# Patient Record
Sex: FEMALE | Race: WHITE | Employment: FULL TIME | URBAN - METROPOLITAN AREA
[De-identification: names, ages, dates, MRNs, and addresses within clinical notes are randomized per-mention and may not be internally consistent; named-entity substitution may affect disease eponyms.]

---

## 2017-03-29 ENCOUNTER — HOSPITAL ENCOUNTER (EMERGENCY)
Age: 27
Discharge: HOME | End: 2017-03-29
Attending: EMERGENCY MEDICINE
Payer: MEDICARE

## 2017-03-29 VITALS
BODY MASS INDEX: 28.25 KG/M2 | SYSTOLIC BLOOD PRESSURE: 139 MMHG | RESPIRATION RATE: 16 BRPM | OXYGEN SATURATION: 98 % | HEIGHT: 67 IN | HEART RATE: 92 BPM | DIASTOLIC BLOOD PRESSURE: 87 MMHG | WEIGHT: 180 LBS | TEMPERATURE: 97.9 F

## 2017-03-29 DIAGNOSIS — F41.1 ANXIETY STATE: Primary | ICD-10-CM

## 2017-03-29 PROCEDURE — 99282 EMERGENCY DEPT VISIT SF MDM: CPT

## 2017-03-29 RX ORDER — LORAZEPAM 1 MG/1
1 TABLET ORAL ONCE
Status: DISCONTINUED | OUTPATIENT
Start: 2017-03-29 | End: 2017-03-29

## 2017-03-29 RX ORDER — LORAZEPAM 1 MG/1
0.5 TABLET ORAL EVERY 8 HOURS PRN
Qty: 5 TABLET | Refills: 0 | Status: SHIPPED | OUTPATIENT
Start: 2017-03-29 | End: 2017-04-28

## 2017-03-29 ASSESSMENT — PAIN SCALES - GENERAL: PAINLEVEL_OUTOF10: 2

## 2017-05-30 ENCOUNTER — HOSPITAL ENCOUNTER (EMERGENCY)
Facility: CLINIC | Age: 27
Discharge: HOME | End: 2017-05-30
Attending: EMERGENCY MEDICINE
Payer: MEDICARE

## 2017-05-30 VITALS
TEMPERATURE: 98.5 F | HEART RATE: 94 BPM | WEIGHT: 180 LBS | OXYGEN SATURATION: 98 % | SYSTOLIC BLOOD PRESSURE: 129 MMHG | BODY MASS INDEX: 28.25 KG/M2 | RESPIRATION RATE: 20 BRPM | HEIGHT: 67 IN | DIASTOLIC BLOOD PRESSURE: 73 MMHG

## 2017-05-30 DIAGNOSIS — N76.0 BACTERIAL VAGINOSIS: Primary | ICD-10-CM

## 2017-05-30 DIAGNOSIS — B96.89 BACTERIAL VAGINOSIS: Primary | ICD-10-CM

## 2017-05-30 DIAGNOSIS — N39.0 URINARY TRACT INFECTION, SITE UNSPECIFIED: ICD-10-CM

## 2017-05-30 LAB
-: ABNORMAL
ABSOLUTE EOS #: 0.1 K/UL (ref 0–0.4)
ABSOLUTE LYMPH #: 2.4 K/UL (ref 1–4.8)
ABSOLUTE MONO #: 0.4 K/UL (ref 0.1–1.2)
AMORPHOUS: ABNORMAL
BACTERIA: ABNORMAL
BASOPHILS # BLD: 0 %
BASOPHILS ABSOLUTE: 0 K/UL (ref 0–0.2)
BILIRUBIN URINE: NEGATIVE
CASTS UA: ABNORMAL /LPF (ref 0–2)
COLOR: YELLOW
COMMENT UA: ABNORMAL
CRYSTALS, UA: ABNORMAL /HPF
DIFFERENTIAL TYPE: ABNORMAL
DIRECT EXAM: ABNORMAL
EOSINOPHILS RELATIVE PERCENT: 1 %
EPITHELIAL CELLS UA: ABNORMAL /HPF (ref 0–5)
GLUCOSE URINE: NEGATIVE
HCG QUALITATIVE: NEGATIVE
HCT VFR BLD CALC: 48 % (ref 36–46)
HEMOGLOBIN: 15.9 G/DL (ref 12–16)
KETONES, URINE: NEGATIVE
LEUKOCYTE ESTERASE, URINE: ABNORMAL
LIPASE: 18 U/L (ref 13–60)
LYMPHOCYTES # BLD: 27 %
Lab: ABNORMAL
MCH RBC QN AUTO: 30.8 PG (ref 26–34)
MCHC RBC AUTO-ENTMCNC: 33.2 G/DL (ref 31–37)
MCV RBC AUTO: 93 FL (ref 80–100)
MONOCYTES # BLD: 5 %
MUCUS: ABNORMAL
NITRITE, URINE: POSITIVE
OTHER OBSERVATIONS UA: ABNORMAL
PDW BLD-RTO: 15.3 % (ref 12.5–15.4)
PH UA: 7 (ref 5–8)
PLATELET # BLD: 166 K/UL (ref 140–450)
PLATELET ESTIMATE: ABNORMAL
PMV BLD AUTO: 10.4 FL (ref 6–12)
PROTEIN UA: NEGATIVE
RBC # BLD: 5.16 M/UL (ref 4–5.2)
RBC # BLD: ABNORMAL 10*6/UL
RBC UA: ABNORMAL /HPF (ref 0–2)
RENAL EPITHELIAL, UA: ABNORMAL /HPF
SEG NEUTROPHILS: 67 %
SEGMENTED NEUTROPHILS ABSOLUTE COUNT: 6 K/UL (ref 1.8–7.7)
SPECIFIC GRAVITY UA: 1.01 (ref 1–1.03)
SPECIMEN DESCRIPTION: ABNORMAL
STATUS: ABNORMAL
TRICHOMONAS: ABNORMAL
TURBIDITY: ABNORMAL
URINE HGB: NEGATIVE
UROBILINOGEN, URINE: NORMAL
WBC # BLD: 8.9 K/UL (ref 3.5–11)
WBC # BLD: ABNORMAL 10*3/UL
WBC UA: ABNORMAL /HPF (ref 0–5)
YEAST: ABNORMAL

## 2017-05-30 PROCEDURE — 87480 CANDIDA DNA DIR PROBE: CPT

## 2017-05-30 PROCEDURE — 83690 ASSAY OF LIPASE: CPT

## 2017-05-30 PROCEDURE — 81001 URINALYSIS AUTO W/SCOPE: CPT

## 2017-05-30 PROCEDURE — 87088 URINE BACTERIA CULTURE: CPT

## 2017-05-30 PROCEDURE — 87491 CHLMYD TRACH DNA AMP PROBE: CPT

## 2017-05-30 PROCEDURE — 36415 COLL VENOUS BLD VENIPUNCTURE: CPT

## 2017-05-30 PROCEDURE — 87510 GARDNER VAG DNA DIR PROBE: CPT

## 2017-05-30 PROCEDURE — 87086 URINE CULTURE/COLONY COUNT: CPT

## 2017-05-30 PROCEDURE — 87660 TRICHOMONAS VAGIN DIR PROBE: CPT

## 2017-05-30 PROCEDURE — 85025 COMPLETE CBC W/AUTO DIFF WBC: CPT

## 2017-05-30 PROCEDURE — 99284 EMERGENCY DEPT VISIT MOD MDM: CPT

## 2017-05-30 PROCEDURE — 87591 N.GONORRHOEAE DNA AMP PROB: CPT

## 2017-05-30 PROCEDURE — 87186 SC STD MICRODIL/AGAR DIL: CPT

## 2017-05-30 PROCEDURE — 84703 CHORIONIC GONADOTROPIN ASSAY: CPT

## 2017-05-30 PROCEDURE — 6370000000 HC RX 637 (ALT 250 FOR IP): Performed by: EMERGENCY MEDICINE

## 2017-05-30 RX ORDER — IBUPROFEN 800 MG/1
800 TABLET ORAL EVERY 8 HOURS PRN
Qty: 30 TABLET | Refills: 0 | Status: SHIPPED | OUTPATIENT
Start: 2017-05-30 | End: 2017-06-19

## 2017-05-30 RX ORDER — ACETAMINOPHEN 500 MG
1000 TABLET ORAL ONCE
Status: COMPLETED | OUTPATIENT
Start: 2017-05-30 | End: 2017-05-30

## 2017-05-30 RX ORDER — METRONIDAZOLE 500 MG/1
500 TABLET ORAL 2 TIMES DAILY
Qty: 14 TABLET | Refills: 0 | Status: SHIPPED | OUTPATIENT
Start: 2017-05-30 | End: 2017-06-06

## 2017-05-30 RX ORDER — NITROFURANTOIN 25; 75 MG/1; MG/1
100 CAPSULE ORAL 2 TIMES DAILY
Qty: 10 CAPSULE | Refills: 0 | Status: SHIPPED | OUTPATIENT
Start: 2017-05-30 | End: 2017-06-04

## 2017-05-30 RX ADMIN — ACETAMINOPHEN 1000 MG: 500 TABLET ORAL at 14:00

## 2017-05-30 ASSESSMENT — ENCOUNTER SYMPTOMS
ABDOMINAL PAIN: 0
BACK PAIN: 0
NAUSEA: 0
CONSTIPATION: 0
EYE PAIN: 0
BLOOD IN STOOL: 0
DIARRHEA: 0
COUGH: 0
VOMITING: 0
SHORTNESS OF BREATH: 0

## 2017-05-30 ASSESSMENT — PAIN DESCRIPTION - ONSET: ONSET: SUDDEN

## 2017-05-30 ASSESSMENT — PAIN DESCRIPTION - FREQUENCY: FREQUENCY: INTERMITTENT

## 2017-05-30 ASSESSMENT — PAIN DESCRIPTION - ORIENTATION: ORIENTATION: LEFT;RIGHT;LOWER

## 2017-05-30 ASSESSMENT — PAIN DESCRIPTION - LOCATION
LOCATION: ABDOMEN
LOCATION: ABDOMEN

## 2017-05-30 ASSESSMENT — PAIN SCALES - GENERAL
PAINLEVEL_OUTOF10: 10
PAINLEVEL_OUTOF10: 8

## 2017-05-30 ASSESSMENT — PAIN DESCRIPTION - PAIN TYPE
TYPE: ACUTE PAIN
TYPE: ACUTE PAIN

## 2017-05-30 ASSESSMENT — PAIN DESCRIPTION - PROGRESSION: CLINICAL_PROGRESSION: GRADUALLY WORSENING

## 2017-05-31 LAB
C TRACH DNA GENITAL QL NAA+PROBE: NEGATIVE
N. GONORRHOEAE DNA: NEGATIVE

## 2017-06-01 LAB
CULTURE: ABNORMAL
CULTURE: ABNORMAL
Lab: ABNORMAL
ORGANISM: ABNORMAL
SPECIMEN DESCRIPTION: ABNORMAL
SPECIMEN DESCRIPTION: ABNORMAL
STATUS: ABNORMAL

## 2017-06-19 ENCOUNTER — APPOINTMENT (OUTPATIENT)
Dept: GENERAL RADIOLOGY | Age: 27
End: 2017-06-19
Payer: MEDICARE

## 2017-06-19 ENCOUNTER — HOSPITAL ENCOUNTER (EMERGENCY)
Age: 27
Discharge: HOME | End: 2017-06-19
Attending: EMERGENCY MEDICINE
Payer: MEDICARE

## 2017-06-19 VITALS
HEIGHT: 67 IN | TEMPERATURE: 98.6 F | HEART RATE: 76 BPM | WEIGHT: 185.5 LBS | OXYGEN SATURATION: 95 % | RESPIRATION RATE: 16 BRPM | BODY MASS INDEX: 29.11 KG/M2 | DIASTOLIC BLOOD PRESSURE: 68 MMHG | SYSTOLIC BLOOD PRESSURE: 122 MMHG

## 2017-06-19 DIAGNOSIS — S93.602A FOOT SPRAIN, LEFT, INITIAL ENCOUNTER: Primary | ICD-10-CM

## 2017-06-19 PROCEDURE — 73630 X-RAY EXAM OF FOOT: CPT

## 2017-06-19 PROCEDURE — 99283 EMERGENCY DEPT VISIT LOW MDM: CPT

## 2017-06-19 RX ORDER — NAPROXEN 500 MG/1
500 TABLET ORAL 2 TIMES DAILY PRN
Qty: 20 TABLET | Refills: 0 | Status: SHIPPED | OUTPATIENT
Start: 2017-06-19 | End: 2021-04-13 | Stop reason: ALTCHOICE

## 2017-06-19 RX ORDER — IBUPROFEN 800 MG/1
800 TABLET ORAL ONCE
Status: DISCONTINUED | OUTPATIENT
Start: 2017-06-19 | End: 2017-06-19 | Stop reason: HOSPADM

## 2017-06-19 ASSESSMENT — PAIN DESCRIPTION - LOCATION: LOCATION: FOOT

## 2017-06-19 ASSESSMENT — PAIN DESCRIPTION - DESCRIPTORS: DESCRIPTORS: ACHING

## 2017-06-19 ASSESSMENT — PAIN DESCRIPTION - ORIENTATION: ORIENTATION: LEFT

## 2017-06-19 ASSESSMENT — PAIN DESCRIPTION - FREQUENCY: FREQUENCY: CONTINUOUS

## 2017-06-19 ASSESSMENT — PAIN SCALES - GENERAL: PAINLEVEL_OUTOF10: 8

## 2018-12-29 ENCOUNTER — HOSPITAL ENCOUNTER (EMERGENCY)
Age: 28
Discharge: HOME | End: 2018-12-29
Attending: EMERGENCY MEDICINE
Payer: MEDICARE

## 2018-12-29 VITALS
OXYGEN SATURATION: 100 % | BODY MASS INDEX: 28.25 KG/M2 | DIASTOLIC BLOOD PRESSURE: 68 MMHG | WEIGHT: 180 LBS | TEMPERATURE: 98.4 F | HEART RATE: 83 BPM | HEIGHT: 67 IN | SYSTOLIC BLOOD PRESSURE: 135 MMHG | RESPIRATION RATE: 18 BRPM

## 2018-12-29 DIAGNOSIS — K08.89 PAIN, DENTAL: Primary | ICD-10-CM

## 2018-12-29 PROCEDURE — 99283 EMERGENCY DEPT VISIT LOW MDM: CPT

## 2018-12-29 RX ORDER — PENICILLIN V POTASSIUM 500 MG/1
500 TABLET ORAL 4 TIMES DAILY
Qty: 40 TABLET | Refills: 0 | Status: SHIPPED | OUTPATIENT
Start: 2018-12-29 | End: 2021-04-13 | Stop reason: ALTCHOICE

## 2018-12-29 ASSESSMENT — PAIN DESCRIPTION - ORIENTATION: ORIENTATION: LEFT;LOWER;UPPER

## 2018-12-29 ASSESSMENT — ENCOUNTER SYMPTOMS
RHINORRHEA: 0
VOMITING: 0
DIARRHEA: 0
SHORTNESS OF BREATH: 0
SINUS PRESSURE: 0
COLOR CHANGE: 0
CONSTIPATION: 0
COUGH: 0
NAUSEA: 0
ABDOMINAL PAIN: 0
WHEEZING: 0
SORE THROAT: 0

## 2018-12-29 ASSESSMENT — PAIN DESCRIPTION - LOCATION: LOCATION: JAW

## 2018-12-29 ASSESSMENT — PAIN DESCRIPTION - PAIN TYPE: TYPE: ACUTE PAIN

## 2018-12-29 ASSESSMENT — PAIN DESCRIPTION - DESCRIPTORS: DESCRIPTORS: ACHING;THROBBING;STABBING;SHARP

## 2018-12-29 ASSESSMENT — PAIN SCALES - GENERAL: PAINLEVEL_OUTOF10: 10

## 2019-05-18 ENCOUNTER — APPOINTMENT (OUTPATIENT)
Dept: CT IMAGING | Age: 29
End: 2019-05-18
Payer: MEDICARE

## 2019-05-18 ENCOUNTER — HOSPITAL ENCOUNTER (EMERGENCY)
Age: 29
Discharge: HOME | End: 2019-05-18
Payer: MEDICARE

## 2019-05-18 VITALS
OXYGEN SATURATION: 100 % | DIASTOLIC BLOOD PRESSURE: 69 MMHG | SYSTOLIC BLOOD PRESSURE: 130 MMHG | BODY MASS INDEX: 26.73 KG/M2 | HEART RATE: 67 BPM | RESPIRATION RATE: 16 BRPM | HEIGHT: 67 IN | WEIGHT: 170.3 LBS | TEMPERATURE: 98.5 F

## 2019-05-18 DIAGNOSIS — S09.90XA MINOR HEAD INJURY, INITIAL ENCOUNTER: ICD-10-CM

## 2019-05-18 DIAGNOSIS — S09.90XA CLOSED HEAD INJURY, INITIAL ENCOUNTER: ICD-10-CM

## 2019-05-18 DIAGNOSIS — S09.90XA INJURY OF HEAD, INITIAL ENCOUNTER: Primary | ICD-10-CM

## 2019-05-18 PROCEDURE — 70450 CT HEAD/BRAIN W/O DYE: CPT

## 2019-05-18 PROCEDURE — 99285 EMERGENCY DEPT VISIT HI MDM: CPT

## 2019-05-18 RX ORDER — METRONIDAZOLE 500 MG/1
500 TABLET ORAL 2 TIMES DAILY
Qty: 14 TABLET | Refills: 0 | Status: SHIPPED | OUTPATIENT
Start: 2019-05-18 | End: 2019-05-25

## 2019-05-18 RX ORDER — DEXTROAMPHETAMINE SACCHARATE, AMPHETAMINE ASPARTATE, DEXTROAMPHETAMINE SULFATE AND AMPHETAMINE SULFATE 5; 5; 5; 5 MG/1; MG/1; MG/1; MG/1
20 TABLET ORAL 2 TIMES DAILY
COMMUNITY
End: 2021-04-13 | Stop reason: ALTCHOICE

## 2019-05-18 ASSESSMENT — PAIN DESCRIPTION - DESCRIPTORS: DESCRIPTORS: PRESSURE

## 2019-05-18 ASSESSMENT — PAIN DESCRIPTION - FREQUENCY: FREQUENCY: CONTINUOUS

## 2019-05-18 ASSESSMENT — PAIN DESCRIPTION - LOCATION: LOCATION: HEAD

## 2019-05-18 ASSESSMENT — PAIN SCALES - GENERAL: PAINLEVEL_OUTOF10: 8

## 2019-05-19 NOTE — ED NOTES
Pt discharge discussed. CT results discussed. Pt advised that if sx persist to f/u with PCP for more in-depth testing than what can be done in an ED setting. Pt had no further questions or concerns.       Chelsie Delgado RN  05/18/19 1814

## 2019-05-20 ASSESSMENT — ENCOUNTER SYMPTOMS
WHEEZING: 0
SHORTNESS OF BREATH: 0
VOMITING: 0
ABDOMINAL PAIN: 0
RHINORRHEA: 0
SORE THROAT: 0
CONSTIPATION: 0
COLOR CHANGE: 0
COUGH: 0
SINUS PRESSURE: 0
DIARRHEA: 0
NAUSEA: 0
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
## 2020-06-18 ENCOUNTER — TELEPHONE (OUTPATIENT)
Dept: OBGYN CLINIC | Age: 30
End: 2020-06-18

## 2021-02-04 ENCOUNTER — HOSPITAL ENCOUNTER (OUTPATIENT)
Age: 31
Setting detail: SPECIMEN
Discharge: HOME | End: 2021-02-04
Payer: MEDICARE

## 2021-02-04 ENCOUNTER — OFFICE VISIT (OUTPATIENT)
Dept: OBGYN CLINIC | Age: 31
End: 2021-02-04
Payer: MEDICARE

## 2021-02-04 VITALS
SYSTOLIC BLOOD PRESSURE: 118 MMHG | WEIGHT: 142 LBS | HEIGHT: 67 IN | DIASTOLIC BLOOD PRESSURE: 72 MMHG | BODY MASS INDEX: 22.29 KG/M2

## 2021-02-04 DIAGNOSIS — Z20.2 STD EXPOSURE: ICD-10-CM

## 2021-02-04 DIAGNOSIS — N88.9 CERVICAL LESION: ICD-10-CM

## 2021-02-04 DIAGNOSIS — Z01.419 ENCOUNTER FOR GYNECOLOGICAL EXAMINATION WITHOUT ABNORMAL FINDING: Primary | ICD-10-CM

## 2021-02-04 DIAGNOSIS — R35.0 FREQUENT URINATION: Primary | ICD-10-CM

## 2021-02-04 PROCEDURE — G8420 CALC BMI NORM PARAMETERS: HCPCS | Performed by: OBSTETRICS & GYNECOLOGY

## 2021-02-04 PROCEDURE — G8427 DOCREV CUR MEDS BY ELIG CLIN: HCPCS | Performed by: OBSTETRICS & GYNECOLOGY

## 2021-02-04 PROCEDURE — G8484 FLU IMMUNIZE NO ADMIN: HCPCS | Performed by: OBSTETRICS & GYNECOLOGY

## 2021-02-04 PROCEDURE — 99203 OFFICE O/P NEW LOW 30 MIN: CPT | Performed by: OBSTETRICS & GYNECOLOGY

## 2021-02-04 PROCEDURE — 4004F PT TOBACCO SCREEN RCVD TLK: CPT | Performed by: OBSTETRICS & GYNECOLOGY

## 2021-02-04 ASSESSMENT — PATIENT HEALTH QUESTIONNAIRE - PHQ9
1. LITTLE INTEREST OR PLEASURE IN DOING THINGS: 0
2. FEELING DOWN, DEPRESSED OR HOPELESS: 0
SUM OF ALL RESPONSES TO PHQ QUESTIONS 1-9: 0

## 2021-02-04 NOTE — PATIENT INSTRUCTIONS
We will contact you with the results of today's vaginal cultures and if any treatment is necessary we will send that to your pharmacy. We will also contact you with the results of today's Pap smear and after that is resulted please schedule an appointment in the office to have another colposcopy of your cervix. If you still want Depo-Provera we will begin within 5 days of your next normal cycle.   Striving to serve committed to caring the people station the juice .3 striving to serve committed to caring the people station the juice .3

## 2021-02-04 NOTE — PROGRESS NOTES
DATE OF VISIT:  21        History and Physical    Cheryle Giang    :  1990  CHIEF COMPLAINT:    Chief Complaint   Patient presents with   Flaco Novak Doctor     New patient Here for annual last pap 18 LSIL has freq urination  wants cultures done                       HPI :   Cheryle Giang is a 31 y.o. femaleGRAVIDA 1 PARA 1001    Cheryle Giang comes to the office today as a new visit self-referral.  She is here to establish care and have her annual exam.  She was last seen by a GYN 2 to 3 years ago for colposcopy secondary to LGSIL. Currently she is not sexually active, has been on Depo-Provera in the past and desires to restart. She states that she is not sure if her discharge is abnormal today? She is also been experiencing urinary frequency without dysuria or hematuria. She has regular monthly cycles without BTB. She is having regular bowel movements without constipation or diarrhea. She is otherwise without any significant complaints today. Omi Crespo is currently on Subutex for previous heroin abuse and states that she has not used in 4 years. _____________________________________________________________________  Past Medical History:   Diagnosis Date    ADD (attention deficit disorder)     Substance abuse ([facility] [address]     heroin / on 18 pt states last used 3 yrs ago                                                                   Past Surgical History:   Procedure Laterality Date    OVARY REMOVAL      left    TONSILLECTOMY       History reviewed. No pertinent family history.   Social History     Tobacco Use   Smoking Status Current Every Day Smoker    Packs/day: 0.50    Types: Cigarettes   Smokeless Tobacco Never Used     Social History     Substance and Sexual Activity   Alcohol Use Yes    Comment: socially     Current Outpatient Medications   Medication Sig Dispense Refill    Buprenorphine HCl (SUBUTEX SL) Place under the tongue 2 / 8mg daily      amphetamine-dextroamphetamine (ADDERALL, 20MG,) 20 MG tablet Take 20 mg by mouth 2 times daily.  Prenatal Multivit-Min-Fe-FA (PRENATAL VITAMINS PO) Take by mouth      penicillin v potassium (VEETID) 500 MG tablet Take 1 tablet by mouth 4 times daily (Patient not taking: Reported on 2021) 40 tablet 0    naproxen (NAPROSYN) 500 MG tablet Take 1 tablet by mouth 2 times daily as needed for Pain (Patient not taking: Reported on 2021) 20 tablet 0     No current facility-administered medications for this visit. Allergies: Allergies   Allergen Reactions    Ceftin [Cefuroxime Axetil]        Gynecologic History:  Patient's last menstrual period was 2021. Sexually Active: No  STD History: No  Abnormal Pap History yes  Birth Control: No    OB History    Para Term  AB Living   1 0 0 0 0 1   SAB TAB Ectopic Molar Multiple Live Births   0 0 0 0 0 0     ______________________________________________________________________  REVIEW OF SYSTEMS:        Constitutional:  Unexpected weight change no  Neurological:  Frequent headaches  no  Ophthalmic:  Recent visual changes no  ENT:   Difficulty swallowing  no  Breast:              Masses   no     Respiratory:  Shortness of breath  no    Cardiovascular: Chest pain   no     Gastrointestinal: Chronic diarrhea/constipation no   Urogenital:  Urinary incontinence  no                                         Heavy/irregular periods           no                                      Vaginal discharge                   ?   Hematological: Bruises easy   no     Endocrine:  Nipple Discharge  no     Hot/Cold Intolerance  no   Psychological:  Mood and affect were wnl yes                                                                                                                                           Physical Exam:    Vitals:    21 0925   BP: 118/72   Site: Right Upper Arm   Position: Sitting   Cuff Size: Medium Adult   Weight: 142 lb (64.4 kg) Height: 5' 7\" (1.702 m)       General Appearance:  She does not appear to be in any distress. This  is a well developed, well nourished, well groomed female. Neurological:  The patient is alert and oriented to time, place, person, and situation without any noted sensory motor deficits. Skin:  A brief inspection of the skin revealed no rashes or lesions. Neck:  The neck was supple. There is no tracheal deviation, thyromegaly or supraclavicular adenopathy appreciated. Respiratory: There was unlabored respiratory effort. Lungs clear to ascultation without wheezes, rales or rhonchi in all fields bilaterally. Cardiovascular:  Normal sinus rhythm with a regular rate and without murmur, rubs or gallops. Abdomen: The abdomen was soft and non-tender with no guarding, rebound, CVAT or rigidity. No hernias were appreciated. Bowel sounds were normally active. Pelvic exam:  No vulvar or vaginal lesions are noted. There is a large white plaque at 12:00 on the cervix present. Normal vaginal discharge present, no significant cystocele, rectocele or enterocele noted. Uterus nongravid and without CMT and adnexa nontender and without abnormal masses bilaterally. Extremities:  FROM and nontender without clubbing cyanosis or edema. ASSESSMENT:         ICD-10-CM    1. Encounter for gynecological examination without abnormal finding  Z01.419 PAP SMEAR   2. STD exposure  Z20.2 C.trachomatis N.gonorrhoeae DNA     VAGINITIS DNA PROBE   3. Cervical lesion  N88.9                    PLAN:  Discussed presence of the cervical lesion with her today and colposcopy was recommended. She is agreeable and will schedule a separate appointment. No absolute contraindications to restart Depo-Provera and she will return within 5 days of her next normal menstrual cycle. Maykel Bernal M.D., Mph  [facility]  [place]

## 2021-02-05 DIAGNOSIS — Z20.2 STD EXPOSURE: ICD-10-CM

## 2021-02-05 LAB
-: ABNORMAL
AMORPHOUS: ABNORMAL
BACTERIA: ABNORMAL
BILIRUBIN URINE: NEGATIVE
CASTS UA: ABNORMAL /LPF (ref 0–8)
COLOR: ABNORMAL
COMMENT UA: ABNORMAL
CRYSTALS, UA: ABNORMAL /HPF
CRYSTALS, UA: ABNORMAL /HPF
DIRECT EXAM: NORMAL
EPITHELIAL CELLS UA: ABNORMAL /HPF (ref 0–5)
GLUCOSE URINE: NEGATIVE
KETONES, URINE: NEGATIVE
LEUKOCYTE ESTERASE, URINE: NEGATIVE
Lab: NORMAL
MUCUS: ABNORMAL
NITRITE, URINE: NEGATIVE
OTHER OBSERVATIONS UA: ABNORMAL
PH UA: 5.5 (ref 5–8)
PROTEIN UA: NEGATIVE
RBC UA: ABNORMAL /HPF (ref 0–4)
RENAL EPITHELIAL, UA: ABNORMAL /HPF
SPECIFIC GRAVITY UA: 1.02 (ref 1–1.03)
SPECIMEN DESCRIPTION: NORMAL
TRICHOMONAS: ABNORMAL
TURBIDITY: ABNORMAL
URINE HGB: NEGATIVE
UROBILINOGEN, URINE: NORMAL
WBC UA: ABNORMAL /HPF (ref 0–5)
YEAST: ABNORMAL

## 2021-02-06 LAB
CULTURE: NORMAL
Lab: NORMAL
SPECIMEN DESCRIPTION: NORMAL

## 2021-02-09 LAB
C TRACH DNA GENITAL QL NAA+PROBE: NEGATIVE
N. GONORRHOEAE DNA: NEGATIVE
SPECIMEN DESCRIPTION: NORMAL

## 2021-02-10 LAB
HPV SAMPLE: ABNORMAL
HPV, GENOTYPE 16: DETECTED
HPV, GENOTYPE 18: NOT DETECTED
HPV, HIGH RISK OTHER: NOT DETECTED
HPV, INTERPRETATION: ABNORMAL
SPECIMEN DESCRIPTION: ABNORMAL

## 2021-02-17 LAB — CYTOLOGY REPORT: NORMAL

## 2021-02-25 ENCOUNTER — HOSPITAL ENCOUNTER (OUTPATIENT)
Age: 31
Setting detail: SPECIMEN
Discharge: HOME | End: 2021-02-25
Payer: MEDICARE

## 2021-02-25 ENCOUNTER — PROCEDURE VISIT (OUTPATIENT)
Dept: OBGYN CLINIC | Age: 31
End: 2021-02-25
Payer: MEDICARE

## 2021-02-25 VITALS
SYSTOLIC BLOOD PRESSURE: 118 MMHG | TEMPERATURE: 97 F | WEIGHT: 142 LBS | HEIGHT: 67 IN | BODY MASS INDEX: 22.29 KG/M2 | DIASTOLIC BLOOD PRESSURE: 72 MMHG

## 2021-02-25 DIAGNOSIS — A63.0: ICD-10-CM

## 2021-02-25 DIAGNOSIS — N84.1 CERVICAL POLYP: ICD-10-CM

## 2021-02-25 DIAGNOSIS — R87.613 PAP SMEAR ABNORMALITY OF CERVIX WITH HGSIL: Primary | ICD-10-CM

## 2021-02-25 DIAGNOSIS — Z30.42 ENCOUNTER FOR MANAGEMENT AND INJECTION OF DEPO-PROVERA: ICD-10-CM

## 2021-02-25 PROCEDURE — 57455 BIOPSY OF CERVIX W/SCOPE: CPT | Performed by: OBSTETRICS & GYNECOLOGY

## 2021-02-25 RX ORDER — MEDROXYPROGESTERONE ACETATE 150 MG/ML
150 INJECTION, SUSPENSION INTRAMUSCULAR ONCE
Status: COMPLETED | OUTPATIENT
Start: 2021-02-25 | End: 2021-02-25

## 2021-02-25 RX ADMIN — MEDROXYPROGESTERONE ACETATE 150 MG: 150 INJECTION, SUSPENSION INTRAMUSCULAR at 15:40

## 2021-02-25 NOTE — PROGRESS NOTES
After obtaining consent, and per orders of Dr. Gerard Holly, injection of Depo Provera 150mg  given in Left upper quad. gluteus by Raj Felix. Patient instructed to remain in clinic for 20 minutes afterwards, and to report any adverse reaction to me immediately.

## 2021-02-25 NOTE — PATIENT INSTRUCTIONS
Please read the information given to you on abnormal Pap smears and LEEP surgery. You may also choose to reference Web MD or the [facility] of obstetrics and gynecology for more information. We will contact you with the results of today's biopsy and tentatively plan on performing a LEEP. Please call the office if you have any questions or concerns. Patient Education        Patient Education        Abnormal Pap Test: Care Instructions  Your Care Instructions     A Pap test (also called a Pap smear) is used to look for early changes that may become cancer of the cervix. Your Pap test was abnormal. That may mean that some cells in your cervix have changed. The cell changes are most often caused by human papillomavirus (HPV) infection. An abnormal Pap test does not mean that the abnormal cells will lead to cancer. Changes in cervical cells may go away on their own or may progress slowly. Your doctor may want to repeat the Pap test or have you take other tests to see if you have cell changes. It is very important that you have regular Pap tests after you've had an abnormal Pap test.  Follow-up care is a key part of your treatment and safety. Be sure to make and go to all appointments, and call your doctor if you are having problems. It's also a good idea to know your test results and keep a list of the medicines you take. How can you care for yourself at home? · Do not smoke. Smoking may increase your risk for cervical cell changes. If you need help quitting, talk to your doctor about stop-smoking programs and medicines. These can increase your chances of quitting for good. · Be sure to get follow-up Pap tests or other follow-up tests as recommended by your doctor. When should you call for help? Watch closely for changes in your health, and be sure to contact your doctor if you have any problems. Where can you learn more? Go to [email or web address] and sign in to your MyChart account. Enter P925 in the [facility] box to learn more about \"Abnormal Pap Test: Care Instructions. \"     If you do not have an account, please click on the \"Sign Up Now\" link. Current as of: April 29, 2020               Content Version: 12.6  © 9484-6362 [employer]. Care instructions adapted under license by [employer]. If you have questions about a medical condition or this instruction, always ask your healthcare professional. [address] any warranty or liability for your use of this information.        12

## 2021-02-25 NOTE — PROGRESS NOTES
Physical Exam  Genitourinary:            Genitourinary Comments: Constanza Enrique returns to the office today for colposcopic follow-up on an abnormal Pap smear. On her initial annual exam she was seen to have several large cervical condyloma and a cervical polyp. Pap smear results revealed HGSIL with HR-HPV+. Cervical material, (ThinPrep vial, Imaging-assisted review):   Specimen Adequacy:        Satisfactory for evaluation.        - Endocervical/transformation zone component present. Descriptive Diagnosis:        High-grade squamous intraepithelial lesion (HSIL).  Comments:        High Risk HPV testing was ordered. Cytotechnologist: Chel Dia M.D.   **Electronically Signed Out**         des/2/17/2021   Constanza Enrique has a history of abnormal Paps and was scheduled to have a colposcopy previously but did not follow-up. Her results were discussed and she agrees to proceed with colposcopy today. She also desires to get Depo-Provera. She has not been sexually active. Procedure/Addendum   HPV Procedure Report     Date Ordered:     2/8/2021     Status: Signed   Out        Date Complete:     2/8/2021     By: BETTIE Pascual(ASCP)        Date Reported:     2/18/2021       INTERPRETATION   Roche HPV DNA High Risk                                                               HPV Sample               Thin Prep                    (Ref Range)   HPV Type 16               Detected                    (Not Detected)   HPV Type 18               Not Detected                    (Not   Detected)   Other High Risk HPV     Not Detected                    (Not Detected)    Physical exam:  -------------------------              02/25/21                    1430        -------------------------   BP:         118/72        Temp:   97 °F (36.1 °C)  -------------------------    No vulvar lesions or inflammation noted. Speculum exam reveals small amount of post menstrual discharge.   Colposcopy was satisfactory. Large condylomatous plaque seen at 1:00 4 and 5:00. Benign-appearing cervical polyp also noted. Assessment/plan:  HGSIL, HR-HPV+  Cervical condyloma. Cervical polyp. ECC performed with Cytobrush and biopsy done at 1:00. Discussed abnormal Pap smears and LEEP. She was given information on both and will be contacted with today's results and recommendations.

## 2021-03-01 LAB — SURGICAL PATHOLOGY REPORT: NORMAL

## 2021-03-09 ENCOUNTER — OFFICE VISIT (OUTPATIENT)
Dept: OBGYN CLINIC | Age: 31
End: 2021-03-09
Payer: MEDICARE

## 2021-03-09 VITALS
TEMPERATURE: 97 F | WEIGHT: 138 LBS | HEIGHT: 67 IN | BODY MASS INDEX: 21.66 KG/M2 | SYSTOLIC BLOOD PRESSURE: 118 MMHG | DIASTOLIC BLOOD PRESSURE: 78 MMHG

## 2021-03-09 DIAGNOSIS — A63.0: ICD-10-CM

## 2021-03-09 DIAGNOSIS — F19.90 SUBSTANCE USE DISORDER: ICD-10-CM

## 2021-03-09 DIAGNOSIS — R87.613 HSIL (HIGH GRADE SQUAMOUS INTRAEPITHELIAL LESION) ON PAP SMEAR OF CERVIX: Primary | ICD-10-CM

## 2021-03-09 PROCEDURE — G8427 DOCREV CUR MEDS BY ELIG CLIN: HCPCS | Performed by: OBSTETRICS & GYNECOLOGY

## 2021-03-09 PROCEDURE — 4004F PT TOBACCO SCREEN RCVD TLK: CPT | Performed by: OBSTETRICS & GYNECOLOGY

## 2021-03-09 PROCEDURE — G8420 CALC BMI NORM PARAMETERS: HCPCS | Performed by: OBSTETRICS & GYNECOLOGY

## 2021-03-09 PROCEDURE — G8484 FLU IMMUNIZE NO ADMIN: HCPCS | Performed by: OBSTETRICS & GYNECOLOGY

## 2021-03-09 PROCEDURE — 99214 OFFICE O/P EST MOD 30 MIN: CPT | Performed by: OBSTETRICS & GYNECOLOGY

## 2021-03-09 NOTE — PATIENT INSTRUCTIONS
Please carefully follow all the preoperative instructions given to you. Please call the office number if you have any questions or concerns before or after surgery. Plan on returning to the office 10 to 14 days after surgery.

## 2021-03-09 NOTE — PROGRESS NOTES
DATE OF VISIT:  3/9/21        History and Physical    Cheryle Giang    :  1990  CHIEF COMPLAINT:    Chief Complaint   Patient presents with    Pre-op Exam     Here for Preop [place] HSIL 21                    HPI :   Cheryle Giang is a 31 y.o. femaleGRAVIDA 1 PARA 1001    Cheryle Giang returns today to discuss GYN surgery. She is here to discuss scheduling a LEEP secondary to    Diagnosis Orders   1. HSIL (high grade squamous intraepithelial lesion) on Pap smear of cervix     2. Cervical condyloma     3. Substance use disorder     4. Positive ECC for HGSIL     Patient education was previously done regarding her diagnosis and management. She does desire to proceed with the intended procedure. She is otherwise without any significant complaints.  _____________________________________________________________________  Past Medical History:   Diagnosis Date    ADD (attention deficit disorder)     Substance abuse ([facility] [address]     heroin / on 18 pt states last used 3 yrs ago                                                                   Past Surgical History:   Procedure Laterality Date    OVARY REMOVAL      left    TONSILLECTOMY       No family history on file. Social History     Tobacco Use   Smoking Status Current Every Day Smoker    Packs/day: 0.50    Types: Cigarettes   Smokeless Tobacco Never Used     Social History     Substance and Sexual Activity   Alcohol Use Yes    Comment: socially     Current Outpatient Medications   Medication Sig Dispense Refill    amphetamine-dextroamphetamine (ADDERALL, 20MG,) 20 MG tablet Take 20 mg by mouth 2 times daily.       Prenatal Multivit-Min-Fe-FA (PRENATAL VITAMINS PO) Take by mouth      Buprenorphine HCl (SUBUTEX SL) Place under the tongue 2 / 8mg daily      penicillin v potassium (VEETID) 500 MG tablet Take 1 tablet by mouth 4 times daily (Patient not taking: Reported on 2021) 40 tablet 0    naproxen (NAPROSYN) 500 MG tablet Take 1 tablet by mouth 2 times daily as needed for Pain (Patient not taking: Reported on 2021) 20 tablet 0     No current facility-administered medications for this visit. Allergies: Allergies   Allergen Reactions    Ceftin [Cefuroxime Axetil]        Gynecologic History:  Patient's last menstrual period was 2021. Sexually Active: Yes  STD History: No  Abnormal Pap History yes  Birth Control: No    OB History    Para Term  AB Living   1 0 0 0 0 1   SAB TAB Ectopic Molar Multiple Live Births   0 0 0 0 0 0     ______________________________________________________________________  REVIEW OF SYSTEMS:        Constitutional:  Unexpected weight change no  Neurological:  Frequent headaches  no  Ophthalmic:  Recent visual changes no  ENT:   Difficulty swallowing  no  Breast:              Masses   no     Respiratory:  Shortness of breath  no    Cardiovascular: Chest pain   no     Gastrointestinal: Chronic diarrhea/constipation no   Urogenital:  Urinary incontinence  no                                         Heavy/irregular periods           no                                      Vaginal discharge                   no  Hematological: Bruises easy   no     Endocrine:  Nipple Discharge  no     Hot/Cold Intolerance  no   Psychological:  Mood and affect were wnl yes                                                                                                                                           Physical Exam:    Vitals:    21 1407   BP: 118/78   Site: Right Upper Arm   Position: Sitting   Cuff Size: Medium Adult   Temp: 97 °F (36.1 °C)   Weight: 138 lb (62.6 kg)   Height: 5' 7\" (1.702 m)       General Appearance:  She does not appear to be in any distress. This  is a well developed, well nourished, well groomed female. Neurological:  The patient is alert and oriented to time, place, person, and situation without any noted sensory motor deficits.     Skin:  A brief inspection of the skin revealed no rashes or lesions. Neck:  The neck was supple. There is no tracheal deviation, thyromegaly or supraclavicular adenopathy appreciated. Respiratory: There was unlabored respiratory effort. Lungs clear to ascultation without wheezes, rales or rhonchi in all fields bilaterally. Cardiovascular:  Normal sinus rhythm with a regular rate and without murmur, rubs or gallops. Abdomen: The abdomen was soft and non-tender with no guarding, rebound, CVAT or rigidity. No hernias were appreciated. Bowel sounds were normally active. Pelvic exam:  For EUA. Extremities:  FROM and nontender without clubbing cyanosis or edema. ASSESSMENT:         ICD-10-CM    1. HSIL (high grade squamous intraepithelial lesion) on Pap smear of cervix  R87.613    2. Cervical condyloma  A63.0    3. Substance use disorder  F19.90                    PLAN:    Proper informed consent was done, alternatives were discussed   and she understands the surgical risks to the proposed procedure including but not limited to: infection, hemorrhage, blood clot formation, damage to the gastrointestinal/genital urinary/neurological/vascular systems, death and failure in the proposed procedure's intent. She also understands the risks from transfusion including but not limited to: fever, allergic reaction, hepatitis B/C. and HIV. All her questions have been answered to her satisfaction. The consent has been signed for a LEEP . Preop labs will include a CBC, type & screen, HCG and BRIGITTE. We will not require antibiotic prophylaxis and will use SCDs for VTE prophylaxis. She will plan on returning to the office in 1-2 weeks postop. Corbin Locke MD, MPH, [facility]    Patient was seen with total face to face time of 20 minutes.

## 2021-03-25 ENCOUNTER — OFFICE VISIT (OUTPATIENT)
Dept: FAMILY MEDICINE CLINIC | Age: 31
End: 2021-03-25
Payer: MEDICARE

## 2021-03-25 VITALS
OXYGEN SATURATION: 97 % | HEART RATE: 66 BPM | DIASTOLIC BLOOD PRESSURE: 70 MMHG | TEMPERATURE: 98.6 F | WEIGHT: 141.8 LBS | BODY MASS INDEX: 22.21 KG/M2 | SYSTOLIC BLOOD PRESSURE: 117 MMHG

## 2021-03-25 DIAGNOSIS — Z13.220 ENCOUNTER FOR LIPID SCREENING FOR CARDIOVASCULAR DISEASE: ICD-10-CM

## 2021-03-25 DIAGNOSIS — R41.840 INATTENTION: ICD-10-CM

## 2021-03-25 DIAGNOSIS — Z76.89 ENCOUNTER TO ESTABLISH CARE WITH NEW DOCTOR: Primary | ICD-10-CM

## 2021-03-25 DIAGNOSIS — Z11.59 NEED FOR HEPATITIS C SCREENING TEST: ICD-10-CM

## 2021-03-25 DIAGNOSIS — Z13.1 DIABETES MELLITUS SCREENING: ICD-10-CM

## 2021-03-25 DIAGNOSIS — Z13.6 ENCOUNTER FOR LIPID SCREENING FOR CARDIOVASCULAR DISEASE: ICD-10-CM

## 2021-03-25 PROCEDURE — 4004F PT TOBACCO SCREEN RCVD TLK: CPT | Performed by: FAMILY MEDICINE

## 2021-03-25 PROCEDURE — G8484 FLU IMMUNIZE NO ADMIN: HCPCS | Performed by: FAMILY MEDICINE

## 2021-03-25 PROCEDURE — G8427 DOCREV CUR MEDS BY ELIG CLIN: HCPCS | Performed by: FAMILY MEDICINE

## 2021-03-25 PROCEDURE — G8420 CALC BMI NORM PARAMETERS: HCPCS | Performed by: FAMILY MEDICINE

## 2021-03-25 PROCEDURE — 99204 OFFICE O/P NEW MOD 45 MIN: CPT | Performed by: FAMILY MEDICINE

## 2021-03-25 RX ORDER — HYDROCORTISONE ACETATE 25 MG/1
25 SUPPOSITORY RECTAL EVERY 12 HOURS
Qty: 10 SUPPOSITORY | Refills: 1 | Status: SHIPPED | OUTPATIENT
Start: 2021-03-25 | End: 2021-04-13 | Stop reason: ALTCHOICE

## 2021-03-25 SDOH — ECONOMIC STABILITY: FOOD INSECURITY: WITHIN THE PAST 12 MONTHS, THE FOOD YOU BOUGHT JUST DIDN'T LAST AND YOU DIDN'T HAVE MONEY TO GET MORE.: NEVER TRUE

## 2021-03-25 SDOH — ECONOMIC STABILITY: INCOME INSECURITY: HOW HARD IS IT FOR YOU TO PAY FOR THE VERY BASICS LIKE FOOD, HOUSING, MEDICAL CARE, AND HEATING?: NOT HARD AT ALL

## 2021-03-25 SDOH — ECONOMIC STABILITY: TRANSPORTATION INSECURITY
IN THE PAST 12 MONTHS, HAS THE LACK OF TRANSPORTATION KEPT YOU FROM MEDICAL APPOINTMENTS OR FROM GETTING MEDICATIONS?: NOT ASKED

## 2021-03-25 NOTE — PROGRESS NOTES
3/25/2021    Noelle Kim (:  1990) is a 31 y.o. female, coming today to establish care. She has a 3 yo son - ADHD messes with her head, since she was a kids. When she had the med her insurance cut off and she did not have the med. She was in a bad relationship but she got out and she now is falling apart. Her family helped her get out of this relationship. She is a very hard worker - sever,  --at [employer] And she was hoping that \"we can help her gaining weight\". The patient is on buprenorphine. She states that she was a heroin abuser for a long time. Now she is in program and she has been clean for years. No violence at the current living place. No concerns about sexual transmitted diseases. Tobacco use: 0.33 ppd/for a long time. Alcohol use:none  Other drug use: none  Depressed: not depressed. Mom 62 yo - healthy. Dad 43th yo -  killed himself. Siblings: 27 yo brother  of a MI. Patient Active Problem List   Diagnosis    Encounter to establish care with new doctor       Review of Systems   Pertinent items are noted in HPI. Prior to Visit Medications    Medication Sig Taking? Authorizing Provider   hydrocortisone (ANUSOL-HC) 25 MG suppository Place 1 suppository rectally every 12 hours Yes Ann Cheatham MD   Buprenorphine HCl (SUBUTEX SL) Place under the tongue 2 / 8mg daily Yes Historical Provider, MD   amphetamine-dextroamphetamine (ADDERALL, 20MG,) 20 MG tablet Take 20 mg by mouth 2 times daily.   Historical Provider, MD   Prenatal Multivit-Min-Fe-FA (PRENATAL VITAMINS PO) Take by mouth  Historical Provider, MD   penicillin v potassium (VEETID) 500 MG tablet Take 1 tablet by mouth 4 times daily  Patient not taking: Reported on 2021  SCOTT Espinoza CNP   naproxen (NAPROSYN) 500 MG tablet Take 1 tablet by mouth 2 times daily as needed for Pain  Patient not taking: Reported on 2021  SCOTT Malik CNP        Allergies Allergen Reactions    Ceftin [Cefuroxime Axetil]        Past Medical History:   Diagnosis Date    ADD (attention deficit disorder)     Substance abuse ([facility] [address]     heroin / on 12/29/18 pt states last used 3 yrs ago       Past Surgical History:   Procedure Laterality Date    OVARY REMOVAL      left    TONSILLECTOMY         Social History     Socioeconomic History    Marital status: Single     Spouse name: Not on file    Number of children: Not on file    Years of education: Not on file    Highest education level: Not on file   Occupational History    Not on file   Social Needs    Financial resource strain: Not hard at all   Breadcrumbtracking insecurity     Worry: Never true     Inability: Never true   [employer] needs     Medical: Not on file     Non-medical: Not on file   Tobacco Use    Smoking status: Current Every Day Smoker     Packs/day: 0.50     Types: Cigarettes    Smokeless tobacco: Never Used   Substance and Sexual Activity    Alcohol use: Yes     Comment: socially    Drug use: Not Currently     Types: IV     Comment: heroin, last used 2015    Sexual activity: Yes     Partners: Male   Lifestyle    Physical activity     Days per week: Not on file     Minutes per session: Not on file    Stress: Not on file   Relationships    Social connections     Talks on phone: Not on file     Gets together: Not on file     Attends Hoahaoism service: Not on file     Active member of club or organization: Not on file     Attends meetings of clubs or organizations: Not on file     Relationship status: Not on file    Intimate partner violence     Fear of current or ex partner: Not on file     Emotionally abused: Not on file     Physically abused: Not on file     Forced sexual activity: Not on file   Other Topics Concern    Not on file   Social History Narrative    Not on file        No family history on file.     ADVANCE DIRECTIVE: N, <no information>    Vitals:    03/25/21 1401   BP: 117/70   Pulse: 66   Temp: 98.6 °F (37 °C)   SpO2: 97%   Weight: 141 lb 12.8 oz (64.3 kg)     Estimated body mass index is 22.21 kg/m² as calculated from the following:    Height as of 3/9/21: 5' 7\" (1.702 m). Weight as of this encounter: 141 lb 12.8 oz (64.3 kg). Physical Exam   HENT:   /70   Pulse 66   Temp 98.6 °F (37 °C)   Wt 141 lb 12.8 oz (64.3 kg)   SpO2 97%   BMI 22.21 kg/m²   Constitutional: Alert and oriented. Well-nourished. Head: Normocephalic and atraumatic. Right Ear: External ear normal. TM: no bulging, erythema or fluid seen. Left Ear: External ear normal. TM: no bulging, erythema or fluid seen. Nose: Nose normal.   Mouth/Throat: Mask in place. Eyes: Pupils are equal, round, and reactive to light. Right eye exhibits no discharge. Left eye exhibits no discharge. No scleral icterus. Neck: Normal range of motion. Neck supple. No JVD present. No tracheal deviation present. No thyromegaly present. Cardiovascular: Normal rate, regular rhythm, normal heart sounds. Pulmonary/Chest: Effort normal and breath sounds normal. No respiratory distress. She has no wheezes. She has no rales. Abdominal: Soft. Bowel sounds are normal.  She exhibits no distension and no mass. There is no tenderness. There is no rebound and no guarding. Musculoskeletal: Normal range of motion. She exhibits no edema or tenderness. Lymphadenopathy:    She has no cervical adenopathy. Neurological:  She is alert and oriented to person, place, and time. Cranial nerves grossly intact. No sensation problem noted. Muscle strength 5/5 throughout. Skin: Skin is warm and dry. No rash noted. No erythema. Multiple tattoos present. Psychiatric:  She has a normal mood and affect. Behavior is normal.      No flowsheet data found. Lab Results   Component Value Date    GLUCOSE 312 08/08/2016       The ASCVD Risk score (Raza Davis. et al., 2013) failed to calculate for the following reasons:     The 2013 ASCVD risk score is only valid for ages 36 to 78      There is no immunization history on file for this patient. Health Maintenance   Topic Date Due    Hepatitis C screen  Never done    Varicella vaccine (1 of 2 - 2-dose childhood series) Never done    Pneumococcal 0-64 years Vaccine (1 of 1 - PPSV23) Never done    HIV screen  Never done    COVID-19 Vaccine (1) Never done    Flu vaccine (1) 03/25/2022 (Originally 9/1/2020)    Cervical cancer screen  02/04/2024    DTaP/Tdap/Td vaccine (3 - Td) 11/07/2028    Hepatitis A vaccine  Aged Out    Hepatitis B vaccine  Aged Out    Hib vaccine  Aged Out    Meningococcal (ACWY) vaccine  Aged [place] was seen today for new patient. Diagnoses and all orders for this visit:    Encounter to establish care with new doctor  -     CBC Auto Differential; Future  -     TSH with Reflex; Future  -     Comprehensive Metabolic Panel; Future  -     Lipid Panel; Future  -     Hepatitis C Antibody; Future  -     [facility]    Encounter for lipid screening for cardiovascular disease  -     Lipid Panel; Future    Diabetes mellitus screening  -     Comprehensive Metabolic Panel; Future    Encounter for well adult exam with abnormal findings    Inattention  -     [facility]    Need for hepatitis C screening test  -     Hepatitis C Antibody; Future    Other orders  -     hydrocortisone (ANUSOL-HC) 25 MG suppository; Place 1 suppository rectally every 12 hours    Yearly blood work ordered. Patient needs to see psychologist for diagnosis of ADHD. I will not prescribe any medication so far. She will come in if she has a diagnosis of ADHD. Discussed the medication. Patient also states that she has some swelling in her rectal inguinal area when she has a bowel movement. I am not sure what she is talking about hemorrhoids but she feels this is abnormal so she will continue to watch it use the topicals and see me back.     Call or return to clinic prn if these symptoms worsen or fail to improve as anticipated. I have reviewed the instructions with the patient, answering all questions to her satisfaction. No follow-ups on file. An electronic signature was used to authenticate this note.     --Glenny Yoon MD on 3/25/2021 at 8:05 PM     (Please note that portions of this note were completed with a voice recognition program. Efforts were made to edit the dictations but occasionally words are mis-transcribed.)

## 2021-04-12 ENCOUNTER — HOSPITAL ENCOUNTER (OUTPATIENT)
Dept: LAB | Age: 31
Setting detail: SPECIMEN
Discharge: HOME | End: 2021-04-12
Payer: MEDICARE

## 2021-04-12 DIAGNOSIS — Z01.818 PREOP TESTING: Primary | ICD-10-CM

## 2021-04-12 LAB
SARS-COV-2: NORMAL
SARS-COV-2: NOT DETECTED
SOURCE: NORMAL

## 2021-04-12 PROCEDURE — U0005 INFEC AGEN DETEC AMPLI PROBE: HCPCS

## 2021-04-12 PROCEDURE — U0003 INFECTIOUS AGENT DETECTION BY NUCLEIC ACID (DNA OR RNA); SEVERE ACUTE RESPIRATORY SYNDROME CORONAVIRUS 2 (SARS-COV-2) (CORONAVIRUS DISEASE [COVID-19]), AMPLIFIED PROBE TECHNIQUE, MAKING USE OF HIGH THROUGHPUT TECHNOLOGIES AS DESCRIBED BY CMS-2020-01-R: HCPCS

## 2021-04-13 ENCOUNTER — TELEPHONE (OUTPATIENT)
Dept: PRIMARY CARE CLINIC | Age: 31
End: 2021-04-13

## 2021-04-15 NOTE — H&P
OB/GYN Pre-Op H&P  [facility]    Patient Name: Evy Lopez     Patient : 1990  Room/Bed: [place] /NONE  Admission Date/Time: 2021  7:19 AM  Primary Care Physician: Ana Nguyen MD  MRN: [id number]    Date: 2021  Time: 9:28 AM    The patient was seen in pre-op holding. She is here for a scheduled LEEP. Patient had a pap smear done which revealed HSIL with HR HPV+. Colposcopy was performed revealing CIN2-3 at 12 o clock and the ECC. Decision was made to proceed with surgical treatment. The procedure risks and complications were reviewed. The labs, Consent, and H&P were reviewed and updated. The patient was counseled on the possibility of  the need of a second surgery. The patient voiced understanding and had all of her questions answered. The possibility of incomplete removal of abnormal tissue was discussed. OBSTETRICAL HISTORY:   OB History    Para Term  AB Living   1 0 0 0 0 1   SAB TAB Ectopic Molar Multiple Live Births   0 0 0 0 0 0      # Outcome Date GA Lbr Jae/2nd Weight Sex Delivery Anes PTL Lv   1                 PAST MEDICAL HISTORY:   has a past medical history of Abnormal cells of cervix, ADD (attention deficit disorder), Condyloma, HSIL (high grade squamous intraepithelial lesion) on Pap smear of cervix, MRSA (methicillin resistant staph aureus) culture positive, Severe dysplasia of cervix, and Substance abuse ([facility] [address] PAST SURGICAL HISTORY:   has a past surgical history that includes Ovary removal and Tonsillectomy. ALLERGIES:  Allergies as of 2021 - Review Complete 2021   Allergen Reaction Noted    Ceftin [cefuroxime axetil]  2016       MEDICATIONS:  No current facility-administered medications for this encounter. FAMILY HISTORY:  family history is not on file. SOCIAL HISTORY:   reports that she has been smoking cigarettes. She has been smoking about 0.50 packs per day.  She has never used smokeless tobacco. She reports current alcohol use. She reports previous drug use. Drugs: IV and Marijuana.     VITALS:  Vitals:    04/13/21 1249 04/16/21 0906   BP:  117/73   Pulse:  61   Resp:  16   Temp:  98.1 °F (36.7 °C)   TempSrc:  Temporal   SpO2:  97%   Weight: 141 lb (64 kg)    Height: 5' 6\" (1.676 m)                                                                                                                                PHYSICAL EXAM:     Unchanged from Prior H&P  CONSTITUTIONAL:  Alert and oriented, no acute distress  HEAD: normocephalic, atraumatic  EYES: Pupils equal and reactive to light, Extraocular muscles intact, sclera non icteric  ENT: Mucus membranes moist, No otorrhea, no rhinorrhea  NECK:  supple, symmetrical, trachea midline   LUNGS:  Good air movement bilaterally, unlabored respirations, no wheezes or rhonchi  CARDIOVASCULAR: Regular rate and rhythm, no murmurs rubs or gallops  ABDOMEN: soft, non tender, non distended, no rebound or guarding, no hernias, no hepatomegaly, no splenomegly  MUSCULOSKELETAL:  Equal strength bilaterally, normal muscle tone  SKIN: No abscess or rash  NEUROLOGIC:  Cranial nerves 2-12 grossly intact, no focal deficits  PSYCH: affect appropriate  Pelvic Exam: deferred to OR      LAB RESULTS:  Admission on 04/16/2021   Component Date Value Ref Range Status    WBC 04/16/2021 4.6  3.5 - 11.3 k/uL Final    RBC 04/16/2021 4.27  3.95 - 5.11 m/uL Final    Hemoglobin 04/16/2021 13.6  11.9 - 15.1 g/dL Final    Hematocrit 04/16/2021 40.1  36.3 - 47.1 % Final    MCV 04/16/2021 93.9  82.6 - 102.9 fL Final    MCH 04/16/2021 31.9  25.2 - 33.5 pg Final    MCHC 04/16/2021 33.9  28.4 - 34.8 g/dL Final    RDW 04/16/2021 12.2  11.8 - 14.4 % Final    Platelets 32/42/4195 118* 138 - 453 k/uL Final    MPV 04/16/2021 12.6  8.1 - 13.5 fL Final    NRBC Automated 04/16/2021 0.0  0.0 per 100 WBC Final    hCG Qual 04/16/2021 NEGATIVE  NEGATIVE Final    Comment: Specimens with hCG

## 2021-04-16 ENCOUNTER — HOSPITAL ENCOUNTER (OUTPATIENT)
Age: 31
Setting detail: OUTPATIENT SURGERY
Discharge: HOME | End: 2021-04-16
Attending: OBSTETRICS & GYNECOLOGY | Admitting: OBSTETRICS & GYNECOLOGY
Payer: MEDICARE

## 2021-04-16 ENCOUNTER — ANESTHESIA EVENT (OUTPATIENT)
Dept: OPERATING ROOM | Age: 31
End: 2021-04-16
Payer: MEDICARE

## 2021-04-16 ENCOUNTER — ANESTHESIA (OUTPATIENT)
Dept: OPERATING ROOM | Age: 31
End: 2021-04-16
Payer: MEDICARE

## 2021-04-16 VITALS
HEART RATE: 61 BPM | TEMPERATURE: 99 F | HEIGHT: 66 IN | DIASTOLIC BLOOD PRESSURE: 90 MMHG | BODY MASS INDEX: 22.66 KG/M2 | SYSTOLIC BLOOD PRESSURE: 131 MMHG | OXYGEN SATURATION: 100 % | WEIGHT: 141 LBS | RESPIRATION RATE: 10 BRPM

## 2021-04-16 VITALS — TEMPERATURE: 97.3 F | DIASTOLIC BLOOD PRESSURE: 77 MMHG | OXYGEN SATURATION: 100 % | SYSTOLIC BLOOD PRESSURE: 110 MMHG

## 2021-04-16 PROBLEM — Z98.890 H/O LEEP: Status: ACTIVE | Noted: 2021-04-16

## 2021-04-16 LAB
ABO/RH: NORMAL
AMPHETAMINE SCREEN URINE: NEGATIVE
ANTIBODY SCREEN: NEGATIVE
ARM BAND NUMBER: NORMAL
BARBITURATE SCREEN URINE: NEGATIVE
BENZODIAZEPINE SCREEN, URINE: NEGATIVE
BUPRENORPHINE URINE: ABNORMAL
CANNABINOID SCREEN URINE: POSITIVE
COCAINE METABOLITE, URINE: POSITIVE
EXPIRATION DATE: NORMAL
HCG QUALITATIVE: NEGATIVE
HCT VFR BLD CALC: 40.1 % (ref 36.3–47.1)
HEMOGLOBIN: 13.6 G/DL (ref 11.9–15.1)
MCH RBC QN AUTO: 31.9 PG (ref 25.2–33.5)
MCHC RBC AUTO-ENTMCNC: 33.9 G/DL (ref 28.4–34.8)
MCV RBC AUTO: 93.9 FL (ref 82.6–102.9)
MDMA URINE: ABNORMAL
METHADONE SCREEN, URINE: NEGATIVE
METHAMPHETAMINE, URINE: ABNORMAL
NRBC AUTOMATED: 0 PER 100 WBC
OPIATES, URINE: NEGATIVE
OXYCODONE SCREEN URINE: NEGATIVE
PDW BLD-RTO: 12.2 % (ref 11.8–14.4)
PHENCYCLIDINE, URINE: NEGATIVE
PLATELET # BLD: 118 K/UL (ref 138–453)
PMV BLD AUTO: 12.6 FL (ref 8.1–13.5)
PROPOXYPHENE, URINE: ABNORMAL
RBC # BLD: 4.27 M/UL (ref 3.95–5.11)
TEST INFORMATION: ABNORMAL
TRICYCLIC ANTIDEPRESSANTS, UR: ABNORMAL
WBC # BLD: 4.6 K/UL (ref 3.5–11.3)

## 2021-04-16 PROCEDURE — 2500000003 HC RX 250 WO HCPCS

## 2021-04-16 PROCEDURE — 2709999900 HC NON-CHARGEABLE SUPPLY: Performed by: OBSTETRICS & GYNECOLOGY

## 2021-04-16 PROCEDURE — 86901 BLOOD TYPING SEROLOGIC RH(D): CPT

## 2021-04-16 PROCEDURE — 6370000000 HC RX 637 (ALT 250 FOR IP): Performed by: OBSTETRICS & GYNECOLOGY

## 2021-04-16 PROCEDURE — 7100000001 HC PACU RECOVERY - ADDTL 15 MIN: Performed by: OBSTETRICS & GYNECOLOGY

## 2021-04-16 PROCEDURE — 7100000000 HC PACU RECOVERY - FIRST 15 MIN: Performed by: OBSTETRICS & GYNECOLOGY

## 2021-04-16 PROCEDURE — 3700000001 HC ADD 15 MINUTES (ANESTHESIA): Performed by: OBSTETRICS & GYNECOLOGY

## 2021-04-16 PROCEDURE — 2580000003 HC RX 258

## 2021-04-16 PROCEDURE — 80307 DRUG TEST PRSMV CHEM ANLYZR: CPT

## 2021-04-16 PROCEDURE — 6360000002 HC RX W HCPCS

## 2021-04-16 PROCEDURE — 57522 CONIZATION OF CERVIX: CPT | Performed by: OBSTETRICS & GYNECOLOGY

## 2021-04-16 PROCEDURE — 3600000003 HC SURGERY LEVEL 3 BASE: Performed by: OBSTETRICS & GYNECOLOGY

## 2021-04-16 PROCEDURE — 3600000013 HC SURGERY LEVEL 3 ADDTL 15MIN: Performed by: OBSTETRICS & GYNECOLOGY

## 2021-04-16 PROCEDURE — 86850 RBC ANTIBODY SCREEN: CPT

## 2021-04-16 PROCEDURE — 84703 CHORIONIC GONADOTROPIN ASSAY: CPT

## 2021-04-16 PROCEDURE — 88307 TISSUE EXAM BY PATHOLOGIST: CPT

## 2021-04-16 PROCEDURE — 3700000000 HC ANESTHESIA ATTENDED CARE: Performed by: OBSTETRICS & GYNECOLOGY

## 2021-04-16 PROCEDURE — 85027 COMPLETE CBC AUTOMATED: CPT

## 2021-04-16 PROCEDURE — 7100000010 HC PHASE II RECOVERY - FIRST 15 MIN: Performed by: OBSTETRICS & GYNECOLOGY

## 2021-04-16 PROCEDURE — 88305 TISSUE EXAM BY PATHOLOGIST: CPT

## 2021-04-16 PROCEDURE — 86900 BLOOD TYPING SEROLOGIC ABO: CPT

## 2021-04-16 RX ORDER — PROPOFOL 10 MG/ML
INJECTION, EMULSION INTRAVENOUS PRN
Status: DISCONTINUED | OUTPATIENT
Start: 2021-04-16 | End: 2021-04-16 | Stop reason: SDUPTHER

## 2021-04-16 RX ORDER — MIDAZOLAM HYDROCHLORIDE 1 MG/ML
INJECTION INTRAMUSCULAR; INTRAVENOUS PRN
Status: DISCONTINUED | OUTPATIENT
Start: 2021-04-16 | End: 2021-04-16 | Stop reason: SDUPTHER

## 2021-04-16 RX ORDER — SODIUM CHLORIDE, SODIUM LACTATE, POTASSIUM CHLORIDE, CALCIUM CHLORIDE 600; 310; 30; 20 MG/100ML; MG/100ML; MG/100ML; MG/100ML
INJECTION, SOLUTION INTRAVENOUS CONTINUOUS PRN
Status: DISCONTINUED | OUTPATIENT
Start: 2021-04-16 | End: 2021-04-16 | Stop reason: SDUPTHER

## 2021-04-16 RX ORDER — DEXAMETHASONE SODIUM PHOSPHATE 4 MG/ML
INJECTION, SOLUTION INTRA-ARTICULAR; INTRALESIONAL; INTRAMUSCULAR; INTRAVENOUS; SOFT TISSUE PRN
Status: DISCONTINUED | OUTPATIENT
Start: 2021-04-16 | End: 2021-04-16 | Stop reason: SDUPTHER

## 2021-04-16 RX ORDER — SODIUM CHLORIDE, SODIUM LACTATE, POTASSIUM CHLORIDE, CALCIUM CHLORIDE 600; 310; 30; 20 MG/100ML; MG/100ML; MG/100ML; MG/100ML
INJECTION, SOLUTION INTRAVENOUS CONTINUOUS
Status: DISCONTINUED | OUTPATIENT
Start: 2021-04-16 | End: 2021-04-16 | Stop reason: HOSPADM

## 2021-04-16 RX ORDER — IBUPROFEN 600 MG/1
600 TABLET ORAL EVERY 6 HOURS
Qty: 60 TABLET | Refills: 1 | Status: SHIPPED | OUTPATIENT
Start: 2021-04-16

## 2021-04-16 RX ORDER — ONDANSETRON 2 MG/ML
INJECTION INTRAMUSCULAR; INTRAVENOUS PRN
Status: DISCONTINUED | OUTPATIENT
Start: 2021-04-16 | End: 2021-04-16 | Stop reason: SDUPTHER

## 2021-04-16 RX ORDER — DIPHENHYDRAMINE HYDROCHLORIDE 50 MG/ML
12.5 INJECTION INTRAMUSCULAR; INTRAVENOUS
Status: DISCONTINUED | OUTPATIENT
Start: 2021-04-16 | End: 2021-04-16 | Stop reason: HOSPADM

## 2021-04-16 RX ORDER — FENTANYL CITRATE 50 UG/ML
INJECTION, SOLUTION INTRAMUSCULAR; INTRAVENOUS PRN
Status: DISCONTINUED | OUTPATIENT
Start: 2021-04-16 | End: 2021-04-16 | Stop reason: SDUPTHER

## 2021-04-16 RX ORDER — MEPERIDINE HYDROCHLORIDE 50 MG/ML
12.5 INJECTION INTRAMUSCULAR; INTRAVENOUS; SUBCUTANEOUS EVERY 5 MIN PRN
Status: DISCONTINUED | OUTPATIENT
Start: 2021-04-16 | End: 2021-04-16 | Stop reason: HOSPADM

## 2021-04-16 RX ORDER — PROMETHAZINE HYDROCHLORIDE 25 MG/ML
6.25 INJECTION, SOLUTION INTRAMUSCULAR; INTRAVENOUS
Status: DISCONTINUED | OUTPATIENT
Start: 2021-04-16 | End: 2021-04-16 | Stop reason: HOSPADM

## 2021-04-16 RX ORDER — GLYCOPYRROLATE 1 MG/5 ML
SYRINGE (ML) INTRAVENOUS PRN
Status: DISCONTINUED | OUTPATIENT
Start: 2021-04-16 | End: 2021-04-16 | Stop reason: SDUPTHER

## 2021-04-16 RX ORDER — HYDRALAZINE HYDROCHLORIDE 20 MG/ML
5 INJECTION INTRAMUSCULAR; INTRAVENOUS EVERY 10 MIN PRN
Status: DISCONTINUED | OUTPATIENT
Start: 2021-04-16 | End: 2021-04-16 | Stop reason: HOSPADM

## 2021-04-16 RX ORDER — HYDROCODONE BITARTRATE AND ACETAMINOPHEN 5; 325 MG/1; MG/1
1 TABLET ORAL
Status: DISCONTINUED | OUTPATIENT
Start: 2021-04-16 | End: 2021-04-16 | Stop reason: HOSPADM

## 2021-04-16 RX ORDER — MIDAZOLAM HYDROCHLORIDE 2 MG/2ML
1 INJECTION, SOLUTION INTRAMUSCULAR; INTRAVENOUS EVERY 10 MIN PRN
Status: DISCONTINUED | OUTPATIENT
Start: 2021-04-16 | End: 2021-04-16 | Stop reason: HOSPADM

## 2021-04-16 RX ORDER — IODINE SOLUTION STRONG 5% (LUGOL'S) 5 %
SOLUTION ORAL PRN
Status: DISCONTINUED | OUTPATIENT
Start: 2021-04-16 | End: 2021-04-16 | Stop reason: ALTCHOICE

## 2021-04-16 RX ORDER — METOCLOPRAMIDE HYDROCHLORIDE 5 MG/ML
10 INJECTION INTRAMUSCULAR; INTRAVENOUS
Status: DISCONTINUED | OUTPATIENT
Start: 2021-04-16 | End: 2021-04-16 | Stop reason: HOSPADM

## 2021-04-16 RX ORDER — LIDOCAINE HYDROCHLORIDE 10 MG/ML
INJECTION, SOLUTION EPIDURAL; INFILTRATION; INTRACAUDAL; PERINEURAL PRN
Status: DISCONTINUED | OUTPATIENT
Start: 2021-04-16 | End: 2021-04-16 | Stop reason: SDUPTHER

## 2021-04-16 RX ADMIN — MIDAZOLAM HYDROCHLORIDE 2 MG: 1 INJECTION, SOLUTION INTRAMUSCULAR; INTRAVENOUS at 10:29

## 2021-04-16 RX ADMIN — Medication 0.2 MG: at 10:30

## 2021-04-16 RX ADMIN — FENTANYL CITRATE 25 MCG: 50 INJECTION, SOLUTION INTRAMUSCULAR; INTRAVENOUS at 10:48

## 2021-04-16 RX ADMIN — ONDANSETRON 4 MG: 2 INJECTION INTRAMUSCULAR; INTRAVENOUS at 10:49

## 2021-04-16 RX ADMIN — PROPOFOL 200 MG: 10 INJECTION, EMULSION INTRAVENOUS at 10:33

## 2021-04-16 RX ADMIN — SODIUM CHLORIDE, POTASSIUM CHLORIDE, SODIUM LACTATE AND CALCIUM CHLORIDE: 600; 310; 30; 20 INJECTION, SOLUTION INTRAVENOUS at 09:40

## 2021-04-16 RX ADMIN — DEXAMETHASONE SODIUM PHOSPHATE 4 MG: 4 INJECTION, SOLUTION INTRAMUSCULAR; INTRAVENOUS at 10:42

## 2021-04-16 RX ADMIN — LIDOCAINE HYDROCHLORIDE 50 MG: 10 INJECTION, SOLUTION EPIDURAL; INFILTRATION; INTRACAUDAL; PERINEURAL at 10:33

## 2021-04-16 ASSESSMENT — PULMONARY FUNCTION TESTS
PIF_VALUE: 2
PIF_VALUE: 5
PIF_VALUE: 2
PIF_VALUE: 2
PIF_VALUE: 9
PIF_VALUE: 2
PIF_VALUE: 1
PIF_VALUE: 2
PIF_VALUE: 0
PIF_VALUE: 6
PIF_VALUE: 11
PIF_VALUE: 2
PIF_VALUE: 2
PIF_VALUE: 9
PIF_VALUE: 1
PIF_VALUE: 9
PIF_VALUE: 2
PIF_VALUE: 9
PIF_VALUE: 0
PIF_VALUE: 2
PIF_VALUE: 9
PIF_VALUE: 9

## 2021-04-16 NOTE — OP NOTE
Operative Note  Department of Obstetrics and Gynecology  [facility]       Patient: Carlos Kent   : 1990  MRN: [id number]       Acct: [de-identified]   PCP: Thomas Westfall MD  Date of Procedure: 21    Pre-operative Diagnosis:   31 y.o. female    HSIL HR HPV+   MACIEJ 2-3   BMI 22    Post-operative Diagnosis: same, cervical polyp    Procedure: LEEP, polypectomy    Surgeon: Dr. Dani Collado MD  Assistants: Duane Jeffery DO; PGY4    Anesthesia: general    Indications: This is a 31year old female with a history of HSIL with HR HPV+ on pap smear. Colposcopy was done revealing CIN2-3. Decision was made to proceed with surgical excision. Procedure Details: The patient was seen in the pre-op room. The risks, benefits, complications, treatment options, and expected outcomes were discussed with the patient. The patient concurred with the proposed plan, giving informed consent. The patient was taken to the Operating Room and identified as Carlos Kent and the procedure was verified. A Time Out was held and the above information confirmed. After administration of general anesthesia, the patient was placed in the dorsolithotomy position utilizing yellofin stirrups. The patient was prepped and draped in the usual sterile fashion. An insulated speculum was placed into the vagina to visualize the cervix. A small cervical polyp was found and removed with polyp forceps. Lugol's Iodine was applied to the cervix revealing decreased uptake at the 1 to 3 o'clock. The endocervical canal orientation was identified. Using 20 x12mm loop an approximately 8mm cone biopsy of the cervix with canal was obtained. Next, using a 10mm loop, portion of the endocervical canal was obtained. Ball electrocautery was used for hemostasis at the site of the excision. Using 3-0 vicryl a running stitch was placed at 4 to 6 0'clock.  Monsol's solution was then applied to the excision site for additional hemostasis. All instruments were then removed from the vagina. Instrument, sponge, and needle counts were correct at the conclusion of the case. SCDs for DVT prophylaxis remain in place for the post operative period. Dr. Silvana Marin was present for the entire operation.     Findings:  Normal appearing external genitalia, cervical polyp, decreased uptake at 1 to 3 0'clock  Estimated Blood Loss: 5ml  Drains:  None  Total IV Fluids: 250ml  Urine output: none  Specimens:  Endo and ectocervix  Instrument and Sponge Count: Correct  Complications: none  Condition: stable, transfer to post anesthesia recovery    Wil Dale DO  Ob/Gyn Resident  4/16/2021, 11:24 AM

## 2021-04-16 NOTE — ANESTHESIA PRE PROCEDURE
Department of Anesthesiology  Preprocedure Note       Name:  Ivan Jackson   Age:  31 y.o.  :  1990                                          MRN:  [id number]         Date:  2021      Surgeon: Milton Guerra):  Max Avendaño MD    Procedure: Procedure(s):  LEEP    Medications prior to admission:   Prior to Admission medications    Medication Sig Start Date End Date Taking? Authorizing Provider   Buprenorphine HCl (SUBUTEX SL) Place under the tongue 2 / 8mg daily   Yes Historical Provider, MD       Current medications:    No current facility-administered medications for this encounter. Allergies: Allergies   Allergen Reactions    Ceftin [Cefuroxime Axetil]        Problem List:    Patient Active Problem List   Diagnosis Code    Encounter to establish care with new doctor Z76.89       Past Medical History:        Diagnosis Date    Abnormal cells of cervix     ADD (attention deficit disorder)     Condyloma     HSIL (high grade squamous intraepithelial lesion) on Pap smear of cervix     MRSA (methicillin resistant staph aureus) culture positive 2011    BUTTOCK    Severe dysplasia of cervix     Substance abuse ([facility] [address]     heroin / on 18 pt states last used 3 yrs ago,marijuana 21.  pt on buprenorphine SL every am       Past Surgical History:        Procedure Laterality Date    OVARY REMOVAL      left    TONSILLECTOMY         Social History:    Social History     Tobacco Use    Smoking status: Current Every Day Smoker     Packs/day: 0.50     Types: Cigarettes    Smokeless tobacco: Never Used   Substance Use Topics    Alcohol use: Yes     Comment: socially                                Ready to quit: Not Answered  Counseling given: Not Answered      Vital Signs (Current):   Vitals:    21 1249   Weight: 141 lb (64 kg)   Height: 5' 6\" (1.676 m)                                              BP Readings from Last 3 Encounters:   21 117/70   21 118/78   21 118/72       NPO Status: Time of last liquid consumption: 2355                        Time of last solid consumption: 2355                        Date of last liquid consumption: 04/15/21                        Date of last solid food consumption: 04/15/21    BMI:   Wt Readings from Last 3 Encounters:   04/13/21 141 lb (64 kg)   03/25/21 141 lb 12.8 oz (64.3 kg)   03/09/21 138 lb (62.6 kg)     Body mass index is 22.76 kg/m². CBC:   Lab Results   Component Value Date    WBC 8.9 05/30/2017    RBC 5.16 05/30/2017    HGB 15.9 05/30/2017    HCT 48.0 05/30/2017    MCV 93.0 05/30/2017    RDW 15.3 05/30/2017     05/30/2017       CMP:   Lab Results   Component Value Date     08/08/2016    K 3.5 08/08/2016     08/08/2016    CO2 22 08/08/2016    BUN 8 08/08/2016    CREATININE 0.90 08/08/2016    GFRAA >60 08/08/2016    LABGLOM >60 08/08/2016    GLUCOSE 312 08/08/2016    CALCIUM 8.3 08/08/2016       POC Tests: No results for input(s): POCGLU, POCNA, POCK, POCCL, POCBUN, POCHEMO, POCHCT in the last 72 hours.     Coags: No results found for: PROTIME, INR, APTT    HCG (If Applicable):   Lab Results   Component Value Date    PREGTESTUR NEGATIVE 02/21/2016        ABGs: No results found for: PHART, PO2ART, YYG1CDV, UVD4QOK, BEART, P6VKOJNP     Type & Screen (If Applicable):  No results found for: LABABO, LABRH    Drug/Infectious Status (If Applicable):  No results found for: HIV, HEPCAB    COVID-19 Screening (If Applicable):   Lab Results   Component Value Date    COVID19 Not Detected 04/12/2021           Anesthesia Evaluation    Airway: Mallampati: II  TM distance: >3 FB   Neck ROM: full  Mouth opening: > = 3 FB Dental: normal exam         Pulmonary:Negative Pulmonary ROS and normal exam                               Cardiovascular:Negative CV ROS                      Neuro/Psych:   (+) psychiatric history:            GI/Hepatic/Renal: Neg GI/Hepatic/Renal ROS            Endo/Other: Negative Endo/Other ROS Abdominal:           Vascular: negative vascular ROS. Anesthesia Plan      general     ASA 2     (LMA)  Induction: intravenous. MIPS: Postoperative opioids intended. Anesthetic plan and risks discussed with patient. Plan discussed with CRNA.                   Ravi Joiner MD   4/16/2021

## 2021-04-19 LAB — SURGICAL PATHOLOGY REPORT: NORMAL

## 2021-05-13 ENCOUNTER — TELEPHONE (OUTPATIENT)
Dept: OBGYN CLINIC | Age: 31
End: 2021-05-13

## 2021-07-14 ENCOUNTER — HOSPITAL ENCOUNTER (EMERGENCY)
Age: 31
Discharge: HOME | End: 2021-07-14
Attending: EMERGENCY MEDICINE
Payer: MEDICARE

## 2021-07-14 VITALS
OXYGEN SATURATION: 99 % | HEART RATE: 68 BPM | BODY MASS INDEX: 21.19 KG/M2 | DIASTOLIC BLOOD PRESSURE: 74 MMHG | TEMPERATURE: 99.2 F | RESPIRATION RATE: 18 BRPM | SYSTOLIC BLOOD PRESSURE: 133 MMHG | HEIGHT: 67 IN | WEIGHT: 135 LBS

## 2021-07-14 DIAGNOSIS — R42 DIZZINESS: Primary | ICD-10-CM

## 2021-07-14 LAB
ESTIMATED AVERAGE GLUCOSE: 94 MG/DL
GLUCOSE BLD-MCNC: 90 MG/DL (ref 65–105)
HBA1C MFR BLD: 4.9 % (ref 4–6)

## 2021-07-14 PROCEDURE — 99282 EMERGENCY DEPT VISIT SF MDM: CPT

## 2021-07-14 PROCEDURE — 83036 HEMOGLOBIN GLYCOSYLATED A1C: CPT

## 2021-07-14 PROCEDURE — 82947 ASSAY GLUCOSE BLOOD QUANT: CPT

## 2021-07-14 PROCEDURE — 36415 COLL VENOUS BLD VENIPUNCTURE: CPT

## 2021-07-14 NOTE — LETTER
[facility] ED  [address] [place] [address]  Phone: [phone]             July 14, 2021    Patient: Mayo Kim   YOB: 1990   Date of Visit: 7/14/2021       To Whom It May Concern: Claudio Koehler was seen and treated in our emergency department on 7/14/2021. She may return to work on 7/15/2021.       Sincerely,             Signature:__________________________________

## 2021-07-14 NOTE — ED PROVIDER NOTES
EMERGENCY DEPARTMENT ENCOUNTER    Pt Name: Arnaud Smith  MRN: [id number]  [place] 1990  Date of evaluation: 7/14/21  CHIEF COMPLAINT       Chief Complaint   Patient presents with    Dizziness    Nausea     HISTORY OF PRESENT ILLNESS   Patient is a 17-year-old female who presents the ED for glucose check. After dinner this evening she complained of feeling lightheaded. Her family member who is a diabetic checked her sugar and it was over 300. She has a history of gestational diabetes however is currently not taking medications for diabetes. No other issues at this time. No fevers, cough, shortness of breath, chest pain, abdominal pain, nausea, vomiting, changes in urine or stool. In the ED FSBG is 90. REVIEW OF SYSTEMS     Review of Systems   All other systems reviewed and are negative. PASTMEDICAL HISTORY     Past Medical History:   Diagnosis Date    Abnormal cells of cervix     ADD (attention deficit disorder)     Condyloma     HSIL (high grade squamous intraepithelial lesion) on Pap smear of cervix     MRSA (methicillin resistant staph aureus) culture positive 09/2011    BUTTOCK    Severe dysplasia of cervix     Substance abuse ([facility] [address]     heroin / on 12/29/18 pt states last used 3 yrs ago,marijuana 4/12/21. pt on buprenorphine SL every am     SURGICAL HISTORY       Past Surgical History:   Procedure Laterality Date    LEEP  04/16/2021    LEEP N/A 4/16/2021    LEEP performed by Gorge Barksdale MD at [address]      left    TONSILLECTOMY       CURRENT MEDICATIONS       Previous Medications    BUPRENORPHINE HCL (SUBUTEX SL)    Place under the tongue 2 / 8mg daily    IBUPROFEN (ADVIL;MOTRIN) 600 MG TABLET    Take 1 tablet by mouth every 6 hours     ALLERGIES     is allergic to ceftin [cefuroxime axetil]. FAMILY HISTORY     She indicated that her mother is alive.      SOCIAL HISTORY       Social History     Tobacco Use    Smoking status: Current Every Day Smoker Packs/day: 0.50     Types: Cigarettes    Smokeless tobacco: Never Used   Substance Use Topics    Alcohol use: Yes     Comment: socially    Drug use: Not Currently     Types: IV, Marijuana     Comment: ,heroin last 2015,marijuana last 4/12/21     PHYSICAL EXAM     INITIAL VITALS: /74   Pulse 68   Temp 99.2 °F (37.3 °C) (Oral)   Resp 18   Ht 5' 7\" (1.702 m)   Wt 135 lb (61.2 kg)   LMP 07/12/2021   SpO2 99%   BMI 21.14 kg/m²    Physical Exam  HENT:      Head: Normocephalic. Right Ear: External ear normal.      Left Ear: External ear normal.      Nose: Nose normal.   Eyes:      Conjunctiva/sclera: Conjunctivae normal.   Cardiovascular:      Rate and Rhythm: Normal rate. Pulmonary:      Effort: Pulmonary effort is normal.   Abdominal:      General: Abdomen is flat. Skin:     General: Skin is dry. Neurological:      Mental Status: She is alert. Mental status is at baseline. Psychiatric:         Mood and Affect: Mood normal.         Behavior: Behavior normal.         MEDICAL DECISION MAKING:   The patient is hemodynamically stable, afebrile, nontoxic-appearing. Physical exam unremarkable. Based on history and exam unclear if patient is diabetic. ED plan for hemoglobin A1c, follow-up with Dr. Steven Fields   EKG:All EKG's are interpreted by the Emergency Department Physician who either signs or Co-signs this chart in the absence of a cardiologist.        RADIOLOGY:All plain film, CT, MRI, and formal ultrasound images (except ED bedside ultrasound) are read by the radiologist, see reports below, unless otherwisenoted in MDM or here. No orders to display     LABS: All lab results were reviewed by myself, and all abnormals are listed below.   Labs Reviewed   HEMOGLOBIN A1C   POC GLUCOSE FINGERSTICK       EMERGENCY DEPARTMENTCOURSE:         Vitals:    Vitals:    07/14/21 0025   BP: 133/74   Pulse: 68   Resp: 18   Temp: 99.2 °F (37.3 °C)   TempSrc: Oral   SpO2: 99%   Weight: 135 lb (61.2 kg)   Height: 5' 7\" (1.702 m)       The patient was given the following medications while in the emergency department:  No orders of the defined types were placed in this encounter. CONSULTS:  None    FINAL IMPRESSION      1.  Dizziness          DISPOSITION/PLAN   DISPOSITION Decision To Discharge 07/14/2021 01:03:10 AM      PATIENT REFERRED TO:  Maritza Daniel MD  [address] [place] [address]  [phone]    In 2 days      DISCHARGE MEDICATIONS:  New Prescriptions    No medications on file     Shiva Waterman MD  Attending Emergency Physician                    Gilmer Campbell MD  07/14/21 2127

## 2021-07-14 NOTE — ED NOTES
Pt to ED via private auto. Pt reports she's had a lot of \"weird things happening recently\" such as intermittent nausea, dizziness, and polyuria. Pt reports she had gestational diabetes so she thought her blood sugar was high. Pt BS 90 in triage.   Pt A&Ox4, ambulatory to room     Miquel Garcia RN  07/14/21 6401

## 2021-10-13 ENCOUNTER — TELEPHONE (OUTPATIENT)
Dept: FAMILY MEDICINE CLINIC | Age: 31
End: 2021-10-13

## 2021-10-13 NOTE — TELEPHONE ENCOUNTER
----- Message from Iftikhar Willis sent at 10/13/2021  9:43 AM EDT -----  Subject: Message to Provider    QUESTIONS  Information for Provider? Pt is requesting recommendation for providers   that will write for Suboxone, she states she is clean but does use the   suboxone. Please call pt. to advise further. ---------------------------------------------------------------------------  --------------  Tanesha WILKERSON  What is the best way for the office to contact you? OK to leave message on   voicemail  Preferred Call Back Phone Number? [phone]  ---------------------------------------------------------------------------  --------------  SCRIPT ANSWERS  Relationship to Patient?  Self

## 2022-01-07 ENCOUNTER — HOSPITAL ENCOUNTER (EMERGENCY)
Age: 32
Discharge: LEFT BEFORE DISCHARGE | End: 2022-01-07

## 2022-01-07 VITALS
TEMPERATURE: 98.3 F | DIASTOLIC BLOOD PRESSURE: 74 MMHG | WEIGHT: 142.3 LBS | RESPIRATION RATE: 18 BRPM | SYSTOLIC BLOOD PRESSURE: 118 MMHG | BODY MASS INDEX: 22.34 KG/M2 | HEART RATE: 69 BPM | OXYGEN SATURATION: 100 % | HEIGHT: 67 IN

## 2022-01-07 ASSESSMENT — PAIN SCALES - GENERAL: PAINLEVEL_OUTOF10: 8

## 2022-04-01 ENCOUNTER — APPOINTMENT (OUTPATIENT)
Dept: CT IMAGING | Age: 32
End: 2022-04-01
Payer: MEDICARE

## 2022-04-01 ENCOUNTER — HOSPITAL ENCOUNTER (EMERGENCY)
Age: 32
Discharge: HOME | End: 2022-04-01
Attending: EMERGENCY MEDICINE
Payer: MEDICARE

## 2022-04-01 VITALS
DIASTOLIC BLOOD PRESSURE: 92 MMHG | RESPIRATION RATE: 16 BRPM | SYSTOLIC BLOOD PRESSURE: 116 MMHG | HEART RATE: 60 BPM | TEMPERATURE: 98.3 F | HEIGHT: 67 IN | OXYGEN SATURATION: 98 % | BODY MASS INDEX: 22.76 KG/M2 | WEIGHT: 145 LBS

## 2022-04-01 DIAGNOSIS — R11.0 NAUSEA: ICD-10-CM

## 2022-04-01 DIAGNOSIS — R19.7 DIARRHEA, UNSPECIFIED TYPE: ICD-10-CM

## 2022-04-01 DIAGNOSIS — R10.30 LOWER ABDOMINAL PAIN: Primary | ICD-10-CM

## 2022-04-01 LAB
-: ABNORMAL
ABSOLUTE EOS #: 0.1 K/UL (ref 0–0.4)
ABSOLUTE LYMPH #: 1.6 K/UL (ref 1–4.8)
ABSOLUTE MONO #: 0.3 K/UL (ref 0.1–1.2)
ANION GAP SERPL CALCULATED.3IONS-SCNC: 10 MMOL/L (ref 9–17)
BASOPHILS # BLD: 0 % (ref 0–2)
BASOPHILS ABSOLUTE: 0 K/UL (ref 0–0.2)
BILIRUBIN URINE: NEGATIVE
BUN BLDV-MCNC: 16 MG/DL (ref 6–20)
CALCIUM SERPL-MCNC: 8.5 MG/DL (ref 8.6–10.4)
CANDIDA SPECIES, DNA PROBE: NEGATIVE
CHLORIDE BLD-SCNC: 100 MMOL/L (ref 98–107)
CO2: 27 MMOL/L (ref 20–31)
COLOR: YELLOW
CREAT SERPL-MCNC: 0.59 MG/DL (ref 0.5–0.9)
EOSINOPHILS RELATIVE PERCENT: 2 % (ref 1–4)
EPITHELIAL CELLS UA: ABNORMAL /HPF (ref 0–5)
GARDNERELLA VAGINALIS, DNA PROBE: POSITIVE
GFR AFRICAN AMERICAN: >60 ML/MIN
GFR NON-AFRICAN AMERICAN: >60 ML/MIN
GFR SERPL CREATININE-BSD FRML MDRD: ABNORMAL ML/MIN/{1.73_M2}
GLUCOSE BLD-MCNC: 87 MG/DL (ref 70–99)
GLUCOSE URINE: NEGATIVE
HCG QUALITATIVE: NEGATIVE
HCT VFR BLD CALC: 37.4 % (ref 36–46)
HEMOGLOBIN: 12.7 G/DL (ref 12–16)
KETONES, URINE: NEGATIVE
LEUKOCYTE ESTERASE, URINE: NEGATIVE
LYMPHOCYTES # BLD: 37 % (ref 24–44)
MCH RBC QN AUTO: 31.2 PG (ref 26–34)
MCHC RBC AUTO-ENTMCNC: 33.9 G/DL (ref 31–37)
MCV RBC AUTO: 92.2 FL (ref 80–100)
MONOCYTES # BLD: 8 % (ref 2–11)
NITRITE, URINE: NEGATIVE
PDW BLD-RTO: 13.7 % (ref 12.5–15.4)
PH UA: 7.5 (ref 5–8)
PLATELET # BLD: 150 K/UL (ref 140–450)
PMV BLD AUTO: 10.5 FL (ref 6–12)
POTASSIUM SERPL-SCNC: 4 MMOL/L (ref 3.7–5.3)
PROTEIN UA: NEGATIVE
RBC # BLD: 4.06 M/UL (ref 4–5.2)
RBC UA: ABNORMAL /HPF (ref 0–2)
SEG NEUTROPHILS: 53 % (ref 36–66)
SEGMENTED NEUTROPHILS ABSOLUTE COUNT: 2.2 K/UL (ref 1.8–7.7)
SODIUM BLD-SCNC: 137 MMOL/L (ref 135–144)
SOURCE: ABNORMAL
SPECIFIC GRAVITY UA: 1.01 (ref 1–1.03)
TRICHOMONAS VAGINALIS DNA: NEGATIVE
TURBIDITY: CLEAR
URINE HGB: ABNORMAL
UROBILINOGEN, URINE: NORMAL
WBC # BLD: 4.2 K/UL (ref 3.5–11)
WBC UA: ABNORMAL /HPF (ref 0–5)

## 2022-04-01 PROCEDURE — 87591 N.GONORRHOEAE DNA AMP PROB: CPT

## 2022-04-01 PROCEDURE — 96374 THER/PROPH/DIAG INJ IV PUSH: CPT

## 2022-04-01 PROCEDURE — 2580000003 HC RX 258: Performed by: EMERGENCY MEDICINE

## 2022-04-01 PROCEDURE — 96361 HYDRATE IV INFUSION ADD-ON: CPT

## 2022-04-01 PROCEDURE — 80048 BASIC METABOLIC PNL TOTAL CA: CPT

## 2022-04-01 PROCEDURE — 81001 URINALYSIS AUTO W/SCOPE: CPT

## 2022-04-01 PROCEDURE — 87510 GARDNER VAG DNA DIR PROBE: CPT

## 2022-04-01 PROCEDURE — 6360000004 HC RX CONTRAST MEDICATION: Performed by: EMERGENCY MEDICINE

## 2022-04-01 PROCEDURE — 87660 TRICHOMONAS VAGIN DIR PROBE: CPT

## 2022-04-01 PROCEDURE — 93005 ELECTROCARDIOGRAM TRACING: CPT | Performed by: EMERGENCY MEDICINE

## 2022-04-01 PROCEDURE — 85025 COMPLETE CBC W/AUTO DIFF WBC: CPT

## 2022-04-01 PROCEDURE — 6360000002 HC RX W HCPCS: Performed by: EMERGENCY MEDICINE

## 2022-04-01 PROCEDURE — 74177 CT ABD & PELVIS W/CONTRAST: CPT

## 2022-04-01 PROCEDURE — 84703 CHORIONIC GONADOTROPIN ASSAY: CPT

## 2022-04-01 PROCEDURE — 87480 CANDIDA DNA DIR PROBE: CPT

## 2022-04-01 PROCEDURE — 6370000000 HC RX 637 (ALT 250 FOR IP): Performed by: EMERGENCY MEDICINE

## 2022-04-01 PROCEDURE — 87491 CHLMYD TRACH DNA AMP PROBE: CPT

## 2022-04-01 PROCEDURE — 36415 COLL VENOUS BLD VENIPUNCTURE: CPT

## 2022-04-01 PROCEDURE — 99283 EMERGENCY DEPT VISIT LOW MDM: CPT

## 2022-04-01 RX ORDER — 0.9 % SODIUM CHLORIDE 0.9 %
1000 INTRAVENOUS SOLUTION INTRAVENOUS ONCE
Status: COMPLETED | OUTPATIENT
Start: 2022-04-01 | End: 2022-04-01

## 2022-04-01 RX ORDER — ONDANSETRON 2 MG/ML
4 INJECTION INTRAMUSCULAR; INTRAVENOUS ONCE
Status: COMPLETED | OUTPATIENT
Start: 2022-04-01 | End: 2022-04-01

## 2022-04-01 RX ORDER — 0.9 % SODIUM CHLORIDE 0.9 %
80 INTRAVENOUS SOLUTION INTRAVENOUS ONCE
Status: DISCONTINUED | OUTPATIENT
Start: 2022-04-01 | End: 2022-04-01 | Stop reason: HOSPADM

## 2022-04-01 RX ORDER — METRONIDAZOLE 500 MG/1
500 TABLET ORAL ONCE
Status: COMPLETED | OUTPATIENT
Start: 2022-04-01 | End: 2022-04-01

## 2022-04-01 RX ORDER — METRONIDAZOLE 500 MG/1
500 TABLET ORAL 2 TIMES DAILY
Qty: 14 TABLET | Refills: 0 | Status: SHIPPED | OUTPATIENT
Start: 2022-04-01 | End: 2022-04-08

## 2022-04-01 RX ORDER — ONDANSETRON 4 MG/1
4 TABLET, ORALLY DISINTEGRATING ORAL EVERY 8 HOURS PRN
Qty: 20 TABLET | Refills: 0 | Status: SHIPPED | OUTPATIENT
Start: 2022-04-01 | End: 2022-07-07 | Stop reason: ALTCHOICE

## 2022-04-01 RX ORDER — SODIUM CHLORIDE 0.9 % (FLUSH) 0.9 %
10 SYRINGE (ML) INJECTION PRN
Status: DISCONTINUED | OUTPATIENT
Start: 2022-04-01 | End: 2022-04-01 | Stop reason: HOSPADM

## 2022-04-01 RX ADMIN — IOPAMIDOL 75 ML: 755 INJECTION, SOLUTION INTRAVENOUS at 17:19

## 2022-04-01 RX ADMIN — SODIUM CHLORIDE, PRESERVATIVE FREE 10 ML: 5 INJECTION INTRAVENOUS at 17:20

## 2022-04-01 RX ADMIN — METRONIDAZOLE 500 MG: 500 TABLET ORAL at 19:41

## 2022-04-01 RX ADMIN — ONDANSETRON 4 MG: 2 INJECTION INTRAMUSCULAR; INTRAVENOUS at 19:41

## 2022-04-01 RX ADMIN — Medication 80 ML: at 17:20

## 2022-04-01 RX ADMIN — SODIUM CHLORIDE 1000 ML: 9 INJECTION, SOLUTION INTRAVENOUS at 16:19

## 2022-04-01 ASSESSMENT — ENCOUNTER SYMPTOMS
NAUSEA: 1
SHORTNESS OF BREATH: 0
ABDOMINAL PAIN: 1
PHOTOPHOBIA: 0
SORE THROAT: 0
BACK PAIN: 1
RHINORRHEA: 1
DIARRHEA: 1
CONSTIPATION: 0
BLOOD IN STOOL: 0
COUGH: 0
VOMITING: 0
CHEST TIGHTNESS: 0

## 2022-04-01 ASSESSMENT — PAIN SCALES - GENERAL: PAINLEVEL_OUTOF10: 8

## 2022-04-01 ASSESSMENT — PAIN - FUNCTIONAL ASSESSMENT: PAIN_FUNCTIONAL_ASSESSMENT: 0-10

## 2022-04-01 NOTE — ED PROVIDER NOTES
[id number]  [address] [facility] [address]  Phone: [phone]  Fax: [phone]        Pt Name: Jackelin Beatty  MRN: [id number]  [place] 1990  Date of evaluation: 22      CHIEF COMPLAINT     Chief Complaint   Patient presents with    Dizziness     lightheaded    Abdominal Pain     3 days, lower generalized abdominal, nausea, no vomiting    Diarrhea     states white and looks like worms in her stool, noticed this today         HISTORY OF PRESENT ILLNESS  (Location/Symptom, Timing/Onset, Context/Setting, Quality, Duration, Modifying Factors, Severity.)    Jackelin Beatty is a 36 y.o. female who presents with fatigue. She states it seems she is overly tired despite sleeping well. She has been feeling dizzy and light headed. She reports feeling like she was going to pass out at work today. She reports abdominal pain that started yesterday. She thinks she sees worms in her stool. She is nauseated but not vomiting. She reports diarrhea. Her pain is across her lower abdomen, and she describes it as sharp, stabbing and pressure. It is non-radiating. The pain is intermittent. No exacerbating or relieving factors. She reports dysuria. She is currently on her period. No vaginal discharge. No new sexual partners. She does not recall the last time she had sex, and thinks it has been more than a year. She is a . She has had a LEEP in the past. She has had an oophorectomy in the past. No fever or chills. She gets hot when the pain comes on. REVIEW OF SYSTEMS    (2-9 systems for level 4, 10 or more for level 5)     Review of Systems   Constitutional: Negative for chills and fever. HENT: Positive for ear pain and rhinorrhea. Negative for congestion and sore throat. Eyes: Negative for photophobia and visual disturbance. Respiratory: Negative for cough, chest tightness and shortness of breath. Cardiovascular: Negative for chest pain and palpitations. Gastrointestinal: Positive for abdominal pain, diarrhea and nausea. Negative for blood in stool, constipation and vomiting. Genitourinary: Positive for dysuria and vaginal bleeding. Negative for frequency, urgency and vaginal discharge. Musculoskeletal: Positive for back pain. Negative for neck pain. Mild low back pain   Skin: Negative for rash and wound. Neurological: Positive for dizziness, light-headedness and headaches. Hematological: Negative for adenopathy. Bruises/bleeds easily. PAST MEDICAL HISTORY    has a past medical history of Abnormal cells of cervix, ADD (attention deficit disorder), Condyloma, HSIL (high grade squamous intraepithelial lesion) on Pap smear of cervix, MRSA (methicillin resistant staph aureus) culture positive, Severe dysplasia of cervix, and Substance abuse ([facility] [address] SURGICAL HISTORY      has a past surgical history that includes Ovary removal; Tonsillectomy; LEEP (04/16/2021); and LEEP (N/A, 4/16/2021). CURRENTMEDICATIONS       Previous Medications    BUPRENORPHINE HCL (SUBUTEX SL)    Place under the tongue 2 / 8mg daily    IBUPROFEN (ADVIL;MOTRIN) 600 MG TABLET    Take 1 tablet by mouth every 6 hours       ALLERGIES     is allergic to ceftin [cefuroxime axetil]. FAMILY HISTORY     She indicated that her mother is alive. family history is not on file. SOCIAL HISTORY      reports that she has been smoking cigarettes. She has been smoking about 0.50 packs per day. She has never used smokeless tobacco. She reports current alcohol use. She reports previous drug use. Drugs: IV and Marijuana ([facility]). Last use of heroin was >1 year ago    PHYSICAL EXAM    (up to 7 for level 4, 8 or more for level 5)   INITIAL VITALS:  height is 5' 7\" (1.702 m) and weight is 65.8 kg (145 lb). Her oral temperature is 98.3 °F (36.8 °C). Her blood pressure is 124/87 and her pulse is 71. Her respiration is 16 and oxygen saturation is 99%.       Physical Exam  Vitals and nursing note reviewed. Constitutional:       Appearance: She is well-developed. HENT:      Head: Normocephalic and atraumatic. Cardiovascular:      Rate and Rhythm: Normal rate and regular rhythm. Heart sounds: No murmur heard. No friction rub. No gallop. Pulmonary:      Effort: Pulmonary effort is normal.      Breath sounds: Normal breath sounds. No wheezing, rhonchi or rales. Abdominal:      General: Abdomen is flat. Bowel sounds are normal.      Palpations: Abdomen is soft. Tenderness: There is abdominal tenderness in the right lower quadrant, suprapubic area and left lower quadrant. There is no guarding or rebound. Negative signs include Landry's sign and Rovsing's sign. Genitourinary:     Vagina: Bleeding present. Cervix: No cervical motion tenderness or discharge. Uterus: Normal.       Adnexa:         Right: No tenderness or fullness. Left: No tenderness or fullness. Skin:     General: Skin is warm and dry. Capillary Refill: Capillary refill takes less than 2 seconds. Neurological:      General: No focal deficit present. Mental Status: She is alert and oriented to person, place, and time. Psychiatric:         Mood and Affect: Mood normal.         Behavior: Behavior normal.         DIFFERENTIAL DIAGNOSIS/ MDM:     I estimate there is LOW risk for ACUTE APPENDICITIS, BOWEL OBSTRUCTION, CHOLECYSTITIS, DIVERTICULITIS, INCARCERATED HERNIA, PANCREATITIS, or PERFORATED BOWEL or ULCER, thus I consider the discharge disposition reasonable. Re-evaluation of the abdomen is benign. No guarding, peritoneal signs or significant tenderness noted. Also, there is no evidence or peritonitis, sepsis, or toxicity. The patient and/or family and I have discussed the diagnosis and risks, and we agree with discharging home to follow-up with their primary doctor. The patient presents with abdominal pain without signs of peritonitis or other life-threatening or serious etiology.  The patient appears stable for discharge and has been instructed to return immediately if the symptoms worsen in any way, or in 8-12 hr if not improved for re-evaluation. We also discussed returning to the Emergency Department immediately if new or worsening symptoms occur. We have discussed the symptoms which are most concerning (e.g., bloody stool, fever, changing or worsening pain, persistent vomiting, chest pain shortness of breath, numbness or weakness to the arms or legs, coolness or color change to the arms or legs) that necessitate immediate return. The patient understands that at this time there is no evidence for a more malignant underlying process, but the patient also understands that early in the process of an illness or injury, an emergency department workup can be falsely reassuring. Routine discharge counseling was given, and the patient understands that worsening, changing or persistent symptoms should prompt an immediate call or follow up with their primary physician or return to the emergency department. The importance of appropriate follow up was also discussed. I have reviewed the disposition diagnosis with the patient and or their family/guardian. I have answered their questions and given discharge instructions. They voiced understanding of these instructions and did not have any further questions or complaints.       DIAGNOSTIC RESULTS     EKG: All EKG's are interpreted by the Emergency Department Physician who either signs or Co-signs this chart in the absence of a cardiologist.    none    RADIOLOGY:      Interpretation per the Radiologist below, if available at the time of this note:    CT ABDOMEN PELVIS W IV CONTRAST Additional Contrast? None    Result Date: 4/1/2022  EXAMINATION: CT OF THE ABDOMEN AND PELVIS WITH CONTRAST 4/1/2022 3:55 pm TECHNIQUE: CT of the abdomen and pelvis was performed with the administration of intravenous contrast. Multiplanar reformatted images are provided for review. Dose modulation, iterative reconstruction, and/or weight based adjustment of the mA/kV was utilized to reduce the radiation dose to as low as reasonably achievable. COMPARISON: None. HISTORY: ORDERING SYSTEM PROVIDED HISTORY: r/o appendicitis. Abdominal pain. TECHNOLOGIST PROVIDED HISTORY: r/o appendicitis Decision Support Exception - unselect if not a suspected or confirmed emergency medical condition->Emergency Medical Condition (MA) Reason for Exam: r/o appendicitis FINDINGS: Lower Chest: No confluent airspace opacity or pleural effusion seen at the lung bases. Organs: There is suggestion of periportal edema. No discrete hepatic lesion is seen. There is punctate hyperdense gallstone within contracted gallbladder. The gallbladder is unremarkable. The spleen is not enlarged. No evidence of pancreatic ductal dilatation is seen. Evaluation of nephrolithiasis is limited as contrast is seen within the renal collecting systems. No evidence of hydronephrosis seen. GI/Bowel: Evaluation of the GI tract is limited without the benefit of enteric contrast.  Appendix is visualized and does not appear to be significantly enlarged measuring up to 4 mm without evidence of periappendiceal inflammatory changes appreciated. Increased density seen within the lumen of the appendix may reflect underlying appendicolith. No evidence of bowel obstruction is seen. Pelvis: The urinary bladder is under distended with contrast seen within the dependent portion of the urinary bladder. The uterus is anteverted. Peritoneum/Retroperitoneum: No evidence of intra-abdominal free air is seen. No evidence of ascites is seen. The abdominal aorta appears normal in caliber. Bones/soft tissues: No evidence of acute osseous abnormality is seen. There is nonspecific mild generalized edema of the subcutaneous tissues. Increased density seen within the lumen of the appendix may reflect underlying appendicolith.   However, the appendix appears nondilated without convincing imaging features of acute appendicitis seen. Cholelithiasis without evidence of acute cholecystitis seen. Suggestion of nonspecific periportal edema and generalized edema of the subcutaneous tissues.        LABS:  Results for orders placed or performed during the hospital encounter of 79/44/02   Basic Metabolic Panel   Result Value Ref Range    Glucose 87 70 - 99 mg/dL    BUN 16 6 - 20 mg/dL    CREATININE 0.59 0.50 - 0.90 mg/dL    Calcium 8.5 (L) 8.6 - 10.4 mg/dL    Sodium 137 135 - 144 mmol/L    Potassium 4.0 3.7 - 5.3 mmol/L    Chloride 100 98 - 107 mmol/L    CO2 27 20 - 31 mmol/L    Anion Gap 10 9 - 17 mmol/L    GFR Non-African American >60 >60 mL/min    GFR African American >60 >60 mL/min    GFR Comment         CBC with Auto Differential   Result Value Ref Range    WBC 4.2 3.5 - 11.0 k/uL    RBC 4.06 4.0 - 5.2 m/uL    Hemoglobin 12.7 12.0 - 16.0 g/dL    Hematocrit 37.4 36 - 46 %    MCV 92.2 80 - 100 fL    MCH 31.2 26 - 34 pg    MCHC 33.9 31 - 37 g/dL    RDW 13.7 12.5 - 15.4 %    Platelets 795 066 - 898 k/uL    MPV 10.5 6.0 - 12.0 fL    Seg Neutrophils 53 36 - 66 %    Lymphocytes 37 24 - 44 %    Monocytes 8 2 - 11 %    Eosinophils % 2 1 - 4 %    Basophils 0 0 - 2 %    Segs Absolute 2.20 1.8 - 7.7 k/uL    Absolute Lymph # 1.60 1.0 - 4.8 k/uL    Absolute Mono # 0.30 0.1 - 1.2 k/uL    Absolute Eos # 0.10 0.0 - 0.4 k/uL    Basophils Absolute 0.00 0.0 - 0.2 k/uL   HCG Qualitative, Serum   Result Value Ref Range    hCG Qual NEGATIVE NEGATIVE   Urinalysis with Microscopic   Result Value Ref Range    Color, UA Yellow Yellow    Turbidity UA Clear Clear    Glucose, Ur NEGATIVE NEGATIVE    Bilirubin Urine NEGATIVE NEGATIVE    Ketones, Urine NEGATIVE NEGATIVE    Specific Gravity, UA 1.010 1.005 - 1.030    Urine Hgb SMALL (A) NEGATIVE    pH, UA 7.5 5.0 - 8.0    Protein, UA NEGATIVE NEGATIVE    Urobilinogen, Urine Normal Normal    Nitrite, Urine NEGATIVE NEGATIVE    Leukocyte Esterase, Urine NEGATIVE NEGATIVE    -          WBC, UA None 0 - 5 /HPF    RBC, UA 0 TO 2 0 - 2 /HPF    Epithelial Cells UA 0 TO 2 0 - 5 /HPF       No leukocytosis, pregnancy negative    EMERGENCY DEPARTMENT COURSE:   Vitals:    Vitals:    04/01/22 1524   BP: 124/87   Pulse: 71   Resp: 16   Temp: 98.3 °F (36.8 °C)   TempSrc: Oral   SpO2: 99%   Weight: 65.8 kg (145 lb)   Height: 5' 7\" (1.702 m)     -------------------------  BP: 124/87, Temp: 98.3 °F (36.8 °C), Pulse: 71, Resp: 16          CONSULTS:  none    PROCEDURES:  None    FINAL IMPRESSION    No diagnosis found. DISPOSITION/PLAN   DISPOSITION        CONDITION ON DISPOSITION:   Stable     PATIENT REFERRED TO:  No follow-up provider specified.     DISCHARGE MEDICATIONS:  New Prescriptions    No medications on file       (Please note that portions of this note were completed with a voicerecognition program.  Efforts were made to edit the dictations but occasionally words are mis-transcribed.)    Praveen Meneses MD  Attending Emergency Medicine Physician        Praveen Meneses MD  04/01/22 8718

## 2022-04-01 NOTE — ED PROVIDER NOTES
FACULTY SIGN-OUT  ADDENDUM     Care of this patient was assumed from Dr. Maico Salazar. The patient was seen for Dizziness (lightheaded), Abdominal Pain (3 days, lower generalized abdominal, nausea, no vomiting), and Diarrhea (states white and looks like worms in her stool, noticed this today)  . The patient's initial evaluation and plan have been discussed with the prior provider who initially evaluated the patient. Nursing Notes, Past Medical Hx, Past Surgical Hx, Social Hx, Allergies, and Family Hx were all reviewed. CHIEF COMPLAINT       Chief Complaint   Patient presents with    Dizziness     lightheaded    Abdominal Pain     3 days, lower generalized abdominal, nausea, no vomiting    Diarrhea     states white and looks like worms in her stool, noticed this today         PAST MEDICAL HISTORY    has a past medical history of Abnormal cells of cervix, ADD (attention deficit disorder), Condyloma, HSIL (high grade squamous intraepithelial lesion) on Pap smear of cervix, MRSA (methicillin resistant staph aureus) culture positive, Severe dysplasia of cervix, and Substance abuse ([facility] [address] SURGICAL HISTORY      has a past surgical history that includes Ovary removal; Tonsillectomy; LEEP (04/16/2021); and LEEP (N/A, 4/16/2021). CURRENT MEDICATIONS       Previous Medications    BUPRENORPHINE HCL (SUBUTEX SL)    Place under the tongue 2 / 8mg daily    IBUPROFEN (ADVIL;MOTRIN) 600 MG TABLET    Take 1 tablet by mouth every 6 hours       ALLERGIES     is allergic to ceftin [cefuroxime axetil]. FAMILY HISTORY     She indicated that her mother is alive. family history is not on file. SOCIAL HISTORY      reports that she has been smoking cigarettes. She has been smoking about 0.50 packs per day. She has never used smokeless tobacco. She reports current alcohol use. She reports previous drug use. Drugs: IV and Marijuana ([facility]).       DIAGNOSTIC RESULTS     EKG: All EKG's are interpreted by the Emergency Department Physician who either signs or Co-signs this chart in the absence of a cardiologist.        RADIOLOGY:   Non-plain film images such as CT, Ultrasound and MRI are read by the radiologist. Plain radiographic images are visualized and the radiologist interpretations are reviewed as follows:   CT ABDOMEN PELVIS W IV CONTRAST Additional Contrast? None   Preliminary Result   Increased density seen within the lumen of the appendix may reflect   underlying appendicolith. However, the appendix appears nondilated without   convincing imaging features of acute appendicitis seen. Cholelithiasis without evidence of acute cholecystitis seen. Suggestion of nonspecific periportal edema and generalized edema of the   subcutaneous tissues. CT ABDOMEN PELVIS W IV CONTRAST Additional Contrast? None    Result Date: 4/1/2022  EXAMINATION: CT OF THE ABDOMEN AND PELVIS WITH CONTRAST 4/1/2022 3:55 pm TECHNIQUE: CT of the abdomen and pelvis was performed with the administration of intravenous contrast. Multiplanar reformatted images are provided for review. Dose modulation, iterative reconstruction, and/or weight based adjustment of the mA/kV was utilized to reduce the radiation dose to as low as reasonably achievable. COMPARISON: None. HISTORY: ORDERING SYSTEM PROVIDED HISTORY: r/o appendicitis. Abdominal pain. TECHNOLOGIST PROVIDED HISTORY: r/o appendicitis Decision Support Exception - unselect if not a suspected or confirmed emergency medical condition->Emergency Medical Condition (MA) Reason for Exam: r/o appendicitis FINDINGS: Lower Chest: No confluent airspace opacity or pleural effusion seen at the lung bases. Organs: There is suggestion of periportal edema. No discrete hepatic lesion is seen. There is punctate hyperdense gallstone within contracted gallbladder. The gallbladder is unremarkable. The spleen is not enlarged. No evidence of pancreatic ductal dilatation is seen.   Evaluation of nephrolithiasis is limited as contrast is seen within the renal collecting systems. No evidence of hydronephrosis seen. GI/Bowel: Evaluation of the GI tract is limited without the benefit of enteric contrast.  Appendix is visualized and does not appear to be significantly enlarged measuring up to 4 mm without evidence of periappendiceal inflammatory changes appreciated. Increased density seen within the lumen of the appendix may reflect underlying appendicolith. No evidence of bowel obstruction is seen. Pelvis: The urinary bladder is under distended with contrast seen within the dependent portion of the urinary bladder. The uterus is anteverted. Peritoneum/Retroperitoneum: No evidence of intra-abdominal free air is seen. No evidence of ascites is seen. The abdominal aorta appears normal in caliber. Bones/soft tissues: No evidence of acute osseous abnormality is seen. There is nonspecific mild generalized edema of the subcutaneous tissues. Increased density seen within the lumen of the appendix may reflect underlying appendicolith. However, the appendix appears nondilated without convincing imaging features of acute appendicitis seen. Cholelithiasis without evidence of acute cholecystitis seen. Suggestion of nonspecific periportal edema and generalized edema of the subcutaneous tissues. LABS:  Results for orders placed or performed during the hospital encounter of 04/01/22   Vaginitis DNA Probe    Specimen: Vaginal   Result Value Ref Range    Source . VAGINAL SWAB     Trichomonas Vaginalis DNA NEGATIVE NEGATIVE    GARDNERELLA VAGINALIS, DNA PROBE POSITIVE (A) NEGATIVE    CANDIDA SPECIES, DNA PROBE NEGATIVE NEGATIVE   Basic Metabolic Panel   Result Value Ref Range    Glucose 87 70 - 99 mg/dL    BUN 16 6 - 20 mg/dL    CREATININE 0.59 0.50 - 0.90 mg/dL    Calcium 8.5 (L) 8.6 - 10.4 mg/dL    Sodium 137 135 - 144 mmol/L    Potassium 4.0 3.7 - 5.3 mmol/L    Chloride 100 98 - 107 mmol/L    CO2 27 20 - 31 mmol/L    Anion Gap 10 9 - 17 mmol/L    GFR Non-African American >60 >60 mL/min    GFR African American >60 >60 mL/min    GFR Comment         CBC with Auto Differential   Result Value Ref Range    WBC 4.2 3.5 - 11.0 k/uL    RBC 4.06 4.0 - 5.2 m/uL    Hemoglobin 12.7 12.0 - 16.0 g/dL    Hematocrit 37.4 36 - 46 %    MCV 92.2 80 - 100 fL    MCH 31.2 26 - 34 pg    MCHC 33.9 31 - 37 g/dL    RDW 13.7 12.5 - 15.4 %    Platelets 018 666 - 609 k/uL    MPV 10.5 6.0 - 12.0 fL    Seg Neutrophils 53 36 - 66 %    Lymphocytes 37 24 - 44 %    Monocytes 8 2 - 11 %    Eosinophils % 2 1 - 4 %    Basophils 0 0 - 2 %    Segs Absolute 2.20 1.8 - 7.7 k/uL    Absolute Lymph # 1.60 1.0 - 4.8 k/uL    Absolute Mono # 0.30 0.1 - 1.2 k/uL    Absolute Eos # 0.10 0.0 - 0.4 k/uL    Basophils Absolute 0.00 0.0 - 0.2 k/uL   HCG Qualitative, Serum   Result Value Ref Range    hCG Qual NEGATIVE NEGATIVE   Urinalysis with Microscopic   Result Value Ref Range    Color, UA Yellow Yellow    Turbidity UA Clear Clear    Glucose, Ur NEGATIVE NEGATIVE    Bilirubin Urine NEGATIVE NEGATIVE    Ketones, Urine NEGATIVE NEGATIVE    Specific Gravity, UA 1.010 1.005 - 1.030    Urine Hgb SMALL (A) NEGATIVE    pH, UA 7.5 5.0 - 8.0    Protein, UA NEGATIVE NEGATIVE    Urobilinogen, Urine Normal Normal    Nitrite, Urine NEGATIVE NEGATIVE    Leukocyte Esterase, Urine NEGATIVE NEGATIVE    -          WBC, UA None 0 - 5 /HPF    RBC, UA 0 TO 2 0 - 2 /HPF    Epithelial Cells UA 0 TO 2 0 - 5 /HPF         EMERGENCY DEPARTMENT COURSE:   Recent Vitals:    Vitals:    04/01/22 1524   BP: 124/87   Pulse: 71   Resp: 16   Temp: 98.3 °F (36.8 °C)   TempSrc: Oral   SpO2: 99%   Weight: 65.8 kg (145 lb)   Height: 5' 7\" (1.702 m)     -------------------------  BP: 124/87, Temp: 98.3 °F (36.8 °C), Pulse: 71, Resp: 16      The patient was given the following medications:  Orders Placed This Encounter   Medications    0.9 % sodium chloride bolus    0.9 % sodium chloride bolus    sodium chloride flush 0.9 % injection 10 mL    iopamidol (ISOVUE-370) 76 % injection 75 mL    ondansetron (ZOFRAN ODT) 4 MG disintegrating tablet     Sig: Take 1 tablet by mouth every 8 hours as needed for Nausea     Dispense:  20 tablet     Refill:  0    ondansetron (ZOFRAN) injection 4 mg    metroNIDAZOLE (FLAGYL) 500 MG tablet     Sig: Take 1 tablet by mouth 2 times daily for 7 days     Dispense:  14 tablet     Refill:  0    metroNIDAZOLE (FLAGYL) tablet 500 mg     Order Specific Question:   Antimicrobial Indications     Answer:   Intra-Abdominal Infection           MEDICAL DECISION MAKING:     Patient presents with nausea and diarrhea and other constitutional symptoms. Work-up shows positive for bacterial vaginosis. She has had this in the past.  No leukocytosis. There is an appendicolith but no evidence of appendicitis. Patient does have mild generalized lower abdominal tenderness without any peritoneal signs on my exam that does not localize to the right lower quadrant. Suspicion very low for appendicitis or other acute intra-abdominal pathology. I discussed all the results with the patient and she is comfortable with the plan to be discharged home and follow-up with her PCP. Advised to return right away if worse or for new or concerning symptoms. We will put her on Flagyl and Zofran. Most likely viral syndrome. I estimate there is LOW risk for ACUTE APPENDICITIS, BOWEL OBSTRUCTION, CHOLECYSTITIS, DIVERTICULITIS, INCARCERATED HERNIA, PANCREATITIS, or PERFORATED BOWEL or ULCER, thus I consider the discharge disposition reasonable. Re-evaluation of the abdomen is benign. No guarding, peritoneal signs or significant tenderness noted. Also, there is no evidence or peritonitis, sepsis, or toxicity. The patient and/or family and I have discussed the diagnosis and risks, and we agree with discharging home to follow-up with their primary doctor.  The patient presents with abdominal pain without signs of peritonitis or other life-threatening or serious etiology. The patient appears stable for discharge and has been instructed to return immediately if the symptoms worsen in any way, or in 8-12 hr if not improved for re-evaluation. We also discussed returning to the Emergency Department immediately if new or worsening symptoms occur. We have discussed the symptoms which are most concerning (e.g., bloody stool, fever, changing or worsening pain, persistent vomiting, chest pain shortness of breath, numbness or weakness to the arms or legs, coolness or color change to the arms or legs) that necessitate immediate return. The patient understands that at this time there is no evidence for a more malignant underlying process, but the patient also understands that early in the process of an illness or injury, an emergency department workup can be falsely reassuring. Routine discharge counseling was given, and the patient understands that worsening, changing or persistent symptoms should prompt an immediate call or follow up with their primary physician or return to the emergency department. The importance of appropriate follow up was also discussed. I have reviewed the disposition diagnosis with the patient and or their family/guardian. I have answered their questions and given discharge instructions. They voiced understanding of these instructions and did not have any further questions or complaints. CONSULTS:    None    CRITICAL CARE:     None    PROCEDURES:    None    FINAL IMPRESSION      1. Lower abdominal pain    2. Diarrhea, unspecified type    3.  Nausea          DISPOSITION/PLAN   DISPOSITION Decision To Discharge 04/01/2022 07:33:35 PM      Condition on Disposition    Improved    PATIENT REFERRED TO:  Víctor Vinson MD  [address] [place] [address]  [phone]    Schedule an appointment as soon as possible for a visit in 3 days        DISCHARGE MEDICATIONS:  New Prescriptions    METRONIDAZOLE (FLAGYL) 500 MG TABLET    Take 1 tablet by mouth 2 times daily for 7 days    ONDANSETRON (ZOFRAN ODT) 4 MG DISINTEGRATING TABLET    Take 1 tablet by mouth every 8 hours as needed for Nausea       (Please note that portions of this note were completed with a voice recognition program.  Efforts were made to edit the dictations but occasionally words are mis-transcribed.)        Leena Peña D.O.   Emergency Medicine Attending       Azucena Vazquez DO  04/01/22 1943

## 2022-04-02 LAB
EKG ATRIAL RATE: 64 BPM
EKG P AXIS: 43 DEGREES
EKG P-R INTERVAL: 148 MS
EKG Q-T INTERVAL: 422 MS
EKG QRS DURATION: 98 MS
EKG QTC CALCULATION (BAZETT): 435 MS
EKG R AXIS: 39 DEGREES
EKG T AXIS: 53 DEGREES
EKG VENTRICULAR RATE: 64 BPM

## 2022-04-02 NOTE — ED NOTES
Patient provided with discharge instructions, prescriptions, and follow up information. Verbalized understanding. IV discontinued and dry dressing in place. A&OX3. Steady gait noted at discharge. Wheelchair declined by patient.       Duarte Reveles RN  04/01/22 2013

## 2022-07-07 ENCOUNTER — APPOINTMENT (OUTPATIENT)
Dept: GENERAL RADIOLOGY | Age: 32
End: 2022-07-07
Payer: MEDICARE

## 2022-07-07 ENCOUNTER — HOSPITAL ENCOUNTER (EMERGENCY)
Age: 32
Discharge: HOME | End: 2022-07-07
Attending: STUDENT IN AN ORGANIZED HEALTH CARE EDUCATION/TRAINING PROGRAM
Payer: MEDICARE

## 2022-07-07 VITALS
SYSTOLIC BLOOD PRESSURE: 117 MMHG | OXYGEN SATURATION: 99 % | RESPIRATION RATE: 18 BRPM | TEMPERATURE: 98.8 F | HEART RATE: 98 BPM | DIASTOLIC BLOOD PRESSURE: 79 MMHG | HEIGHT: 67 IN | BODY MASS INDEX: 22.76 KG/M2 | WEIGHT: 145 LBS

## 2022-07-07 DIAGNOSIS — S22.31XA CLOSED FRACTURE OF ONE RIB OF RIGHT SIDE, INITIAL ENCOUNTER: Primary | ICD-10-CM

## 2022-07-07 PROCEDURE — 6370000000 HC RX 637 (ALT 250 FOR IP): Performed by: STUDENT IN AN ORGANIZED HEALTH CARE EDUCATION/TRAINING PROGRAM

## 2022-07-07 PROCEDURE — 71101 X-RAY EXAM UNILAT RIBS/CHEST: CPT

## 2022-07-07 PROCEDURE — 99283 EMERGENCY DEPT VISIT LOW MDM: CPT

## 2022-07-07 RX ORDER — LIDOCAINE 4 G/G
1 PATCH TOPICAL ONCE
Status: DISCONTINUED | OUTPATIENT
Start: 2022-07-07 | End: 2022-07-07 | Stop reason: HOSPADM

## 2022-07-07 RX ORDER — LIDOCAINE 4 G/G
1 PATCH TOPICAL DAILY
Qty: 30 PATCH | Refills: 0 | Status: SHIPPED | OUTPATIENT
Start: 2022-07-07 | End: 2022-08-06

## 2022-07-07 RX ORDER — ONDANSETRON 4 MG/1
4 TABLET, ORALLY DISINTEGRATING ORAL ONCE
Status: COMPLETED | OUTPATIENT
Start: 2022-07-07 | End: 2022-07-07

## 2022-07-07 RX ORDER — ACETAMINOPHEN 500 MG
1000 TABLET ORAL ONCE
Status: COMPLETED | OUTPATIENT
Start: 2022-07-07 | End: 2022-07-07

## 2022-07-07 RX ORDER — IBUPROFEN 800 MG/1
800 TABLET ORAL ONCE
Status: COMPLETED | OUTPATIENT
Start: 2022-07-07 | End: 2022-07-07

## 2022-07-07 RX ADMIN — ONDANSETRON 4 MG: 4 TABLET, ORALLY DISINTEGRATING ORAL at 20:01

## 2022-07-07 RX ADMIN — IBUPROFEN 800 MG: 800 TABLET ORAL at 20:01

## 2022-07-07 RX ADMIN — ACETAMINOPHEN 1000 MG: 500 TABLET ORAL at 20:01

## 2022-07-07 ASSESSMENT — PAIN SCALES - GENERAL
PAINLEVEL_OUTOF10: 10
PAINLEVEL_OUTOF10: 10

## 2022-07-07 ASSESSMENT — ENCOUNTER SYMPTOMS
ABDOMINAL PAIN: 0
SHORTNESS OF BREATH: 0
COUGH: 0
VOMITING: 0
NAUSEA: 0

## 2022-07-07 ASSESSMENT — PAIN DESCRIPTION - ORIENTATION: ORIENTATION: RIGHT

## 2022-07-07 ASSESSMENT — PAIN DESCRIPTION - LOCATION: LOCATION: RIB CAGE

## 2022-07-07 ASSESSMENT — PAIN - FUNCTIONAL ASSESSMENT: PAIN_FUNCTIONAL_ASSESSMENT: 0-10

## 2022-07-07 NOTE — Clinical Note
King Gutierrez was seen and treated in our emergency department on 7/7/2022. She may return to work on 07/08/2022. I recommend light duty without heavy lifting due to rib fracture. If you have any questions or concerns, please don't hesitate to call.       Para Stager, DO

## 2022-11-14 ENCOUNTER — HOSPITAL ENCOUNTER (OUTPATIENT)
Age: 32
Discharge: HOME | End: 2022-11-14

## 2022-11-15 ENCOUNTER — HOSPITAL ENCOUNTER (OUTPATIENT)
Age: 32
Discharge: HOME | End: 2022-11-15

## 2022-11-15 LAB — RUBV IGG SER QL: 51.5 IU/ML

## 2022-11-15 PROCEDURE — 86787 VARICELLA-ZOSTER ANTIBODY: CPT

## 2022-11-15 PROCEDURE — 86481 TB AG RESPONSE T-CELL SUSP: CPT

## 2022-11-15 PROCEDURE — 36415 COLL VENOUS BLD VENIPUNCTURE: CPT

## 2022-11-15 PROCEDURE — 86765 RUBEOLA ANTIBODY: CPT

## 2022-11-15 PROCEDURE — 86735 MUMPS ANTIBODY: CPT

## 2022-11-15 PROCEDURE — 86762 RUBELLA ANTIBODY: CPT

## 2022-11-16 LAB
MEASLES ANTIBODY IGG: 2.38
MUV IGG SER QL: 2.79
VZV IGG SER QL IA: 2.19

## 2022-11-17 LAB — T-SPOT TB TEST: NORMAL

## 2023-04-27 ENCOUNTER — APPOINTMENT (OUTPATIENT)
Dept: CT IMAGING | Age: 33
End: 2023-04-27
Payer: MEDICAID

## 2023-04-27 ENCOUNTER — HOSPITAL ENCOUNTER (EMERGENCY)
Age: 33
Discharge: HOME | End: 2023-04-27
Attending: EMERGENCY MEDICINE
Payer: MEDICAID

## 2023-04-27 VITALS
HEART RATE: 82 BPM | SYSTOLIC BLOOD PRESSURE: 134 MMHG | RESPIRATION RATE: 12 BRPM | TEMPERATURE: 98.7 F | HEIGHT: 67 IN | OXYGEN SATURATION: 99 % | BODY MASS INDEX: 22.29 KG/M2 | WEIGHT: 142 LBS | DIASTOLIC BLOOD PRESSURE: 82 MMHG

## 2023-04-27 DIAGNOSIS — R42 DIZZINESS: Primary | ICD-10-CM

## 2023-04-27 DIAGNOSIS — M54.12 CERVICAL RADICULOPATHY: ICD-10-CM

## 2023-04-27 LAB
ABSOLUTE EOS #: 0.04 K/UL (ref 0–0.44)
ABSOLUTE IMMATURE GRANULOCYTE: 0 K/UL (ref 0–0.3)
ABSOLUTE LYMPH #: 1.48 K/UL (ref 1.1–3.7)
ABSOLUTE MONO #: 0.27 K/UL (ref 0.1–1.2)
ANION GAP SERPL CALCULATED.3IONS-SCNC: 11 MMOL/L (ref 9–17)
BASOPHILS # BLD: 1 % (ref 0–2)
BASOPHILS ABSOLUTE: 0.04 K/UL (ref 0–0.2)
BUN SERPL-MCNC: 9 MG/DL (ref 6–20)
BUN/CREAT BLD: 16 (ref 9–20)
CALCIUM SERPL-MCNC: 9 MG/DL (ref 8.6–10.4)
CHLORIDE SERPL-SCNC: 102 MMOL/L (ref 98–107)
CO2 SERPL-SCNC: 25 MMOL/L (ref 20–31)
CREAT SERPL-MCNC: 0.57 MG/DL (ref 0.5–0.9)
EOSINOPHILS RELATIVE PERCENT: 1 % (ref 1–4)
GFR SERPL CREATININE-BSD FRML MDRD: >60 ML/MIN/1.73M2
GLUCOSE SERPL-MCNC: 88 MG/DL (ref 70–99)
HCG QUALITATIVE: NEGATIVE
HCT VFR BLD AUTO: 40.6 % (ref 36.3–47.1)
HGB BLD-MCNC: 13.5 G/DL (ref 11.9–15.1)
IMMATURE GRANULOCYTES: 0 %
LYMPHOCYTES # BLD: 38 % (ref 24–43)
MCH RBC QN AUTO: 30.8 PG (ref 25.2–33.5)
MCHC RBC AUTO-ENTMCNC: 33.3 G/DL (ref 28.4–34.8)
MCV RBC AUTO: 92.7 FL (ref 82.6–102.9)
MONOCYTES # BLD: 7 % (ref 3–12)
NRBC AUTOMATED: 0 PER 100 WBC
PDW BLD-RTO: 12.3 % (ref 11.8–14.4)
PLATELET # BLD AUTO: 132 K/UL (ref 138–453)
PMV BLD AUTO: 11.8 FL (ref 8.1–13.5)
POTASSIUM SERPL-SCNC: 4 MMOL/L (ref 3.7–5.3)
RBC # BLD: 4.38 M/UL (ref 3.95–5.11)
SEG NEUTROPHILS: 53 % (ref 36–65)
SEGMENTED NEUTROPHILS ABSOLUTE COUNT: 2.07 K/UL (ref 1.5–8.1)
SODIUM SERPL-SCNC: 138 MMOL/L (ref 135–144)
WBC # BLD AUTO: 3.9 K/UL (ref 3.5–11.3)

## 2023-04-27 PROCEDURE — 2580000003 HC RX 258: Performed by: EMERGENCY MEDICINE

## 2023-04-27 PROCEDURE — 84703 CHORIONIC GONADOTROPIN ASSAY: CPT

## 2023-04-27 PROCEDURE — 99284 EMERGENCY DEPT VISIT MOD MDM: CPT

## 2023-04-27 PROCEDURE — 72125 CT NECK SPINE W/O DYE: CPT

## 2023-04-27 PROCEDURE — 85025 COMPLETE CBC W/AUTO DIFF WBC: CPT

## 2023-04-27 PROCEDURE — 93005 ELECTROCARDIOGRAM TRACING: CPT | Performed by: EMERGENCY MEDICINE

## 2023-04-27 PROCEDURE — 70450 CT HEAD/BRAIN W/O DYE: CPT

## 2023-04-27 PROCEDURE — 80048 BASIC METABOLIC PNL TOTAL CA: CPT

## 2023-04-27 RX ORDER — CYCLOBENZAPRINE HCL 10 MG
10 TABLET ORAL 3 TIMES DAILY PRN
Qty: 21 TABLET | Refills: 0 | Status: SHIPPED | OUTPATIENT
Start: 2023-04-27 | End: 2023-05-07

## 2023-04-27 RX ORDER — 0.9 % SODIUM CHLORIDE 0.9 %
1000 INTRAVENOUS SOLUTION INTRAVENOUS ONCE
Status: COMPLETED | OUTPATIENT
Start: 2023-04-27 | End: 2023-04-27

## 2023-04-27 RX ORDER — PREDNISONE 50 MG/1
50 TABLET ORAL DAILY
Qty: 5 TABLET | Refills: 0 | Status: SHIPPED | OUTPATIENT
Start: 2023-04-27 | End: 2023-05-02

## 2023-04-27 RX ADMIN — SODIUM CHLORIDE 1000 ML: 9 INJECTION, SOLUTION INTRAVENOUS at 20:19

## 2023-04-27 ASSESSMENT — ENCOUNTER SYMPTOMS
COUGH: 0
NAUSEA: 0
ABDOMINAL PAIN: 0
EYE REDNESS: 0
DIARRHEA: 0
SHORTNESS OF BREATH: 0
EYE DISCHARGE: 0
RHINORRHEA: 0
VOMITING: 0
COLOR CHANGE: 0
BACK PAIN: 1
SORE THROAT: 0

## 2023-04-27 ASSESSMENT — PAIN SCALES - GENERAL: PAINLEVEL_OUTOF10: 10

## 2023-04-27 ASSESSMENT — PAIN - FUNCTIONAL ASSESSMENT: PAIN_FUNCTIONAL_ASSESSMENT: 0-10

## 2023-04-27 ASSESSMENT — PAIN DESCRIPTION - LOCATION: LOCATION: BACK

## 2023-04-27 NOTE — ED PROVIDER NOTES
weakness, neck pain    Differential Diagnosis included but not limited: Radiculopathy, intracranial lesion    Diagnoses Considered but Do Not Suspect: Guillain-Barré    Pertinent Comorbid Conditions: None    2)  Data Reviewed  My EKG interpretation: Patient's EKG shows sinus rhythm rate of 66 [place] QRS QTc intervals unremarkable the patient has normal axis no ST elevations or depressions, nonspecific EKG. Imaging that is independently reviewed and interpreted by me as:      See more data below for the lab and radiology tests and orders. 3)  Treatment and Disposition      Shared Decision Making: The patient was involved in his/her plan of care through shared decision making. The testing that was ordered was discussed with the patient. Any medications that may have been ordered were discussed with the patient    Code Status Discussion:      \"ED Course\" Notes From Epic Narrator:         CRITICAL CARE:       PROCEDURES:    Procedures      DATA FOR LAB AND RADIOLOGY TESTS ORDERED BELOW ARE REVIEWED BY THE ED CLINICIAN:    RADIOLOGY: All x-rays, CT, MRI, and formal ultrasound images (except ED bedside ultrasound) are read by the radiologist, see reports below, unless otherwise noted in MDM or here. Reports below are reviewed by myself. CT CERVICAL SPINE WO CONTRAST   Final Result   Head CT: No acute intracranial abnormality. No evidence for acute   intracranial hemorrhage, territorial infarction or intracranial mass lesion. Cervical CT: No acute abnormality of the cervical spine. No fracture. CT HEAD WO CONTRAST   Final Result   Head CT: No acute intracranial abnormality. No evidence for acute   intracranial hemorrhage, territorial infarction or intracranial mass lesion. Cervical CT: No acute abnormality of the cervical spine. No fracture. LABS: Lab orders shown below, the results are reviewed by myself, and all abnormals are listed below.   Labs Reviewed   CBC WITH AUTO

## 2023-04-28 NOTE — ED NOTES
Patient requesting to know if she can eat, given ice water. Awaiting results. Updated on need for urine specimen, denies needing to urinate currently.       Magalie Hernández RN  04/27/23 2120

## 2023-04-29 LAB
EKG ATRIAL RATE: 66 BPM
EKG P AXIS: 60 DEGREES
EKG P-R INTERVAL: 146 MS
EKG Q-T INTERVAL: 420 MS
EKG QRS DURATION: 90 MS
EKG QTC CALCULATION (BAZETT): 440 MS
EKG R AXIS: -8 DEGREES
EKG T AXIS: 58 DEGREES
EKG VENTRICULAR RATE: 66 BPM

## 2023-04-29 PROCEDURE — 93010 ELECTROCARDIOGRAM REPORT: CPT | Performed by: INTERNAL MEDICINE

## 2023-08-04 ENCOUNTER — HOSPITAL ENCOUNTER (EMERGENCY)
Age: 33
Discharge: HOME | End: 2023-08-04
Attending: EMERGENCY MEDICINE
Payer: MEDICAID

## 2023-08-04 VITALS
SYSTOLIC BLOOD PRESSURE: 128 MMHG | TEMPERATURE: 98.2 F | WEIGHT: 137 LBS | HEART RATE: 67 BPM | BODY MASS INDEX: 21.5 KG/M2 | RESPIRATION RATE: 16 BRPM | OXYGEN SATURATION: 98 % | DIASTOLIC BLOOD PRESSURE: 82 MMHG | HEIGHT: 67 IN

## 2023-08-04 DIAGNOSIS — M54.13 RADICULOPATHY OF CERVICOTHORACIC REGION: Primary | ICD-10-CM

## 2023-08-04 DIAGNOSIS — F41.9 ANXIETY: ICD-10-CM

## 2023-08-04 LAB
CHP ED QC CHECK: YES
PREGNANCY TEST URINE, POC: NEGATIVE

## 2023-08-04 PROCEDURE — 99283 EMERGENCY DEPT VISIT LOW MDM: CPT

## 2023-08-04 PROCEDURE — 81025 URINE PREGNANCY TEST: CPT

## 2023-08-04 RX ORDER — PREDNISONE 20 MG/1
50 TABLET ORAL DAILY
Qty: 13 TABLET | Refills: 0 | Status: SHIPPED | OUTPATIENT
Start: 2023-08-04 | End: 2023-08-04 | Stop reason: SDUPTHER

## 2023-08-04 RX ORDER — ALPRAZOLAM 0.25 MG/1
0.25 TABLET ORAL NIGHTLY PRN
Qty: 7 TABLET | Refills: 0 | Status: SHIPPED | OUTPATIENT
Start: 2023-08-04 | End: 2023-08-11

## 2023-08-04 RX ORDER — ALPRAZOLAM 0.25 MG/1
0.25 TABLET ORAL NIGHTLY PRN
Qty: 7 TABLET | Refills: 0 | Status: SHIPPED | OUTPATIENT
Start: 2023-08-04 | End: 2023-08-04 | Stop reason: SDUPTHER

## 2023-08-04 RX ORDER — PREDNISONE 20 MG/1
50 TABLET ORAL DAILY
Qty: 13 TABLET | Refills: 0 | Status: SHIPPED | OUTPATIENT
Start: 2023-08-04 | End: 2023-08-09

## 2023-08-04 RX ORDER — CYCLOBENZAPRINE HCL 10 MG
10 TABLET ORAL NIGHTLY PRN
Qty: 10 TABLET | Refills: 0 | Status: SHIPPED | OUTPATIENT
Start: 2023-08-04 | End: 2023-08-04 | Stop reason: SDUPTHER

## 2023-08-04 RX ORDER — CYCLOBENZAPRINE HCL 10 MG
10 TABLET ORAL NIGHTLY PRN
Qty: 10 TABLET | Refills: 0 | Status: SHIPPED | OUTPATIENT
Start: 2023-08-04 | End: 2023-08-14

## 2023-08-04 ASSESSMENT — PAIN SCALES - GENERAL: PAINLEVEL_OUTOF10: 10

## 2023-08-04 ASSESSMENT — PAIN - FUNCTIONAL ASSESSMENT: PAIN_FUNCTIONAL_ASSESSMENT: 0-10

## 2023-08-04 NOTE — DISCHARGE INSTRUCTIONS
Do not drive or operate machinery while using the Xanax. I do not recommend taking the Xanax and Flexeril together as it will cause increased drowsiness. Steroids can help with nerve pain. I do recommend follow-up with a neurologist given the severity of the symptoms in your neck shoulder and hand.

## 2023-08-04 NOTE — ED NOTES
Entered patient room, patient on cell phone, logged into computer, patient remained on cell phone. Writer advised patient writer would return when she was no longer busy and patient stated she was just on the phone with her mom and writer could stay. Writer asked if any staff had collected a urine specimen from her and she said no. Patient stated she could provide. Patient states she presents to ED with increased anxiety and that she is out of anxiety medications. Patient presents calm and in no acute distress and was conversing normally with mother about housing. Patient also c/o left shoulder pain, but denies accident, injury or trauma.       Ysabel Sanford., RN  43/43/73 [facility]., RN  88/65/42 7822

## 2023-08-04 NOTE — ED NOTES
Patient ambulated to bathroom while on cell phone with mother, clean catch specimen obtained, warm blanket provided patient, patient returned to room, call light within reach.       Lyssa Maza, RN  90/26/80 1235

## 2023-08-04 NOTE — ED PROVIDER NOTES
EMERGENCY DEPARTMENT ENCOUNTER    Pt Name: Mart Alvarado  MRN: [id number]  [address] 1990  Date of evaluation: 8/4/23  CHIEF COMPLAINT       Chief Complaint   Patient presents with    Shoulder Pain    Anxiety     Patient she is out of anxiety medication. Patient states she was sent my EMS. HISTORY OF PRESENT ILLNESS   70-year-old female presents emergency room for intermittent issues with pain to the left shoulder with shooting pain down the arm. She has had intermittent issues with this for several weeks. She had a visit back in April dealing with the pain as well. She had been given steroids and muscle relaxer. She does report she was unable to get these prescriptions filled. Today the pain was a lot more severe than usual.  She reports that it radiated all the way down to her hand she had some hand numbness. She also had some radiation of pain up into her chest.  Pain seems to start in the trapezius and shoots down the hand. She denies any injuries. Patient did report concern about possible pregnancy as well with did request a pregnancy test.           REVIEW OF SYSTEMS     Review of Systems   Musculoskeletal:  Positive for arthralgias. PASTMEDICAL HISTORY     Past Medical History:   Diagnosis Date    Abnormal cells of cervix     ADD (attention deficit disorder)     Condyloma     HSIL (high grade squamous intraepithelial lesion) on Pap smear of cervix     MRSA (methicillin resistant staph aureus) culture positive 09/2011    BUTTOCK    Severe dysplasia of cervix     Substance abuse ([address])     heroin / on 12/29/18 pt states last used 3 yrs ago,marijuana 4/12/21.  pt on buprenorphine SL every am     Past Problem List  Patient Active Problem List   Diagnosis Code    Encounter to establish care with new doctor Z76.89    H/O LEEP 4/16/21 Z98.890    HGSIL on cytologic smear of cervix R87.613     SURGICAL HISTORY       Past Surgical History:   Procedure Laterality Date    LEEP  04/16/2021    LEEP N/A Anxiety          DISPOSITION/PLAN   DISPOSITION Decision To Discharge 08/04/2023 04:04:21 PM      OUTPATIENT FOLLOW UP THE PATIENT:  Balta Rivera MD  [address] [address]  [phone]    Schedule an appointment as soon as possible for a visit in 1 week      Shai Lemos [facility] , [address] [place] [address],[address] [facility]  [phone]    Schedule an appointment as soon as possible for a visit in 1 week        MD Jenny Cole MD  08/06/23 2020

## 2023-08-04 NOTE — ED NOTES
Discharge papers, education, meds, referral reviewed and questions answered, patient had no further questions.      Rayne Robertson., RN  76/08/09 8630

## 2023-08-07 LAB — HCG, PREGNANCY URINE (POC): NEGATIVE

## 2023-11-21 ENCOUNTER — TELEPHONE (OUTPATIENT)
Dept: OBGYN CLINIC | Age: 33
End: 2023-11-21

## 2023-11-21 DIAGNOSIS — B96.89 BACTERIAL VAGINITIS: ICD-10-CM

## 2023-11-21 DIAGNOSIS — N76.0 BACTERIAL VAGINITIS: ICD-10-CM

## 2023-11-21 DIAGNOSIS — R30.0 DYSURIA: Primary | ICD-10-CM

## 2023-11-21 RX ORDER — NITROFURANTOIN 25; 75 MG/1; MG/1
100 CAPSULE ORAL 2 TIMES DAILY
Qty: 14 CAPSULE | Refills: 0 | Status: SHIPPED | OUTPATIENT
Start: 2023-11-21 | End: 2023-11-28

## 2023-11-21 RX ORDER — METRONIDAZOLE 500 MG/1
500 TABLET ORAL 2 TIMES DAILY
Qty: 14 TABLET | Refills: 0 | Status: SHIPPED | OUTPATIENT
Start: 2023-11-21 | End: 2023-11-28

## 2023-11-21 NOTE — TELEPHONE ENCOUNTER
Recommend taking Macrobid completely first before Metronidazole (Flagyl). If symptoms not improving, would recommend coming in for vaginal swabs and urine culture.     DO Chad Randall Ob/Gyn   11/21/2023, 9:47 AM

## 2023-11-21 NOTE — TELEPHONE ENCOUNTER
LMP: Est. Early Oct.    Last Seen:03.16.21    Next Appt: 12.08.23    C/O  Burn while urination   Poss. BV    Pt called to set appt's for pregnancy. Pt having burning sensation with  urination and stated she thinks UTI and BV. Pt requested if she could have antibiotics sent in for her. Advised pt that would reach out to provider to determine POC. Have not seen pt in 2 years.        Please Advise

## 2023-12-08 ENCOUNTER — OFFICE VISIT (OUTPATIENT)
Dept: OBGYN CLINIC | Age: 33
End: 2023-12-08

## 2023-12-08 ENCOUNTER — HOSPITAL ENCOUNTER (OUTPATIENT)
Age: 33
Setting detail: SPECIMEN
Discharge: HOME | End: 2023-12-08

## 2023-12-08 VITALS
BODY MASS INDEX: 23.39 KG/M2 | DIASTOLIC BLOOD PRESSURE: 70 MMHG | SYSTOLIC BLOOD PRESSURE: 112 MMHG | WEIGHT: 149 LBS | HEIGHT: 67 IN

## 2023-12-08 DIAGNOSIS — Z32.01 POSITIVE PREGNANCY TEST: ICD-10-CM

## 2023-12-08 DIAGNOSIS — F11.20 PREGNANCY COMPLICATED BY SUBOXONE MAINTENANCE, ANTEPARTUM, FIRST TRIMESTER (HCC): ICD-10-CM

## 2023-12-08 DIAGNOSIS — Z98.890 H/O LEEP: ICD-10-CM

## 2023-12-08 DIAGNOSIS — Z3A.01 6 WEEKS GESTATION OF PREGNANCY: Primary | ICD-10-CM

## 2023-12-08 DIAGNOSIS — O30.009 TWIN PREGNANCY, ANTEPARTUM, UNSPECIFIED MULTIPLE GESTATION TYPE: ICD-10-CM

## 2023-12-08 DIAGNOSIS — O99.321 PREGNANCY COMPLICATED BY SUBOXONE MAINTENANCE, ANTEPARTUM, FIRST TRIMESTER (HCC): ICD-10-CM

## 2023-12-08 DIAGNOSIS — O21.9 NAUSEA AND VOMITING DURING PREGNANCY PRIOR TO 22 WEEKS GESTATION: ICD-10-CM

## 2023-12-08 DIAGNOSIS — Z3A.01 6 WEEKS GESTATION OF PREGNANCY: ICD-10-CM

## 2023-12-08 LAB
ABO + RH BLD: NORMAL
AMPHET UR QL SCN: NEGATIVE
BACTERIA URNS QL MICRO: ABNORMAL
BARBITURATES UR QL SCN: NEGATIVE
BASOPHILS # BLD: 0.03 K/UL (ref 0–0.2)
BASOPHILS NFR BLD: 1 % (ref 0–2)
BENZODIAZ UR QL: NEGATIVE
BILIRUB UR QL STRIP: NEGATIVE
BLOOD GROUP ANTIBODIES SERPL: NEGATIVE
C TRACH DNA SPEC QL PROBE+SIG AMP: NORMAL
CANDIDA SPECIES: NEGATIVE
CANNABINOIDS UR QL SCN: POSITIVE
CASTS #/AREA URNS LPF: ABNORMAL /LPF (ref 0–8)
CLARITY UR: ABNORMAL
COCAINE UR QL SCN: NEGATIVE
COLOR UR: ABNORMAL
EOSINOPHIL # BLD: 0.04 K/UL (ref 0–0.44)
EOSINOPHILS RELATIVE PERCENT: 1 % (ref 1–4)
EPI CELLS #/AREA URNS HPF: ABNORMAL /HPF (ref 0–5)
ERYTHROCYTE [DISTWIDTH] IN BLOOD BY AUTOMATED COUNT: 12.1 % (ref 11.8–14.4)
FENTANYL UR QL: NEGATIVE
GARDNERELLA VAGINALIS: NEGATIVE
GLUCOSE UR STRIP-MCNC: NEGATIVE MG/DL
HBV SURFACE AG SERPL QL IA: ABNORMAL
HCT VFR BLD AUTO: 38.4 % (ref 36.3–47.1)
HGB BLD-MCNC: 13.3 G/DL (ref 11.9–15.1)
HGB UR QL STRIP.AUTO: NEGATIVE
IMM GRANULOCYTES # BLD AUTO: <0.03 K/UL (ref 0–0.3)
IMM GRANULOCYTES NFR BLD: 0 %
KETONES UR STRIP-MCNC: NEGATIVE MG/DL
LEUKOCYTE ESTERASE UR QL STRIP: ABNORMAL
LYMPHOCYTES NFR BLD: 1.43 K/UL (ref 1.1–3.7)
LYMPHOCYTES RELATIVE PERCENT: 30 % (ref 24–43)
MCH RBC QN AUTO: 31.4 PG (ref 25.2–33.5)
MCHC RBC AUTO-ENTMCNC: 34.6 G/DL (ref 28.4–34.8)
MCV RBC AUTO: 90.6 FL (ref 82.6–102.9)
METHADONE UR QL: NEGATIVE
MONOCYTES NFR BLD: 0.4 K/UL (ref 0.1–1.2)
MONOCYTES NFR BLD: 8 % (ref 3–12)
N GONORRHOEA DNA SPEC QL PROBE+SIG AMP: NORMAL
NEUTROPHILS NFR BLD: 60 % (ref 36–65)
NEUTS SEG NFR BLD: 2.85 K/UL (ref 1.5–8.1)
NITRITE UR QL STRIP: NEGATIVE
NRBC BLD-RTO: 0 PER 100 WBC
OPIATES UR QL SCN: NEGATIVE
OXYCODONE UR QL SCN: NEGATIVE
PCP UR QL SCN: NEGATIVE
PH UR STRIP: 8.5 [PH] (ref 5–8)
PLATELET # BLD AUTO: ABNORMAL K/UL (ref 138–453)
PLATELET, FLUORESCENCE: 146 K/UL (ref 138–453)
PLATELETS.RETICULATED NFR BLD AUTO: 11.6 % (ref 1.1–10.3)
PROT UR STRIP-MCNC: NEGATIVE MG/DL
RBC # BLD AUTO: 4.24 M/UL (ref 3.95–5.11)
RBC #/AREA URNS HPF: ABNORMAL /HPF (ref 0–4)
REAGIN+T PALLIDUM IGG+IGM SERPL-IMP: ABNORMAL
RUBV IGG SERPL QL IA: ABNORMAL IU/ML
SOURCE: NORMAL
SP GR UR STRIP: 1.02 (ref 1–1.03)
SPECIMEN DESCRIPTION: NORMAL
T PALLIDUM AB SER QL IA: ABNORMAL
TEST INFORMATION: ABNORMAL
TRICHOMONAS: NEGATIVE
UROBILINOGEN UR STRIP-ACNC: NORMAL EU/DL (ref 0–1)
WBC #/AREA URNS HPF: ABNORMAL /HPF (ref 0–5)
WBC OTHER # BLD: 4.8 K/UL (ref 3.5–11.3)

## 2023-12-08 RX ORDER — DIPHENHYDRAMINE HYDROCHLORIDE 25 MG/1
25 CAPSULE ORAL 3 TIMES DAILY
Qty: 90 TABLET | Refills: 3 | Status: SHIPPED | OUTPATIENT
Start: 2023-12-08

## 2023-12-08 RX ORDER — PNV NO.95/FERROUS FUM/FOLIC AC 28MG-0.8MG
1 TABLET ORAL DAILY
Qty: 30 TABLET | Refills: 12 | Status: SHIPPED | OUTPATIENT
Start: 2023-12-08

## 2023-12-08 NOTE — PROGRESS NOTES
(PRENATAL VITAMIN) 27-0.8 MG TABS Take 1 tablet by mouth daily 30 tablet 12    doxyLAMINE succinate (GNP SLEEP AID) 25 MG tablet Take 1 tablet by mouth nightly 30 tablet 3    vitamin B-6 (PYRIDOXINE) 25 MG tablet Take 1 tablet by mouth in the morning, at noon, and at bedtime 90 tablet 3    Buprenorphine HCl (SUBUTEX SL) Place under the tongue 2 / 8mg daily       No current facility-administered medications for this visit. ALLERGIES:  Ceftin [cefuroxime axetil]    General Appearance: This  is a well Developed, well Nourished, well groomed female. Her BMI was reviewed. Nutritional decision making was discussed, including weight gain goals in pregnancy. Skin:  There was a normal Inspection of the skin. There were no rashes or lesions. Lymphatic:  No Lymph Nodes were Palpable in the neck , axilla or groin. Neck and EENT:  The neck was supple. There were no masses   The thyroid was not enlarged and had no masses. Perrla  Cardiovascular: The lungs were auscultated and found to be clear. There were no rales, rhonchi or wheezes. There was a good respiratory effort. The heart was in a regular rate and rhythm. There was no murmur appreciated. The patients extremities were without calf tenderness, edema, or varicosities. Abdomen: The abdomen was soft and non-tender. There were good bowel sounds in all quadrants and there was no guarding, rebound or rigidity. On evaluation there was no evidence of hepatosplenomegaly and there was no costal vertebral kobe tenderness bilaterally. No hernias were appreciated. Psych: The patient had a normal Orientation to: Time, Place, Person, and Situation  There is no Mood / Affect changes  Breast:  (Chest) Discussed breastfeeding.    Pelvic Exam:   External genitalia: General appearance; normal, Hair distribution; normal, Lesions absent  Urinary system: urethral meatus normal  Vaginal: normal mucosa, no discharge  Cervix: normal appearing cervix without discharge or

## 2023-12-09 LAB
MICROORGANISM SPEC CULT: NORMAL
SPECIMEN DESCRIPTION: NORMAL

## 2023-12-11 LAB
HGB ELECTROPHORESIS INTERP: NORMAL
PATHOLOGIST: NORMAL

## 2023-12-12 LAB
C TRACH DNA SPEC QL PROBE+SIG AMP: NEGATIVE
HIV 1+2 AB+HIV1 P24 AG SERPL QL IA: NONREACTIVE
HPV I/H RISK 4 DNA CVX QL NAA+PROBE: DETECTED
HPV SAMPLE: ABNORMAL
HPV, INTERPRETATION: ABNORMAL
HPV16 DNA CVX QL NAA+PROBE: NOT DETECTED
HPV18 DNA CVX QL NAA+PROBE: NOT DETECTED
N GONORRHOEA DNA SPEC QL PROBE+SIG AMP: NEGATIVE
SPECIMEN DESCRIPTION: ABNORMAL
SPECIMEN DESCRIPTION: NORMAL

## 2023-12-14 LAB
BASOPHILS # BLD: 0.03 K/UL (ref 0–0.2)
BASOPHILS NFR BLD: 1 % (ref 0–2)
CFTR ALLELE 1 BLD/T QL: NEGATIVE
CFTR ALLELE 2 BLD/T QL: NEGATIVE
CFTR MUT ANL BLD/T: NORMAL
CFTR MUT ANL BLD/T: NORMAL
EOSINOPHIL # BLD: 0.04 K/UL (ref 0–0.44)
EOSINOPHILS RELATIVE PERCENT: 1 % (ref 1–4)
ERYTHROCYTE [DISTWIDTH] IN BLOOD BY AUTOMATED COUNT: 12.1 % (ref 11.8–14.4)
HBV SURFACE AG SERPL QL IA: NONREACTIVE
HCT VFR BLD AUTO: 38.4 % (ref 36.3–47.1)
HCV AB SERPL QL IA: REACTIVE
HGB BLD-MCNC: 13.3 G/DL (ref 11.9–15.1)
IMM GRANULOCYTES # BLD AUTO: <0.03 K/UL (ref 0–0.3)
IMM GRANULOCYTES NFR BLD: 0 %
LYMPHOCYTES NFR BLD: 1.43 K/UL (ref 1.1–3.7)
LYMPHOCYTES RELATIVE PERCENT: 30 % (ref 24–43)
MCH RBC QN AUTO: 31.4 PG (ref 25.2–33.5)
MCHC RBC AUTO-ENTMCNC: 34.6 G/DL (ref 28.4–34.8)
MCV RBC AUTO: 90.6 FL (ref 82.6–102.9)
MONOCYTES NFR BLD: 0.4 K/UL (ref 0.1–1.2)
MONOCYTES NFR BLD: 8 % (ref 3–12)
NEUTROPHILS NFR BLD: 60 % (ref 36–65)
NEUTS SEG NFR BLD: 2.85 K/UL (ref 1.5–8.1)
NRBC BLD-RTO: 0 PER 100 WBC
PLATELET # BLD AUTO: ABNORMAL K/UL (ref 138–453)
PLATELET, FLUORESCENCE: 146 K/UL (ref 138–453)
PLATELETS.RETICULATED NFR BLD AUTO: 11.6 % (ref 1.1–10.3)
RBC # BLD AUTO: 4.24 M/UL (ref 3.95–5.11)
RUBV IGG SERPL QL IA: 52.98 IU/ML
T PALLIDUM AB SER QL IA: NONREACTIVE
WBC OTHER # BLD: 4.8 K/UL (ref 3.5–11.3)

## 2023-12-27 ENCOUNTER — TELEPHONE (OUTPATIENT)
Dept: OBGYN CLINIC | Age: 33
End: 2023-12-27

## 2023-12-27 NOTE — TELEPHONE ENCOUNTER
9.2 wks    Pt called in stating she is having some pain in the right side of her abdomin and some cramping periodically. Pt is not having any vaginal bleeding at this time. Pt has been busy the past couple days with the holiday. Pt could have over did it. I advised pt to take some extra strength tylenol and to take a warm bath or shower and drink plenty of fluids and if that does not help to give us a return call. Pt verbalized understanding.

## 2023-12-28 ENCOUNTER — TELEPHONE (OUTPATIENT)
Dept: SOCIAL WORK | Age: 33
End: 2023-12-28

## 2023-12-28 LAB — CYTOLOGY REPORT: NORMAL
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
## 2024-01-04 ENCOUNTER — INITIAL PRENATAL (OUTPATIENT)
Dept: OBGYN CLINIC | Age: 34
End: 2024-01-04
Payer: MEDICAID

## 2024-01-04 VITALS
WEIGHT: 150.6 LBS | DIASTOLIC BLOOD PRESSURE: 64 MMHG | HEIGHT: 67 IN | BODY MASS INDEX: 23.64 KG/M2 | SYSTOLIC BLOOD PRESSURE: 102 MMHG

## 2024-01-04 DIAGNOSIS — Z98.890 H/O LEEP: ICD-10-CM

## 2024-01-04 DIAGNOSIS — Z86.14 HISTORY OF MRSA INFECTION: ICD-10-CM

## 2024-01-04 DIAGNOSIS — Z3A.10 10 WEEKS GESTATION OF PREGNANCY: ICD-10-CM

## 2024-01-04 DIAGNOSIS — R76.8 HEPATITIS C ANTIBODY POSITIVE IN BLOOD: Primary | ICD-10-CM

## 2024-01-04 DIAGNOSIS — O99.321 PREGNANCY COMPLICATED BY SUBOXONE MAINTENANCE, ANTEPARTUM, FIRST TRIMESTER (HCC): ICD-10-CM

## 2024-01-04 DIAGNOSIS — O30.009 TWIN PREGNANCY, ANTEPARTUM, UNSPECIFIED MULTIPLE GESTATION TYPE: ICD-10-CM

## 2024-01-04 DIAGNOSIS — O99.611 CONSTIPATION DURING PREGNANCY IN FIRST TRIMESTER: ICD-10-CM

## 2024-01-04 DIAGNOSIS — K59.00 CONSTIPATION DURING PREGNANCY IN FIRST TRIMESTER: ICD-10-CM

## 2024-01-04 DIAGNOSIS — F11.20 PREGNANCY COMPLICATED BY SUBOXONE MAINTENANCE, ANTEPARTUM, FIRST TRIMESTER (HCC): ICD-10-CM

## 2024-01-04 PROCEDURE — 99211 OFF/OP EST MAY X REQ PHY/QHP: CPT | Performed by: STUDENT IN AN ORGANIZED HEALTH CARE EDUCATION/TRAINING PROGRAM

## 2024-01-04 RX ORDER — POLYETHYLENE GLYCOL 3350 17 G/17G
17 POWDER, FOR SOLUTION ORAL DAILY
Qty: 116 G | Refills: 4 | Status: SHIPPED | OUTPATIENT
Start: 2024-01-04 | End: 2024-02-03

## 2024-01-04 RX ORDER — DOCUSATE SODIUM 100 MG/1
100 CAPSULE, LIQUID FILLED ORAL 2 TIMES DAILY
Qty: 60 CAPSULE | Refills: 0 | Status: SHIPPED | OUTPATIENT
Start: 2024-01-04 | End: 2024-02-03

## 2024-01-04 NOTE — PROGRESS NOTES
Relationship with FOB: friend, not living together, no currently involved, he has other children, no health issues for them  FOB name: desires not to share at this time  Plans to Breast fdg  Pain Score:0/10  Job title:  This is a planned pregnancy:no  Certain LMP:No  S/S of pregnancy:Yes missed period  Hx N/V pregnancy:fatigue       Mother's ethnicity:   Father's ethnicity:         Patient Active Problem List   Diagnosis    Encounter to establish care with new doctor    H/O LEEP 21    HGSIL on cytologic smear of cervix     Blood pressure 102/64, height 1.702 m (5' 7\"), weight 68.3 kg (150 lb 9.6 oz), last menstrual period 2023, unknown if currently breastfeeding.      Noelle Kim is a 33 y.o. , here for her ACOG. The patients past medical, surgical, social and family history were reviewed.  Current medications and allergies were reviewed, and documented in the chart.    Menstrual history: regular  Birth control: none. Thinking about getting IUD placed at delivery.     Wt Readings from Last 3 Encounters:   24 68.3 kg (150 lb 9.6 oz)   23 67.6 kg (149 lb)   23 62.1 kg (137 lb)     Recent Results (from the past 8736 hour(s))   Basic Metabolic Panel    Collection Time: 23  7:42 PM   Result Value Ref Range    Glucose 88 70 - 99 mg/dL    BUN 9 6 - 20 mg/dL    Creatinine 0.57 0.50 - 0.90 mg/dL    Est, Glom Filt Rate >60 >60 mL/min/1.73m2    Bun/Cre Ratio 16 9 - 20    Calcium 9.0 8.6 - 10.4 mg/dL    Sodium 138 135 - 144 mmol/L    Potassium 4.0 3.7 - 5.3 mmol/L    Chloride 102 98 - 107 mmol/L    CO2 25 20 - 31 mmol/L    Anion Gap 11 9 - 17 mmol/L   CBC with Auto Differential    Collection Time: 23  7:42 PM   Result Value Ref Range    WBC 3.9 3.5 - 11.3 k/uL    RBC 4.38 3.95 - 5.11 m/uL    Hemoglobin 13.5 11.9 - 15.1 g/dL    Hematocrit 40.6 36.3 - 47.1 %    MCV 92.7 82.6 - 102.9 fL    MCH 30.8 25.2 - 33.5 pg    MCHC 33.3 28.4 - 34.8 g/dL

## 2024-01-05 ENCOUNTER — TELEPHONE (OUTPATIENT)
Dept: OBGYN CLINIC | Age: 34
End: 2024-01-05

## 2024-01-05 DIAGNOSIS — O24.419 GESTATIONAL DIABETES MELLITUS (GDM), ANTEPARTUM, GESTATIONAL DIABETES METHOD OF CONTROL UNSPECIFIED: ICD-10-CM

## 2024-01-05 DIAGNOSIS — O09.91 HIGH-RISK PREGNANCY SUPERVISION, FIRST TRIMESTER: Primary | ICD-10-CM

## 2024-01-05 DIAGNOSIS — B18.2 CHRONIC HEPATITIS C WITHOUT HEPATIC COMA (HCC): ICD-10-CM

## 2024-01-05 DIAGNOSIS — Z3A.10 10 WEEKS GESTATION OF PREGNANCY: ICD-10-CM

## 2024-01-05 PROBLEM — B19.20 VIRAL HEPATITIS C: Status: ACTIVE | Noted: 2018-06-14

## 2024-01-05 PROBLEM — Z90.721 S/P LEFT OOPHORECTOMY: Status: ACTIVE | Noted: 2024-01-05

## 2024-01-05 PROBLEM — Z76.89 ENCOUNTER TO ESTABLISH CARE WITH NEW DOCTOR: Status: RESOLVED | Noted: 2021-03-25 | Resolved: 2024-01-05

## 2024-01-05 PROBLEM — O99.119 THROMBOCYTOPENIA AFFECTING PREGNANCY (HCC): Status: ACTIVE | Noted: 2024-01-05

## 2024-01-05 PROBLEM — O30.041 DICHORIONIC DIAMNIOTIC TWIN PREGNANCY IN FIRST TRIMESTER: Status: ACTIVE | Noted: 2024-01-05

## 2024-01-05 PROBLEM — F19.20 POLYSUBSTANCE DEPENDENCE INCLUDING OPIOID TYPE DRUG, EPISODIC ABUSE (HCC): Status: ACTIVE | Noted: 2024-01-05

## 2024-01-05 PROBLEM — F14.10 COCAINE ABUSE (HCC): Status: ACTIVE | Noted: 2018-09-28

## 2024-01-05 PROBLEM — F11.20 POLYSUBSTANCE DEPENDENCE INCLUDING OPIOID TYPE DRUG, EPISODIC ABUSE (HCC): Status: ACTIVE | Noted: 2024-01-05

## 2024-01-05 PROBLEM — F14.10 COCAINE ABUSE (HCC): Status: RESOLVED | Noted: 2018-09-28 | Resolved: 2024-01-05

## 2024-01-05 PROBLEM — D69.6 THROMBOCYTOPENIA AFFECTING PREGNANCY (HCC): Status: ACTIVE | Noted: 2024-01-05

## 2024-01-05 PROBLEM — F31.4 BIPOLAR 1 DISORDER, DEPRESSED, SEVERE (HCC): Chronic | Status: ACTIVE | Noted: 2018-09-28

## 2024-01-05 PROBLEM — F12.10 TETRAHYDROCANNABINOL (THC) USE DISORDER, MILD, ABUSE: Status: ACTIVE | Noted: 2024-01-05

## 2024-01-05 PROBLEM — F12.10 TETRAHYDROCANNABINOL (THC) USE DISORDER, MILD, ABUSE: Status: RESOLVED | Noted: 2024-01-05 | Resolved: 2024-01-05

## 2024-01-05 NOTE — TELEPHONE ENCOUNTER
OB 10.4 wks Twins called to let pt know she needs colposcopy following her PAP results and  reviewed her chart. She needs 2 other labs and best to do by tomrrow. Pt agreeable to POC.     Nessa scheduled.

## 2024-01-05 NOTE — TELEPHONE ENCOUNTER
----- Message from Aishwarya Johnston DO sent at 1/5/2024  9:39 AM EST -----  Lindy Esquivel, I ordered a CMP so we can check her liver enzymes with her having HepC and I also ordered a 1 hour glucose test since she had GDM in a previous pregnancy. Can we just call her and let her know if those 2 new labs to get done? Thanks !      ----- Message -----  From: Lali Perkins RN  Sent: 1/4/2024   4:13 PM EST  To: Aishwarya Johnston DO    ACOG  Comments/Counseling: Pt presents for ACOG visit today.  -POC pt already scheduled for 20wk anatomy scan.  -NIPT with gender drawn today.  -Hep C positive and will have referral to GI.  -POC set up ob visits for duration of pregnancy, mail calendar and letter mailed may change appts prn.  Sierra

## 2024-01-10 DIAGNOSIS — O30.009 FIRST TRIMESTER SCREENING FOR TWIN PREGNANCY: ICD-10-CM

## 2024-01-10 DIAGNOSIS — Z3A.11 11 WEEKS GESTATION OF PREGNANCY: Primary | ICD-10-CM

## 2024-01-10 DIAGNOSIS — R40.20 LOSS OF CONSCIOUSNESS (HCC): ICD-10-CM

## 2024-01-10 DIAGNOSIS — Z36.89 FIRST TRIMESTER SCREENING FOR TWIN PREGNANCY: ICD-10-CM

## 2024-01-10 DIAGNOSIS — R55 BRIEF LOSS OF CONSCIOUSNESS: ICD-10-CM

## 2024-01-12 LAB
Lab: NORMAL
NTRA FETAL FRACTION SECOND FETUS: NORMAL
NTRA FETAL FRACTION: NORMAL
NTRA GENDER OF FETUS: NORMAL
NTRA GENDER OF SECOND FETUS: NORMAL
NTRA TRISOMY 13 AGE-BASED RISK TEXT: NORMAL
NTRA TRISOMY 13 RESULT TEXT: NORMAL
NTRA TRISOMY 13 RISK SCORE TEXT: NORMAL
NTRA TRISOMY 18 AGE-BASED RISK TEXT: NORMAL
NTRA TRISOMY 18 RESULT TEXT: NORMAL
NTRA TRISOMY 18 RISK SCORE TEXT: NORMAL
NTRA TRISOMY 21 AGE-BASED RISK TEXT: NORMAL
NTRA TRISOMY 21 RESULT TEXT: NORMAL
NTRA TRISOMY 21 RISK SCORE TEXT: NORMAL
NTRA ZYGOSITY: NORMAL

## 2024-01-20 ENCOUNTER — HOSPITAL ENCOUNTER (EMERGENCY)
Age: 34
Discharge: HOME | End: 2024-01-20
Attending: EMERGENCY MEDICINE
Payer: MEDICAID

## 2024-01-20 VITALS
TEMPERATURE: 99 F | WEIGHT: 150 LBS | HEIGHT: 67 IN | HEART RATE: 67 BPM | RESPIRATION RATE: 12 BRPM | OXYGEN SATURATION: 96 % | SYSTOLIC BLOOD PRESSURE: 121 MMHG | DIASTOLIC BLOOD PRESSURE: 67 MMHG | BODY MASS INDEX: 23.54 KG/M2

## 2024-01-20 DIAGNOSIS — N89.8 VAGINAL DISCHARGE: Primary | ICD-10-CM

## 2024-01-20 DIAGNOSIS — O30.002 TWIN GESTATION IN SECOND TRIMESTER, UNSPECIFIED MULTIPLE GESTATION TYPE: ICD-10-CM

## 2024-01-20 LAB
AMORPH SED URNS QL MICRO: ABNORMAL
BACTERIA URNS QL MICRO: ABNORMAL
BILIRUB UR QL STRIP: NEGATIVE
C TRACH DNA SPEC QL PROBE+SIG AMP: NORMAL
CANDIDA SPECIES: NEGATIVE
CLARITY UR: ABNORMAL
COLOR UR: YELLOW
EPI CELLS #/AREA URNS HPF: ABNORMAL /HPF (ref 0–5)
GARDNERELLA VAGINALIS: NEGATIVE
GLUCOSE UR STRIP-MCNC: NEGATIVE MG/DL
HCG UR QL: POSITIVE
HGB UR QL STRIP.AUTO: NEGATIVE
KETONES UR STRIP-MCNC: NEGATIVE MG/DL
LEUKOCYTE ESTERASE UR QL STRIP: NEGATIVE
N GONORRHOEA DNA SPEC QL PROBE+SIG AMP: NORMAL
NITRITE UR QL STRIP: NEGATIVE
PH UR STRIP: 8.5 [PH] (ref 5–8)
PROT UR STRIP-MCNC: NEGATIVE MG/DL
RBC #/AREA URNS HPF: ABNORMAL /HPF (ref 0–2)
SOURCE: NORMAL
SP GR UR STRIP: 1.01 (ref 1–1.03)
SPECIMEN DESCRIPTION: NORMAL
TRICHOMONAS: NEGATIVE
UROBILINOGEN UR STRIP-ACNC: NORMAL EU/DL (ref 0–1)
WBC #/AREA URNS HPF: ABNORMAL /HPF (ref 0–5)

## 2024-01-20 PROCEDURE — 81001 URINALYSIS AUTO W/SCOPE: CPT

## 2024-01-20 PROCEDURE — 6360000002 HC RX W HCPCS: Performed by: EMERGENCY MEDICINE

## 2024-01-20 PROCEDURE — 81025 URINE PREGNANCY TEST: CPT

## 2024-01-20 PROCEDURE — 87510 GARDNER VAG DNA DIR PROBE: CPT

## 2024-01-20 PROCEDURE — 99284 EMERGENCY DEPT VISIT MOD MDM: CPT

## 2024-01-20 PROCEDURE — 87480 CANDIDA DNA DIR PROBE: CPT

## 2024-01-20 PROCEDURE — 87591 N.GONORRHOEAE DNA AMP PROB: CPT

## 2024-01-20 PROCEDURE — 96372 THER/PROPH/DIAG INJ SC/IM: CPT

## 2024-01-20 PROCEDURE — 87491 CHLMYD TRACH DNA AMP PROBE: CPT

## 2024-01-20 PROCEDURE — 87660 TRICHOMONAS VAGIN DIR PROBE: CPT

## 2024-01-20 PROCEDURE — 6370000000 HC RX 637 (ALT 250 FOR IP): Performed by: EMERGENCY MEDICINE

## 2024-01-20 RX ORDER — AZITHROMYCIN 250 MG/1
1000 TABLET, FILM COATED ORAL ONCE
Status: COMPLETED | OUTPATIENT
Start: 2024-01-20 | End: 2024-01-20

## 2024-01-20 RX ORDER — CEFTRIAXONE 500 MG/1
500 INJECTION, POWDER, FOR SOLUTION INTRAMUSCULAR; INTRAVENOUS ONCE
Status: COMPLETED | OUTPATIENT
Start: 2024-01-20 | End: 2024-01-20

## 2024-01-20 RX ADMIN — CEFTRIAXONE SODIUM 500 MG: 500 INJECTION, POWDER, FOR SOLUTION INTRAMUSCULAR; INTRAVENOUS at 19:51

## 2024-01-20 RX ADMIN — AZITHROMYCIN DIHYDRATE 1000 MG: 250 TABLET ORAL at 19:52

## 2024-01-20 ASSESSMENT — PAIN DESCRIPTION - PAIN TYPE: TYPE: ACUTE PAIN

## 2024-01-20 ASSESSMENT — PAIN - FUNCTIONAL ASSESSMENT: PAIN_FUNCTIONAL_ASSESSMENT: 0-10

## 2024-01-20 ASSESSMENT — PAIN DESCRIPTION - DESCRIPTORS: DESCRIPTORS: DISCOMFORT

## 2024-01-20 ASSESSMENT — PAIN DESCRIPTION - LOCATION: LOCATION: PELVIS

## 2024-01-20 ASSESSMENT — PAIN DESCRIPTION - FREQUENCY: FREQUENCY: INTERMITTENT

## 2024-01-20 NOTE — ED NOTES
Pt reports onset last evening of sharp R groin pain which has progressed today into sharp bilat groin pain. C/o light spotting today as well as gray grainy vaginal d/c. States when she saw her OB/GYN approx 7 weeks ago, she was diagnosed with BV and prescribed atbx which she took, but states \"I think it's back\". Pt approx 13 weeks gestation with twins. Reports recent decrease in n/v. Denies abd pain. Denies urinary complaints.

## 2024-01-22 LAB
C TRACH DNA SPEC QL PROBE+SIG AMP: NEGATIVE
N GONORRHOEA DNA SPEC QL PROBE+SIG AMP: NEGATIVE
SPECIMEN DESCRIPTION: NORMAL

## 2024-01-30 SDOH — ECONOMIC STABILITY: FOOD INSECURITY: WITHIN THE PAST 12 MONTHS, YOU WORRIED THAT YOUR FOOD WOULD RUN OUT BEFORE YOU GOT MONEY TO BUY MORE.: OFTEN TRUE

## 2024-01-30 SDOH — ECONOMIC STABILITY: FOOD INSECURITY: WITHIN THE PAST 12 MONTHS, THE FOOD YOU BOUGHT JUST DIDN'T LAST AND YOU DIDN'T HAVE MONEY TO GET MORE.: SOMETIMES TRUE

## 2024-01-30 SDOH — ECONOMIC STABILITY: INCOME INSECURITY: HOW HARD IS IT FOR YOU TO PAY FOR THE VERY BASICS LIKE FOOD, HOUSING, MEDICAL CARE, AND HEATING?: HARD

## 2024-01-30 SDOH — ECONOMIC STABILITY: TRANSPORTATION INSECURITY
IN THE PAST 12 MONTHS, HAS LACK OF TRANSPORTATION KEPT YOU FROM MEETINGS, WORK, OR FROM GETTING THINGS NEEDED FOR DAILY LIVING?: NO

## 2024-01-30 SDOH — ECONOMIC STABILITY: HOUSING INSECURITY
IN THE LAST 12 MONTHS, WAS THERE A TIME WHEN YOU DID NOT HAVE A STEADY PLACE TO SLEEP OR SLEPT IN A SHELTER (INCLUDING NOW)?: YES

## 2024-01-31 ENCOUNTER — ROUTINE PRENATAL (OUTPATIENT)
Dept: OBGYN CLINIC | Age: 34
End: 2024-01-31
Payer: MEDICAID

## 2024-01-31 ENCOUNTER — HOSPITAL ENCOUNTER (OUTPATIENT)
Age: 34
Setting detail: SPECIMEN
Discharge: HOME | End: 2024-01-31

## 2024-01-31 VITALS
DIASTOLIC BLOOD PRESSURE: 76 MMHG | WEIGHT: 151 LBS | BODY MASS INDEX: 23.65 KG/M2 | SYSTOLIC BLOOD PRESSURE: 117 MMHG | HEART RATE: 77 BPM

## 2024-01-31 DIAGNOSIS — R76.8 HEPATITIS C ANTIBODY POSITIVE IN BLOOD: ICD-10-CM

## 2024-01-31 DIAGNOSIS — B18.2 CHRONIC HEPATITIS C WITHOUT HEPATIC COMA (HCC): ICD-10-CM

## 2024-01-31 DIAGNOSIS — O09.91 HIGH-RISK PREGNANCY SUPERVISION, FIRST TRIMESTER: ICD-10-CM

## 2024-01-31 DIAGNOSIS — F11.20 POLYSUBSTANCE DEPENDENCE INCLUDING OPIOID TYPE DRUG, EPISODIC ABUSE (HCC): ICD-10-CM

## 2024-01-31 DIAGNOSIS — Z3A.10 10 WEEKS GESTATION OF PREGNANCY: ICD-10-CM

## 2024-01-31 DIAGNOSIS — O09.92 HIGH-RISK PREGNANCY IN SECOND TRIMESTER: Primary | ICD-10-CM

## 2024-01-31 DIAGNOSIS — F19.20 POLYSUBSTANCE DEPENDENCE INCLUDING OPIOID TYPE DRUG, EPISODIC ABUSE (HCC): ICD-10-CM

## 2024-01-31 DIAGNOSIS — Z3A.14 14 WEEKS GESTATION OF PREGNANCY: ICD-10-CM

## 2024-01-31 LAB
ALBUMIN SERPL-MCNC: 3.6 G/DL (ref 3.5–5.2)
ALBUMIN/GLOB SERPL: 1.7 {RATIO} (ref 1–2.5)
ALP SERPL-CCNC: 39 U/L (ref 35–104)
ALT SERPL-CCNC: 33 U/L (ref 5–33)
ANION GAP SERPL CALCULATED.3IONS-SCNC: 9 MMOL/L (ref 9–17)
AST SERPL-CCNC: 54 U/L
BILIRUB SERPL-MCNC: 0.5 MG/DL (ref 0.3–1.2)
BUN SERPL-MCNC: 5 MG/DL (ref 6–20)
CALCIUM SERPL-MCNC: 8.4 MG/DL (ref 8.6–10.4)
CHLORIDE SERPL-SCNC: 102 MMOL/L (ref 98–107)
CO2 SERPL-SCNC: 22 MMOL/L (ref 20–31)
CREAT SERPL-MCNC: 0.3 MG/DL (ref 0.5–0.9)
GFR SERPL CREATININE-BSD FRML MDRD: >60 ML/MIN/1.73M2
GLUCOSE SERPL-MCNC: 99 MG/DL (ref 70–99)
POTASSIUM SERPL-SCNC: 3.6 MMOL/L (ref 3.7–5.3)
PROT SERPL-MCNC: 5.7 G/DL (ref 6.4–8.3)
SODIUM SERPL-SCNC: 133 MMOL/L (ref 135–144)

## 2024-01-31 PROCEDURE — 99213 OFFICE O/P EST LOW 20 MIN: CPT | Performed by: STUDENT IN AN ORGANIZED HEALTH CARE EDUCATION/TRAINING PROGRAM

## 2024-01-31 NOTE — PROGRESS NOTES
Prenatal Visit    Noelle Kim is a 33 y.o. female  at 14w2d IUP    Subjective:  The patient was seen and evaluated. She has no complaints today.  Reports she is having nightmares that one of her twins has . This is causing anxiety for her as she is not feeling movement yet. Denies any abdominal cramping, pain or leaking. She still has occasional episodes of spotting. This started after having sex. Denies any bright red bleeding or large blood clots. The spotting does not occur every day. She is taking her prenatal vitamins.      The problem list reflects the active issues addressed during today's visit    VITALS:    BP: 117/76  Weight - Scale: 68.5 kg (151 lb)  Pulse: 77  Patient Position: Sitting  Fetal HR: 154   B Fetal  bpm     PHYSICAL:   General appearance: no apparent distress, alert and cooperative  HEENT: head atraumatic, normocephalic, trachea midline, moist mucous membranes   Neurologic: alert, oriented, normal speech   Lungs: no increased work of breathing,   Abdomen: soft, gravid, non-tender on palpation    Musculoskeletal: no gross abnormalities, range of motion appropriate for age   Psychiatric: mood appropriate, normal affect      Assessment & Plan:  Noelle Kim is a 33 y.o. female  at 14w2d IUP   - VSS     - Bedside US used to obtain fetal heart tones and to reassure patient    - Initial prenatal labs were reviewed    - Several labs were reprinted for her to complete    - Continue taking prenatal vitamins QD    - MFM US scheduled for 24   - NIPT low risk genetics    - Influenza vaccination: due this season    - S/p COVID-19 vaccination x2   - Confirmed subutex dosing and where she gets it from    - Patient to make a sooner follow up if vaginal spotting worsens    Return in about 4 weeks (around 2024) for ANKUR.    No orders of the defined types were placed in this encounter.    Patient Active Problem List    Diagnosis Date Noted    Hx Gestational diabetes

## 2024-02-01 LAB
HCV RNA # SERPL NAA+PROBE: DETECTED {COPIES}/ML
HCV RNA SERPL NAA+PROBE-ACNC: ABNORMAL IU/ML
HCV RNA SERPL NAA+PROBE-LOG IU: 6.05 LOG IU/ML
SPECIMEN SOURCE: ABNORMAL

## 2024-02-19 ENCOUNTER — TELEPHONE (OUTPATIENT)
Dept: GASTROENTEROLOGY | Age: 34
End: 2024-02-19

## 2024-02-19 ENCOUNTER — ROUTINE PRENATAL (OUTPATIENT)
Dept: PERINATAL CARE | Age: 34
End: 2024-02-19
Payer: MEDICAID

## 2024-02-19 VITALS
TEMPERATURE: 97.3 F | WEIGHT: 165 LBS | SYSTOLIC BLOOD PRESSURE: 90 MMHG | HEIGHT: 67 IN | BODY MASS INDEX: 25.9 KG/M2 | HEART RATE: 84 BPM | DIASTOLIC BLOOD PRESSURE: 68 MMHG | RESPIRATION RATE: 16 BRPM

## 2024-02-19 DIAGNOSIS — O34.40 HISTORY OF CERVICAL LEEP BIOPSY AFFECTING CARE OF MOTHER, ANTEPARTUM: ICD-10-CM

## 2024-02-19 DIAGNOSIS — Z98.890 HISTORY OF CERVICAL LEEP BIOPSY AFFECTING CARE OF MOTHER, ANTEPARTUM: ICD-10-CM

## 2024-02-19 DIAGNOSIS — F19.20 DRUG DEPENDENCE DURING PREGNANCY IN SECOND TRIMESTER (HCC): ICD-10-CM

## 2024-02-19 DIAGNOSIS — O30.042 DICHORIONIC DIAMNIOTIC TWIN PREGNANCY IN SECOND TRIMESTER: Primary | ICD-10-CM

## 2024-02-19 DIAGNOSIS — O44.40 LOW IMPLANTATION OF PLACENTA WITHOUT HEMORRHAGE: ICD-10-CM

## 2024-02-19 DIAGNOSIS — O35.BXX2 FETAL CARDIAC ECHOGENIC FOCUS, ANTEPARTUM, FETUS 2 OF MULTIPLE GESTATION: ICD-10-CM

## 2024-02-19 DIAGNOSIS — Z3A.17 17 WEEKS GESTATION OF PREGNANCY: ICD-10-CM

## 2024-02-19 DIAGNOSIS — O99.322 DRUG DEPENDENCE DURING PREGNANCY IN SECOND TRIMESTER (HCC): ICD-10-CM

## 2024-02-19 DIAGNOSIS — Z13.89 ENCOUNTER FOR ROUTINE SCREENING FOR MALFORMATION USING ULTRASONICS: ICD-10-CM

## 2024-02-19 DIAGNOSIS — O35.03X1 CHOROID PLEXUS CYST OF FETUS AFFECTING CARE OF MOTHER, ANTEPARTUM, FETUS 1 OF MULTIPLE GESTATION: ICD-10-CM

## 2024-02-19 PROBLEM — O28.3 ABNORMAL FETAL ULTRASOUND: Status: ACTIVE | Noted: 2024-02-19

## 2024-02-19 PROCEDURE — 99999 PR OFFICE/OUTPT VISIT,PROCEDURE ONLY: CPT | Performed by: OBSTETRICS & GYNECOLOGY

## 2024-02-19 PROCEDURE — 76805 OB US >/= 14 WKS SNGL FETUS: CPT | Performed by: OBSTETRICS & GYNECOLOGY

## 2024-02-19 PROCEDURE — 76810 OB US >/= 14 WKS ADDL FETUS: CPT | Performed by: OBSTETRICS & GYNECOLOGY

## 2024-02-19 PROCEDURE — 76817 TRANSVAGINAL US OBSTETRIC: CPT | Performed by: OBSTETRICS & GYNECOLOGY

## 2024-02-19 RX ORDER — AMOXICILLIN 500 MG/1
CAPSULE ORAL
COMMUNITY
Start: 2024-02-09

## 2024-02-19 NOTE — PROGRESS NOTES
Obstetric/Gynecology Maternal Fetal Medicine Resident Note    Patient has been informed of new ultrasound findings of Choroid Plexus Cysts on Baby A and Echogenic Intracardiac Focus of L Ventricle of Baby B.       Patient is amenable to  formal consult with M attending physician for Dichorionic Diamniotic Twin Gestation, Choroid Plexus Cysts (baby A), Echogenic Intracardiac Focus (baby B).     DO DESTINY Hilliard Resident, PGY2  [facility]  2/19/2024, 10:21 AM

## 2024-02-28 ENCOUNTER — TELEPHONE (OUTPATIENT)
Dept: OBGYN CLINIC | Age: 34
End: 2024-02-28

## 2024-02-28 NOTE — TELEPHONE ENCOUNTER
Pt had an appt at 2:30pm, she came 30 minutes late for that appointment & said it said 2:45pm in her MyChart account, I asked her if she could show it to me & maybe she could still be seen & she would not show it to me & just kept walking out the door saying that she said she already told me that's what it said.

## 2024-02-29 PROBLEM — O44.40 LOW-LYING PLACENTA: Status: ACTIVE | Noted: 2024-02-29

## 2024-03-04 ENCOUNTER — ROUTINE PRENATAL (OUTPATIENT)
Dept: PERINATAL CARE | Age: 34
End: 2024-03-04
Payer: MEDICAID

## 2024-03-04 VITALS
DIASTOLIC BLOOD PRESSURE: 60 MMHG | RESPIRATION RATE: 16 BRPM | HEIGHT: 67 IN | BODY MASS INDEX: 25.74 KG/M2 | WEIGHT: 164 LBS | HEART RATE: 68 BPM | TEMPERATURE: 99 F | SYSTOLIC BLOOD PRESSURE: 118 MMHG

## 2024-03-04 DIAGNOSIS — O30.042 DICHORIONIC DIAMNIOTIC TWIN PREGNANCY IN SECOND TRIMESTER: Primary | ICD-10-CM

## 2024-03-04 DIAGNOSIS — O35.BXX2 FETAL CARDIAC ECHOGENIC FOCUS, ANTEPARTUM, FETUS 2 OF MULTIPLE GESTATION: ICD-10-CM

## 2024-03-04 DIAGNOSIS — O35.03X1 CHOROID PLEXUS CYST OF FETUS AFFECTING CARE OF MOTHER, ANTEPARTUM, FETUS 1 OF MULTIPLE GESTATION: ICD-10-CM

## 2024-03-04 DIAGNOSIS — O34.40 HISTORY OF CERVICAL LEEP BIOPSY AFFECTING CARE OF MOTHER, ANTEPARTUM: ICD-10-CM

## 2024-03-04 DIAGNOSIS — Z98.890 HISTORY OF CERVICAL LEEP BIOPSY AFFECTING CARE OF MOTHER, ANTEPARTUM: ICD-10-CM

## 2024-03-04 DIAGNOSIS — O44.40 LOW IMPLANTATION OF PLACENTA WITHOUT HEMORRHAGE: ICD-10-CM

## 2024-03-04 DIAGNOSIS — Z3A.19 19 WEEKS GESTATION OF PREGNANCY: ICD-10-CM

## 2024-03-04 PROCEDURE — 76817 TRANSVAGINAL US OBSTETRIC: CPT | Performed by: OBSTETRICS & GYNECOLOGY

## 2024-03-04 PROCEDURE — 76815 OB US LIMITED FETUS(S): CPT | Performed by: OBSTETRICS & GYNECOLOGY

## 2024-03-04 PROCEDURE — 99999 PR OFFICE/OUTPT VISIT,PROCEDURE ONLY: CPT | Performed by: OBSTETRICS & GYNECOLOGY

## 2024-03-04 RX ORDER — DOXYLAMINE SUCCINATE 25 MG/1
25 TABLET ORAL NIGHTLY
COMMUNITY
Start: 2024-02-18

## 2024-03-04 RX ORDER — BUPRENORPHINE 8 MG/1
TABLET SUBLINGUAL
COMMUNITY
Start: 2024-02-21

## 2024-03-18 ENCOUNTER — ROUTINE PRENATAL (OUTPATIENT)
Dept: PERINATAL CARE | Age: 34
End: 2024-03-18
Payer: MEDICAID

## 2024-03-18 VITALS
DIASTOLIC BLOOD PRESSURE: 60 MMHG | HEART RATE: 72 BPM | HEIGHT: 67 IN | BODY MASS INDEX: 26.21 KG/M2 | SYSTOLIC BLOOD PRESSURE: 118 MMHG | TEMPERATURE: 98.1 F | WEIGHT: 167 LBS | RESPIRATION RATE: 16 BRPM

## 2024-03-18 DIAGNOSIS — O30.042 DICHORIONIC DIAMNIOTIC TWIN PREGNANCY IN SECOND TRIMESTER: Primary | ICD-10-CM

## 2024-03-18 DIAGNOSIS — O34.40 HISTORY OF CERVICAL LEEP BIOPSY AFFECTING CARE OF MOTHER, ANTEPARTUM: ICD-10-CM

## 2024-03-18 DIAGNOSIS — Z3A.21 21 WEEKS GESTATION OF PREGNANCY: ICD-10-CM

## 2024-03-18 DIAGNOSIS — O44.40 LOW IMPLANTATION OF PLACENTA WITHOUT HEMORRHAGE: ICD-10-CM

## 2024-03-18 DIAGNOSIS — O35.BXX2 FETAL CARDIAC ECHOGENIC FOCUS, ANTEPARTUM, FETUS 2 OF MULTIPLE GESTATION: ICD-10-CM

## 2024-03-18 DIAGNOSIS — Z98.890 HISTORY OF CERVICAL LEEP BIOPSY AFFECTING CARE OF MOTHER, ANTEPARTUM: ICD-10-CM

## 2024-03-18 DIAGNOSIS — O35.03X1 CHOROID PLEXUS CYST OF FETUS AFFECTING CARE OF MOTHER, ANTEPARTUM, FETUS 1 OF MULTIPLE GESTATION: ICD-10-CM

## 2024-03-18 PROCEDURE — 76811 OB US DETAILED SNGL FETUS: CPT | Performed by: OBSTETRICS & GYNECOLOGY

## 2024-03-18 PROCEDURE — 76812 OB US DETAILED ADDL FETUS: CPT | Performed by: OBSTETRICS & GYNECOLOGY

## 2024-03-18 PROCEDURE — 99999 PR OFFICE/OUTPT VISIT,PROCEDURE ONLY: CPT | Performed by: OBSTETRICS & GYNECOLOGY

## 2024-03-18 PROCEDURE — 76817 TRANSVAGINAL US OBSTETRIC: CPT | Performed by: OBSTETRICS & GYNECOLOGY

## 2024-03-29 ENCOUNTER — TELEPHONE (OUTPATIENT)
Dept: SOCIAL WORK | Age: 34
End: 2024-03-29

## 2024-03-29 NOTE — TELEPHONE ENCOUNTER
Mother and Child Dependency Program    Sw was unable to meet with the Pt in February or March. Brook was able to reach the Pt a few times, but the contacts never resulted in scheduling appointments to meet. Brook told the Pt she would be discharged if we were unable to meet in March, Pt has not responded to any attempts to schedule since 3/18/24. Sw let Pt know she was discharged, and to reach out if she wanted to be re-enrolled in the program.

## 2024-04-01 ENCOUNTER — ROUTINE PRENATAL (OUTPATIENT)
Dept: PERINATAL CARE | Age: 34
End: 2024-04-01
Payer: MEDICAID

## 2024-04-01 VITALS
BODY MASS INDEX: 27 KG/M2 | WEIGHT: 172 LBS | HEIGHT: 67 IN | TEMPERATURE: 97.2 F | HEART RATE: 69 BPM | SYSTOLIC BLOOD PRESSURE: 119 MMHG | RESPIRATION RATE: 16 BRPM | DIASTOLIC BLOOD PRESSURE: 70 MMHG

## 2024-04-01 DIAGNOSIS — O30.042 DICHORIONIC DIAMNIOTIC TWIN PREGNANCY IN SECOND TRIMESTER: Primary | ICD-10-CM

## 2024-04-01 DIAGNOSIS — O44.40 LOW IMPLANTATION OF PLACENTA WITHOUT HEMORRHAGE: ICD-10-CM

## 2024-04-01 DIAGNOSIS — O99.322 DRUG DEPENDENCE DURING PREGNANCY IN SECOND TRIMESTER (HCC): ICD-10-CM

## 2024-04-01 DIAGNOSIS — F19.20 DRUG DEPENDENCE DURING PREGNANCY IN SECOND TRIMESTER (HCC): ICD-10-CM

## 2024-04-01 DIAGNOSIS — O35.03X1 CHOROID PLEXUS CYST OF FETUS AFFECTING CARE OF MOTHER, ANTEPARTUM, FETUS 1 OF MULTIPLE GESTATION: ICD-10-CM

## 2024-04-01 DIAGNOSIS — O35.BXX2 FETAL CARDIAC ECHOGENIC FOCUS, ANTEPARTUM, FETUS 2 OF MULTIPLE GESTATION: ICD-10-CM

## 2024-04-01 DIAGNOSIS — Z98.890 HISTORY OF CERVICAL LEEP BIOPSY AFFECTING CARE OF MOTHER, ANTEPARTUM: ICD-10-CM

## 2024-04-01 DIAGNOSIS — Z3A.23 23 WEEKS GESTATION OF PREGNANCY: ICD-10-CM

## 2024-04-01 DIAGNOSIS — O34.40 HISTORY OF CERVICAL LEEP BIOPSY AFFECTING CARE OF MOTHER, ANTEPARTUM: ICD-10-CM

## 2024-04-01 DIAGNOSIS — O16.2 HYPERTENSION AFFECTING PREGNANCY IN SECOND TRIMESTER: ICD-10-CM

## 2024-04-01 PROCEDURE — 76815 OB US LIMITED FETUS(S): CPT | Performed by: OBSTETRICS & GYNECOLOGY

## 2024-04-01 PROCEDURE — 76817 TRANSVAGINAL US OBSTETRIC: CPT | Performed by: OBSTETRICS & GYNECOLOGY

## 2024-04-01 PROCEDURE — 99244 OFF/OP CNSLTJ NEW/EST MOD 40: CPT | Performed by: OBSTETRICS & GYNECOLOGY

## 2024-04-05 ENCOUNTER — HOSPITAL ENCOUNTER (EMERGENCY)
Age: 34
Discharge: HOME | End: 2024-04-06
Attending: STUDENT IN AN ORGANIZED HEALTH CARE EDUCATION/TRAINING PROGRAM
Payer: MEDICAID

## 2024-04-05 VITALS
HEART RATE: 69 BPM | WEIGHT: 171 LBS | RESPIRATION RATE: 14 BRPM | TEMPERATURE: 98 F | SYSTOLIC BLOOD PRESSURE: 106 MMHG | DIASTOLIC BLOOD PRESSURE: 36 MMHG | OXYGEN SATURATION: 100 % | HEIGHT: 67 IN | BODY MASS INDEX: 26.84 KG/M2

## 2024-04-05 DIAGNOSIS — O09.90 HIGH RISK PREGNANCY, ANTEPARTUM: ICD-10-CM

## 2024-04-05 DIAGNOSIS — R82.71 BACTERIURIA: Primary | ICD-10-CM

## 2024-04-05 LAB
ALBUMIN SERPL-MCNC: 3.9 G/DL (ref 3.5–5.2)
ALP SERPL-CCNC: 64 U/L (ref 35–104)
ALT SERPL-CCNC: 8 U/L (ref 5–33)
ANION GAP SERPL CALCULATED.3IONS-SCNC: 11 MMOL/L (ref 9–17)
AST SERPL-CCNC: 30 U/L
BACTERIA URNS QL MICRO: ABNORMAL
BASOPHILS # BLD: <0.03 K/UL (ref 0–0.2)
BASOPHILS NFR BLD: 0 % (ref 0–2)
BILIRUB SERPL-MCNC: 0.8 MG/DL (ref 0.3–1.2)
BILIRUB UR QL STRIP: ABNORMAL
BUN SERPL-MCNC: 10 MG/DL (ref 6–20)
BUN/CREAT SERPL: 25 (ref 9–20)
C TRACH DNA SPEC QL PROBE+SIG AMP: NORMAL
CALCIUM SERPL-MCNC: 8.5 MG/DL (ref 8.6–10.4)
CHLORIDE SERPL-SCNC: 100 MMOL/L (ref 98–107)
CLARITY UR: CLEAR
CO2 SERPL-SCNC: 23 MMOL/L (ref 20–31)
COLOR UR: ABNORMAL
CREAT SERPL-MCNC: 0.4 MG/DL (ref 0.5–0.9)
EOSINOPHIL # BLD: 0.04 K/UL (ref 0–0.44)
EOSINOPHILS RELATIVE PERCENT: 1 % (ref 1–4)
EPI CELLS #/AREA URNS HPF: ABNORMAL /HPF (ref 0–5)
ERYTHROCYTE [DISTWIDTH] IN BLOOD BY AUTOMATED COUNT: 12.6 % (ref 11.8–14.4)
GFR SERPL CREATININE-BSD FRML MDRD: >90 ML/MIN/1.73M2
GLUCOSE SERPL-MCNC: 69 MG/DL (ref 70–99)
GLUCOSE UR STRIP-MCNC: ABNORMAL MG/DL
HCT VFR BLD AUTO: 34.5 % (ref 36.3–47.1)
HGB BLD-MCNC: 11.8 G/DL (ref 11.9–15.1)
HGB UR QL STRIP.AUTO: NEGATIVE
IMM GRANULOCYTES # BLD AUTO: 0.05 K/UL (ref 0–0.3)
IMM GRANULOCYTES NFR BLD: 1 %
KETONES UR STRIP-MCNC: ABNORMAL MG/DL
LEUKOCYTE ESTERASE UR QL STRIP: NEGATIVE
LYMPHOCYTES NFR BLD: 1.41 K/UL (ref 1.1–3.7)
LYMPHOCYTES RELATIVE PERCENT: 19 % (ref 24–43)
MCH RBC QN AUTO: 33.1 PG (ref 25.2–33.5)
MCHC RBC AUTO-ENTMCNC: 34.2 G/DL (ref 28.4–34.8)
MCV RBC AUTO: 96.6 FL (ref 82.6–102.9)
MONOCYTES NFR BLD: 0.59 K/UL (ref 0.1–1.2)
MONOCYTES NFR BLD: 8 % (ref 3–12)
MUCOUS THREADS URNS QL MICRO: ABNORMAL
N GONORRHOEA DNA SPEC QL PROBE+SIG AMP: NORMAL
NEUTROPHILS NFR BLD: 71 % (ref 36–65)
NEUTS SEG NFR BLD: 5.51 K/UL (ref 1.5–8.1)
NITRITE UR QL STRIP: NEGATIVE
NRBC BLD-RTO: 0 PER 100 WBC
PH UR STRIP: 5.5 [PH] (ref 5–8)
PLATELET # BLD AUTO: 181 K/UL (ref 138–453)
PMV BLD AUTO: 11.6 FL (ref 8.1–13.5)
POTASSIUM SERPL-SCNC: 3.8 MMOL/L (ref 3.7–5.3)
PROT SERPL-MCNC: 6.9 G/DL (ref 6.4–8.3)
PROT UR STRIP-MCNC: ABNORMAL MG/DL
RBC # BLD AUTO: 3.57 M/UL (ref 3.95–5.11)
RBC #/AREA URNS HPF: ABNORMAL /HPF (ref 0–2)
SODIUM SERPL-SCNC: 134 MMOL/L (ref 135–144)
SP GR UR STRIP: 1.05 (ref 1–1.03)
SPECIMEN DESCRIPTION: NORMAL
UROBILINOGEN UR STRIP-ACNC: ABNORMAL EU/DL (ref 0–1)
WBC #/AREA URNS HPF: ABNORMAL /HPF (ref 0–5)
WBC OTHER # BLD: 7.6 K/UL (ref 3.5–11.3)

## 2024-04-05 PROCEDURE — 87480 CANDIDA DNA DIR PROBE: CPT

## 2024-04-05 PROCEDURE — 85025 COMPLETE CBC W/AUTO DIFF WBC: CPT

## 2024-04-05 PROCEDURE — 87491 CHLMYD TRACH DNA AMP PROBE: CPT

## 2024-04-05 PROCEDURE — 86901 BLOOD TYPING SEROLOGIC RH(D): CPT

## 2024-04-05 PROCEDURE — 87086 URINE CULTURE/COLONY COUNT: CPT

## 2024-04-05 PROCEDURE — 86900 BLOOD TYPING SEROLOGIC ABO: CPT

## 2024-04-05 PROCEDURE — 99283 EMERGENCY DEPT VISIT LOW MDM: CPT

## 2024-04-05 PROCEDURE — 6370000000 HC RX 637 (ALT 250 FOR IP): Performed by: STUDENT IN AN ORGANIZED HEALTH CARE EDUCATION/TRAINING PROGRAM

## 2024-04-05 PROCEDURE — 80053 COMPREHEN METABOLIC PANEL: CPT

## 2024-04-05 PROCEDURE — 87660 TRICHOMONAS VAGIN DIR PROBE: CPT

## 2024-04-05 PROCEDURE — 87591 N.GONORRHOEAE DNA AMP PROB: CPT

## 2024-04-05 PROCEDURE — 87510 GARDNER VAG DNA DIR PROBE: CPT

## 2024-04-05 PROCEDURE — 81001 URINALYSIS AUTO W/SCOPE: CPT

## 2024-04-05 RX ORDER — ACETAMINOPHEN 325 MG/1
650 TABLET ORAL ONCE
Status: COMPLETED | OUTPATIENT
Start: 2024-04-05 | End: 2024-04-05

## 2024-04-05 RX ADMIN — ACETAMINOPHEN 650 MG: 325 TABLET ORAL at 22:37

## 2024-04-05 ASSESSMENT — PAIN - FUNCTIONAL ASSESSMENT: PAIN_FUNCTIONAL_ASSESSMENT: 0-10

## 2024-04-05 ASSESSMENT — PAIN DESCRIPTION - LOCATION
LOCATION: ABDOMEN
LOCATION: ABDOMEN

## 2024-04-05 ASSESSMENT — PAIN SCALES - GENERAL
PAINLEVEL_OUTOF10: 8
PAINLEVEL_OUTOF10: 9

## 2024-04-06 LAB
ABO + RH BLD: NORMAL
CANDIDA SPECIES: POSITIVE
GARDNERELLA VAGINALIS: NEGATIVE
SOURCE: ABNORMAL
TRICHOMONAS: NEGATIVE

## 2024-04-06 PROCEDURE — 6370000000 HC RX 637 (ALT 250 FOR IP): Performed by: STUDENT IN AN ORGANIZED HEALTH CARE EDUCATION/TRAINING PROGRAM

## 2024-04-06 RX ORDER — NITROFURANTOIN 25; 75 MG/1; MG/1
100 CAPSULE ORAL ONCE
Status: COMPLETED | OUTPATIENT
Start: 2024-04-06 | End: 2024-04-06

## 2024-04-06 RX ORDER — NITROFURANTOIN 25; 75 MG/1; MG/1
100 CAPSULE ORAL 2 TIMES DAILY
Qty: 14 CAPSULE | Refills: 0 | Status: SHIPPED | OUTPATIENT
Start: 2024-04-06 | End: 2024-04-13

## 2024-04-06 RX ADMIN — NITROFURANTOIN MONOHYDRATE/MACROCRYSTALS 100 MG: 75; 25 CAPSULE ORAL at 00:28

## 2024-04-06 NOTE — ED NOTES
Pt presents to ED with c/o pelvic pain. Pt stated she is 24 weeks pregnant with twins. Pt stated she feel as if she is having contractions. Pt denies any discharge, bleeding, or fluid coming from vagina. Pt follows with OB at [facility]. Pt stated she feels the fetuses moving around.

## 2024-04-06 NOTE — DISCHARGE INSTRUCTIONS
As discussed given your borderline viable pregnancy would recommend going to [place] nurse triage for further OB/GYN evaluation.    PLEASE RETURN TO THE EMERGENCY DEPARTMENT IMMEDIATELY for worsening symptoms, inability to urinate, worsening of blood in your urine, or if you develop any concerning symptoms such as: high fever not relieved by acetaminophen (Tylenol) chills, shortness of breath, chest pain, feeling of your heart fluttering or racing, persistent nausea and/or vomiting, vomiting up blood, blood in your stool, loss of consciousness, numbness, weakness or tingling in the arms or legs or change in color of the extremities, changes in mental status, persistent headache, blurry vision, loss of bladder / bowel.

## 2024-04-06 NOTE — ED PROVIDER NOTES
Merged with [facility] EMERGENCY DEPARTMENT ENCOUNTER      Pt Name: Noelle Kim  MRN: [id number]  Birthdate 1990  Date of evaluation: 4/6/24    CHIEF COMPLAINT       Chief Complaint   Patient presents with    Abdominal Pain    Pregnancy Problem     Patient states she is 24 weeks pregnant with twins, states she thought it was robert gates this morning. Continues to have lower abdominal.  Patient states she is having back pain and babies are moving like crazy. States she feels like she has to push.        HISTORY OF PRESENT ILLNESS   Noelle Kim is a 33 y.o. female who presents with cramps contractions.  Denies vaginal bleeding vaginal discharge.  Pregnant with twins.  Endorsing previous IV heroin use but states she is on Suboxone and has been clean for multiple years.  Follows at [place].  Feeling good fetal movement.    PASTMEDICAL HISTORY     Past Medical History:   Diagnosis Date    Abnormal cells of cervix     ADD (attention deficit disorder)     Anemia     Complication of anesthesia     Condyloma     Depression     Gestational diabetes mellitus     HSIL (high grade squamous intraepithelial lesion) on Pap smear of cervix     Liver disease     MRSA (methicillin resistant staph aureus) culture positive 09/2011    BUTTOCK    Severe dysplasia of cervix     STD (sexually transmitted disease)     Substance abuse (HCC)     heroin / on 12/29/18 pt states last used 3 yrs ago,marijuana 4/12/21. pt on buprenorphine SL every am     Past Problem List  Patient Active Problem List   Diagnosis Code    H/O LEEP 4/16/21 Z98.890    Abnormal Pap Smear R87.613    Bipolar 1 disorder, depressed, severe (HCC) F31.4    Viral hepatitis C B19.20    Hx Gestational diabetes mellitus O24.419    Polysubstance Drug Use F11.20, F19.20    Thrombocytopenia affecting pregnancy O99.119, D69.6    G3 Dichorionic diamniotic twin pregnancy O30.041    Hx Left salpingoophorectomy 2013 Z90.721    Chroid Plexus Cysts (Baby A) O28.3    Echogenic Intracardiac  diabetes mellitus     HSIL (high grade squamous intraepithelial lesion) on Pap smear of cervix     Liver disease     MRSA (methicillin resistant staph aureus) culture positive 09/2011    BUTTOCK    Severe dysplasia of cervix     STD (sexually transmitted disease)     Substance abuse (HCC)     heroin / on 12/29/18 pt states last used 3 yrs ago,marijuana 4/12/21. pt on buprenorphine SL every am       2)  Data Reviewed    External Documents Reviewed: Previous ER visits reviewed by myself      3)  Treatment and Disposition  [Patient repeat assessment, Disposition discussion with patient/family, Case discussed with consulting clinician, MIPS, Social determinants of health impacting treatment or disposition, Shared Decision Making, Code Status Discussion:]    Does have some bacteria we will treat with Macrobid.  Positive for candidiasis.  Will defer to OB for treatment.  Discussed despite testing negative.  Given she has borderline previable and given cramping would recommend going to [place] OB triage for OB specific evaluation.  Bedside ultrasound performed showing excellent fetal movement with baby A heart rate 146, baby B 138.    DATA FOR LAB AND RADIOLOGY TESTS ORDERED BELOW ARE REVIEWED BY THE ED CLINICIAN:    RADIOLOGY: All x-rays, CT, MRI, and formal ultrasound images (except ED bedside ultrasound) are read by the radiologist, see reports below, unless otherwise noted in MDM or here.  Reports below are reviewed by myself.  No orders to display       LABS: Lab orders shown below, the results are reviewed by myself, and all abnormals are listed below.  Labs Reviewed   VAGINITIS DNA PROBE - Abnormal; Notable for the following components:       Result Value    Candida species POSITIVE (*)     All other components within normal limits   CBC WITH AUTO DIFFERENTIAL - Abnormal; Notable for the following components:    RBC 3.57 (*)     Hemoglobin 11.8 (*)     Hematocrit 34.5 (*)     Neutrophils % 71 (*)     Lymphocytes

## 2024-04-07 LAB
MICROORGANISM SPEC CULT: NORMAL
SPECIMEN DESCRIPTION: NORMAL

## 2024-04-15 ENCOUNTER — ROUTINE PRENATAL (OUTPATIENT)
Dept: PERINATAL CARE | Age: 34
End: 2024-04-15
Payer: MEDICAID

## 2024-04-15 VITALS
DIASTOLIC BLOOD PRESSURE: 62 MMHG | RESPIRATION RATE: 16 BRPM | BODY MASS INDEX: 27.31 KG/M2 | HEIGHT: 67 IN | SYSTOLIC BLOOD PRESSURE: 120 MMHG | TEMPERATURE: 97.3 F | HEART RATE: 70 BPM | WEIGHT: 174 LBS

## 2024-04-15 DIAGNOSIS — O35.BXX2 FETAL CARDIAC ECHOGENIC FOCUS, ANTEPARTUM, FETUS 2 OF MULTIPLE GESTATION: ICD-10-CM

## 2024-04-15 DIAGNOSIS — O34.40 HISTORY OF CERVICAL LEEP BIOPSY AFFECTING CARE OF MOTHER, ANTEPARTUM: ICD-10-CM

## 2024-04-15 DIAGNOSIS — O16.2 HYPERTENSION AFFECTING PREGNANCY IN SECOND TRIMESTER: ICD-10-CM

## 2024-04-15 DIAGNOSIS — O35.03X1 CHOROID PLEXUS CYST OF FETUS AFFECTING CARE OF MOTHER, ANTEPARTUM, FETUS 1 OF MULTIPLE GESTATION: ICD-10-CM

## 2024-04-15 DIAGNOSIS — Z36.4 ULTRASOUND FOR ANTENATAL SCREENING FOR FETAL GROWTH RESTRICTION: ICD-10-CM

## 2024-04-15 DIAGNOSIS — O30.042 DICHORIONIC DIAMNIOTIC TWIN PREGNANCY IN SECOND TRIMESTER: Primary | ICD-10-CM

## 2024-04-15 DIAGNOSIS — Z98.890 HISTORY OF CERVICAL LEEP BIOPSY AFFECTING CARE OF MOTHER, ANTEPARTUM: ICD-10-CM

## 2024-04-15 DIAGNOSIS — Z03.73 SUSPECTED FETAL ANOMALY NOT FOUND: ICD-10-CM

## 2024-04-15 DIAGNOSIS — Z3A.25 25 WEEKS GESTATION OF PREGNANCY: ICD-10-CM

## 2024-04-15 PROCEDURE — 99999 PR OFFICE/OUTPT VISIT,PROCEDURE ONLY: CPT | Performed by: OBSTETRICS & GYNECOLOGY

## 2024-04-15 PROCEDURE — 76816 OB US FOLLOW-UP PER FETUS: CPT | Performed by: OBSTETRICS & GYNECOLOGY

## 2024-04-15 PROCEDURE — 76820 UMBILICAL ARTERY ECHO: CPT | Performed by: OBSTETRICS & GYNECOLOGY

## 2024-04-15 PROCEDURE — 76817 TRANSVAGINAL US OBSTETRIC: CPT | Performed by: OBSTETRICS & GYNECOLOGY

## 2024-04-15 RX ORDER — ASPIRIN 81 MG/1
TABLET, COATED ORAL
COMMUNITY
Start: 2024-04-03

## 2024-04-24 ENCOUNTER — ROUTINE PRENATAL (OUTPATIENT)
Dept: OBGYN CLINIC | Age: 34
End: 2024-04-24
Payer: MEDICAID

## 2024-04-24 ENCOUNTER — HOSPITAL ENCOUNTER (OUTPATIENT)
Age: 34
Setting detail: SPECIMEN
Discharge: HOME | End: 2024-04-24

## 2024-04-24 VITALS
WEIGHT: 170 LBS | BODY MASS INDEX: 26.63 KG/M2 | DIASTOLIC BLOOD PRESSURE: 84 MMHG | SYSTOLIC BLOOD PRESSURE: 116 MMHG | HEART RATE: 68 BPM

## 2024-04-24 DIAGNOSIS — N89.8 VAGINAL DISCHARGE: ICD-10-CM

## 2024-04-24 DIAGNOSIS — L29.9 PRURITUS OF PREGNANCY IN SECOND TRIMESTER: ICD-10-CM

## 2024-04-24 DIAGNOSIS — O09.92 HIGH-RISK PREGNANCY IN SECOND TRIMESTER: Primary | ICD-10-CM

## 2024-04-24 DIAGNOSIS — O09.92 HIGH-RISK PREGNANCY IN SECOND TRIMESTER: ICD-10-CM

## 2024-04-24 DIAGNOSIS — O10.919 CHRONIC HYPERTENSION DURING PREGNANCY: ICD-10-CM

## 2024-04-24 DIAGNOSIS — O99.712 PRURITUS OF PREGNANCY IN SECOND TRIMESTER: ICD-10-CM

## 2024-04-24 DIAGNOSIS — R82.90 ABNORMAL URINE ODOR: ICD-10-CM

## 2024-04-24 DIAGNOSIS — Z3A.26 26 WEEKS GESTATION OF PREGNANCY: ICD-10-CM

## 2024-04-24 DIAGNOSIS — O28.3 ABNORMAL FETAL ULTRASOUND: ICD-10-CM

## 2024-04-24 LAB
ALBUMIN SERPL-MCNC: 3.7 G/DL (ref 3.5–5.2)
ALBUMIN/GLOB SERPL: 1 {RATIO} (ref 1–2.5)
ALP SERPL-CCNC: 78 U/L (ref 35–104)
ALT SERPL-CCNC: 22 U/L (ref 10–35)
ANION GAP SERPL CALCULATED.3IONS-SCNC: 12 MMOL/L (ref 9–16)
AST SERPL-CCNC: 37 U/L (ref 10–35)
BASOPHILS # BLD: <0.03 K/UL (ref 0–0.2)
BASOPHILS NFR BLD: 0 % (ref 0–2)
BILIRUB SERPL-MCNC: 1.1 MG/DL (ref 0–1.2)
BUN SERPL-MCNC: 8 MG/DL (ref 6–20)
CALCIUM SERPL-MCNC: 8.2 MG/DL (ref 8.6–10.4)
CANDIDA SPECIES: NEGATIVE
CHLORIDE SERPL-SCNC: 104 MMOL/L (ref 98–107)
CO2 SERPL-SCNC: 22 MMOL/L (ref 20–31)
CREAT SERPL-MCNC: 0.5 MG/DL (ref 0.5–0.9)
CREAT UR-MCNC: 218 MG/DL (ref 28–217)
EOSINOPHIL # BLD: 0.08 K/UL (ref 0–0.44)
EOSINOPHILS RELATIVE PERCENT: 1 % (ref 1–4)
ERYTHROCYTE [DISTWIDTH] IN BLOOD BY AUTOMATED COUNT: 13.1 % (ref 11.8–14.4)
GARDNERELLA VAGINALIS: NEGATIVE
GFR SERPL CREATININE-BSD FRML MDRD: >90 ML/MIN/1.73M2
GLUCOSE SERPL-MCNC: 78 MG/DL (ref 74–99)
HCT VFR BLD AUTO: 34.7 % (ref 36.3–47.1)
HGB BLD-MCNC: 11.3 G/DL (ref 11.9–15.1)
IMM GRANULOCYTES # BLD AUTO: 0.05 K/UL (ref 0–0.3)
IMM GRANULOCYTES NFR BLD: 1 %
LYMPHOCYTES NFR BLD: 1.25 K/UL (ref 1.1–3.7)
LYMPHOCYTES RELATIVE PERCENT: 19 % (ref 24–43)
MCH RBC QN AUTO: 32.2 PG (ref 25.2–33.5)
MCHC RBC AUTO-ENTMCNC: 32.6 G/DL (ref 28.4–34.8)
MCV RBC AUTO: 98.9 FL (ref 82.6–102.9)
MONOCYTES NFR BLD: 0.47 K/UL (ref 0.1–1.2)
MONOCYTES NFR BLD: 7 % (ref 3–12)
NEUTROPHILS NFR BLD: 72 % (ref 36–65)
NEUTS SEG NFR BLD: 4.89 K/UL (ref 1.5–8.1)
NRBC BLD-RTO: 0 PER 100 WBC
PLATELET # BLD AUTO: 203 K/UL (ref 138–453)
PMV BLD AUTO: 12.6 FL (ref 8.1–13.5)
POTASSIUM SERPL-SCNC: 4 MMOL/L (ref 3.7–5.3)
PROT SERPL-MCNC: 6.3 G/DL (ref 6.6–8.7)
RBC # BLD AUTO: 3.51 M/UL (ref 3.95–5.11)
SODIUM SERPL-SCNC: 138 MMOL/L (ref 136–145)
SOURCE: NORMAL
T PALLIDUM AB SER QL IA: NONREACTIVE
TOTAL PROTEIN, URINE: 33 MG/DL
TRICHOMONAS: NEGATIVE
URINE TOTAL PROTEIN CREATININE RATIO: 0.15
WBC OTHER # BLD: 6.8 K/UL (ref 3.5–11.3)

## 2024-04-24 PROCEDURE — 99213 OFFICE O/P EST LOW 20 MIN: CPT | Performed by: STUDENT IN AN ORGANIZED HEALTH CARE EDUCATION/TRAINING PROGRAM

## 2024-04-24 RX ORDER — DIPHENHYDRAMINE HCL 25 MG
25 CAPSULE ORAL EVERY 6 HOURS PRN
Qty: 30 CAPSULE | Refills: 0 | Status: SHIPPED | OUTPATIENT
Start: 2024-04-24

## 2024-04-24 NOTE — PROGRESS NOTES
& Gc/C collected for discharge    - Urine culture collected to rule out UTI    - Rx benadryl sent for full body pruritus    - 28 week labs also ordered    - Bile acids, preE labs collected               - Continue taking prenatal vitamins QD               - Influenza vaccination: due this season               - S/p COVID-19 vaccination x2              - TDAP at next visit    - Will wait for labs to result and see if itching resolves with benadryl before starting treatment for ICP. She stopped macrobid 2 days ago, will see if the itching improves since finishing that antibiotic. She was agreeable to this plan   Return in about 2 weeks (around 5/8/2024) for ANKUR.        Orders Placed This Encounter   Procedures    Chlamydia Trachomatis & Neisseria gonorrhoeae (GC) by amplified detection     Standing Status:   Future     Standing Expiration Date:   4/24/2025    Vaginitis DNA Probe     Standing Status:   Future     Standing Expiration Date:   4/24/2025    Culture, Urine     Standing Status:   Future     Standing Expiration Date:   4/24/2025    Glucose Tolerance, 1 Hr     Standing Status:   Future     Standing Expiration Date:   4/24/2025    T. Pallidum Ab     Standing Status:   Future     Number of Occurrences:   1     Standing Expiration Date:   4/24/2025    CBC with Auto Differential     Standing Status:   Future     Number of Occurrences:   1     Standing Expiration Date:   4/24/2025    Bile Acids, Total     Standing Status:   Future     Number of Occurrences:   1     Standing Expiration Date:   4/24/2025    Comprehensive Metabolic Panel     Standing Status:   Future     Number of Occurrences:   1     Standing Expiration Date:   4/24/2025    Protein / Creatinine Ratio, Urine     Standing Status:   Future     Standing Expiration Date:   4/24/2025     Patient Active Problem List    Diagnosis Date Noted    Pruritus of pregnancy in second trimester 04/24/2024    Chronic hypertension during pregnancy 04/24/2024

## 2024-04-25 LAB
C TRACH DNA SPEC QL PROBE+SIG AMP: NEGATIVE
MICROORGANISM SPEC CULT: NORMAL
N GONORRHOEA DNA SPEC QL PROBE+SIG AMP: NEGATIVE
SPECIMEN DESCRIPTION: NORMAL
SPECIMEN DESCRIPTION: NORMAL

## 2024-04-26 LAB — BILE AC SERPL-SCNC: 3 UMOL/L (ref 0–10)

## 2024-05-08 ENCOUNTER — ROUTINE PRENATAL (OUTPATIENT)
Dept: OBGYN CLINIC | Age: 34
End: 2024-05-08
Payer: MEDICAID

## 2024-05-08 VITALS
BODY MASS INDEX: 27.1 KG/M2 | SYSTOLIC BLOOD PRESSURE: 115 MMHG | WEIGHT: 173 LBS | HEART RATE: 73 BPM | DIASTOLIC BLOOD PRESSURE: 66 MMHG

## 2024-05-08 DIAGNOSIS — O09.93 SUPERVISION OF HIGH RISK PREGNANCY IN THIRD TRIMESTER: ICD-10-CM

## 2024-05-08 DIAGNOSIS — Z3A.28 28 WEEKS GESTATION OF PREGNANCY: Primary | ICD-10-CM

## 2024-05-08 DIAGNOSIS — R39.89 ABNORMAL URINE COLOR: ICD-10-CM

## 2024-05-08 PROCEDURE — 99213 OFFICE O/P EST LOW 20 MIN: CPT | Performed by: OBSTETRICS & GYNECOLOGY

## 2024-05-08 NOTE — PROGRESS NOTES
2-3.  Hx HSIL pap + hpv 16 no follow up      Bipolar 1 disorder, depressed, severe (HCC) 09/28/2018    Viral hepatitis C 06/14/2018      Hep C genotype 1a  G3- quant pending   1/31/24 AST was elevated at 54        - Discussed updated COVID precautions and policies. Reviewed updated visitor policy. Encouraged social distancing and appropriate hand washing/hygiene practices. Reviewed symptoms suspicious for COVID infection. Discussed that ACOG, SMFM, and the CDC recommend to not withold immunization in pregnant and breastfeeding women who meet criteria for receipt of the vaccine based on the ACIP recommended priority groups. All questions answered. Patient vocalized understanding.    - RSV vaccination (32-36 weeks): The patient was counseled on benefits to her baby if she receives the Pfizer RSV vaccine (Abrysvo) during pregnancy. She was informed that this vaccination is FDA approved for use during pregnancy. She will think about it and call local pharmacies       Tdap declined at this time  UC sent as pt having urinary complaints  Encouraged hydration and elevation of her feet and using cold to help decrease the swelling.   Discussed s/sx that should prompt call to the office  Discussed kick counts  Pt counseled to continue PNVs  RTC in 2 wks    Therese Abad MD

## 2024-05-12 ENCOUNTER — HOSPITAL ENCOUNTER (OUTPATIENT)
Age: 34
Discharge: HOME | End: 2024-05-12
Attending: OBSTETRICS & GYNECOLOGY | Admitting: OBSTETRICS & GYNECOLOGY
Payer: MEDICAID

## 2024-05-12 VITALS — SYSTOLIC BLOOD PRESSURE: 113 MMHG | HEART RATE: 76 BPM | DIASTOLIC BLOOD PRESSURE: 60 MMHG | OXYGEN SATURATION: 98 %

## 2024-05-12 PROBLEM — Z3A.28 28 WEEKS GESTATION OF PREGNANCY: Status: ACTIVE | Noted: 2024-05-12

## 2024-05-12 LAB
BACTERIA URNS QL MICRO: ABNORMAL
BILIRUB UR QL STRIP: ABNORMAL
CANDIDA SPECIES: NEGATIVE
CLARITY UR: ABNORMAL
COLOR UR: ABNORMAL
EPI CELLS #/AREA URNS HPF: ABNORMAL /HPF (ref 0–5)
GARDNERELLA VAGINALIS: NEGATIVE
GLUCOSE UR STRIP-MCNC: NEGATIVE MG/DL
HGB UR QL STRIP.AUTO: NEGATIVE
KETONES UR STRIP-MCNC: ABNORMAL MG/DL
LEUKOCYTE ESTERASE UR QL STRIP: ABNORMAL
MUCOUS THREADS URNS QL MICRO: ABNORMAL
NITRITE UR QL STRIP: POSITIVE
PH UR STRIP: 5.5 [PH] (ref 5–8)
PROT UR STRIP-MCNC: ABNORMAL MG/DL
RBC #/AREA URNS HPF: ABNORMAL /HPF (ref 0–2)
SOURCE: NORMAL
SP GR UR STRIP: 1.03 (ref 1–1.03)
TRICHOMONAS: NEGATIVE
UROBILINOGEN UR STRIP-ACNC: ABNORMAL EU/DL (ref 0–1)
WBC #/AREA URNS HPF: ABNORMAL /HPF (ref 0–5)

## 2024-05-12 PROCEDURE — 81001 URINALYSIS AUTO W/SCOPE: CPT

## 2024-05-12 PROCEDURE — 87480 CANDIDA DNA DIR PROBE: CPT

## 2024-05-12 PROCEDURE — 87086 URINE CULTURE/COLONY COUNT: CPT

## 2024-05-12 PROCEDURE — 87491 CHLMYD TRACH DNA AMP PROBE: CPT

## 2024-05-12 PROCEDURE — 87591 N.GONORRHOEAE DNA AMP PROB: CPT

## 2024-05-12 PROCEDURE — 87660 TRICHOMONAS VAGIN DIR PROBE: CPT

## 2024-05-12 PROCEDURE — 87510 GARDNER VAG DNA DIR PROBE: CPT

## 2024-05-12 RX ORDER — ONDANSETRON 4 MG/1
4 TABLET, ORALLY DISINTEGRATING ORAL EVERY 8 HOURS PRN
Status: DISCONTINUED | OUTPATIENT
Start: 2024-05-12 | End: 2024-05-13 | Stop reason: HOSPADM

## 2024-05-12 RX ORDER — ACETAMINOPHEN 500 MG
1000 TABLET ORAL EVERY 6 HOURS PRN
Status: DISCONTINUED | OUTPATIENT
Start: 2024-05-12 | End: 2024-05-13 | Stop reason: HOSPADM

## 2024-05-12 RX ORDER — ONDANSETRON 2 MG/ML
4 INJECTION INTRAMUSCULAR; INTRAVENOUS EVERY 6 HOURS PRN
Status: DISCONTINUED | OUTPATIENT
Start: 2024-05-12 | End: 2024-05-13 | Stop reason: HOSPADM

## 2024-05-13 NOTE — FLOWSHEET NOTE
Presents per wc with c/o \"leaking clear fluid for the last hour\" also c/o back pain and abdominal pain. Denies urge to void at this time

## 2024-05-13 NOTE — DISCHARGE INSTRUCTIONS
Diet:   Regular                       Increase Fluids  8-10 glasses/day    Activities:    As tolerated           Frequent rest periods              Additional Instructions:  Notify Physician    If any of the following                                   **Spots before eyes or blurred vision                      **Dizziness or severe Headache                                 **  Vomiting or diarrhea for several hours  **Chills & or fever                                                      **Decrease or absence of baby movement  **Vaginal pressure                                                **Epigastric pain                                                                                                                     **  Right sided abdominal pain  **Uterine contractions are regular & 5 min. Apart  **Bag or avina leaking or gush of fluid from vagina     **Abdominal or menstrual like cramping that is constant or comes and goes  **Any vaginal bleeding that is heavier than a menstrual period  **Swelling of face, hands, legs or feet not decreased with rest on left side

## 2024-05-13 NOTE — H&P
presentation (babyB) 02/29/2024    Low-lying placenta (baby A) 02/29/2024    Chroid Plexus Cysts (Baby A) 02/19/2024     Noted MFM US 2/19/24  Resolved       Echogenic Intracardiac Focus (baby B) 02/19/2024     Noted MFM US 2/19/24      Hx Gestational diabetes mellitus 01/05/2024     Early 1 hr GTT ordered       Polysubstance Drug Use 01/05/2024     Hx of Cocaine and IV heroine use   Hx overdose in 2017   G3- + THC use during pregnancy, On Subutex takes 8 mg BID. Dr Baer at [facility]       Thrombocytopenia affecting pregnancy 01/05/2024 12/8/23 Plts 146      G3 Dichorionic diamniotic twin pregnancy 01/05/2024    Hx Left salpingoophorectomy 2013 01/05/2024     Benign serous cystadenofibroma       Abnormal Pap Smear      4/29/13 LSIL   5/15/13 Colpo negative for dysplasia  6/12/18 LSIL  2/4/21 HSIL with + HPV 16   2/25/21 Colpo MACIEJ 2-3  4/16/21 LEEP MACIEJ 2-3  12/8/23 NILM with other high risk HPV       H/O LEEP 4/16/21 04/16/2021 4/2021 LEEP MACIEJ 2-3.  Hx HSIL pap + hpv 16 no follow up      Bipolar 1 disorder, depressed, severe (HCC) 09/28/2018    Viral hepatitis C 06/14/2018      Hep C genotype 1a  G3- quant pending   1/31/24 AST was elevated at 54          Plan discussed with Dr. Gallegos, who is agreeable.     Steroids given this admission: No    Risks, benefits, alternatives and possible complications have been discussed in detail with the patient. Admission, and post admission procedures and expectations were discussed in detail. All questions were answered.    Attending's Name: Dr. Pollo Bowen MD  Ob/Gyn Resident  5/13/2024, 12:06 AM

## 2024-05-22 ENCOUNTER — ROUTINE PRENATAL (OUTPATIENT)
Dept: OBGYN CLINIC | Age: 34
End: 2024-05-22
Payer: MEDICAID

## 2024-05-22 VITALS
WEIGHT: 168.8 LBS | BODY MASS INDEX: 26.44 KG/M2 | SYSTOLIC BLOOD PRESSURE: 114 MMHG | HEART RATE: 85 BPM | DIASTOLIC BLOOD PRESSURE: 76 MMHG

## 2024-05-22 DIAGNOSIS — Z3A.26 26 WEEKS GESTATION OF PREGNANCY: ICD-10-CM

## 2024-05-22 DIAGNOSIS — L29.9 PRURITUS OF PREGNANCY IN SECOND TRIMESTER: ICD-10-CM

## 2024-05-22 DIAGNOSIS — L98.9 LESION OF NECK: ICD-10-CM

## 2024-05-22 DIAGNOSIS — O99.712 PRURITUS OF PREGNANCY IN SECOND TRIMESTER: ICD-10-CM

## 2024-05-22 DIAGNOSIS — O99.119 THROMBOCYTOPENIA AFFECTING PREGNANCY (HCC): ICD-10-CM

## 2024-05-22 DIAGNOSIS — D69.6 THROMBOCYTOPENIA AFFECTING PREGNANCY (HCC): ICD-10-CM

## 2024-05-22 DIAGNOSIS — O09.92 HIGH-RISK PREGNANCY IN SECOND TRIMESTER: ICD-10-CM

## 2024-05-22 DIAGNOSIS — Z3A.30 30 WEEKS GESTATION OF PREGNANCY: Primary | ICD-10-CM

## 2024-05-22 DIAGNOSIS — L98.9 SKIN LESION OF NECK: ICD-10-CM

## 2024-05-22 DIAGNOSIS — O09.93 SUPERVISION OF HIGH RISK PREGNANCY IN THIRD TRIMESTER: ICD-10-CM

## 2024-05-22 DIAGNOSIS — B19.20 HEPATITIS C VIRUS INFECTION WITHOUT HEPATIC COMA, UNSPECIFIED CHRONICITY: ICD-10-CM

## 2024-05-22 PROCEDURE — 99213 OFFICE O/P EST LOW 20 MIN: CPT | Performed by: OBSTETRICS & GYNECOLOGY

## 2024-05-22 RX ORDER — DIPHENHYDRAMINE HCL 25 MG
25 CAPSULE ORAL EVERY 6 HOURS PRN
Qty: 30 CAPSULE | Refills: 2 | Status: SHIPPED | OUTPATIENT
Start: 2024-05-22

## 2024-05-22 RX ORDER — OMEPRAZOLE 40 MG/1
40 CAPSULE, DELAYED RELEASE ORAL
Qty: 30 CAPSULE | Refills: 3 | Status: SHIPPED | OUTPATIENT
Start: 2024-05-22

## 2024-05-22 NOTE — PROGRESS NOTES
Noelle is a  @ 30w2d who presents for ANKUR visit.  She denies LOF, VB or Ctxs.  + FM.  She went to [facility] 5 days ago for  ctxs and was given IV fluids and that made them go away.  She says she is only drinking about 3-4 bottles a day.  She is having a lot of itching and says benadryl is helping her.  She also has a mole/skin tag on the back of her neck that keeps bleeding.  She denies any fevers/chills, SOB, cough, sore throat, loss of taste/smell or sick contacts.  Pt denies any HA, vision changes or RUQ pain.     O:  Vitals:    24 1448   BP: 114/76   Pulse: 85     Gen: NAD  Neck: 1 cm pedunculated thick stalked lesion that is bleeding from the base and appears very dark pink.    Abd: soft, nontender, gravid  Ext:  no edema      BP: 114/76  Weight - Scale: 76.6 kg (168 lb 12.8 oz)  Pulse: 85  Patient Position: Sitting  Fetal HR: 153/159  Movement: Present    A/P:  Patient Active Problem List    Diagnosis Date Noted    28 weeks gestation of pregnancy 2024    Pruritus of pregnancy in second trimester 2024    cHTN (no meds) 2024     Diagnosed by MFM through chart review       Breech presentation (babyB) 2024    Low-lying placenta (baby A) 2024    Chroid Plexus Cysts (Baby A) 2024     Noted MFM US 24  Resolved       Echogenic Intracardiac Focus (baby B) 2024     Noted MFM US 24      Hx Gestational diabetes mellitus 2024     Early 1 hr GTT ordered       Polysubstance Drug Use 2024     Hx of Cocaine and IV heroine use   Hx overdose in 2017   G3- + THC use during pregnancy, On Subutex takes 8 mg BID. Dr Baer at [facility] in [place]       Thrombocytopenia affecting pregnancy 2024 Plts 146  24 PLTs 203      G3 Dichorionic diamniotic twin pregnancy 2024    Hx Left salpingoophorectomy 2013 2024     Benign serous cystadenofibroma       Abnormal Pap Smear      13 LSIL   5/15/13 Colpo negative for

## 2024-05-28 ENCOUNTER — ROUTINE PRENATAL (OUTPATIENT)
Dept: PERINATAL CARE | Age: 34
End: 2024-05-28
Payer: MEDICAID

## 2024-05-28 VITALS
SYSTOLIC BLOOD PRESSURE: 128 MMHG | HEIGHT: 67 IN | RESPIRATION RATE: 16 BRPM | TEMPERATURE: 98.4 F | DIASTOLIC BLOOD PRESSURE: 78 MMHG | WEIGHT: 171.96 LBS | HEART RATE: 64 BPM | BODY MASS INDEX: 26.99 KG/M2

## 2024-05-28 DIAGNOSIS — O35.9XX2 FETAL ABNORMALITY AFFECTING MANAGEMENT OF MOTHER, FETUS 2 OF MULTIPLE GESTATION: ICD-10-CM

## 2024-05-28 DIAGNOSIS — O35.03X1 CHOROID PLEXUS CYST OF FETUS AFFECTING CARE OF MOTHER, ANTEPARTUM, FETUS 1 OF MULTIPLE GESTATION: ICD-10-CM

## 2024-05-28 DIAGNOSIS — O35.BXX2 FETAL CARDIAC ECHOGENIC FOCUS, ANTEPARTUM, FETUS 2 OF MULTIPLE GESTATION: ICD-10-CM

## 2024-05-28 DIAGNOSIS — O30.043 DICHORIONIC DIAMNIOTIC TWIN PREGNANCY IN THIRD TRIMESTER: Primary | ICD-10-CM

## 2024-05-28 DIAGNOSIS — O16.3 HYPERTENSION AFFECTING PREGNANCY IN THIRD TRIMESTER: ICD-10-CM

## 2024-05-28 DIAGNOSIS — Z36.4 ULTRASOUND FOR ANTENATAL SCREENING FOR FETAL GROWTH RESTRICTION: ICD-10-CM

## 2024-05-28 DIAGNOSIS — O35.9XX1 FETAL ABNORMALITY AFFECTING MANAGEMENT OF MOTHER, FETUS 1 OF MULTIPLE GESTATION: ICD-10-CM

## 2024-05-28 DIAGNOSIS — Z03.73 SUSPECTED FETAL ANOMALY NOT FOUND: ICD-10-CM

## 2024-05-28 DIAGNOSIS — Z36.3 ENCOUNTER FOR ROUTINE SCREENING FOR MALFORMATION USING ULTRASONICS: ICD-10-CM

## 2024-05-28 DIAGNOSIS — Z3A.31 31 WEEKS GESTATION OF PREGNANCY: ICD-10-CM

## 2024-05-28 PROCEDURE — 76810 OB US >/= 14 WKS ADDL FETUS: CPT | Performed by: OBSTETRICS & GYNECOLOGY

## 2024-05-28 PROCEDURE — 76817 TRANSVAGINAL US OBSTETRIC: CPT | Performed by: OBSTETRICS & GYNECOLOGY

## 2024-05-28 PROCEDURE — 76819 FETAL BIOPHYS PROFIL W/O NST: CPT | Performed by: OBSTETRICS & GYNECOLOGY

## 2024-05-28 PROCEDURE — 76805 OB US >/= 14 WKS SNGL FETUS: CPT | Performed by: OBSTETRICS & GYNECOLOGY

## 2024-05-28 PROCEDURE — 76820 UMBILICAL ARTERY ECHO: CPT | Performed by: OBSTETRICS & GYNECOLOGY

## 2024-05-28 PROCEDURE — 99999 PR OFFICE/OUTPT VISIT,PROCEDURE ONLY: CPT | Performed by: OBSTETRICS & GYNECOLOGY

## 2024-06-05 ENCOUNTER — ROUTINE PRENATAL (OUTPATIENT)
Dept: OBGYN CLINIC | Age: 34
End: 2024-06-05
Payer: MEDICAID

## 2024-06-05 VITALS — SYSTOLIC BLOOD PRESSURE: 120 MMHG | DIASTOLIC BLOOD PRESSURE: 83 MMHG | HEART RATE: 78 BPM

## 2024-06-05 DIAGNOSIS — Z3A.32 32 WEEKS GESTATION OF PREGNANCY: Primary | ICD-10-CM

## 2024-06-05 DIAGNOSIS — O09.93 SUPERVISION OF HIGH RISK PREGNANCY IN THIRD TRIMESTER: ICD-10-CM

## 2024-06-05 PROCEDURE — 59025 FETAL NON-STRESS TEST: CPT | Performed by: OBSTETRICS & GYNECOLOGY

## 2024-06-05 PROCEDURE — 99213 OFFICE O/P EST LOW 20 MIN: CPT | Performed by: OBSTETRICS & GYNECOLOGY

## 2024-06-05 NOTE — PROGRESS NOTES
Noelle is a  @ 32w2d who presents for ANKUR visit.  She denies LOF, VB or Ctxs.  + FM.  She is just overall uncomfortable and wants to be done soon.  She denies any fevers/chills, SOB, cough, sore throat, loss of taste/smell or sick contacts.  Pt denies any HA, vision changes or RUQ pain.     O:  Vitals:    24 1457   BP: 120/83   Pulse: 78     Gen: NAD  Abd: soft, nontender, gravid  Ext:  mild edema    NST: Baby A: 130, mod variability, accels, no decels.  Category 1 FHTs, reactive   Baby B: 145, mod variability, accels, no decels.  Category 1 FHTs, reactive.    BP: 120/83  Pulse: 78  Patient Position: Sitting  Fetal HR: rnst x2  Movement: Present    A/P:  Patient Active Problem List    Diagnosis Date Noted    28 weeks gestation of pregnancy 2024    Pruritus of pregnancy in second trimester 2024    cHTN (no meds) 2024     Diagnosed by MFM through chart review       Breech presentation (babyB) 2024    Low-lying placenta (baby A) 2024    Chroid Plexus Cysts (Baby A) 2024     Noted MFM US 24  Resolved       Echogenic Intracardiac Focus (baby B) 2024     Noted MFM US 24      Hx Gestational diabetes mellitus 2024     Early 1 hr GTT ordered       Polysubstance Drug Use 2024     Hx of Cocaine and IV heroine use   Hx overdose in 2017   G3- + THC use during pregnancy, On Subutex takes 8 mg BID. Dr Baer at [facility] in [place]       Thrombocytopenia affecting pregnancy 2024 Plts 146  24 PLTs 203      G3 Dichorionic diamniotic twin pregnancy 2024    Hx Left salpingoophorectomy 2013 2024     Benign serous cystadenofibroma       Abnormal Pap Smear      13 LSIL   5/15/13 Colpo negative for dysplasia  18 LSIL  21 HSIL with + HPV 16   21 Colpo MACIEJ 2-3  21 LEEP MACIEJ 2-3  23 NILM with other high risk HPV       H/O LEEP 2021 LEEP MACIEJ 2-3.  Hx HSIL pap + hpv 16 no

## 2024-06-13 ENCOUNTER — ROUTINE PRENATAL (OUTPATIENT)
Dept: PERINATAL CARE | Age: 34
End: 2024-06-13
Payer: MEDICAID

## 2024-06-13 VITALS
WEIGHT: 181.88 LBS | RESPIRATION RATE: 16 BRPM | SYSTOLIC BLOOD PRESSURE: 131 MMHG | TEMPERATURE: 98.3 F | HEART RATE: 60 BPM | BODY MASS INDEX: 28.55 KG/M2 | DIASTOLIC BLOOD PRESSURE: 78 MMHG | HEIGHT: 67 IN

## 2024-06-13 DIAGNOSIS — O35.9XX1 FETAL ABNORMALITY AFFECTING MANAGEMENT OF MOTHER, FETUS 1 OF MULTIPLE GESTATION: ICD-10-CM

## 2024-06-13 DIAGNOSIS — O35.9XX2 FETAL ABNORMALITY AFFECTING MANAGEMENT OF MOTHER, FETUS 2 OF MULTIPLE GESTATION: ICD-10-CM

## 2024-06-13 DIAGNOSIS — O16.3 HYPERTENSION AFFECTING PREGNANCY IN THIRD TRIMESTER: ICD-10-CM

## 2024-06-13 DIAGNOSIS — O35.03X1 CHOROID PLEXUS CYST OF FETUS AFFECTING CARE OF MOTHER, ANTEPARTUM, FETUS 1 OF MULTIPLE GESTATION: ICD-10-CM

## 2024-06-13 DIAGNOSIS — Z03.73 SUSPECTED FETAL ANOMALY NOT FOUND: ICD-10-CM

## 2024-06-13 DIAGNOSIS — O30.043 DICHORIONIC DIAMNIOTIC TWIN PREGNANCY IN THIRD TRIMESTER: Primary | ICD-10-CM

## 2024-06-13 DIAGNOSIS — Z3A.33 33 WEEKS GESTATION OF PREGNANCY: ICD-10-CM

## 2024-06-13 DIAGNOSIS — O35.BXX2 FETAL CARDIAC ECHOGENIC FOCUS, ANTEPARTUM, FETUS 2 OF MULTIPLE GESTATION: ICD-10-CM

## 2024-06-13 PROCEDURE — 76819 FETAL BIOPHYS PROFIL W/O NST: CPT | Performed by: OBSTETRICS & GYNECOLOGY

## 2024-06-13 PROCEDURE — 76820 UMBILICAL ARTERY ECHO: CPT | Performed by: OBSTETRICS & GYNECOLOGY

## 2024-06-13 PROCEDURE — 76815 OB US LIMITED FETUS(S): CPT | Performed by: OBSTETRICS & GYNECOLOGY

## 2024-06-19 ENCOUNTER — ROUTINE PRENATAL (OUTPATIENT)
Dept: OBGYN CLINIC | Age: 34
End: 2024-06-19
Payer: MEDICAID

## 2024-06-19 VITALS
WEIGHT: 179 LBS | HEART RATE: 72 BPM | DIASTOLIC BLOOD PRESSURE: 82 MMHG | SYSTOLIC BLOOD PRESSURE: 122 MMHG | BODY MASS INDEX: 28.04 KG/M2

## 2024-06-19 DIAGNOSIS — O09.93 SUPERVISION OF HIGH RISK PREGNANCY IN THIRD TRIMESTER: Primary | ICD-10-CM

## 2024-06-19 DIAGNOSIS — Z98.890 H/O LEEP: ICD-10-CM

## 2024-06-19 DIAGNOSIS — O09.92 HIGH-RISK PREGNANCY IN SECOND TRIMESTER: ICD-10-CM

## 2024-06-19 DIAGNOSIS — O99.712 PRURITUS OF PREGNANCY IN SECOND TRIMESTER: ICD-10-CM

## 2024-06-19 DIAGNOSIS — Z3A.26 26 WEEKS GESTATION OF PREGNANCY: ICD-10-CM

## 2024-06-19 DIAGNOSIS — L29.9 PRURITUS OF PREGNANCY IN SECOND TRIMESTER: ICD-10-CM

## 2024-06-19 DIAGNOSIS — D69.6 THROMBOCYTOPENIA AFFECTING PREGNANCY (HCC): ICD-10-CM

## 2024-06-19 DIAGNOSIS — O99.119 THROMBOCYTOPENIA AFFECTING PREGNANCY (HCC): ICD-10-CM

## 2024-06-19 DIAGNOSIS — Z3A.34 34 WEEKS GESTATION OF PREGNANCY: ICD-10-CM

## 2024-06-19 PROCEDURE — 59025 FETAL NON-STRESS TEST: CPT | Performed by: OBSTETRICS & GYNECOLOGY

## 2024-06-19 PROCEDURE — 99213 OFFICE O/P EST LOW 20 MIN: CPT | Performed by: OBSTETRICS & GYNECOLOGY

## 2024-06-19 RX ORDER — DIPHENHYDRAMINE HCL 25 MG
25 CAPSULE ORAL EVERY 6 HOURS PRN
Qty: 30 CAPSULE | Refills: 2 | Status: SHIPPED | OUTPATIENT
Start: 2024-06-19

## 2024-06-19 NOTE — PROGRESS NOTES
Noelle is a  @ 34w2d who presents for ANKUR visit.  She denies LOF, VB or Ctxs.  + FM.  She is just uncomfortable with leg swelling and says that her irritation of her legs is not getting better because of the swelling.  She is having gums and nose bleeding occasionally.  She denies any fevers/chills, SOB, cough, sore throat, loss of taste/smell or sick contacts.  Pt denies any HA, vision changes or RUQ pain.     O:  Vitals:    24 1451   BP: 122/82   Pulse: 72     Gen: NAD  Abd: soft, nontender, gravid  Ext:  Mild edema    NST: Baby A: 145, mod variability, accels, no decels.  Category 1 FHTs, reactive.     Baby B: 150, mod variability, accles, no decels.  Category 1 FHTs, reactive.    BP: 122/82  Weight - Scale: 81.2 kg (179 lb)  Pulse: 72  Patient Position: Sitting  Fetal HR: rnst  Movement: Present    A/P:  Patient Active Problem List    Diagnosis Date Noted    28 weeks gestation of pregnancy 2024    Pruritus of pregnancy in second trimester 2024    cHTN (no meds) 2024     Diagnosed by MFM through chart review       Breech presentation (babyB) 2024    Low-lying placenta (baby A) 2024    Chroid Plexus Cysts (Baby A) 2024     Noted MFM US 24  Resolved       Echogenic Intracardiac Focus (baby B) 2024     Noted MFM US 24      Hx Gestational diabetes mellitus 2024     Early 1 hr GTT ordered       Polysubstance Drug Use 2024     Hx of Cocaine and IV heroine use   Hx overdose in 2017   G3- + THC use during pregnancy, On Subutex takes 8 mg BID. Dr Baer at [facility] in [place]       Thrombocytopenia affecting pregnancy 2024 Plts 146  24 PLTs 203      G3 Dichorionic diamniotic twin pregnancy 2024    Hx Left salpingoophorectomy 2013 2024     Benign serous cystadenofibroma       Abnormal Pap Smear      13 LSIL   5/15/13 Colpo negative for dysplasia  18 LSIL  21 HSIL with + HPV 16   21

## 2024-06-27 ENCOUNTER — ROUTINE PRENATAL (OUTPATIENT)
Dept: PERINATAL CARE | Age: 34
End: 2024-06-27
Payer: MEDICAID

## 2024-06-27 VITALS
TEMPERATURE: 98.2 F | DIASTOLIC BLOOD PRESSURE: 80 MMHG | SYSTOLIC BLOOD PRESSURE: 132 MMHG | HEART RATE: 76 BPM | BODY MASS INDEX: 28.56 KG/M2 | HEIGHT: 67 IN | RESPIRATION RATE: 16 BRPM | WEIGHT: 182 LBS

## 2024-06-27 DIAGNOSIS — O35.9XX2 FETAL ABNORMALITY AFFECTING MANAGEMENT OF MOTHER, FETUS 2 OF MULTIPLE GESTATION: ICD-10-CM

## 2024-06-27 DIAGNOSIS — O35.03X1 CHOROID PLEXUS CYST OF FETUS AFFECTING CARE OF MOTHER, ANTEPARTUM, FETUS 1 OF MULTIPLE GESTATION: ICD-10-CM

## 2024-06-27 DIAGNOSIS — Z36.4 ULTRASOUND FOR ANTENATAL SCREENING FOR FETAL GROWTH RESTRICTION: ICD-10-CM

## 2024-06-27 DIAGNOSIS — O35.BXX2 FETAL CARDIAC ECHOGENIC FOCUS, ANTEPARTUM, FETUS 2 OF MULTIPLE GESTATION: ICD-10-CM

## 2024-06-27 DIAGNOSIS — O16.3 HYPERTENSION AFFECTING PREGNANCY IN THIRD TRIMESTER: ICD-10-CM

## 2024-06-27 DIAGNOSIS — O30.043 DICHORIONIC DIAMNIOTIC TWIN PREGNANCY IN THIRD TRIMESTER: Primary | ICD-10-CM

## 2024-06-27 DIAGNOSIS — Z03.73 SUSPECTED FETAL ANOMALY NOT FOUND: ICD-10-CM

## 2024-06-27 DIAGNOSIS — O35.9XX1 FETAL ABNORMALITY AFFECTING MANAGEMENT OF MOTHER, FETUS 1 OF MULTIPLE GESTATION: ICD-10-CM

## 2024-06-27 DIAGNOSIS — Z3A.35 35 WEEKS GESTATION OF PREGNANCY: ICD-10-CM

## 2024-06-27 PROCEDURE — 76819 FETAL BIOPHYS PROFIL W/O NST: CPT | Performed by: OBSTETRICS & GYNECOLOGY

## 2024-06-27 PROCEDURE — 76816 OB US FOLLOW-UP PER FETUS: CPT | Performed by: OBSTETRICS & GYNECOLOGY

## 2024-06-27 PROCEDURE — 76820 UMBILICAL ARTERY ECHO: CPT | Performed by: OBSTETRICS & GYNECOLOGY

## 2024-07-10 ENCOUNTER — ROUTINE PRENATAL (OUTPATIENT)
Dept: OBGYN CLINIC | Age: 34
End: 2024-07-10
Payer: MEDICAID

## 2024-07-10 ENCOUNTER — ROUTINE PRENATAL (OUTPATIENT)
Dept: PERINATAL CARE | Age: 34
End: 2024-07-10
Payer: MEDICAID

## 2024-07-10 ENCOUNTER — HOSPITAL ENCOUNTER (OUTPATIENT)
Age: 34
Setting detail: SPECIMEN
Discharge: HOME | End: 2024-07-10

## 2024-07-10 VITALS
WEIGHT: 182.54 LBS | TEMPERATURE: 97.9 F | RESPIRATION RATE: 16 BRPM | SYSTOLIC BLOOD PRESSURE: 128 MMHG | HEIGHT: 67 IN | BODY MASS INDEX: 28.65 KG/M2 | HEART RATE: 57 BPM | DIASTOLIC BLOOD PRESSURE: 87 MMHG

## 2024-07-10 VITALS
BODY MASS INDEX: 28.51 KG/M2 | DIASTOLIC BLOOD PRESSURE: 76 MMHG | SYSTOLIC BLOOD PRESSURE: 127 MMHG | HEART RATE: 96 BPM | WEIGHT: 182 LBS

## 2024-07-10 DIAGNOSIS — O35.BXX2 FETAL CARDIAC ECHOGENIC FOCUS, ANTEPARTUM, FETUS 2 OF MULTIPLE GESTATION: ICD-10-CM

## 2024-07-10 DIAGNOSIS — O09.93 HIGH-RISK PREGNANCY IN THIRD TRIMESTER: Primary | ICD-10-CM

## 2024-07-10 DIAGNOSIS — O35.9XX1 FETAL ABNORMALITY AFFECTING MANAGEMENT OF MOTHER, FETUS 1 OF MULTIPLE GESTATION: ICD-10-CM

## 2024-07-10 DIAGNOSIS — O35.9XX2 FETAL ABNORMALITY AFFECTING MANAGEMENT OF MOTHER, FETUS 2 OF MULTIPLE GESTATION: ICD-10-CM

## 2024-07-10 DIAGNOSIS — Z3A.37 37 WEEKS GESTATION OF PREGNANCY: ICD-10-CM

## 2024-07-10 DIAGNOSIS — L29.9 GENERALIZED PRURITUS: ICD-10-CM

## 2024-07-10 DIAGNOSIS — O09.93 HIGH-RISK PREGNANCY IN THIRD TRIMESTER: ICD-10-CM

## 2024-07-10 DIAGNOSIS — B19.20 HEPATITIS C VIRUS INFECTION WITHOUT HEPATIC COMA, UNSPECIFIED CHRONICITY: ICD-10-CM

## 2024-07-10 DIAGNOSIS — O30.041 DICHORIONIC DIAMNIOTIC TWIN PREGNANCY IN FIRST TRIMESTER: ICD-10-CM

## 2024-07-10 DIAGNOSIS — O30.043 DICHORIONIC DIAMNIOTIC TWIN PREGNANCY IN THIRD TRIMESTER: Primary | ICD-10-CM

## 2024-07-10 DIAGNOSIS — O10.919 CHRONIC HYPERTENSION DURING PREGNANCY: ICD-10-CM

## 2024-07-10 DIAGNOSIS — O16.3 HYPERTENSION AFFECTING PREGNANCY IN THIRD TRIMESTER: ICD-10-CM

## 2024-07-10 PROBLEM — Z3A.28 28 WEEKS GESTATION OF PREGNANCY: Status: RESOLVED | Noted: 2024-05-12 | Resolved: 2024-07-10

## 2024-07-10 PROCEDURE — 76815 OB US LIMITED FETUS(S): CPT | Performed by: OBSTETRICS & GYNECOLOGY

## 2024-07-10 PROCEDURE — 76819 FETAL BIOPHYS PROFIL W/O NST: CPT | Performed by: OBSTETRICS & GYNECOLOGY

## 2024-07-10 PROCEDURE — 76820 UMBILICAL ARTERY ECHO: CPT | Performed by: OBSTETRICS & GYNECOLOGY

## 2024-07-10 PROCEDURE — 99999 PR OFFICE/OUTPT VISIT,PROCEDURE ONLY: CPT | Performed by: OBSTETRICS & GYNECOLOGY

## 2024-07-10 PROCEDURE — 99213 OFFICE O/P EST LOW 20 MIN: CPT | Performed by: STUDENT IN AN ORGANIZED HEALTH CARE EDUCATION/TRAINING PROGRAM

## 2024-07-10 PROCEDURE — 59025 FETAL NON-STRESS TEST: CPT | Performed by: STUDENT IN AN ORGANIZED HEALTH CARE EDUCATION/TRAINING PROGRAM

## 2024-07-10 RX ORDER — BENZOCAINE/MENTHOL 6 MG-10 MG
LOZENGE MUCOUS MEMBRANE
Qty: 30 G | Refills: 1 | Status: ON HOLD | OUTPATIENT
Start: 2024-07-10 | End: 2024-07-17

## 2024-07-10 NOTE — PROGRESS NOTES
Prenatal Visit    Noelle Kim is a 34 y.o. female  at 37w2d IUP    The patient was seen and evaluated. She has no complaints today. There was positive fetal movements x2. She denies contractions, vaginal bleeding and leakage of fluid. She currently denies any signs or symptoms of pre-eclampsia which include headache, vision changes, RUQ pain. Signs and symptoms of labor were reviewed.  Reports she still has whole body pruritus. Benadryl does give her some temporary relief     Allergies:  Ceftin [cefuroxime axetil] and Nicotine    Vitals:     BP: 127/76  Weight - Scale: 82.6 kg (182 lb)  Pulse: 96  Patient Position: Sitting  Fetal HR: 130  Movement: Present   Baby b 140 bpm     Physical:   General appearance: no apparent distress, alert and cooperative  HEENT: head atraumatic, normocephalic, trachea midline, moist mucous membranes   Neurologic: alert, oriented, normal speech   Lungs: no increased work of breathing,   Abdomen: soft, gravid, non-tender on palpation    Musculoskeletal: no gross abnormalities, range of motion appropriate for age   Psychiatric: mood appropriate, normal affect    Extremities: has excoriations in various stages of healing present all over her legs and arms, no specific pattern      NST:   Fetal heart rate baseline: 140, moderate variability, accelerations present, absent decelerations     The tracing has been reviewed and is considered reactive.    NST:   Fetal heart rate baseline: 130, moderate variability, accelerations present, absent decelerations     The tracing has been reviewed and is considered reactive.      Assessment & Plan:  Noelle Kim is a 34 y.o. female  at 37w2d IUP   - VSS     - First BP is elevated but repeat is normotensive, has no s/s of preE today    - GBS collected    - CMP reprinted to check on liver enzymes   - HepC rna quant reprinted as it was not completed earlier in the pregnancy   - Continue taking prenatal vitamins QD               -

## 2024-07-11 LAB
ALBUMIN SERPL-MCNC: 3.2 G/DL (ref 3.5–5.2)
ALBUMIN/GLOB SERPL: 1 {RATIO} (ref 1–2.5)
ALP SERPL-CCNC: 274 U/L (ref 35–104)
ALT SERPL-CCNC: 18 U/L (ref 10–35)
ANION GAP SERPL CALCULATED.3IONS-SCNC: 10 MMOL/L (ref 9–16)
AST SERPL-CCNC: 48 U/L (ref 10–35)
BILIRUB SERPL-MCNC: 0.7 MG/DL (ref 0–1.2)
BUN SERPL-MCNC: 11 MG/DL (ref 6–20)
CALCIUM SERPL-MCNC: 7.9 MG/DL (ref 8.6–10.4)
CHLORIDE SERPL-SCNC: 105 MMOL/L (ref 98–107)
CO2 SERPL-SCNC: 23 MMOL/L (ref 20–31)
CREAT SERPL-MCNC: 0.9 MG/DL (ref 0.5–0.9)
GFR, ESTIMATED: >90 ML/MIN/1.73M2
GLUCOSE SERPL-MCNC: 75 MG/DL (ref 74–99)
HCV RNA # SERPL NAA+PROBE: DETECTED {COPIES}/ML
HCV RNA SERPL NAA+PROBE-ACNC: ABNORMAL IU/ML
HCV RNA SERPL NAA+PROBE-LOG IU: 7 LOG IU/ML
POTASSIUM SERPL-SCNC: 5.1 MMOL/L (ref 3.7–5.3)
PROT SERPL-MCNC: 5.7 G/DL (ref 6.6–8.7)
SODIUM SERPL-SCNC: 138 MMOL/L (ref 136–145)
SPECIMEN SOURCE: ABNORMAL

## 2024-07-12 ENCOUNTER — HOSPITAL ENCOUNTER (INPATIENT)
Age: 34
LOS: 11 days | Discharge: HOME | DRG: 540 | End: 2024-07-23
Attending: STUDENT IN AN ORGANIZED HEALTH CARE EDUCATION/TRAINING PROGRAM | Admitting: STUDENT IN AN ORGANIZED HEALTH CARE EDUCATION/TRAINING PROGRAM
Payer: MEDICAID

## 2024-07-12 DIAGNOSIS — Z98.891 S/P CESAREAN SECTION: Primary | ICD-10-CM

## 2024-07-12 PROBLEM — Z3A.37 37 WEEKS GESTATION OF PREGNANCY: Status: ACTIVE | Noted: 2024-07-12

## 2024-07-12 LAB
ALBUMIN SERPL-MCNC: 3.5 G/DL (ref 3.5–5.2)
ALBUMIN/GLOB SERPL: 1 {RATIO} (ref 1–2.5)
ALP SERPL-CCNC: 304 U/L (ref 35–104)
ALT SERPL-CCNC: 21 U/L (ref 10–35)
AMPHET UR QL SCN: NEGATIVE
ANION GAP SERPL CALCULATED.3IONS-SCNC: 12 MMOL/L (ref 9–16)
AST SERPL-CCNC: 64 U/L (ref 10–35)
BARBITURATES UR QL SCN: NEGATIVE
BENZODIAZ UR QL: NEGATIVE
BILIRUB SERPL-MCNC: 0.9 MG/DL (ref 0–1.2)
BUN SERPL-MCNC: 10 MG/DL (ref 6–20)
CALCIUM SERPL-MCNC: 8.2 MG/DL (ref 8.6–10.4)
CANNABINOIDS UR QL SCN: POSITIVE
CHLORIDE SERPL-SCNC: 104 MMOL/L (ref 98–107)
CO2 SERPL-SCNC: 20 MMOL/L (ref 20–31)
COCAINE UR QL SCN: NEGATIVE
CREAT SERPL-MCNC: 0.9 MG/DL (ref 0.5–0.9)
CREAT UR-MCNC: 210 MG/DL (ref 28–217)
ERYTHROCYTE [DISTWIDTH] IN BLOOD BY AUTOMATED COUNT: 13.5 % (ref 11.8–14.4)
FENTANYL UR QL: NEGATIVE
GFR, ESTIMATED: 87 ML/MIN/1.73M2
GLUCOSE SERPL-MCNC: 89 MG/DL (ref 74–99)
HCT VFR BLD AUTO: 39.3 % (ref 36.3–47.1)
HGB BLD-MCNC: 13 G/DL (ref 11.9–15.1)
MCH RBC QN AUTO: 30.7 PG (ref 25.2–33.5)
MCHC RBC AUTO-ENTMCNC: 33.1 G/DL (ref 28.4–34.8)
MCV RBC AUTO: 92.7 FL (ref 82.6–102.9)
METHADONE UR QL: NEGATIVE
NRBC BLD-RTO: 0 PER 100 WBC
OPIATES UR QL SCN: NEGATIVE
OXYCODONE UR QL SCN: NEGATIVE
PCP UR QL SCN: NEGATIVE
PLATELET # BLD AUTO: ABNORMAL K/UL (ref 138–453)
PLATELET, FLUORESCENCE: 132 K/UL (ref 138–453)
PLATELETS.RETICULATED NFR BLD AUTO: 19.7 % (ref 1.1–10.3)
POTASSIUM SERPL-SCNC: 4.1 MMOL/L (ref 3.7–5.3)
PROT SERPL-MCNC: 6.1 G/DL (ref 6.6–8.7)
RBC # BLD AUTO: 4.24 M/UL (ref 3.95–5.11)
SODIUM SERPL-SCNC: 136 MMOL/L (ref 136–145)
T PALLIDUM AB SER QL IA: NONREACTIVE
TEST INFORMATION: ABNORMAL
TOTAL PROTEIN, URINE: 91 MG/DL
URINE TOTAL PROTEIN CREATININE RATIO: 0.43
WBC OTHER # BLD: 8.2 K/UL (ref 3.5–11.3)

## 2024-07-12 PROCEDURE — 82570 ASSAY OF URINE CREATININE: CPT

## 2024-07-12 PROCEDURE — 1220000000 HC SEMI PRIVATE OB R&B

## 2024-07-12 PROCEDURE — 2580000003 HC RX 258: Performed by: STUDENT IN AN ORGANIZED HEALTH CARE EDUCATION/TRAINING PROGRAM

## 2024-07-12 PROCEDURE — 86900 BLOOD TYPING SEROLOGIC ABO: CPT

## 2024-07-12 PROCEDURE — 80053 COMPREHEN METABOLIC PANEL: CPT

## 2024-07-12 PROCEDURE — 85055 RETICULATED PLATELET ASSAY: CPT

## 2024-07-12 PROCEDURE — 86920 COMPATIBILITY TEST SPIN: CPT

## 2024-07-12 PROCEDURE — 86780 TREPONEMA PALLIDUM: CPT

## 2024-07-12 PROCEDURE — 86850 RBC ANTIBODY SCREEN: CPT

## 2024-07-12 PROCEDURE — 80307 DRUG TEST PRSMV CHEM ANLYZR: CPT

## 2024-07-12 PROCEDURE — 84156 ASSAY OF PROTEIN URINE: CPT

## 2024-07-12 PROCEDURE — 85027 COMPLETE CBC AUTOMATED: CPT

## 2024-07-12 PROCEDURE — 86901 BLOOD TYPING SEROLOGIC RH(D): CPT

## 2024-07-12 RX ORDER — ONDANSETRON 4 MG/1
4 TABLET, ORALLY DISINTEGRATING ORAL EVERY 8 HOURS PRN
Status: DISCONTINUED | OUTPATIENT
Start: 2024-07-12 | End: 2024-07-23 | Stop reason: HOSPADM

## 2024-07-12 RX ORDER — SODIUM CHLORIDE 0.9 % (FLUSH) 0.9 %
10 SYRINGE (ML) INJECTION PRN
Status: DISCONTINUED | OUTPATIENT
Start: 2024-07-12 | End: 2024-07-15

## 2024-07-12 RX ORDER — ONDANSETRON 2 MG/ML
4 INJECTION INTRAMUSCULAR; INTRAVENOUS EVERY 6 HOURS PRN
Status: DISCONTINUED | OUTPATIENT
Start: 2024-07-12 | End: 2024-07-15

## 2024-07-12 RX ORDER — SODIUM CHLORIDE, SODIUM LACTATE, POTASSIUM CHLORIDE, CALCIUM CHLORIDE 600; 310; 30; 20 MG/100ML; MG/100ML; MG/100ML; MG/100ML
INJECTION, SOLUTION INTRAVENOUS CONTINUOUS
Status: DISCONTINUED | OUTPATIENT
Start: 2024-07-13 | End: 2024-07-13

## 2024-07-12 RX ORDER — CYCLOBENZAPRINE HCL 10 MG
10 TABLET ORAL 3 TIMES DAILY PRN
Status: DISCONTINUED | OUTPATIENT
Start: 2024-07-12 | End: 2024-07-13

## 2024-07-12 RX ORDER — BUPRENORPHINE 8 MG/1
16 TABLET SUBLINGUAL DAILY
Status: DISCONTINUED | OUTPATIENT
Start: 2024-07-13 | End: 2024-07-13

## 2024-07-12 RX ORDER — ACETAMINOPHEN 500 MG
1000 TABLET ORAL EVERY 6 HOURS PRN
Status: DISCONTINUED | OUTPATIENT
Start: 2024-07-12 | End: 2024-07-13

## 2024-07-12 RX ORDER — ONDANSETRON 2 MG/ML
4 INJECTION INTRAMUSCULAR; INTRAVENOUS EVERY 6 HOURS PRN
Status: DISCONTINUED | OUTPATIENT
Start: 2024-07-12 | End: 2024-07-23 | Stop reason: HOSPADM

## 2024-07-12 RX ORDER — CITRIC ACID/SODIUM CITRATE 334-500MG
30 SOLUTION, ORAL ORAL ONCE
Status: DISCONTINUED | OUTPATIENT
Start: 2024-07-13 | End: 2024-07-15

## 2024-07-12 RX ORDER — LIDOCAINE 4 G/G
1 PATCH TOPICAL DAILY
Status: DISCONTINUED | OUTPATIENT
Start: 2024-07-13 | End: 2024-07-13

## 2024-07-12 RX ORDER — SODIUM CHLORIDE 9 MG/ML
INJECTION, SOLUTION INTRAVENOUS PRN
Status: DISCONTINUED | OUTPATIENT
Start: 2024-07-12 | End: 2024-07-15

## 2024-07-12 RX ORDER — ACETAMINOPHEN 500 MG
1000 TABLET ORAL ONCE
Status: DISCONTINUED | OUTPATIENT
Start: 2024-07-13 | End: 2024-07-15

## 2024-07-12 RX ORDER — SODIUM CHLORIDE 0.9 % (FLUSH) 0.9 %
10 SYRINGE (ML) INJECTION EVERY 12 HOURS SCHEDULED
Status: DISCONTINUED | OUTPATIENT
Start: 2024-07-13 | End: 2024-07-15

## 2024-07-12 RX ORDER — ACETAMINOPHEN 500 MG
1000 TABLET ORAL ONCE
Status: DISCONTINUED | OUTPATIENT
Start: 2024-07-13 | End: 2024-07-12

## 2024-07-12 RX ORDER — SODIUM CHLORIDE, SODIUM LACTATE, POTASSIUM CHLORIDE, AND CALCIUM CHLORIDE .6; .31; .03; .02 G/100ML; G/100ML; G/100ML; G/100ML
1000 INJECTION, SOLUTION INTRAVENOUS ONCE
Status: COMPLETED | OUTPATIENT
Start: 2024-07-12 | End: 2024-07-12

## 2024-07-12 RX ORDER — SODIUM CHLORIDE, SODIUM LACTATE, POTASSIUM CHLORIDE, AND CALCIUM CHLORIDE .6; .31; .03; .02 G/100ML; G/100ML; G/100ML; G/100ML
1000 INJECTION, SOLUTION INTRAVENOUS ONCE
Status: COMPLETED | OUTPATIENT
Start: 2024-07-13 | End: 2024-07-13

## 2024-07-12 RX ADMIN — SODIUM CHLORIDE, POTASSIUM CHLORIDE, SODIUM LACTATE AND CALCIUM CHLORIDE 1000 ML: 600; 310; 30; 20 INJECTION, SOLUTION INTRAVENOUS at 21:38

## 2024-07-13 ENCOUNTER — APPOINTMENT (OUTPATIENT)
Dept: GENERAL RADIOLOGY | Age: 34
DRG: 540 | End: 2024-07-13
Payer: MEDICAID

## 2024-07-13 ENCOUNTER — ANESTHESIA EVENT (OUTPATIENT)
Dept: LABOR AND DELIVERY | Age: 34
End: 2024-07-13
Payer: MEDICAID

## 2024-07-13 ENCOUNTER — ANESTHESIA (OUTPATIENT)
Dept: LABOR AND DELIVERY | Age: 34
End: 2024-07-13
Payer: MEDICAID

## 2024-07-13 ENCOUNTER — ANESTHESIA (OUTPATIENT)
Dept: OPERATING ROOM | Age: 34
End: 2024-07-13
Payer: MEDICAID

## 2024-07-13 ENCOUNTER — ANESTHESIA EVENT (OUTPATIENT)
Dept: OPERATING ROOM | Age: 34
End: 2024-07-13
Payer: MEDICAID

## 2024-07-13 LAB
ABO + RH BLD: NORMAL
ALBUMIN SERPL-MCNC: 2.4 G/DL (ref 3.5–5.2)
ALBUMIN SERPL-MCNC: 2.4 G/DL (ref 3.5–5.2)
ALBUMIN SERPL-MCNC: 3 G/DL (ref 3.5–5.2)
ALBUMIN/GLOB SERPL: 1 {RATIO} (ref 1–2.5)
ALBUMIN/GLOB SERPL: 1 {RATIO} (ref 1–2.5)
ALBUMIN/GLOB SERPL: 2 {RATIO} (ref 1–2.5)
ALLEN TEST: ABNORMAL
ALP SERPL-CCNC: 168 U/L (ref 35–104)
ALP SERPL-CCNC: 261 U/L (ref 35–104)
ALP SERPL-CCNC: 71 U/L (ref 35–104)
ALT SERPL-CCNC: 172 U/L (ref 10–35)
ALT SERPL-CCNC: 19 U/L (ref 10–35)
ALT SERPL-CCNC: 19 U/L (ref 10–35)
AMMONIA PLAS-SCNC: 75 UMOL/L (ref 11–51)
ANION GAP SERPL CALCULATED.3IONS-SCNC: 15 MMOL/L (ref 9–16)
ANION GAP SERPL CALCULATED.3IONS-SCNC: 7 MMOL/L (ref 9–16)
ANION GAP SERPL CALCULATED.3IONS-SCNC: 9 MMOL/L (ref 9–16)
ANION GAP SERPL CALCULATED.3IONS-SCNC: 9 MMOL/L (ref 9–16)
ARM BAND NUMBER: NORMAL
ARTERIAL PATENCY WRIST A: ABNORMAL
AST SERPL-CCNC: 58 U/L (ref 10–35)
AST SERPL-CCNC: 664 U/L (ref 10–35)
AST SERPL-CCNC: 67 U/L (ref 10–35)
BASOPHILS # BLD: 0 K/UL (ref 0–0.2)
BASOPHILS # BLD: 0 K/UL (ref 0–0.2)
BASOPHILS # BLD: 0.08 K/UL (ref 0–0.2)
BASOPHILS # BLD: <0.03 K/UL (ref 0–0.2)
BASOPHILS NFR BLD: 0 % (ref 0–2)
BASOPHILS NFR BLD: 1 % (ref 0–2)
BILIRUB DIRECT SERPL-MCNC: 1 MG/DL (ref 0–0.2)
BILIRUB INDIRECT SERPL-MCNC: 0.4 MG/DL (ref 0–1)
BILIRUB SERPL-MCNC: 0.9 MG/DL (ref 0–1.2)
BILIRUB SERPL-MCNC: 1.4 MG/DL (ref 0–1.2)
BILIRUB SERPL-MCNC: 4 MG/DL (ref 0–1.2)
BLOOD BANK SAMPLE EXPIRATION: NORMAL
BLOOD GROUP ANTIBODIES SERPL: NEGATIVE
BODY TEMPERATURE: 37
BUN BLD-MCNC: 10 MG/DL (ref 8–26)
BUN BLD-MCNC: 12 MG/DL (ref 8–26)
BUN BLD-MCNC: 8 MG/DL (ref 8–26)
BUN BLD-MCNC: 8 MG/DL (ref 8–26)
BUN BLD-MCNC: 9 MG/DL (ref 8–26)
BUN BLD-MCNC: 9 MG/DL (ref 8–26)
BUN SERPL-MCNC: 10 MG/DL (ref 6–20)
BUN SERPL-MCNC: 13 MG/DL (ref 6–20)
CA-I BLD-SCNC: 0.95 MMOL/L (ref 1.13–1.33)
CA-I BLD-SCNC: 1 MMOL/L (ref 1.15–1.33)
CA-I BLD-SCNC: 1.02 MMOL/L (ref 1.15–1.33)
CA-I BLD-SCNC: 1.03 MMOL/L (ref 1.15–1.33)
CA-I BLD-SCNC: 1.09 MMOL/L (ref 1.15–1.33)
CA-I BLD-SCNC: 1.14 MMOL/L (ref 1.13–1.33)
CA-I BLD-SCNC: 1.14 MMOL/L (ref 1.15–1.33)
CA-I BLD-SCNC: 1.18 MMOL/L (ref 1.15–1.33)
CALCIUM SERPL-MCNC: 6.7 MG/DL (ref 8.6–10.4)
CALCIUM SERPL-MCNC: 7.5 MG/DL (ref 8.6–10.4)
CALCIUM SERPL-MCNC: 7.7 MG/DL (ref 8.6–10.4)
CALCIUM SERPL-MCNC: 7.9 MG/DL (ref 8.6–10.4)
CHLORIDE BLD-SCNC: 106 MMOL/L (ref 98–107)
CHLORIDE BLD-SCNC: 106 MMOL/L (ref 98–107)
CHLORIDE BLD-SCNC: 108 MMOL/L (ref 98–107)
CHLORIDE BLD-SCNC: 108 MMOL/L (ref 98–107)
CHLORIDE BLD-SCNC: 110 MMOL/L (ref 98–107)
CHLORIDE BLD-SCNC: 111 MMOL/L (ref 98–107)
CHLORIDE SERPL-SCNC: 105 MMOL/L (ref 98–107)
CHLORIDE SERPL-SCNC: 106 MMOL/L (ref 98–107)
CHLORIDE SERPL-SCNC: 108 MMOL/L (ref 98–107)
CHLORIDE SERPL-SCNC: 112 MMOL/L (ref 98–107)
CO2 BLD CALC-SCNC: 14 MMOL/L (ref 22–30)
CO2 BLD CALC-SCNC: 17 MMOL/L (ref 22–30)
CO2 BLD CALC-SCNC: 18 MMOL/L (ref 22–30)
CO2 BLD CALC-SCNC: 20 MMOL/L (ref 22–30)
CO2 BLD CALC-SCNC: 22 MMOL/L (ref 22–30)
CO2 BLD CALC-SCNC: 24 MMOL/L (ref 22–30)
CO2 SERPL-SCNC: 14 MMOL/L (ref 20–31)
CO2 SERPL-SCNC: 20 MMOL/L (ref 20–31)
CO2 SERPL-SCNC: 21 MMOL/L (ref 20–31)
CO2 SERPL-SCNC: 24 MMOL/L (ref 20–31)
COHGB MFR BLD: 1.4 % (ref 0–5)
CREAT SERPL-MCNC: 0.8 MG/DL (ref 0.5–0.9)
CREAT SERPL-MCNC: 0.8 MG/DL (ref 0.5–0.9)
CREAT SERPL-MCNC: 1 MG/DL (ref 0.5–0.9)
CREAT SERPL-MCNC: 1 MG/DL (ref 0.5–0.9)
CRITICAL ACTION: NORMAL
CRITICAL ACTION: NORMAL
CRITICAL NOTIFICATION DATE/TIME: NORMAL
CRITICAL NOTIFICATION DATE/TIME: NORMAL
CRITICAL NOTIFICATION: NORMAL
CRITICAL NOTIFICATION: NORMAL
CRITICAL VALUE READ BACK: YES
CRITICAL VALUE READ BACK: YES
EGFR, POC: >90 ML/MIN/1.73M2
EOSINOPHIL # BLD: 0 K/UL (ref 0–0.44)
EOSINOPHIL # BLD: 0 K/UL (ref 0–0.44)
EOSINOPHIL # BLD: 0.09 K/UL (ref 0–0.44)
EOSINOPHIL # BLD: <0.03 K/UL (ref 0–0.44)
EOSINOPHILS RELATIVE PERCENT: 0 % (ref 1–4)
EOSINOPHILS RELATIVE PERCENT: 1 % (ref 1–4)
ERYTHROCYTE [DISTWIDTH] IN BLOOD BY AUTOMATED COUNT: 13.6 % (ref 11.8–14.4)
ERYTHROCYTE [DISTWIDTH] IN BLOOD BY AUTOMATED COUNT: 13.8 % (ref 11.8–14.4)
ERYTHROCYTE [DISTWIDTH] IN BLOOD BY AUTOMATED COUNT: 15.2 % (ref 11.8–14.4)
ERYTHROCYTE [DISTWIDTH] IN BLOOD BY AUTOMATED COUNT: 15.5 % (ref 11.8–14.4)
ERYTHROCYTE [DISTWIDTH] IN BLOOD BY AUTOMATED COUNT: 15.8 % (ref 11.8–14.4)
EST. AVERAGE GLUCOSE BLD GHB EST-MCNC: 91 MG/DL
FIBRINOGEN PPP-MCNC: 163 MG/DL (ref 203–521)
FIBRINOGEN PPP-MCNC: 165 MG/DL (ref 203–521)
FIBRINOGEN PPP-MCNC: 166 MG/DL (ref 203–521)
FIBRINOGEN PPP-MCNC: 175 MG/DL (ref 203–521)
FIBRINOGEN, FUNCTIONAL TEG: 16.4 MM (ref 15–32)
FIBRINOGEN, FUNCTIONAL TEG: 17 MM (ref 15–32)
FIBRINOGEN, FUNCTIONAL TEG: 20.4 MM (ref 15–32)
FIO2 ON VENT: ABNORMAL %
FIO2: 30
FIO2: 40
GFR, ESTIMATED: 76 ML/MIN/1.73M2
GFR, ESTIMATED: 77 ML/MIN/1.73M2
GFR, ESTIMATED: >90 ML/MIN/1.73M2
GFR, ESTIMATED: >90 ML/MIN/1.73M2
GLOBULIN SER CALC-MCNC: 1.7 G/DL
GLUCOSE BLD-MCNC: 110 MG/DL (ref 65–105)
GLUCOSE BLD-MCNC: 212 MG/DL (ref 74–100)
GLUCOSE BLD-MCNC: 267 MG/DL (ref 74–100)
GLUCOSE BLD-MCNC: 271 MG/DL (ref 74–100)
GLUCOSE BLD-MCNC: 278 MG/DL (ref 65–105)
GLUCOSE BLD-MCNC: 417 MG/DL (ref 74–100)
GLUCOSE BLD-MCNC: 85 MG/DL (ref 65–105)
GLUCOSE BLD-MCNC: 85 MG/DL (ref 65–105)
GLUCOSE BLD-MCNC: 88 MG/DL (ref 74–100)
GLUCOSE BLD-MCNC: 91 MG/DL (ref 65–105)
GLUCOSE BLD-MCNC: 93 MG/DL (ref 74–100)
GLUCOSE BLD-MCNC: 96 MG/DL (ref 65–105)
GLUCOSE BLD-MCNC: 99 MG/DL (ref 65–105)
GLUCOSE SERPL-MCNC: 100 MG/DL (ref 74–99)
GLUCOSE SERPL-MCNC: 111 MG/DL (ref 74–99)
GLUCOSE SERPL-MCNC: 391 MG/DL (ref 74–99)
GLUCOSE SERPL-MCNC: 70 MG/DL (ref 74–99)
HBA1C MFR BLD: 4.8 % (ref 4–6)
HCO3 ARTERIAL: 19.4 MMOL/L (ref 22–27)
HCT VFR BLD AUTO: 15.7 % (ref 36.3–47.1)
HCT VFR BLD AUTO: 18 % (ref 36–46)
HCT VFR BLD AUTO: 23 % (ref 36–46)
HCT VFR BLD AUTO: 24 % (ref 36–46)
HCT VFR BLD AUTO: 24 % (ref 36–46)
HCT VFR BLD AUTO: 25.8 % (ref 36.3–47.1)
HCT VFR BLD AUTO: 27.1 % (ref 36.3–47.1)
HCT VFR BLD AUTO: 27.5 % (ref 36.3–47.1)
HCT VFR BLD AUTO: 29 % (ref 36–46)
HCT VFR BLD AUTO: 29 % (ref 36–46)
HCT VFR BLD AUTO: 29.5 % (ref 36.3–47.1)
HCT VFR BLD AUTO: 31.7 % (ref 36.3–47.1)
HCT VFR BLD AUTO: 36.4 % (ref 36.3–47.1)
HGB BLD-MCNC: 10.5 G/DL (ref 11.9–15.1)
HGB BLD-MCNC: 12 G/DL (ref 11.9–15.1)
HGB BLD-MCNC: 5 G/DL (ref 11.9–15.1)
HGB BLD-MCNC: 8.7 G/DL (ref 11.9–15.1)
HGB BLD-MCNC: 9.2 G/DL (ref 11.9–15.1)
HGB BLD-MCNC: 9.5 G/DL (ref 11.9–15.1)
HGB BLD-MCNC: 9.7 G/DL (ref 11.9–15.1)
IMM GRANULOCYTES # BLD AUTO: 0.04 K/UL (ref 0–0.3)
IMM GRANULOCYTES # BLD AUTO: 0.09 K/UL (ref 0–0.3)
IMM GRANULOCYTES # BLD AUTO: 0.3 K/UL (ref 0–0.3)
IMM GRANULOCYTES # BLD AUTO: 0.43 K/UL (ref 0–0.3)
IMM GRANULOCYTES NFR BLD: 1 %
IMM GRANULOCYTES NFR BLD: 3 %
INR PPP: 1.2
INR PPP: 1.2
INR PPP: 1.3
INR PPP: 1.3
LIPASE SERPL-CCNC: 16 U/L (ref 13–60)
LY30 (LYSIS) TEG: 0 % (ref 0–2.6)
LYMPHOCYTES NFR BLD: 0.6 K/UL (ref 1.1–3.7)
LYMPHOCYTES NFR BLD: 0.84 K/UL (ref 1.1–3.7)
LYMPHOCYTES NFR BLD: 2.42 K/UL (ref 1.1–3.7)
LYMPHOCYTES NFR BLD: 3.85 K/UL (ref 1.1–3.7)
LYMPHOCYTES RELATIVE PERCENT: 11 % (ref 24–43)
LYMPHOCYTES RELATIVE PERCENT: 31 % (ref 24–43)
LYMPHOCYTES RELATIVE PERCENT: 7 % (ref 24–43)
LYMPHOCYTES RELATIVE PERCENT: 8 % (ref 24–43)
MA(MAX CLOT) RAPID TEG: 53.4 MM (ref 52–70)
MA(MAX CLOT) RAPID TEG: 59.7 MM (ref 52–70)
MA(MAX CLOT) RAPID TEG: 65.6 MM (ref 52–70)
MAGNESIUM SERPL-MCNC: 1.5 MG/DL (ref 1.6–2.6)
MAGNESIUM SERPL-MCNC: 1.8 MG/DL (ref 1.6–2.6)
MCH RBC QN AUTO: 29.2 PG (ref 25.2–33.5)
MCH RBC QN AUTO: 29.6 PG (ref 25.2–33.5)
MCH RBC QN AUTO: 30.7 PG (ref 25.2–33.5)
MCH RBC QN AUTO: 31.1 PG (ref 25.2–33.5)
MCH RBC QN AUTO: 31.1 PG (ref 25.2–33.5)
MCHC RBC AUTO-ENTMCNC: 32.9 G/DL (ref 28.4–34.8)
MCHC RBC AUTO-ENTMCNC: 33 G/DL (ref 28.4–34.8)
MCHC RBC AUTO-ENTMCNC: 33.1 G/DL (ref 28.4–34.8)
MCHC RBC AUTO-ENTMCNC: 33.5 G/DL (ref 28.4–34.8)
MCHC RBC AUTO-ENTMCNC: 35.1 G/DL (ref 28.4–34.8)
MCV RBC AUTO: 84.4 FL (ref 82.6–102.9)
MCV RBC AUTO: 87.3 FL (ref 82.6–102.9)
MCV RBC AUTO: 93.4 FL (ref 82.6–102.9)
MCV RBC AUTO: 93.8 FL (ref 82.6–102.9)
MCV RBC AUTO: 94.3 FL (ref 82.6–102.9)
MICROORGANISM SPEC CULT: NORMAL
MODE: ABNORMAL
MONOCYTES NFR BLD: 0.51 K/UL (ref 0.1–1.2)
MONOCYTES NFR BLD: 0.63 K/UL (ref 0.1–1.2)
MONOCYTES NFR BLD: 0.94 K/UL (ref 0.1–1.2)
MONOCYTES NFR BLD: 1.52 K/UL (ref 0.1–1.2)
MONOCYTES NFR BLD: 5 % (ref 3–12)
MONOCYTES NFR BLD: 6 % (ref 3–12)
MONOCYTES NFR BLD: 8 % (ref 3–12)
MONOCYTES NFR BLD: 8 % (ref 3–12)
MORPHOLOGY: ABNORMAL
MORPHOLOGY: ABNORMAL
NEGATIVE BASE EXCESS, ART: 1.2 MMOL/L (ref 0–2)
NEGATIVE BASE EXCESS, ART: 14.9 MMOL/L (ref 0–2)
NEGATIVE BASE EXCESS, ART: 3.1 MMOL/L (ref 0–2)
NEGATIVE BASE EXCESS, ART: 5.8 MMOL/L (ref 0–2)
NEGATIVE BASE EXCESS, ART: 8 MMOL/L (ref 0–2)
NEGATIVE BASE EXCESS, ART: 8.9 MMOL/L (ref 0–2)
NEGATIVE BASE EXCESS, ART: 9.3 MMOL/L (ref 0–2)
NEUTROPHILS NFR BLD: 57 % (ref 36–65)
NEUTROPHILS NFR BLD: 81 % (ref 36–65)
NEUTROPHILS NFR BLD: 86 % (ref 36–65)
NEUTROPHILS NFR BLD: 86 % (ref 36–65)
NEUTS SEG NFR BLD: 26.06 K/UL (ref 1.5–8.1)
NEUTS SEG NFR BLD: 6.4 K/UL (ref 1.5–8.1)
NEUTS SEG NFR BLD: 7.09 K/UL (ref 1.5–8.1)
NEUTS SEG NFR BLD: 7.3 K/UL (ref 1.5–8.1)
NRBC BLD-RTO: 0 PER 100 WBC
NRBC BLD-RTO: 0 PER 100 WBC
NRBC BLD-RTO: 0.2 PER 100 WBC
NRBC BLD-RTO: 0.2 PER 100 WBC
NRBC BLD-RTO: 0.3 PER 100 WBC
O2 DELIVERY DEVICE: ABNORMAL
O2 SAT, ARTERIAL: 99.3 % (ref 94–100)
PARTIAL THROMBOPLASTIN TIME: 34.7 SEC (ref 23–36.5)
PARTIAL THROMBOPLASTIN TIME: 37.1 SEC (ref 23–36.5)
PARTIAL THROMBOPLASTIN TIME: 40.6 SEC (ref 23–36.5)
PARTIAL THROMBOPLASTIN TIME: 41.1 SEC (ref 23–36.5)
PARTIAL THROMBOPLASTIN TIME: 41.6 SEC (ref 23–36.5)
PCO2 ARTERIAL: 49.2 MMHG (ref 32–45)
PERFORMING LOCATION: NORMAL
PH ARTERIAL: 7.22 (ref 7.35–7.45)
PHOSPHATE SERPL-MCNC: 3.7 MG/DL (ref 2.5–4.5)
PHOSPHATE SERPL-MCNC: 4.7 MG/DL (ref 2.5–4.5)
PLATELET # BLD AUTO: 57 K/UL (ref 138–453)
PLATELET # BLD AUTO: ABNORMAL K/UL (ref 138–453)
PLATELET, FLUORESCENCE: 111 K/UL (ref 138–453)
PLATELET, FLUORESCENCE: 169 K/UL (ref 138–453)
PLATELET, FLUORESCENCE: 266 K/UL (ref 138–453)
PLATELET, FLUORESCENCE: 53 K/UL (ref 138–453)
PLATELETS.RETICULATED NFR BLD AUTO: 15.3 % (ref 1.1–10.3)
PLATELETS.RETICULATED NFR BLD AUTO: 17.8 % (ref 1.1–10.3)
PLATELETS.RETICULATED NFR BLD AUTO: 19.7 % (ref 1.1–10.3)
PLATELETS.RETICULATED NFR BLD AUTO: 20.4 % (ref 1.1–10.3)
PMV BLD AUTO: 12.8 FL (ref 8.1–13.5)
PO2 ARTERIAL: 255 MMHG (ref 75–95)
POC ANION GAP: 11 MMOL/L (ref 7–16)
POC ANION GAP: 12 MMOL/L (ref 7–16)
POC ANION GAP: 13 MMOL/L (ref 7–16)
POC ANION GAP: 18 MMOL/L (ref 7–16)
POC ANION GAP: 20 MMOL/L (ref 7–16)
POC ANION GAP: 9 MMOL/L (ref 7–16)
POC CREATININE: 0.6 MG/DL (ref 0.51–1.19)
POC CREATININE: 0.7 MG/DL (ref 0.51–1.19)
POC CREATININE: 0.7 MG/DL (ref 0.51–1.19)
POC CREATININE: 0.8 MG/DL (ref 0.51–1.19)
POC HCO3: 13.9 MMOL/L (ref 21–28)
POC HCO3: 16.8 MMOL/L (ref 21–28)
POC HCO3: 17.7 MMOL/L (ref 21–28)
POC HCO3: 19.9 MMOL/L (ref 21–28)
POC HCO3: 22.2 MMOL/L (ref 21–28)
POC HCO3: 23.9 MMOL/L (ref 21–28)
POC HEMOGLOBIN (CALC): 10 G/DL (ref 12–16)
POC HEMOGLOBIN (CALC): 6.3 G/DL (ref 12–16)
POC HEMOGLOBIN (CALC): 7.9 G/DL (ref 12–16)
POC HEMOGLOBIN (CALC): 8.1 G/DL (ref 12–16)
POC HEMOGLOBIN (CALC): 8.1 G/DL (ref 12–16)
POC HEMOGLOBIN (CALC): 9.8 G/DL (ref 12–16)
POC LACTIC ACID: 2 MMOL/L (ref 0.56–1.39)
POC LACTIC ACID: 3.2 MMOL/L (ref 0.56–1.39)
POC LACTIC ACID: 4.7 MMOL/L (ref 0.56–1.39)
POC LACTIC ACID: 6.9 MMOL/L (ref 0.56–1.39)
POC LACTIC ACID: 7 MMOL/L (ref 0.56–1.39)
POC LACTIC ACID: 9 MMOL/L (ref 0.56–1.39)
POC O2 SATURATION: 98.3 % (ref 94–98)
POC O2 SATURATION: 99.2 % (ref 94–98)
POC O2 SATURATION: 99.3 % (ref 94–98)
POC O2 SATURATION: 99.3 % (ref 94–98)
POC O2 SATURATION: 99.5 % (ref 94–98)
POC O2 SATURATION: 99.7 % (ref 94–98)
POC PCO2: 36.5 MM HG (ref 35–48)
POC PCO2: 38.6 MM HG (ref 35–48)
POC PCO2: 40 MM HG (ref 35–48)
POC PCO2: 40.6 MM HG (ref 35–48)
POC PCO2: 41 MM HG (ref 35–48)
POC PCO2: 44.4 MM HG (ref 35–48)
POC PH: 7.11 (ref 7.35–7.45)
POC PH: 7.24 (ref 7.35–7.45)
POC PH: 7.27 (ref 7.35–7.45)
POC PH: 7.32 (ref 7.35–7.45)
POC PH: 7.35 (ref 7.35–7.45)
POC PH: 7.38 (ref 7.35–7.45)
POC PO2: 149.1 MM HG (ref 83–108)
POC PO2: 155.6 MM HG (ref 83–108)
POC PO2: 162.5 MM HG (ref 83–108)
POC PO2: 163.2 MM HG (ref 83–108)
POC PO2: 176 MM HG (ref 83–108)
POC PO2: 215.7 MM HG (ref 83–108)
POTASSIUM BLD-SCNC: 3.5 MMOL/L (ref 3.5–4.5)
POTASSIUM BLD-SCNC: 3.9 MMOL/L (ref 3.5–4.5)
POTASSIUM BLD-SCNC: 4.2 MMOL/L (ref 3.5–4.5)
POTASSIUM BLD-SCNC: 5.2 MMOL/L (ref 3.5–4.5)
POTASSIUM BLD-SCNC: 5.2 MMOL/L (ref 3.5–4.5)
POTASSIUM BLD-SCNC: 6 MMOL/L (ref 3.5–5.1)
POTASSIUM SERPL-SCNC: 4.1 MMOL/L (ref 3.7–5.3)
POTASSIUM SERPL-SCNC: 4.2 MMOL/L (ref 3.7–5.3)
POTASSIUM SERPL-SCNC: 5.4 MMOL/L (ref 3.7–5.3)
POTASSIUM SERPL-SCNC: 6.2 MMOL/L (ref 3.7–5.3)
PROT SERPL-MCNC: 3.7 G/DL (ref 6.6–8.7)
PROT SERPL-MCNC: 4.1 G/DL (ref 6.6–8.7)
PROT SERPL-MCNC: 5.2 G/DL (ref 6.6–8.7)
PROTHROMBIN TIME: 14.6 SEC (ref 11.7–14.9)
PROTHROMBIN TIME: 14.8 SEC (ref 11.7–14.9)
PROTHROMBIN TIME: 15.5 SEC (ref 11.7–14.9)
PROTHROMBIN TIME: 15.8 SEC (ref 11.7–14.9)
RBC # BLD AUTO: 3.15 M/UL (ref 3.95–5.11)
RBC # BLD AUTO: 3.16 M/UL (ref 3.95–5.11)
RBC # BLD AUTO: 3.21 M/UL (ref 3.95–5.11)
RBC # BLD AUTO: 3.38 M/UL (ref 3.95–5.11)
RBC # BLD AUTO: 3.86 M/UL (ref 3.95–5.11)
RBC # BLD: ABNORMAL 10*6/UL
REACTION TIME TEG: 5.9 MIN (ref 4.6–9.1)
REACTION TIME TEG: 6.4 MIN (ref 4.6–9.1)
REACTION TIME TEG: 6.8 MIN (ref 4.6–9.1)
SAMPLE SITE: ABNORMAL
SERVICE CMNT-IMP: NORMAL
SODIUM BLD-SCNC: 135 MMOL/L (ref 138–146)
SODIUM BLD-SCNC: 137 MMOL/L (ref 138–146)
SODIUM BLD-SCNC: 140 MMOL/L (ref 138–146)
SODIUM BLD-SCNC: 142 MMOL/L (ref 138–146)
SODIUM BLD-SCNC: 142 MMOL/L (ref 138–146)
SODIUM BLD-SCNC: 143 MMOL/L (ref 138–146)
SODIUM SERPL-SCNC: 134 MMOL/L (ref 136–145)
SODIUM SERPL-SCNC: 135 MMOL/L (ref 136–145)
SODIUM SERPL-SCNC: 139 MMOL/L (ref 136–145)
SODIUM SERPL-SCNC: 142 MMOL/L (ref 136–145)
SPECIMEN DESCRIPTION: NORMAL
WBC OTHER # BLD: 12.5 K/UL (ref 3.5–11.3)
WBC OTHER # BLD: 30.3 K/UL (ref 3.5–11.3)
WBC OTHER # BLD: 7.7 K/UL (ref 3.5–11.3)
WBC OTHER # BLD: 7.9 K/UL (ref 3.5–11.3)
WBC OTHER # BLD: 8.5 K/UL (ref 3.5–11.3)

## 2024-07-13 PROCEDURE — 2580000003 HC RX 258: Performed by: SURGERY

## 2024-07-13 PROCEDURE — 2580000003 HC RX 258

## 2024-07-13 PROCEDURE — 7100000000 HC PACU RECOVERY - FIRST 15 MIN: Performed by: OBSTETRICS & GYNECOLOGY

## 2024-07-13 PROCEDURE — 37799 UNLISTED PX VASCULAR SURGERY: CPT

## 2024-07-13 PROCEDURE — 2Y44X5Z PACKING OF FEMALE GENITAL TRACT USING PACKING MATERIAL: ICD-10-PCS | Performed by: SURGERY

## 2024-07-13 PROCEDURE — 82803 BLOOD GASES ANY COMBINATION: CPT

## 2024-07-13 PROCEDURE — 80051 ELECTROLYTE PANEL: CPT

## 2024-07-13 PROCEDURE — 86927 PLASMA FRESH FROZEN: CPT

## 2024-07-13 PROCEDURE — 74018 RADEX ABDOMEN 1 VIEW: CPT

## 2024-07-13 PROCEDURE — 6370000000 HC RX 637 (ALT 250 FOR IP)

## 2024-07-13 PROCEDURE — 88307 TISSUE EXAM BY PATHOLOGIST: CPT

## 2024-07-13 PROCEDURE — 85390 FIBRINOLYSINS SCREEN I&R: CPT

## 2024-07-13 PROCEDURE — 6360000002 HC RX W HCPCS: Performed by: STUDENT IN AN ORGANIZED HEALTH CARE EDUCATION/TRAINING PROGRAM

## 2024-07-13 PROCEDURE — 3700000000 HC ANESTHESIA ATTENDED CARE: Performed by: SURGERY

## 2024-07-13 PROCEDURE — 3600000004 HC SURGERY LEVEL 4 BASE: Performed by: SURGERY

## 2024-07-13 PROCEDURE — 2709999900 HC NON-CHARGEABLE SUPPLY: Performed by: SURGERY

## 2024-07-13 PROCEDURE — 3700000000 HC ANESTHESIA ATTENDED CARE: Performed by: OBSTETRICS & GYNECOLOGY

## 2024-07-13 PROCEDURE — 2720000010 HC SURG SUPPLY STERILE: Performed by: OBSTETRICS & GYNECOLOGY

## 2024-07-13 PROCEDURE — 2580000003 HC RX 258: Performed by: STUDENT IN AN ORGANIZED HEALTH CARE EDUCATION/TRAINING PROGRAM

## 2024-07-13 PROCEDURE — 0WJG0ZZ INSPECTION OF PERITONEAL CAVITY, OPEN APPROACH: ICD-10-PCS | Performed by: SURGERY

## 2024-07-13 PROCEDURE — 6360000002 HC RX W HCPCS

## 2024-07-13 PROCEDURE — P9017 PLASMA 1 DONOR FRZ W/IN 8 HR: HCPCS

## 2024-07-13 PROCEDURE — 6370000000 HC RX 637 (ALT 250 FOR IP): Performed by: STUDENT IN AN ORGANIZED HEALTH CARE EDUCATION/TRAINING PROGRAM

## 2024-07-13 PROCEDURE — 84100 ASSAY OF PHOSPHORUS: CPT

## 2024-07-13 PROCEDURE — 2720000010 HC SURG SUPPLY STERILE: Performed by: SURGERY

## 2024-07-13 PROCEDURE — 83690 ASSAY OF LIPASE: CPT

## 2024-07-13 PROCEDURE — 3609079900 HC CESAREAN SECTION: Performed by: OBSTETRICS & GYNECOLOGY

## 2024-07-13 PROCEDURE — 2500000003 HC RX 250 WO HCPCS

## 2024-07-13 PROCEDURE — 82565 ASSAY OF CREATININE: CPT

## 2024-07-13 PROCEDURE — 80076 HEPATIC FUNCTION PANEL: CPT

## 2024-07-13 PROCEDURE — 7100000001 HC PACU RECOVERY - ADDTL 15 MIN: Performed by: OBSTETRICS & GYNECOLOGY

## 2024-07-13 PROCEDURE — 97606 NEG PRS WND THER DME>50 SQCM: CPT | Performed by: SURGERY

## 2024-07-13 PROCEDURE — 94761 N-INVAS EAR/PLS OXIMETRY MLT: CPT

## 2024-07-13 PROCEDURE — P9073 PLATELETS PHERESIS PATH REDU: HCPCS

## 2024-07-13 PROCEDURE — 71045 X-RAY EXAM CHEST 1 VIEW: CPT

## 2024-07-13 PROCEDURE — 2500000003 HC RX 250 WO HCPCS: Performed by: NURSE ANESTHETIST, CERTIFIED REGISTERED

## 2024-07-13 PROCEDURE — 3700000001 HC ADD 15 MINUTES (ANESTHESIA): Performed by: SURGERY

## 2024-07-13 PROCEDURE — 0BH17EZ INSERTION OF ENDOTRACHEAL AIRWAY INTO TRACHEA, VIA NATURAL OR ARTIFICIAL OPENING: ICD-10-PCS

## 2024-07-13 PROCEDURE — 36430 TRANSFUSION BLD/BLD COMPNT: CPT

## 2024-07-13 PROCEDURE — P9016 RBC LEUKOCYTES REDUCED: HCPCS

## 2024-07-13 PROCEDURE — 3700000001 HC ADD 15 MINUTES (ANESTHESIA): Performed by: OBSTETRICS & GYNECOLOGY

## 2024-07-13 PROCEDURE — 0W3F3ZZ CONTROL BLEEDING IN ABDOMINAL WALL, PERCUTANEOUS APPROACH: ICD-10-PCS | Performed by: SURGERY

## 2024-07-13 PROCEDURE — 83036 HEMOGLOBIN GLYCOSYLATED A1C: CPT

## 2024-07-13 PROCEDURE — 2500000003 HC RX 250 WO HCPCS: Performed by: STUDENT IN AN ORGANIZED HEALTH CARE EDUCATION/TRAINING PROGRAM

## 2024-07-13 PROCEDURE — 2580000003 HC RX 258: Performed by: NURSE ANESTHETIST, CERTIFIED REGISTERED

## 2024-07-13 PROCEDURE — 36415 COLL VENOUS BLD VENIPUNCTURE: CPT

## 2024-07-13 PROCEDURE — 85730 THROMBOPLASTIN TIME PARTIAL: CPT

## 2024-07-13 PROCEDURE — 85055 RETICULATED PLATELET ASSAY: CPT

## 2024-07-13 PROCEDURE — 85576 BLOOD PLATELET AGGREGATION: CPT

## 2024-07-13 PROCEDURE — 85025 COMPLETE CBC W/AUTO DIFF WBC: CPT

## 2024-07-13 PROCEDURE — 30233N1 TRANSFUSION OF NONAUTOLOGOUS RED BLOOD CELLS INTO PERIPHERAL VEIN, PERCUTANEOUS APPROACH: ICD-10-PCS | Performed by: SURGERY

## 2024-07-13 PROCEDURE — 82805 BLOOD GASES W/O2 SATURATION: CPT

## 2024-07-13 PROCEDURE — 30233K1 TRANSFUSION OF NONAUTOLOGOUS FROZEN PLASMA INTO PERIPHERAL VEIN, PERCUTANEOUS APPROACH: ICD-10-PCS | Performed by: SURGERY

## 2024-07-13 PROCEDURE — 83605 ASSAY OF LACTIC ACID: CPT

## 2024-07-13 PROCEDURE — 3600000014 HC SURGERY LEVEL 4 ADDTL 15MIN: Performed by: SURGERY

## 2024-07-13 PROCEDURE — 93005 ELECTROCARDIOGRAM TRACING: CPT | Performed by: SURGERY

## 2024-07-13 PROCEDURE — 80053 COMPREHEN METABOLIC PANEL: CPT

## 2024-07-13 PROCEDURE — 85018 HEMOGLOBIN: CPT

## 2024-07-13 PROCEDURE — P9012 CRYOPRECIPITATE EACH UNIT: HCPCS

## 2024-07-13 PROCEDURE — 82140 ASSAY OF AMMONIA: CPT

## 2024-07-13 PROCEDURE — 5A1945Z RESPIRATORY VENTILATION, 24-96 CONSECUTIVE HOURS: ICD-10-PCS

## 2024-07-13 PROCEDURE — 82947 ASSAY GLUCOSE BLOOD QUANT: CPT

## 2024-07-13 PROCEDURE — 6360000002 HC RX W HCPCS: Performed by: NURSE ANESTHETIST, CERTIFIED REGISTERED

## 2024-07-13 PROCEDURE — 80048 BASIC METABOLIC PNL TOTAL CA: CPT

## 2024-07-13 PROCEDURE — 59514 CESAREAN DELIVERY ONLY: CPT | Performed by: OBSTETRICS & GYNECOLOGY

## 2024-07-13 PROCEDURE — 85347 COAGULATION TIME ACTIVATED: CPT

## 2024-07-13 PROCEDURE — 82330 ASSAY OF CALCIUM: CPT

## 2024-07-13 PROCEDURE — 49000 EXPLORATION OF ABDOMEN: CPT | Performed by: SURGERY

## 2024-07-13 PROCEDURE — 85610 PROTHROMBIN TIME: CPT

## 2024-07-13 PROCEDURE — 2000000000 HC ICU R&B

## 2024-07-13 PROCEDURE — 85014 HEMATOCRIT: CPT

## 2024-07-13 PROCEDURE — 83735 ASSAY OF MAGNESIUM: CPT

## 2024-07-13 PROCEDURE — 84520 ASSAY OF UREA NITROGEN: CPT

## 2024-07-13 PROCEDURE — 30233R1 TRANSFUSION OF NONAUTOLOGOUS PLATELETS INTO PERIPHERAL VEIN, PERCUTANEOUS APPROACH: ICD-10-PCS | Performed by: SURGERY

## 2024-07-13 PROCEDURE — 2700000000 HC OXYGEN THERAPY PER DAY

## 2024-07-13 PROCEDURE — 85027 COMPLETE CBC AUTOMATED: CPT

## 2024-07-13 PROCEDURE — 94002 VENT MGMT INPAT INIT DAY: CPT

## 2024-07-13 PROCEDURE — 2709999900 HC NON-CHARGEABLE SUPPLY: Performed by: OBSTETRICS & GYNECOLOGY

## 2024-07-13 PROCEDURE — 85384 FIBRINOGEN ACTIVITY: CPT

## 2024-07-13 RX ORDER — GABAPENTIN 300 MG/1
300 CAPSULE ORAL EVERY 8 HOURS
Status: DISCONTINUED | OUTPATIENT
Start: 2024-07-13 | End: 2024-07-23 | Stop reason: HOSPADM

## 2024-07-13 RX ORDER — MAGNESIUM SULFATE IN WATER 40 MG/ML
2000 INJECTION, SOLUTION INTRAVENOUS ONCE
Status: COMPLETED | OUTPATIENT
Start: 2024-07-13 | End: 2024-07-13

## 2024-07-13 RX ORDER — DEXAMETHASONE SODIUM PHOSPHATE 10 MG/ML
INJECTION, SOLUTION INTRAMUSCULAR; INTRAVENOUS PRN
Status: DISCONTINUED | OUTPATIENT
Start: 2024-07-13 | End: 2024-07-13 | Stop reason: SDUPTHER

## 2024-07-13 RX ORDER — GLUCAGON 1 MG/ML
1 KIT INJECTION PRN
Status: DISCONTINUED | OUTPATIENT
Start: 2024-07-13 | End: 2024-07-15

## 2024-07-13 RX ORDER — HYDRALAZINE HYDROCHLORIDE 20 MG/ML
10 INJECTION INTRAMUSCULAR; INTRAVENOUS ONCE
Status: COMPLETED | OUTPATIENT
Start: 2024-07-13 | End: 2024-07-13

## 2024-07-13 RX ORDER — PROPOFOL 10 MG/ML
5-50 INJECTION, EMULSION INTRAVENOUS ONCE
Status: COMPLETED | OUTPATIENT
Start: 2024-07-13 | End: 2024-07-13

## 2024-07-13 RX ORDER — CALCIUM GLUCONATE 20 MG/ML
1000 INJECTION, SOLUTION INTRAVENOUS ONCE
Status: DISCONTINUED | OUTPATIENT
Start: 2024-07-13 | End: 2024-07-13 | Stop reason: SDUPTHER

## 2024-07-13 RX ORDER — SODIUM CHLORIDE 0.9 % (FLUSH) 0.9 %
5-40 SYRINGE (ML) INJECTION PRN
Status: DISCONTINUED | OUTPATIENT
Start: 2024-07-13 | End: 2024-07-15

## 2024-07-13 RX ORDER — SODIUM CHLORIDE 9 MG/ML
INJECTION, SOLUTION INTRAVENOUS PRN
Status: DISCONTINUED | OUTPATIENT
Start: 2024-07-13 | End: 2024-07-13

## 2024-07-13 RX ORDER — ACETAMINOPHEN 500 MG
1000 TABLET ORAL EVERY 8 HOURS SCHEDULED
Status: DISCONTINUED | OUTPATIENT
Start: 2024-07-13 | End: 2024-07-15

## 2024-07-13 RX ORDER — SODIUM CHLORIDE 9 MG/ML
INJECTION, SOLUTION INTRAVENOUS PRN
Status: DISCONTINUED | OUTPATIENT
Start: 2024-07-13 | End: 2024-07-15

## 2024-07-13 RX ORDER — CEFAZOLIN SODIUM 1 G/3ML
INJECTION, POWDER, FOR SOLUTION INTRAMUSCULAR; INTRAVENOUS PRN
Status: DISCONTINUED | OUTPATIENT
Start: 2024-07-13 | End: 2024-07-13 | Stop reason: SDUPTHER

## 2024-07-13 RX ORDER — ONDANSETRON 2 MG/ML
INJECTION INTRAMUSCULAR; INTRAVENOUS PRN
Status: DISCONTINUED | OUTPATIENT
Start: 2024-07-13 | End: 2024-07-13 | Stop reason: SDUPTHER

## 2024-07-13 RX ORDER — PROPOFOL 10 MG/ML
5-50 INJECTION, EMULSION INTRAVENOUS CONTINUOUS
Status: DISCONTINUED | OUTPATIENT
Start: 2024-07-13 | End: 2024-07-16

## 2024-07-13 RX ORDER — SODIUM CHLORIDE, SODIUM LACTATE, POTASSIUM CHLORIDE, CALCIUM CHLORIDE 600; 310; 30; 20 MG/100ML; MG/100ML; MG/100ML; MG/100ML
INJECTION, SOLUTION INTRAVENOUS CONTINUOUS PRN
Status: DISCONTINUED | OUTPATIENT
Start: 2024-07-13 | End: 2024-07-13 | Stop reason: SDUPTHER

## 2024-07-13 RX ORDER — SODIUM CHLORIDE 9 MG/ML
INJECTION, SOLUTION INTRAVENOUS CONTINUOUS PRN
Status: DISCONTINUED | OUTPATIENT
Start: 2024-07-13 | End: 2024-07-13 | Stop reason: SDUPTHER

## 2024-07-13 RX ORDER — MAGNESIUM SULFATE IN WATER 40 MG/ML
2000 INJECTION, SOLUTION INTRAVENOUS ONCE
Status: DISCONTINUED | OUTPATIENT
Start: 2024-07-13 | End: 2024-07-13

## 2024-07-13 RX ORDER — CALCIUM CHLORIDE 100 MG/ML
1000 INJECTION INTRAVENOUS; INTRAVENTRICULAR PRN
Status: DISCONTINUED | OUTPATIENT
Start: 2024-07-13 | End: 2024-07-15

## 2024-07-13 RX ORDER — CALCIUM GLUCONATE 20 MG/ML
1000 INJECTION, SOLUTION INTRAVENOUS ONCE
Status: COMPLETED | OUTPATIENT
Start: 2024-07-14 | End: 2024-07-14

## 2024-07-13 RX ORDER — MAGNESIUM SULFATE HEPTAHYDRATE 40 MG/ML
4000 INJECTION, SOLUTION INTRAVENOUS ONCE
Status: DISCONTINUED | OUTPATIENT
Start: 2024-07-13 | End: 2024-07-13

## 2024-07-13 RX ORDER — CALCIUM GLUCONATE 20 MG/ML
2000 INJECTION, SOLUTION INTRAVENOUS ONCE
Status: DISCONTINUED | OUTPATIENT
Start: 2024-07-13 | End: 2024-07-13 | Stop reason: SDUPTHER

## 2024-07-13 RX ORDER — FENTANYL CITRATE 50 UG/ML
INJECTION, SOLUTION INTRAMUSCULAR; INTRAVENOUS PRN
Status: DISCONTINUED | OUTPATIENT
Start: 2024-07-13 | End: 2024-07-13 | Stop reason: SDUPTHER

## 2024-07-13 RX ORDER — TRANEXAMIC ACID 100 MG/ML
INJECTION, SOLUTION INTRAVENOUS PRN
Status: DISCONTINUED | OUTPATIENT
Start: 2024-07-13 | End: 2024-07-13 | Stop reason: SDUPTHER

## 2024-07-13 RX ORDER — BUPIVACAINE HYDROCHLORIDE 2.5 MG/ML
INJECTION, SOLUTION EPIDURAL; INFILTRATION; INTRACAUDAL
Status: DISPENSED
Start: 2024-07-13 | End: 2024-07-13

## 2024-07-13 RX ORDER — CALCIUM CHLORIDE 100 MG/ML
1000 INJECTION INTRAVENOUS; INTRAVENTRICULAR ONCE
Status: COMPLETED | OUTPATIENT
Start: 2024-07-13 | End: 2024-07-13

## 2024-07-13 RX ORDER — INSULIN LISPRO 100 [IU]/ML
0-16 INJECTION, SOLUTION INTRAVENOUS; SUBCUTANEOUS EVERY 4 HOURS
Status: DISCONTINUED | OUTPATIENT
Start: 2024-07-13 | End: 2024-07-13

## 2024-07-13 RX ORDER — CALCIUM GLUCONATE 20 MG/ML
1000 INJECTION, SOLUTION INTRAVENOUS ONCE
Status: COMPLETED | OUTPATIENT
Start: 2024-07-13 | End: 2024-07-13

## 2024-07-13 RX ORDER — DEXTROSE MONOHYDRATE 100 MG/ML
INJECTION, SOLUTION INTRAVENOUS CONTINUOUS PRN
Status: DISCONTINUED | OUTPATIENT
Start: 2024-07-13 | End: 2024-07-15

## 2024-07-13 RX ORDER — SODIUM CHLORIDE, SODIUM LACTATE, POTASSIUM CHLORIDE, CALCIUM CHLORIDE 600; 310; 30; 20 MG/100ML; MG/100ML; MG/100ML; MG/100ML
INJECTION, SOLUTION INTRAVENOUS CONTINUOUS
Status: DISCONTINUED | OUTPATIENT
Start: 2024-07-13 | End: 2024-07-14

## 2024-07-13 RX ORDER — METHOCARBAMOL 750 MG/1
750 TABLET, FILM COATED ORAL 4 TIMES DAILY
Status: DISCONTINUED | OUTPATIENT
Start: 2024-07-13 | End: 2024-07-15

## 2024-07-13 RX ORDER — MORPHINE SULFATE 1 MG/ML
INJECTION, SOLUTION EPIDURAL; INTRATHECAL; INTRAVENOUS PRN
Status: DISCONTINUED | OUTPATIENT
Start: 2024-07-13 | End: 2024-07-13 | Stop reason: SDUPTHER

## 2024-07-13 RX ORDER — NOREPINEPHRINE BITARTRATE 0.06 MG/ML
INJECTION, SOLUTION INTRAVENOUS
Status: COMPLETED
Start: 2024-07-13 | End: 2024-07-13

## 2024-07-13 RX ORDER — PROPOFOL 10 MG/ML
INJECTION, EMULSION INTRAVENOUS CONTINUOUS PRN
Status: DISCONTINUED | OUTPATIENT
Start: 2024-07-13 | End: 2024-07-13 | Stop reason: SDUPTHER

## 2024-07-13 RX ORDER — SODIUM CHLORIDE 0.9 % (FLUSH) 0.9 %
5-40 SYRINGE (ML) INJECTION EVERY 12 HOURS SCHEDULED
Status: DISCONTINUED | OUTPATIENT
Start: 2024-07-13 | End: 2024-07-16

## 2024-07-13 RX ORDER — TRANEXAMIC ACID 10 MG/ML
1000 INJECTION, SOLUTION INTRAVENOUS ONCE
Status: DISCONTINUED | OUTPATIENT
Start: 2024-07-13 | End: 2024-07-15

## 2024-07-13 RX ORDER — ROCURONIUM BROMIDE 10 MG/ML
INJECTION, SOLUTION INTRAVENOUS PRN
Status: DISCONTINUED | OUTPATIENT
Start: 2024-07-13 | End: 2024-07-13 | Stop reason: SDUPTHER

## 2024-07-13 RX ORDER — SODIUM CHLORIDE, SODIUM LACTATE, POTASSIUM CHLORIDE, AND CALCIUM CHLORIDE .6; .31; .03; .02 G/100ML; G/100ML; G/100ML; G/100ML
1000 INJECTION, SOLUTION INTRAVENOUS ONCE
Status: COMPLETED | OUTPATIENT
Start: 2024-07-13 | End: 2024-07-13

## 2024-07-13 RX ORDER — MIDAZOLAM HYDROCHLORIDE 1 MG/ML
INJECTION INTRAMUSCULAR; INTRAVENOUS PRN
Status: DISCONTINUED | OUTPATIENT
Start: 2024-07-13 | End: 2024-07-13 | Stop reason: SDUPTHER

## 2024-07-13 RX ORDER — CARBOPROST TROMETHAMINE 250 UG/ML
INJECTION, SOLUTION INTRAMUSCULAR PRN
Status: DISCONTINUED | OUTPATIENT
Start: 2024-07-13 | End: 2024-07-13 | Stop reason: SDUPTHER

## 2024-07-13 RX ORDER — MORPHINE SULFATE 2 MG/ML
2 INJECTION, SOLUTION INTRAMUSCULAR; INTRAVENOUS ONCE
Status: COMPLETED | OUTPATIENT
Start: 2024-07-13 | End: 2024-07-13

## 2024-07-13 RX ORDER — HYDRALAZINE HYDROCHLORIDE 20 MG/ML
INJECTION INTRAMUSCULAR; INTRAVENOUS
Status: COMPLETED
Start: 2024-07-13 | End: 2024-07-13

## 2024-07-13 RX ORDER — TRANEXAMIC ACID 10 MG/ML
1000 INJECTION, SOLUTION INTRAVENOUS ONCE
Status: COMPLETED | OUTPATIENT
Start: 2024-07-13 | End: 2024-07-13

## 2024-07-13 RX ORDER — ATROPINE SULFATE 0.1 MG/ML
INJECTION INTRAVENOUS
Status: DISPENSED
Start: 2024-07-13 | End: 2024-07-14

## 2024-07-13 RX ORDER — NOREPINEPHRINE BITARTRATE 0.06 MG/ML
1-100 INJECTION, SOLUTION INTRAVENOUS CONTINUOUS
Status: DISCONTINUED | OUTPATIENT
Start: 2024-07-13 | End: 2024-07-17

## 2024-07-13 RX ORDER — PANTOPRAZOLE SODIUM 40 MG/1
40 TABLET, DELAYED RELEASE ORAL
Status: DISCONTINUED | OUTPATIENT
Start: 2024-07-13 | End: 2024-07-15

## 2024-07-13 RX ORDER — MAGNESIUM SULFATE 1 G/100ML
1000 INJECTION INTRAVENOUS
Status: ACTIVE | OUTPATIENT
Start: 2024-07-13 | End: 2024-07-13

## 2024-07-13 RX ORDER — LIDOCAINE HYDROCHLORIDE 10 MG/ML
INJECTION, SOLUTION EPIDURAL; INFILTRATION; INTRACAUDAL; PERINEURAL PRN
Status: DISCONTINUED | OUTPATIENT
Start: 2024-07-13 | End: 2024-07-13 | Stop reason: SDUPTHER

## 2024-07-13 RX ORDER — PROCHLORPERAZINE EDISYLATE 5 MG/ML
10 INJECTION INTRAMUSCULAR; INTRAVENOUS ONCE
Status: COMPLETED | OUTPATIENT
Start: 2024-07-13 | End: 2024-07-13

## 2024-07-13 RX ORDER — LIDOCAINE 4 G/G
1 PATCH TOPICAL DAILY
Status: DISCONTINUED | OUTPATIENT
Start: 2024-07-13 | End: 2024-07-23 | Stop reason: HOSPADM

## 2024-07-13 RX ORDER — INSULIN LISPRO 100 [IU]/ML
0-4 INJECTION, SOLUTION INTRAVENOUS; SUBCUTANEOUS NIGHTLY
Status: DISCONTINUED | OUTPATIENT
Start: 2024-07-13 | End: 2024-07-13

## 2024-07-13 RX ORDER — PROPOFOL 10 MG/ML
INJECTION, EMULSION INTRAVENOUS PRN
Status: DISCONTINUED | OUTPATIENT
Start: 2024-07-13 | End: 2024-07-13 | Stop reason: SDUPTHER

## 2024-07-13 RX ORDER — MAGNESIUM HYDROXIDE 1200 MG/15ML
LIQUID ORAL CONTINUOUS PRN
Status: COMPLETED | OUTPATIENT
Start: 2024-07-13 | End: 2024-07-13

## 2024-07-13 RX ORDER — SUCCINYLCHOLINE/SOD CL,ISO/PF 100 MG/5ML
SYRINGE (ML) INTRAVENOUS PRN
Status: DISCONTINUED | OUTPATIENT
Start: 2024-07-13 | End: 2024-07-13 | Stop reason: SDUPTHER

## 2024-07-13 RX ORDER — DEXMEDETOMIDINE HYDROCHLORIDE 4 UG/ML
.4-1.2 INJECTION, SOLUTION INTRAVENOUS CONTINUOUS
Status: DISCONTINUED | OUTPATIENT
Start: 2024-07-13 | End: 2024-07-16

## 2024-07-13 RX ADMIN — HYDRALAZINE HYDROCHLORIDE 10 MG: 20 INJECTION INTRAMUSCULAR; INTRAVENOUS at 14:44

## 2024-07-13 RX ADMIN — SODIUM BICARBONATE 50 MEQ: 84 INJECTION, SOLUTION INTRAVENOUS at 13:41

## 2024-07-13 RX ADMIN — PROPOFOL 50 MCG/KG/MIN: 10 INJECTION, EMULSION INTRAVENOUS at 09:20

## 2024-07-13 RX ADMIN — MORPHINE SULFATE 5 MG: 1 INJECTION, SOLUTION EPIDURAL; INTRATHECAL; INTRAVENOUS at 07:35

## 2024-07-13 RX ADMIN — SODIUM CHLORIDE, PRESERVATIVE FREE 10 ML: 5 INJECTION INTRAVENOUS at 20:20

## 2024-07-13 RX ADMIN — CALCIUM CHLORIDE 1000 MG: 100 INJECTION INTRAVENOUS; INTRAVENTRICULAR at 14:26

## 2024-07-13 RX ADMIN — SODIUM CHLORIDE, PRESERVATIVE FREE 10 ML: 5 INJECTION INTRAVENOUS at 13:43

## 2024-07-13 RX ADMIN — DEXAMETHASONE SODIUM PHOSPHATE 10 MG: 10 INJECTION, SOLUTION INTRAMUSCULAR; INTRAVENOUS at 07:22

## 2024-07-13 RX ADMIN — PROPOFOL 30 MCG/KG/MIN: 10 INJECTION, EMULSION INTRAVENOUS at 14:57

## 2024-07-13 RX ADMIN — ROCURONIUM BROMIDE 50 MG: 10 INJECTION, SOLUTION INTRAVENOUS at 08:57

## 2024-07-13 RX ADMIN — ROCURONIUM BROMIDE 50 MG: 10 INJECTION INTRAVENOUS at 15:59

## 2024-07-13 RX ADMIN — DEXMEDETOMIDINE HYDROCHLORIDE 0.5 MCG/KG/HR: 400 INJECTION, SOLUTION INTRAVENOUS at 17:41

## 2024-07-13 RX ADMIN — CARBOPROST TROMETHAMINE 250 MCG: 250 INJECTION, SOLUTION INTRAMUSCULAR at 07:24

## 2024-07-13 RX ADMIN — PROCHLORPERAZINE EDISYLATE 10 MG: 5 INJECTION INTRAMUSCULAR; INTRAVENOUS at 02:04

## 2024-07-13 RX ADMIN — PROPOFOL 35 MCG/KG/MIN: 10 INJECTION, EMULSION INTRAVENOUS at 18:38

## 2024-07-13 RX ADMIN — FENTANYL CITRATE 100 MCG: 50 INJECTION, SOLUTION INTRAMUSCULAR; INTRAVENOUS at 07:19

## 2024-07-13 RX ADMIN — FENTANYL CITRATE 50 MCG: 50 INJECTION, SOLUTION INTRAMUSCULAR; INTRAVENOUS at 07:51

## 2024-07-13 RX ADMIN — QUETIAPINE FUMARATE 125 MG: 100 TABLET ORAL at 20:20

## 2024-07-13 RX ADMIN — Medication 2000 MG: at 12:41

## 2024-07-13 RX ADMIN — CYCLOBENZAPRINE 10 MG: 10 TABLET, FILM COATED ORAL at 00:06

## 2024-07-13 RX ADMIN — PHENYLEPHRINE HYDROCHLORIDE 100 MCG: 10 INJECTION INTRAVENOUS at 09:41

## 2024-07-13 RX ADMIN — SODIUM CHLORIDE, POTASSIUM CHLORIDE, SODIUM LACTATE AND CALCIUM CHLORIDE: 600; 310; 30; 20 INJECTION, SOLUTION INTRAVENOUS at 07:05

## 2024-07-13 RX ADMIN — Medication 5 MCG/MIN: at 11:45

## 2024-07-13 RX ADMIN — MORPHINE SULFATE 2 MG: 2 INJECTION, SOLUTION INTRAMUSCULAR; INTRAVENOUS at 02:04

## 2024-07-13 RX ADMIN — SODIUM CHLORIDE, POTASSIUM CHLORIDE, SODIUM LACTATE AND CALCIUM CHLORIDE 1000 ML: 600; 310; 30; 20 INJECTION, SOLUTION INTRAVENOUS at 12:45

## 2024-07-13 RX ADMIN — Medication 2000 MG: at 22:02

## 2024-07-13 RX ADMIN — Medication 909 ML/HR: at 07:20

## 2024-07-13 RX ADMIN — PHENYLEPHRINE HYDROCHLORIDE 100 MCG: 10 INJECTION INTRAVENOUS at 07:22

## 2024-07-13 RX ADMIN — ONDANSETRON 4 MG: 2 INJECTION INTRAMUSCULAR; INTRAVENOUS at 07:29

## 2024-07-13 RX ADMIN — PHENYLEPHRINE HYDROCHLORIDE 200 MCG: 10 INJECTION INTRAVENOUS at 07:59

## 2024-07-13 RX ADMIN — LIDOCAINE HYDROCHLORIDE 5 ML: 10 INJECTION, SOLUTION EPIDURAL; INFILTRATION; INTRACAUDAL; PERINEURAL at 07:11

## 2024-07-13 RX ADMIN — SODIUM CHLORIDE 4.4 UNITS/HR: 9 INJECTION, SOLUTION INTRAVENOUS at 12:22

## 2024-07-13 RX ADMIN — SODIUM CHLORIDE, PRESERVATIVE FREE 10 ML: 5 INJECTION INTRAVENOUS at 20:21

## 2024-07-13 RX ADMIN — SODIUM BICARBONATE 50 MEQ: 84 INJECTION, SOLUTION INTRAVENOUS at 12:00

## 2024-07-13 RX ADMIN — CALCIUM GLUCONATE 1000 MG: 20 INJECTION, SOLUTION INTRAVENOUS at 12:35

## 2024-07-13 RX ADMIN — SODIUM CHLORIDE, POTASSIUM CHLORIDE, SODIUM LACTATE AND CALCIUM CHLORIDE: 600; 310; 30; 20 INJECTION, SOLUTION INTRAVENOUS at 10:36

## 2024-07-13 RX ADMIN — FENTANYL CITRATE 50 MCG: 50 INJECTION, SOLUTION INTRAMUSCULAR; INTRAVENOUS at 09:26

## 2024-07-13 RX ADMIN — PROPOFOL 20 MCG/KG/MIN: 10 INJECTION, EMULSION INTRAVENOUS at 21:16

## 2024-07-13 RX ADMIN — GABAPENTIN 300 MG: 300 CAPSULE ORAL at 18:51

## 2024-07-13 RX ADMIN — TRANEXAMIC ACID 1000 MG: 10 INJECTION, SOLUTION INTRAVENOUS at 09:08

## 2024-07-13 RX ADMIN — SODIUM CHLORIDE, POTASSIUM CHLORIDE, SODIUM LACTATE AND CALCIUM CHLORIDE 1000 ML: 600; 310; 30; 20 INJECTION, SOLUTION INTRAVENOUS at 12:42

## 2024-07-13 RX ADMIN — Medication 0.2 MG/KG/HR: at 10:58

## 2024-07-13 RX ADMIN — SODIUM CHLORIDE: 9 INJECTION, SOLUTION INTRAVENOUS at 15:51

## 2024-07-13 RX ADMIN — FENTANYL CITRATE 50 MCG: 50 INJECTION, SOLUTION INTRAMUSCULAR; INTRAVENOUS at 16:13

## 2024-07-13 RX ADMIN — SODIUM BICARBONATE 50 MEQ: 84 INJECTION, SOLUTION INTRAVENOUS at 11:45

## 2024-07-13 RX ADMIN — MAGNESIUM SULFATE HEPTAHYDRATE 2000 MG: 40 INJECTION, SOLUTION INTRAVENOUS at 21:16

## 2024-07-13 RX ADMIN — PROPOFOL 200 MG: 10 INJECTION, EMULSION INTRAVENOUS at 07:12

## 2024-07-13 RX ADMIN — CEFAZOLIN 2 G: 1 INJECTION, POWDER, FOR SOLUTION INTRAMUSCULAR; INTRAVENOUS at 08:55

## 2024-07-13 RX ADMIN — ROCURONIUM BROMIDE 50 MG: 10 INJECTION, SOLUTION INTRAVENOUS at 07:51

## 2024-07-13 RX ADMIN — Medication 2000 MG: at 12:30

## 2024-07-13 RX ADMIN — SODIUM CHLORIDE, POTASSIUM CHLORIDE, SODIUM LACTATE AND CALCIUM CHLORIDE: 600; 310; 30; 20 INJECTION, SOLUTION INTRAVENOUS at 23:28

## 2024-07-13 RX ADMIN — ACETAMINOPHEN 1000 MG: 500 TABLET ORAL at 20:19

## 2024-07-13 RX ADMIN — PHENYLEPHRINE HYDROCHLORIDE 100 MCG: 10 INJECTION INTRAVENOUS at 09:12

## 2024-07-13 RX ADMIN — SODIUM CHLORIDE, PRESERVATIVE FREE 10 ML: 5 INJECTION INTRAVENOUS at 11:00

## 2024-07-13 RX ADMIN — METHOCARBAMOL 750 MG: 750 TABLET ORAL at 20:20

## 2024-07-13 RX ADMIN — MIDAZOLAM 2 MG: 1 INJECTION INTRAMUSCULAR; INTRAVENOUS at 07:19

## 2024-07-13 RX ADMIN — SODIUM CHLORIDE, POTASSIUM CHLORIDE, SODIUM LACTATE AND CALCIUM CHLORIDE: 600; 310; 30; 20 INJECTION, SOLUTION INTRAVENOUS at 18:55

## 2024-07-13 RX ADMIN — Medication 100 MG: at 07:15

## 2024-07-13 RX ADMIN — CALCIUM GLUCONATE 1000 MG: 20 INJECTION, SOLUTION INTRAVENOUS at 23:56

## 2024-07-13 RX ADMIN — CEFAZOLIN 2 G: 1 INJECTION, POWDER, FOR SOLUTION INTRAMUSCULAR; INTRAVENOUS at 07:12

## 2024-07-13 RX ADMIN — SODIUM CHLORIDE: 9 INJECTION, SOLUTION INTRAVENOUS at 23:30

## 2024-07-13 RX ADMIN — FENTANYL CITRATE 50 MCG: 50 INJECTION, SOLUTION INTRAMUSCULAR; INTRAVENOUS at 16:10

## 2024-07-13 RX ADMIN — DEXMEDETOMIDINE HYDROCHLORIDE 0.2 MCG/KG/HR: 400 INJECTION, SOLUTION INTRAVENOUS at 11:29

## 2024-07-13 RX ADMIN — NOREPINEPHRINE BITARTRATE 5 MCG/MIN: 0.06 INJECTION, SOLUTION INTRAVENOUS at 11:45

## 2024-07-13 RX ADMIN — TRANEXAMIC ACID 1000 MG: 100 INJECTION, SOLUTION INTRAVENOUS at 07:24

## 2024-07-13 RX ADMIN — MAGNESIUM SULFATE HEPTAHYDRATE 2000 MG: 40 INJECTION, SOLUTION INTRAVENOUS at 18:57

## 2024-07-13 RX ADMIN — MORPHINE SULFATE 5 MG: 1 INJECTION, SOLUTION EPIDURAL; INTRATHECAL; INTRAVENOUS at 07:29

## 2024-07-13 NOTE — ANESTHESIA PROCEDURE NOTES
Arterial Line:    An arterial line was placed using ultrasound guidance, in the OR for the following indication(s): continuous blood pressure monitoring and blood sampling needed.    A  (size) (length), Arrow (type) catheter was placed, Seldinger technique used, into the right radial artery, secured by Tegaderm.  Anesthesia type: General    Events:  patient tolerated procedure well with no complications.7/13/2024 8:05 AM7/13/2024 8:07 AM  Anesthesiologist: Gilberto Araujo MD  Performed: Anesthesiologist   Preanesthetic Checklist  Completed: patient identified, IV checked, site marked, risks and benefits discussed, surgical/procedural consents, equipment checked, pre-op evaluation, timeout performed, anesthesia consent given, oxygen available, monitors applied/VS acknowledged, fire risk safety assessment completed and verbalized and blood product R/B/A discussed and consented

## 2024-07-13 NOTE — FLOWSHEET NOTE
Patient given morphine at 0204 am to help with lower back pain. Patient told to call out if any pain/vaginal pressure increases. Patient sound asleep until 0600 am when writer RN at bedside to help patient into shower before . Patient difficult to arouse from sleep. Patient walked into the bathroom and grabbed her purse to take in with her.

## 2024-07-13 NOTE — FLOWSHEET NOTE
RN at bedside putting patient back on EFM after shower. Patient states \"I think my water just broke\". Dr. Parsons and Dr. Palomares called to bedside.

## 2024-07-13 NOTE — FLOWSHEET NOTE
Unable to complete final time out and final cont due to urgency in nature of patient condition. Dr Sudarshan Ragsdale at bedside. Will follow up with Xray due to nature of situation tomorrow after surgery and closing

## 2024-07-13 NOTE — ANESTHESIA POSTPROCEDURE EVALUATION
Department of Anesthesiology  Postprocedure Note    Patient: Noelle Kim  MRN: [id number]  YOB: 1990  Date of evaluation: 2024    Procedure Summary       Date: 24 Room / Location: [address]    Anesthesia Start: 705 Anesthesia Stop: 1038    Procedure:  SECTION (Abdomen) Diagnosis:       Twin birth      (Twin birth [Z37.9])    Surgeons: Sandra Gallegos MD Responsible Provider: Gilberto Araujo MD    Anesthesia Type: general ASA Status: 3 - Emergent            Anesthesia Type: No value filed.    Rafia Phase I:      Rafia Phase II:      Anesthesia Post Evaluation    Patient location during evaluation: ICU  Patient participation: complete - patient cannot participate  Level of consciousness: sedated and ventilated  Airway patency: patent  Cardiovascular status: hemodynamically stable  Respiratory status: intubated and ventilator    No notable events documented.

## 2024-07-13 NOTE — FLOWSHEET NOTE
Dr. Parsons notified of Baby B still tracing, however the color switched over. Both EFM ultrasounds tracing same fetal heart rate despite positioning and readjusting on patient. Writing RN requests to have Dr. Palomares let her know when they want to confirm two fetal heart rates via ultrasound again.

## 2024-07-13 NOTE — FLOWSHEET NOTE
EFM monitors re-docked to accurately trace twins according to previous colors on tracing. Baby A in RLQ and baby B in LUQ.

## 2024-07-13 NOTE — H&P
OBSTETRICAL HISTORY AND PHYSICAL  [facility]    Date: 2024       Time: 9:14 PM   Patient Name: Noelle Kim     Patient : 1990  Room/Bed: REC3/REC3-01    Admission Date/Time: 2024  8:44 PM      CC: Back pain and cramping     HPI: Noelle Kim is a 34 y.o.  at 37w4d who presents to L&D with back pain and \"period cramps\" that began last night and worsened today. She also states she believes she lost her mucus plug yesterday. Pregnancy complicated by Di-Di twins, cHTN(no meds), breech presentation (Baby A), Hepatitis C, Thrombocytopenia, Bipolar disorder, Hx Polysubstance Use, Echogenic intracardiac focus (baby B), Hx of LEEP, and Hx of GDMA.     Patient denies any fever, chills, N/V, headaches, vision changes, chest pain, shortness of breath, RUQ pain, abdominal pain, and increased swelling/tenderness in bilateral lower extremities. Patient denies any vaginal discharge and any urinary complaints. The patient reports fetal movement is present, denies contractions, denies loss of fluid, denies vaginal bleeding.    DATING:  LMP: Patient's last menstrual period was 10/01/2023 (approximate).  Estimated Date of Delivery: 24   Based on: early ultrasound, at 6 4/7 weeks GA    PREGNANCY RISK FACTORS:  Patient Active Problem List   Diagnosis    H/O LEEP 21    Abnormal Pap Smear    Bipolar 1 disorder, depressed, severe (HCC)    Viral hepatitis C    Hx Gestational diabetes mellitus    Polysubstance Drug Use    Thrombocytopenia affecting pregnancy    G3 Dichorionic diamniotic twin pregnancy    Hx Left salpingoophorectomy 2013    Chroid Plexus Cysts (Baby A)    Echogenic Intracardiac Focus (baby B)    Breech presentation (babyB)    Low-lying placenta (baby A)    Pruritus of pregnancy in second trimester    cHTN (no meds)    37 weeks gestation of pregnancy        Steroids Given In This Pregnancy:  no     REVIEW OF SYSTEMS:  Constitutional: negative fever, negative

## 2024-07-13 NOTE — FLOWSHEET NOTE
Pt arrived with complaints of flank pain and side pain. Pt states she also believe she lost her mucus plug yesterday. Pt denies any vaginal bleeding discharge or leakage of fluid. Positive fetal movement of both.  Pt states she always have the urgency to urinate

## 2024-07-13 NOTE — CONSENT
Informed Consent for Blood Component Transfusion Note    I have discussed with the patient the rationale for blood component transfusion; its benefits in treating or preventing fatigue, organ damage, or death; and its risk which includes mild transfusion reactions, rare risk of blood borne infection, or more serious but rare reactions. I have discussed the alternatives to transfusion, including the risk and consequences of not receiving transfusion. The patient had an opportunity to ask questions and had agreed to proceed with transfusion of blood components.    Electronically signed by Kelsie Ceja MD on 7/13/24 at 9:43 AM EDT

## 2024-07-13 NOTE — SIGNIFICANT EVENT
Ob Attending   OB hemorrhage significant event     Called by Dr. Palomares at 6:56AM for patient Noelle Kim at 33 yo  who is 37w5d in spontaneous labor with di-di twin IUP admitted at 1 cm dilated and given morphine rest. She awoke and had SROM. Exam at that time was 10 cm dilated and baby A was known breech. Patient had a pregnancy complicated by:  Patient Active Problem List   Diagnosis    H/O LEEP 21    Abnormal Pap Smear    Bipolar 1 disorder, depressed, severe (HCC)    Viral hepatitis C    Hx Gestational diabetes mellitus    Polysubstance Drug Use    Thrombocytopenia affecting pregnancy    G3 Dichorionic diamniotic twin pregnancy    Hx Left salpingoophorectomy     Chroid Plexus Cysts (Baby A)    Echogenic Intracardiac Focus (baby B)    Breech presentation (babyB)    Low-lying placenta (baby A)    Pruritus of pregnancy in second trimester    cHTN (no meds)    37 weeks gestation of pregnancy     She was taken to the OR for a primary  section for breech presentation. Patient had suspected gestational thrombocytopenia with platelets of 132. Starting hemoglobin 13. She was known to have hepatitis C with most recent viral load of 9,970,000. She has a history of drug abuse but has been abstaining this pregnancy.     After general endotracheal tube anesthesia was obtained a repeat low transverse  section was preformed with uncomplicated delivery of baby A and B who were handed to the awaiting NICU team. The placenta was delivered uncomplicated and the hysterotomy was closed with 0- monocryl sutures. Hemabate was given for atony as was pitocin and TXA. The posterior cul de sac was cleared of all clots and bleeding and brisk bleeding was noted. The hysterotomy was again inspected and noted to be hemostatic. The posterior uterus was intact. A survey of the abdomen reveled concern for brisk bleeding coming from the liver bed. An OB emergency was called and trauma surgery was called for as

## 2024-07-13 NOTE — ANESTHESIA PROCEDURE NOTES
Central Venous Line:    A central venous line was placed using ultrasound guidance, in the OR for the following indication(s): central venous access and CVP monitoring.7/13/2024 7:55 AM7/13/2024 8:00 AM    Sterility preparation included the following: provider used sterile gloves, gown, hat and mask and hand hygiene performed prior to central venous catheter insertion.    The patient was placed in Trendelenburg position.The right internal jugular vein was prepped.    The site was prepped with Chloraprep.  A 9 Fr (size), introducer     During the procedure, the following specific steps were taken: target vein identified, needle advanced into vein and blood aspirated and guidewire advanced into vein.    Intravenous verification was obtained by ultrasound, venous blood return and manometry.    Post insertion care included: all ports aspirated, all ports flushed easily, guidewire removed intact and line sutured in place.    During the procedure the patient experienced: patient tolerated procedure well with no complications.      Outcomes: uncomplicated and patient tolerated procedure well  Anesthesia type: general..No  Staffing  Performed: Anesthesiologist   Anesthesiologist: Gilberto Araujo MD  Performed by: Gilberto Araujo MD  Authorized by: Gilberto Araujo MD    Preanesthetic Checklist  Completed: patient identified, IV checked, site marked, risks and benefits discussed, surgical/procedural consents, equipment checked, pre-op evaluation, timeout performed, anesthesia consent given, oxygen available, monitors applied/VS acknowledged, fire risk safety assessment completed and verbalized and blood product R/B/A discussed and consented

## 2024-07-13 NOTE — FLOWSHEET NOTE
Dr. Palomares at bedside with ultrasound to get babies back on EFM. Dr. Palomares verified two separate heart rates and visualized both babies moving on ultrasound. Dr. Palomares aware of difficulty tracing both infants and only able to trace infant B at this time. Patient un willing to reposition to trace both infants. Dr. Palomares states it's okay to leave the monitors as they are until the morning. RN will keep attempting to reposition and trace both infants.

## 2024-07-13 NOTE — ANESTHESIA PRE PROCEDURE
premix 1,000 mg  1,000 mg IntraVENous Once Loyda Fairchild MD        propofol infusion  5-50 mcg/kg/min IntraVENous Once Loyda Fairchild MD        ondansetron (ZOFRAN-ODT) disintegrating tablet 4 mg  4 mg Oral Q8H PRN Rose Robles,         Or    ondansetron (ZOFRAN) injection 4 mg  4 mg IntraVENous Q6H PRN Rose Robles,         acetaminophen (TYLENOL) tablet 1,000 mg  1,000 mg Oral Q6H PRN Rose Robles,         cyclobenzaprine (FLEXERIL) tablet 10 mg  10 mg Oral TID PRN Barby Parsons DO   10 mg at 07/13/24 0006    buprenorphine (SUBUTEX) SL tablet 16 mg  16 mg SubLINGual Daily Barby Parsons DO        lactated ringers bolus 1,000 mL  1,000 mL IntraVENous Once Rose Robles,         sodium chloride flush 0.9 % injection 10 mL  10 mL IntraVENous 2 times per day Rose Robles,         sodium chloride flush 0.9 % injection 10 mL  10 mL IntraVENous PRN Rose Robles,         0.9 % sodium chloride infusion   IntraVENous PRN Rose Robles,         citric acid-sodium citrate (BICITRA) solution 30 mL  30 mL Oral Once Rose Robles,         famotidine (PEPCID) 20 mg in sodium chloride (PF) 0.9 % 10 mL injection  20 mg IntraVENous Once Rose Robles,         ondansetron (ZOFRAN) injection 4 mg  4 mg IntraVENous Q6H PRN Rose Robles,         ceFAZolin (ANCEF) 2000 mg in sterile water 20 mL IV syringe  2,000 mg IntraVENous Once Rose Robles,         acetaminophen (TYLENOL) tablet 1,000 mg  1,000 mg Oral Once Rose Robles,          Facility-Administered Medications Ordered in Other Encounters   Medication Dose Route Frequency Provider Last Rate Last Admin    midazolam (VERSED) injection   IntraVENous PRN Byron Negron APRN - CRNA   2 mg at 07/13/24 0719    fentaNYL (SUBLIMAZE) injection   IntraVENous PRN Byron Negron APRN - CRNA   50 mcg at 07/13/24 0926    tranexamic acid (CYKLOKAPRON)

## 2024-07-13 NOTE — CARE COORDINATION
ANTEPARTUM NOTE    Twin birth [Z37.9]  37 weeks gestation of pregnancy [Z3A.37]    Noelle  was admitted to L&D on 24 for back pain/ cramping @ 37W 4D    Noelle met criteria for cHTN (no meds) with LUIS without SF     Planning       OB GYN Provider: Dr. Johnston    Will meet with patient after delivery to verify name/address/phone/insurance and discuss discharge planning.     Anticipate DC home 2 nights after vaginal delivery or 4 nights after C/S delivery as long as hemodynamically stable.

## 2024-07-13 NOTE — ANESTHESIA PRE PROCEDURE
Intracardiac Focus (baby B) O28.3    Breech presentation (babyB) O32.1XX0    Low-lying placenta (baby A) O44.40    Pruritus of pregnancy in second trimester O99.712, L29.9    cHTN (no meds) O10.919    37 weeks gestation of pregnancy Z3A.37    PLTCS with ex lap, survey abdomen, abthera wound vac 7/13/24 M/M WT 5#15, 6#13 Apg 8/9 and 8/9 O82       Past Medical History:        Diagnosis Date    Abnormal cells of cervix     ADD (attention deficit disorder)     Anemia     Complication of anesthesia     Condyloma     Depression     Gestational diabetes mellitus     HSIL (high grade squamous intraepithelial lesion) on Pap smear of cervix     Liver disease     MRSA (methicillin resistant staph aureus) culture positive 09/2011    BUTTOCK    Severe dysplasia of cervix     STD (sexually transmitted disease)     Substance abuse (HCC)     heroin / on 12/29/18 pt states last used 3 yrs ago,marijuana 4/12/21. pt on buprenorphine SL every am       Past Surgical History:        Procedure Laterality Date    LEEP N/A 04/16/2021    LEEP performed by Sushil Josue MD at [facility] OR    OVARY REMOVAL      left    TONSILLECTOMY         Social History:    Social History     Tobacco Use    Smoking status: Every Day     Types: E-Cigarettes    Smokeless tobacco: Never    Tobacco comments:     \"Stopped smoking cigs. a couple months ago\"  2/19/2024     \"Every other day vaping - no change- 1 can last a month\"  4/15/2024   Substance Use Topics    Alcohol use: Not Currently     Comment: socially                                Ready to quit: Not Answered  Counseling given: Not Answered  Tobacco comments: \"Stopped smoking cigs. a couple months ago\"  2/19/2024  \"Every other day vaping - no change- 1 can last a month\"  4/15/2024      Vital Signs (Current):   Vitals:    07/13/24 1510 07/13/24 1514 07/13/24 1515 07/13/24 1530   BP:       Pulse: 57 56 55 53   Resp: 18 18 19 18   Temp:       TempSrc:       SpO2: 100% 100% 100% 100%

## 2024-07-13 NOTE — CONSULTS
General Surgery  Consult    PATIENT NAME: Noelle Kim  AGE: 34 y.o.  MEDICAL RECORD NO. [id number]  DATE: 2024  SURGEON: Dr. CHILDS   PRIMARY CARE PHYSICIAN: Balta Cristina MD    Patient evaluated at the request of  Dr. Gallegos  Reason for evaluation: Intra-abdominal bleeding during      Patient information was obtained from OB-Gyn team.  History/Exam limitations: due to condition.    IMPRESSION:     Patient Active Problem List   Diagnosis    H/O LEEP 21    Abnormal Pap Smear    Bipolar 1 disorder, depressed, severe (HCC)    Viral hepatitis C    Hx Gestational diabetes mellitus    Polysubstance Drug Use    Thrombocytopenia affecting pregnancy    G3 Dichorionic diamniotic twin pregnancy    Hx Left salpingoophorectomy     Chroid Plexus Cysts (Baby A)    Echogenic Intracardiac Focus (baby B)    Breech presentation (babyB)    Low-lying placenta (baby A)    Pruritus of pregnancy in second trimester    cHTN (no meds)    37 weeks gestation of pregnancy     Variceal bleeding s/p  section       PLAN:   Patient underwent exploratory laparotomy, control of variceal bleeding, abdominal packing and temporary abdominal closure with ABThera wound VAC.  Please see operative dictation for details of the procedure.  Plan for admission to trauma/surgical intensive care unit  Will plan to run a full set of postoperative labs including a TEG  Continue to transfuse based off of TEG  Will obtain postoperative chest x-ray and ABG  Critical care management per TICU      HISTORY:   History of Chief Complaint:    Noelle Kim is a 34 y.o. female who was admitted to Labor and Delivery and underwent  section for twin deliver. After the twins were delivered, OB noted significant intra-abdominal bleeding that appeared to be coming from near the liver. Packing was placed and an GAMALIEL alert was activated. Upon evaluation, bleeding was controlled. General Surgery scrubbed in and performed an exploratory

## 2024-07-13 NOTE — DISCHARGE SUMMARY
tablet  Generic drug: aspirin     buprenorphine 8 MG Subl SL tablet  Commonly known as: SUBUTEX  place 1 tablet under the tongue and ALLOW to dissolve twice a day     Prenatal Vitamin 27-0.8 MG Tabs  Take 1 tablet by mouth daily     vitamin B-6 25 MG tablet  Commonly known as: PYRIDOXINE  Take 1 tablet by mouth in the morning, at noon, and at bedtime            ASK your doctor about these medications      diphenhydrAMINE 25 MG capsule  Commonly known as: Benadryl Allergy  Take 1 capsule by mouth every 6 hours as needed for Itching, Allergies or Sleep     hydrocortisone 1 % cream  Commonly known as: Ala-Aleksandr  Apply topically 2 times daily.     omeprazole 40 MG delayed release capsule  Commonly known as: PRILOSEC  Take 1 capsule by mouth every morning (before breakfast)     oxyCODONE 5 MG immediate release tablet  Commonly known as: Roxicodone  Take 1 tablet by mouth every 6 hours as needed for Pain for up to 5 days. Intended supply: 7 days. Take lowest dose possible to manage pain Max Daily Amount: 20 mg  Ask about: Should I take this medication?               Where to Get Your Medications        These medications were sent to [address] -  [phone] - F [phone]  [address] [place] [id number]      Phone: [phone]   acetaminophen 500 MG tablet  buprenorphine 8 MG Subl SL tablet  ibuprofen 600 MG tablet  ondansetron 4 MG tablet  oxyCODONE 5 MG immediate release tablet  sennosides-docusate sodium 8.6-50 MG tablet       Activity: pelvic rest x 6 weeks, no driving on narcotics, no lifting greater than 15 lbs  Diet: regular diet  Follow up:  3 days  for BP check and post op appointment    Condition on discharge: good    Discharge date: 7/23/24    Kun Doll MD  Ob/Gyn Resident    Comments:  Home care and follow-up care were reviewed.  Pelvic rest, and birth control were reviewed. Signs and symptoms of mastitis and post partum depression were reviewed. The

## 2024-07-13 NOTE — ANESTHESIA POSTPROCEDURE EVALUATION
Department of Anesthesiology  Postprocedure Note    Patient: Noelle Kim  MRN: [id number]  YOB: 1990  Date of evaluation: 7/13/2024    Procedure Summary       Date: 07/13/24 Room / Location: [address]    Anesthesia Start: 1551 Anesthesia Stop:     Procedure: RE-OPEN LAPAROTOMY EXPLORATORY Diagnosis:       Miscarriage      (Miscarriage [O03.9])    Surgeons: Gary Ragsdale MD Responsible Provider: Gilberto Araujo MD    Anesthesia Type: general ASA Status: 4 - Emergent            Anesthesia Type: No value filed.    Rafia Phase I:      Rafia Phase II:      Anesthesia Post Evaluation    Patient location during evaluation: ICU  Patient participation: complete - patient cannot participate  Level of consciousness: sedated and ventilated  Airway patency: patent  Cardiovascular status: ongoing resuscitation.  Respiratory status: intubated and ventilator    No notable events documented.

## 2024-07-14 ENCOUNTER — APPOINTMENT (OUTPATIENT)
Dept: GENERAL RADIOLOGY | Age: 34
DRG: 540 | End: 2024-07-14
Payer: MEDICAID

## 2024-07-14 ENCOUNTER — ANESTHESIA (OUTPATIENT)
Dept: OPERATING ROOM | Age: 34
End: 2024-07-14
Payer: MEDICAID

## 2024-07-14 ENCOUNTER — ANESTHESIA EVENT (OUTPATIENT)
Dept: OPERATING ROOM | Age: 34
End: 2024-07-14
Payer: MEDICAID

## 2024-07-14 LAB
ALBUMIN SERPL-MCNC: 2.5 G/DL (ref 3.5–5.2)
ALBUMIN/GLOB SERPL: 2 {RATIO} (ref 1–2.5)
ALLEN TEST: ABNORMAL
ALP SERPL-CCNC: 62 U/L (ref 35–104)
ALT SERPL-CCNC: 249 U/L (ref 10–35)
AMMONIA PLAS-SCNC: 21 UMOL/L (ref 11–51)
ANION GAP SERPL CALCULATED.3IONS-SCNC: 12 MMOL/L (ref 9–16)
ANION GAP SERPL CALCULATED.3IONS-SCNC: 9 MMOL/L (ref 9–16)
ANION GAP SERPL CALCULATED.3IONS-SCNC: 9 MMOL/L (ref 9–16)
ARTERIAL PATENCY WRIST A: ABNORMAL
AST SERPL-CCNC: 1078 U/L (ref 10–35)
BASOPHILS # BLD: 0 K/UL (ref 0–0.2)
BASOPHILS # BLD: <0.03 K/UL (ref 0–0.2)
BASOPHILS NFR BLD: 0 % (ref 0–2)
BASOPHILS NFR BLD: 0 % (ref 0–2)
BILIRUB DIRECT SERPL-MCNC: 2.4 MG/DL (ref 0–0.2)
BILIRUB INDIRECT SERPL-MCNC: 1.1 MG/DL (ref 0–1)
BILIRUB SERPL-MCNC: 3.5 MG/DL (ref 0–1.2)
BLOOD BANK BLOOD PRODUCT EXPIRATION DATE: NORMAL
BLOOD BANK DISPENSE STATUS: NORMAL
BLOOD BANK ISBT PRODUCT BLOOD TYPE: 5100
BLOOD BANK ISBT PRODUCT BLOOD TYPE: 5100
BLOOD BANK ISBT PRODUCT BLOOD TYPE: 600
BLOOD BANK ISBT PRODUCT BLOOD TYPE: 6200
BLOOD BANK PRODUCT CODE: NORMAL
BLOOD BANK UNIT TYPE AND RH: NORMAL
BODY TEMPERATURE: 37
BPU ID: NORMAL
BUN SERPL-MCNC: 13 MG/DL (ref 6–20)
BUN SERPL-MCNC: 13 MG/DL (ref 6–20)
BUN SERPL-MCNC: 15 MG/DL (ref 6–20)
CA-I BLD-SCNC: 1.08 MMOL/L (ref 1.13–1.33)
CA-I BLD-SCNC: 1.14 MMOL/L (ref 1.13–1.33)
CALCIUM SERPL-MCNC: 6.6 MG/DL (ref 8.6–10.4)
CALCIUM SERPL-MCNC: 7.8 MG/DL (ref 8.6–10.4)
CALCIUM SERPL-MCNC: 7.9 MG/DL (ref 8.6–10.4)
CHLORIDE SERPL-SCNC: 105 MMOL/L (ref 98–107)
CHLORIDE SERPL-SCNC: 108 MMOL/L (ref 98–107)
CHLORIDE SERPL-SCNC: 108 MMOL/L (ref 98–107)
CHLORIDE, WHOLE BLOOD: 107 MMOL/L (ref 98–110)
CO2 SERPL-SCNC: 19 MMOL/L (ref 20–31)
CO2 SERPL-SCNC: 22 MMOL/L (ref 20–31)
CO2 SERPL-SCNC: 23 MMOL/L (ref 20–31)
COHGB MFR BLD: 1.8 % (ref 0–5)
COMPONENT: NORMAL
CREAT SERPL-MCNC: 0.9 MG/DL (ref 0.5–0.9)
CREAT SERPL-MCNC: 1 MG/DL (ref 0.5–0.9)
CREAT SERPL-MCNC: 1.2 MG/DL (ref 0.5–0.9)
EOSINOPHIL # BLD: 0 K/UL (ref 0–0.44)
EOSINOPHIL # BLD: <0.03 K/UL (ref 0–0.44)
EOSINOPHILS RELATIVE PERCENT: 0 % (ref 1–4)
EOSINOPHILS RELATIVE PERCENT: 0 % (ref 1–4)
ERYTHROCYTE [DISTWIDTH] IN BLOOD BY AUTOMATED COUNT: 16.4 % (ref 11.8–14.4)
ERYTHROCYTE [DISTWIDTH] IN BLOOD BY AUTOMATED COUNT: 17.2 % (ref 11.8–14.4)
FIBRINOGEN PPP-MCNC: 160 MG/DL (ref 203–521)
FIBRINOGEN PPP-MCNC: 176 MG/DL (ref 203–521)
FIBRINOGEN, FUNCTIONAL TEG: 16 MM (ref 15–32)
FIBRINOGEN, FUNCTIONAL TEG: 17.1 MM (ref 15–32)
FIBRINOGEN, FUNCTIONAL TEG: 17.8 MM (ref 15–32)
FIBRINOGEN, FUNCTIONAL TEG: 17.9 MM (ref 15–32)
FIO2 ON VENT: 60 %
FIO2: 30
FIO2: 30
GFR, ESTIMATED: 64 ML/MIN/1.73M2
GFR, ESTIMATED: 82 ML/MIN/1.73M2
GFR, ESTIMATED: 86 ML/MIN/1.73M2
GLOBULIN SER CALC-MCNC: 1.1 G/DL
GLUCOSE BLD-MCNC: 113 MG/DL (ref 65–105)
GLUCOSE BLD-MCNC: 115 MG/DL (ref 65–105)
GLUCOSE BLD-MCNC: 121 MG/DL (ref 65–105)
GLUCOSE BLD-MCNC: 143 MG/DL (ref 74–100)
GLUCOSE BLD-MCNC: 153 MG/DL (ref 65–105)
GLUCOSE BLD-MCNC: 64 MG/DL (ref 74–100)
GLUCOSE BLD-MCNC: 67 MG/DL (ref 65–105)
GLUCOSE BLD-MCNC: 84 MG/DL (ref 65–105)
GLUCOSE BLD-MCNC: 88 MG/DL (ref 65–105)
GLUCOSE BLD-MCNC: 88 MG/DL (ref 65–105)
GLUCOSE BLD-MCNC: 89 MG/DL (ref 65–105)
GLUCOSE BLD-MCNC: 97 MG/DL (ref 65–105)
GLUCOSE BLD-MCNC: 99 MG/DL (ref 65–105)
GLUCOSE SERPL-MCNC: 105 MG/DL (ref 74–99)
GLUCOSE SERPL-MCNC: 143 MG/DL (ref 74–99)
GLUCOSE SERPL-MCNC: 78 MG/DL (ref 74–99)
HCO3 ARTERIAL: 20.3 MMOL/L (ref 22–27)
HCT VFR BLD AUTO: 21.7 % (ref 36.3–47.1)
HCT VFR BLD AUTO: 21.9 % (ref 36.3–47.1)
HCT VFR BLD AUTO: 25.3 % (ref 36.3–47.1)
HCT VFR BLD AUTO: 26.6 % (ref 36.3–47.1)
HCT VFR BLD CALC: 23.8 % (ref 36.3–47.1)
HEMOGLOBIN: 7.6 GM/DL (ref 11.9–15.1)
HGB BLD-MCNC: 7.4 G/DL (ref 11.9–15.1)
HGB BLD-MCNC: 7.6 G/DL (ref 11.9–15.1)
HGB BLD-MCNC: 8.8 G/DL (ref 11.9–15.1)
HGB BLD-MCNC: 9 G/DL (ref 11.9–15.1)
IMM GRANULOCYTES # BLD AUTO: 0 K/UL (ref 0–0.3)
IMM GRANULOCYTES # BLD AUTO: 0.11 K/UL (ref 0–0.3)
IMM GRANULOCYTES NFR BLD: 0 %
IMM GRANULOCYTES NFR BLD: 1 %
INR PPP: 1.2
INR PPP: 1.3
LACTIC ACID, WHOLE BLOOD: 1.6 MMOL/L (ref 0.7–2.1)
LACTIC ACID, WHOLE BLOOD: 3.3 MMOL/L (ref 0.7–2.1)
LIPASE SERPL-CCNC: 42 U/L (ref 13–60)
LY30 (LYSIS) TEG: 0 % (ref 0–2.6)
LYMPHOCYTES NFR BLD: 0.77 K/UL (ref 1.1–3.7)
LYMPHOCYTES NFR BLD: 1.01 K/UL (ref 1.1–3.7)
LYMPHOCYTES RELATIVE PERCENT: 10 % (ref 24–43)
LYMPHOCYTES RELATIVE PERCENT: 11 % (ref 24–43)
MA(MAX CLOT) RAPID TEG: 51.7 MM (ref 52–70)
MA(MAX CLOT) RAPID TEG: 53.4 MM (ref 52–70)
MA(MAX CLOT) RAPID TEG: 54.1 MM (ref 52–70)
MA(MAX CLOT) RAPID TEG: 55.5 MM (ref 52–70)
MAGNESIUM SERPL-MCNC: 4.2 MG/DL (ref 1.6–2.6)
MAGNESIUM SERPL-MCNC: 6.9 MG/DL (ref 1.6–2.6)
MCH RBC QN AUTO: 28.7 PG (ref 25.2–33.5)
MCH RBC QN AUTO: 28.9 PG (ref 25.2–33.5)
MCHC RBC AUTO-ENTMCNC: 33.1 G/DL (ref 28.4–34.8)
MCHC RBC AUTO-ENTMCNC: 34.1 G/DL (ref 28.4–34.8)
MCV RBC AUTO: 84.1 FL (ref 82.6–102.9)
MCV RBC AUTO: 87.2 FL (ref 82.6–102.9)
MODE: ABNORMAL
MODE: ABNORMAL
MONOCYTES NFR BLD: 0.43 K/UL (ref 0.1–1.2)
MONOCYTES NFR BLD: 0.49 K/UL (ref 0.1–1.2)
MONOCYTES NFR BLD: 4 % (ref 3–12)
MONOCYTES NFR BLD: 7 % (ref 3–12)
MORPHOLOGY: ABNORMAL
NEGATIVE BASE EXCESS, ART: 1.2 MMOL/L (ref 0–2)
NEGATIVE BASE EXCESS, ART: 3.5 MMOL/L (ref 0–2)
NEGATIVE BASE EXCESS, ART: 4.3 MMOL/L (ref 0–2)
NEUTROPHILS NFR BLD: 82 % (ref 36–65)
NEUTROPHILS NFR BLD: 85 % (ref 36–65)
NEUTS SEG NFR BLD: 5.74 K/UL (ref 1.5–8.1)
NEUTS SEG NFR BLD: 8.32 K/UL (ref 1.5–8.1)
NRBC BLD-RTO: 0 PER 100 WBC
NRBC BLD-RTO: 0.2 PER 100 WBC
O2 DELIVERY DEVICE: ABNORMAL
O2 DELIVERY DEVICE: ABNORMAL
O2 SAT, ARTERIAL: 99.7 % (ref 94–100)
PARTIAL THROMBOPLASTIN TIME: 34.4 SEC (ref 23–36.5)
PATIENT TEMP: 36.6
PCO2 ARTERIAL: 33.6 MMHG (ref 32–45)
PERFORMING LOCATION: ABNORMAL
PERFORMING LOCATION: NORMAL
PH ARTERIAL: 7.4 (ref 7.35–7.45)
PHOSPHATE SERPL-MCNC: 4.2 MG/DL (ref 2.5–4.5)
PLATELET # BLD AUTO: 70 K/UL (ref 138–453)
PLATELET # BLD AUTO: ABNORMAL K/UL (ref 138–453)
PLATELET, FLUORESCENCE: 54 K/UL (ref 138–453)
PLATELETS.RETICULATED NFR BLD AUTO: 20 % (ref 1.1–10.3)
PMV BLD AUTO: 13 FL (ref 8.1–13.5)
PO2 ARTERIAL: 240 MMHG (ref 75–95)
POC HCO3: 21.9 MMOL/L (ref 21–28)
POC HCO3: 23 MMOL/L (ref 21–28)
POC LACTIC ACID: 2.1 MMOL/L (ref 0.56–1.39)
POC LACTIC ACID: 2.8 MMOL/L (ref 0.56–1.39)
POC O2 SATURATION: 98.4 % (ref 94–98)
POC O2 SATURATION: 99.3 % (ref 94–98)
POC PCO2: 34.2 MM HG (ref 35–48)
POC PCO2: 45 MM HG (ref 35–48)
POC PH: 7.29 (ref 7.35–7.45)
POC PH: 7.44 (ref 7.35–7.45)
POC PO2: 126.1 MM HG (ref 83–108)
POC PO2: 143.7 MM HG (ref 83–108)
POTASSIUM SERPL-SCNC: 4.2 MMOL/L (ref 3.7–5.3)
POTASSIUM SERPL-SCNC: 4.3 MMOL/L (ref 3.7–5.3)
POTASSIUM SERPL-SCNC: 4.5 MMOL/L (ref 3.7–5.3)
POTASSIUM, WHOLE BLOOD: 3.9 MMOL/L (ref 3.6–5)
PROT SERPL-MCNC: 3.6 G/DL (ref 6.6–8.7)
PROTHROMBIN TIME: 14.9 SEC (ref 11.7–14.9)
PROTHROMBIN TIME: 15.6 SEC (ref 11.7–14.9)
RBC # BLD AUTO: 2.58 M/UL (ref 3.95–5.11)
RBC # BLD AUTO: 3.05 M/UL (ref 3.95–5.11)
RBC # BLD: ABNORMAL 10*6/UL
REACTION TIME TEG: 5.9 MIN (ref 4.6–9.1)
REACTION TIME TEG: 6.4 MIN (ref 4.6–9.1)
REACTION TIME TEG: 7.5 MIN (ref 4.6–9.1)
REACTION TIME TEG: 8.3 MIN (ref 4.6–9.1)
SAMPLE SITE: ABNORMAL
SAMPLE SITE: ABNORMAL
SODIUM SERPL-SCNC: 136 MMOL/L (ref 136–145)
SODIUM SERPL-SCNC: 139 MMOL/L (ref 136–145)
SODIUM SERPL-SCNC: 140 MMOL/L (ref 136–145)
SODIUM, WHOLE BLOOD: 136 MMOL/L (ref 136–145)
TRANSFUSION STATUS: NORMAL
UNIT DIVISION: 0
UNIT ISSUE DATE/TIME: NORMAL
WBC OTHER # BLD: 7 K/UL (ref 3.5–11.3)
WBC OTHER # BLD: 9.9 K/UL (ref 3.5–11.3)

## 2024-07-14 PROCEDURE — 82140 ASSAY OF AMMONIA: CPT

## 2024-07-14 PROCEDURE — 2720000010 HC SURG SUPPLY STERILE: Performed by: SURGERY

## 2024-07-14 PROCEDURE — 85610 PROTHROMBIN TIME: CPT

## 2024-07-14 PROCEDURE — 83690 ASSAY OF LIPASE: CPT

## 2024-07-14 PROCEDURE — 82803 BLOOD GASES ANY COMBINATION: CPT

## 2024-07-14 PROCEDURE — 85730 THROMBOPLASTIN TIME PARTIAL: CPT

## 2024-07-14 PROCEDURE — 6360000002 HC RX W HCPCS

## 2024-07-14 PROCEDURE — 71045 X-RAY EXAM CHEST 1 VIEW: CPT

## 2024-07-14 PROCEDURE — 82805 BLOOD GASES W/O2 SATURATION: CPT

## 2024-07-14 PROCEDURE — 2580000003 HC RX 258: Performed by: STUDENT IN AN ORGANIZED HEALTH CARE EDUCATION/TRAINING PROGRAM

## 2024-07-14 PROCEDURE — 85055 RETICULATED PLATELET ASSAY: CPT

## 2024-07-14 PROCEDURE — 2580000003 HC RX 258

## 2024-07-14 PROCEDURE — 82947 ASSAY GLUCOSE BLOOD QUANT: CPT

## 2024-07-14 PROCEDURE — 85384 FIBRINOGEN ACTIVITY: CPT

## 2024-07-14 PROCEDURE — 0WJG0ZZ INSPECTION OF PERITONEAL CAVITY, OPEN APPROACH: ICD-10-PCS | Performed by: SURGERY

## 2024-07-14 PROCEDURE — 84100 ASSAY OF PHOSPHORUS: CPT

## 2024-07-14 PROCEDURE — 36430 TRANSFUSION BLD/BLD COMPNT: CPT

## 2024-07-14 PROCEDURE — 6370000000 HC RX 637 (ALT 250 FOR IP): Performed by: STUDENT IN AN ORGANIZED HEALTH CARE EDUCATION/TRAINING PROGRAM

## 2024-07-14 PROCEDURE — 82330 ASSAY OF CALCIUM: CPT

## 2024-07-14 PROCEDURE — 6360000002 HC RX W HCPCS: Performed by: STUDENT IN AN ORGANIZED HEALTH CARE EDUCATION/TRAINING PROGRAM

## 2024-07-14 PROCEDURE — 80076 HEPATIC FUNCTION PANEL: CPT

## 2024-07-14 PROCEDURE — 3700000000 HC ANESTHESIA ATTENDED CARE: Performed by: SURGERY

## 2024-07-14 PROCEDURE — 83735 ASSAY OF MAGNESIUM: CPT

## 2024-07-14 PROCEDURE — 2580000003 HC RX 258: Performed by: SURGERY

## 2024-07-14 PROCEDURE — 86927 PLASMA FRESH FROZEN: CPT

## 2024-07-14 PROCEDURE — 80048 BASIC METABOLIC PNL TOTAL CA: CPT

## 2024-07-14 PROCEDURE — 85014 HEMATOCRIT: CPT

## 2024-07-14 PROCEDURE — 6370000000 HC RX 637 (ALT 250 FOR IP)

## 2024-07-14 PROCEDURE — 83605 ASSAY OF LACTIC ACID: CPT

## 2024-07-14 PROCEDURE — 3600000015 HC SURGERY LEVEL 5 ADDTL 15MIN: Performed by: SURGERY

## 2024-07-14 PROCEDURE — 85390 FIBRINOLYSINS SCREEN I&R: CPT

## 2024-07-14 PROCEDURE — 84132 ASSAY OF SERUM POTASSIUM: CPT

## 2024-07-14 PROCEDURE — 82962 GLUCOSE BLOOD TEST: CPT

## 2024-07-14 PROCEDURE — P9017 PLASMA 1 DONOR FRZ W/IN 8 HR: HCPCS

## 2024-07-14 PROCEDURE — 85576 BLOOD PLATELET AGGREGATION: CPT

## 2024-07-14 PROCEDURE — 2500000003 HC RX 250 WO HCPCS

## 2024-07-14 PROCEDURE — 2709999900 HC NON-CHARGEABLE SUPPLY: Performed by: SURGERY

## 2024-07-14 PROCEDURE — 3600000005 HC SURGERY LEVEL 5 BASE: Performed by: SURGERY

## 2024-07-14 PROCEDURE — 37799 UNLISTED PX VASCULAR SURGERY: CPT

## 2024-07-14 PROCEDURE — 0W3F3ZZ CONTROL BLEEDING IN ABDOMINAL WALL, PERCUTANEOUS APPROACH: ICD-10-PCS | Performed by: SURGERY

## 2024-07-14 PROCEDURE — P9041 ALBUMIN (HUMAN),5%, 50ML: HCPCS

## 2024-07-14 PROCEDURE — 85025 COMPLETE CBC W/AUTO DIFF WBC: CPT

## 2024-07-14 PROCEDURE — P9016 RBC LEUKOCYTES REDUCED: HCPCS

## 2024-07-14 PROCEDURE — 6360000002 HC RX W HCPCS: Performed by: NURSE ANESTHETIST, CERTIFIED REGISTERED

## 2024-07-14 PROCEDURE — 94761 N-INVAS EAR/PLS OXIMETRY MLT: CPT

## 2024-07-14 PROCEDURE — 2700000000 HC OXYGEN THERAPY PER DAY

## 2024-07-14 PROCEDURE — 94003 VENT MGMT INPAT SUBQ DAY: CPT

## 2024-07-14 PROCEDURE — 36415 COLL VENOUS BLD VENIPUNCTURE: CPT

## 2024-07-14 PROCEDURE — 2500000003 HC RX 250 WO HCPCS: Performed by: STUDENT IN AN ORGANIZED HEALTH CARE EDUCATION/TRAINING PROGRAM

## 2024-07-14 PROCEDURE — 2000000000 HC ICU R&B

## 2024-07-14 PROCEDURE — 82435 ASSAY OF BLOOD CHLORIDE: CPT

## 2024-07-14 PROCEDURE — 85018 HEMOGLOBIN: CPT

## 2024-07-14 PROCEDURE — 84295 ASSAY OF SERUM SODIUM: CPT

## 2024-07-14 PROCEDURE — 85347 COAGULATION TIME ACTIVATED: CPT

## 2024-07-14 PROCEDURE — 3700000001 HC ADD 15 MINUTES (ANESTHESIA): Performed by: SURGERY

## 2024-07-14 RX ORDER — ALBUMIN, HUMAN INJ 5% 5 %
25 SOLUTION INTRAVENOUS ONCE
Status: COMPLETED | OUTPATIENT
Start: 2024-07-14 | End: 2024-07-14

## 2024-07-14 RX ORDER — IBUPROFEN 600 MG/1
600 TABLET ORAL EVERY 6 HOURS
Status: DISCONTINUED | OUTPATIENT
Start: 2024-07-14 | End: 2024-07-16

## 2024-07-14 RX ORDER — SODIUM CHLORIDE, SODIUM LACTATE, POTASSIUM CHLORIDE, CALCIUM CHLORIDE 600; 310; 30; 20 MG/100ML; MG/100ML; MG/100ML; MG/100ML
INJECTION, SOLUTION INTRAVENOUS CONTINUOUS PRN
Status: DISCONTINUED | OUTPATIENT
Start: 2024-07-14 | End: 2024-07-14 | Stop reason: SDUPTHER

## 2024-07-14 RX ORDER — QUETIAPINE FUMARATE 100 MG/1
100 TABLET, FILM COATED ORAL 2 TIMES DAILY
Status: DISCONTINUED | OUTPATIENT
Start: 2024-07-14 | End: 2024-07-17

## 2024-07-14 RX ORDER — SODIUM CHLORIDE 9 MG/ML
INJECTION, SOLUTION INTRAVENOUS PRN
Status: DISCONTINUED | OUTPATIENT
Start: 2024-07-14 | End: 2024-07-15

## 2024-07-14 RX ORDER — SODIUM CHLORIDE 0.9 % (FLUSH) 0.9 %
5-40 SYRINGE (ML) INJECTION EVERY 12 HOURS SCHEDULED
Status: DISCONTINUED | OUTPATIENT
Start: 2024-07-14 | End: 2024-07-16

## 2024-07-14 RX ORDER — FENTANYL CITRATE-0.9 % NACL/PF 10 MCG/ML
25-200 PLASTIC BAG, INJECTION (ML) INTRAVENOUS CONTINUOUS
Status: DISCONTINUED | OUTPATIENT
Start: 2024-07-14 | End: 2024-07-14

## 2024-07-14 RX ORDER — CEFAZOLIN SODIUM 1 G/3ML
INJECTION, POWDER, FOR SOLUTION INTRAMUSCULAR; INTRAVENOUS PRN
Status: DISCONTINUED | OUTPATIENT
Start: 2024-07-14 | End: 2024-07-14 | Stop reason: SDUPTHER

## 2024-07-14 RX ORDER — SODIUM CHLORIDE 9 MG/ML
INJECTION, SOLUTION INTRAVENOUS CONTINUOUS
Status: DISCONTINUED | OUTPATIENT
Start: 2024-07-14 | End: 2024-07-14

## 2024-07-14 RX ORDER — LABETALOL HYDROCHLORIDE 5 MG/ML
20 INJECTION, SOLUTION INTRAVENOUS ONCE
Status: COMPLETED | OUTPATIENT
Start: 2024-07-14 | End: 2024-07-14

## 2024-07-14 RX ORDER — ROCURONIUM BROMIDE 10 MG/ML
INJECTION, SOLUTION INTRAVENOUS PRN
Status: DISCONTINUED | OUTPATIENT
Start: 2024-07-14 | End: 2024-07-14 | Stop reason: SDUPTHER

## 2024-07-14 RX ORDER — DEXTROSE MONOHYDRATE AND SODIUM CHLORIDE 5; .45 G/100ML; G/100ML
INJECTION, SOLUTION INTRAVENOUS CONTINUOUS
Status: DISCONTINUED | OUTPATIENT
Start: 2024-07-14 | End: 2024-07-17

## 2024-07-14 RX ORDER — LABETALOL HYDROCHLORIDE 5 MG/ML
5 INJECTION, SOLUTION INTRAVENOUS ONCE
Status: DISCONTINUED | OUTPATIENT
Start: 2024-07-14 | End: 2024-07-14

## 2024-07-14 RX ORDER — SODIUM CHLORIDE 0.9 % (FLUSH) 0.9 %
5-40 SYRINGE (ML) INJECTION PRN
Status: DISCONTINUED | OUTPATIENT
Start: 2024-07-14 | End: 2024-07-23 | Stop reason: HOSPADM

## 2024-07-14 RX ORDER — CALCIUM GLUCONATE 94 MG/ML
1000 INJECTION, SOLUTION INTRAVENOUS PRN
Status: DISCONTINUED | OUTPATIENT
Start: 2024-07-14 | End: 2024-07-15

## 2024-07-14 RX ORDER — MAGNESIUM SULFATE HEPTAHYDRATE 40 MG/ML
4000 INJECTION, SOLUTION INTRAVENOUS ONCE
Status: COMPLETED | OUTPATIENT
Start: 2024-07-14 | End: 2024-07-14

## 2024-07-14 RX ORDER — MAGNESIUM HYDROXIDE 1200 MG/15ML
LIQUID ORAL CONTINUOUS PRN
Status: COMPLETED | OUTPATIENT
Start: 2024-07-14 | End: 2024-07-14

## 2024-07-14 RX ADMIN — SODIUM CHLORIDE, POTASSIUM CHLORIDE, SODIUM LACTATE AND CALCIUM CHLORIDE: 600; 310; 30; 20 INJECTION, SOLUTION INTRAVENOUS at 03:48

## 2024-07-14 RX ADMIN — Medication 2000 MG: at 23:15

## 2024-07-14 RX ADMIN — SODIUM CHLORIDE, PRESERVATIVE FREE 10 ML: 5 INJECTION INTRAVENOUS at 10:05

## 2024-07-14 RX ADMIN — DEXTROSE AND SODIUM CHLORIDE: 5; 450 INJECTION, SOLUTION INTRAVENOUS at 14:30

## 2024-07-14 RX ADMIN — LABETALOL HYDROCHLORIDE 20 MG: 5 INJECTION, SOLUTION INTRAVENOUS at 21:30

## 2024-07-14 RX ADMIN — GABAPENTIN 300 MG: 300 CAPSULE ORAL at 10:15

## 2024-07-14 RX ADMIN — METHOCARBAMOL 750 MG: 750 TABLET ORAL at 13:03

## 2024-07-14 RX ADMIN — ACETAMINOPHEN 1000 MG: 500 TABLET ORAL at 05:43

## 2024-07-14 RX ADMIN — DEXTROSE AND SODIUM CHLORIDE: 5; 450 INJECTION, SOLUTION INTRAVENOUS at 09:16

## 2024-07-14 RX ADMIN — QUETIAPINE FUMARATE 100 MG: 100 TABLET ORAL at 23:15

## 2024-07-14 RX ADMIN — METHOCARBAMOL 750 MG: 750 TABLET ORAL at 21:30

## 2024-07-14 RX ADMIN — INSULIN HUMAN 10 UNITS: 100 INJECTION, SOLUTION PARENTERAL at 00:19

## 2024-07-14 RX ADMIN — QUETIAPINE FUMARATE 125 MG: 100 TABLET ORAL at 07:49

## 2024-07-14 RX ADMIN — ROCURONIUM BROMIDE 50 MG: 10 INJECTION INTRAVENOUS at 18:43

## 2024-07-14 RX ADMIN — DEXTROSE AND SODIUM CHLORIDE: 5; 450 INJECTION, SOLUTION INTRAVENOUS at 22:02

## 2024-07-14 RX ADMIN — PROPOFOL 40 MCG/KG/MIN: 10 INJECTION, EMULSION INTRAVENOUS at 14:32

## 2024-07-14 RX ADMIN — ACETAMINOPHEN 1000 MG: 500 TABLET ORAL at 13:07

## 2024-07-14 RX ADMIN — METHOCARBAMOL 750 MG: 750 TABLET ORAL at 07:50

## 2024-07-14 RX ADMIN — ACETAMINOPHEN 1000 MG: 500 TABLET ORAL at 21:31

## 2024-07-14 RX ADMIN — MAGNESIUM SULFATE HEPTAHYDRATE 4000 MG: 40 INJECTION, SOLUTION INTRAVENOUS at 02:00

## 2024-07-14 RX ADMIN — SODIUM CHLORIDE, POTASSIUM CHLORIDE, SODIUM LACTATE AND CALCIUM CHLORIDE: 600; 310; 30; 20 INJECTION, SOLUTION INTRAVENOUS at 18:41

## 2024-07-14 RX ADMIN — MAGNESIUM SULFATE HEPTAHYDRATE 2000 MG/HR: 40 INJECTION, SOLUTION INTRAVENOUS at 02:45

## 2024-07-14 RX ADMIN — MAGNESIUM SULFATE HEPTAHYDRATE 2000 MG/HR: 40 INJECTION, SOLUTION INTRAVENOUS at 13:13

## 2024-07-14 RX ADMIN — ALBUMIN (HUMAN) 25 G: 12.5 INJECTION, SOLUTION INTRAVENOUS at 00:22

## 2024-07-14 RX ADMIN — PROPOFOL 35 MCG/KG/MIN: 10 INJECTION, EMULSION INTRAVENOUS at 05:42

## 2024-07-14 RX ADMIN — CEFAZOLIN 2 G: 1 INJECTION, POWDER, FOR SOLUTION INTRAMUSCULAR; INTRAVENOUS at 19:08

## 2024-07-14 RX ADMIN — SODIUM CHLORIDE, PRESERVATIVE FREE 10 ML: 5 INJECTION INTRAVENOUS at 10:03

## 2024-07-14 RX ADMIN — GABAPENTIN 300 MG: 300 CAPSULE ORAL at 02:49

## 2024-07-14 RX ADMIN — PROPOFOL 40 MCG/KG/MIN: 10 INJECTION, EMULSION INTRAVENOUS at 10:11

## 2024-07-14 RX ADMIN — Medication 2000 MG: at 10:15

## 2024-07-14 RX ADMIN — SODIUM CHLORIDE, PRESERVATIVE FREE 10 ML: 5 INJECTION INTRAVENOUS at 10:06

## 2024-07-14 RX ADMIN — PANTOPRAZOLE SODIUM 40 MG: 40 TABLET, DELAYED RELEASE ORAL at 05:44

## 2024-07-14 RX ADMIN — Medication 50 MCG/HR: at 08:35

## 2024-07-14 RX ADMIN — IBUPROFEN 600 MG: 600 TABLET ORAL at 13:03

## 2024-07-14 RX ADMIN — IBUPROFEN 600 MG: 600 TABLET ORAL at 23:15

## 2024-07-14 RX ADMIN — DEXTROSE MONOHYDRATE 250 ML: 100 INJECTION, SOLUTION INTRAVENOUS at 00:18

## 2024-07-14 RX ADMIN — DEXTROSE MONOHYDRATE 125 ML: 100 INJECTION, SOLUTION INTRAVENOUS at 07:43

## 2024-07-14 NOTE — ANESTHESIA PRE PROCEDURE
\"LABABO\"    Drug/Infectious Status (If Applicable):  Lab Results   Component Value Date/Time    HEPCAB REACTIVE 12/08/2023 03:50 PM       COVID-19 Screening (If Applicable):   Lab Results   Component Value Date/Time    COVID19 Not Detected 04/12/2021 11:00 AM           Anesthesia Evaluation  Patient summary reviewed   no history of anesthetic complications:   Airway: Mallampati: Unable to assess / NA         Intubated Dental:          Pulmonary:                              Cardiovascular:    (+) hypertension:                  Neuro/Psych:   (+) psychiatric history:            GI/Hepatic/Renal:   (+) hepatitis: C, liver disease:         ROS comment: Open abdomen ongoing bleeding..   Endo/Other:    (+) Diabetes.                  ROS comment: S/p C/S. Pre-e. Abdominal:             Vascular:          Other Findings:             Anesthesia Plan      general     ASA 4       Induction: intravenous and inhalational.          Plan discussed with CRNA.                    Mai Alcazar MD   7/14/2024

## 2024-07-14 NOTE — LACTATION NOTE
Nursing staff has requested setup and demonstration of pump use.  Pt was able to let staff know she wishes to pump her breast milk for the babies.  Pt currently intubated and under heavy sedation and will be returning to surgery today.  Pump setup and demonstration given along with written instructions.  Plan is to start pumping after surgery.

## 2024-07-15 ENCOUNTER — APPOINTMENT (OUTPATIENT)
Dept: GENERAL RADIOLOGY | Age: 34
DRG: 540 | End: 2024-07-15
Payer: MEDICAID

## 2024-07-15 ENCOUNTER — ANESTHESIA EVENT (OUTPATIENT)
Dept: OPERATING ROOM | Age: 34
End: 2024-07-15
Payer: MEDICAID

## 2024-07-15 PROBLEM — Z98.891 S/P CESAREAN SECTION: Status: ACTIVE | Noted: 2024-07-15

## 2024-07-15 PROBLEM — R58 INTRA-ABDOMINAL BLEEDING: Status: ACTIVE | Noted: 2024-07-15

## 2024-07-15 LAB
ALBUMIN SERPL-MCNC: 2.5 G/DL (ref 3.5–5.2)
ALBUMIN/GLOB SERPL: 2 {RATIO} (ref 1–2.5)
ALLEN TEST: ABNORMAL
ALP SERPL-CCNC: 103 U/L (ref 35–104)
ALT SERPL-CCNC: 316 U/L (ref 10–35)
AMMONIA PLAS-SCNC: 30 UMOL/L (ref 11–51)
ANION GAP SERPL CALCULATED.3IONS-SCNC: 10 MMOL/L (ref 9–16)
AST SERPL-CCNC: 1636 U/L (ref 10–35)
BASOPHILS # BLD: <0.03 K/UL (ref 0–0.2)
BASOPHILS NFR BLD: 0 % (ref 0–2)
BILIRUB DIRECT SERPL-MCNC: 1.4 MG/DL (ref 0–0.2)
BILIRUB INDIRECT SERPL-MCNC: 0.5 MG/DL (ref 0–1)
BILIRUB SERPL-MCNC: 1.9 MG/DL (ref 0–1.2)
BLOOD BANK BLOOD PRODUCT EXPIRATION DATE: NORMAL
BLOOD BANK BLOOD PRODUCT EXPIRATION DATE: NORMAL
BLOOD BANK DISPENSE STATUS: NORMAL
BLOOD BANK ISBT PRODUCT BLOOD TYPE: 5100
BLOOD BANK ISBT PRODUCT BLOOD TYPE: 6200
BLOOD BANK PRODUCT CODE: NORMAL
BLOOD BANK PRODUCT CODE: NORMAL
BLOOD BANK UNIT TYPE AND RH: NORMAL
BLOOD BANK UNIT TYPE AND RH: NORMAL
BPU ID: NORMAL
BUN BLD-MCNC: 9 MG/DL (ref 8–26)
BUN SERPL-MCNC: 17 MG/DL (ref 6–20)
CA-I BLD-SCNC: 0.99 MMOL/L (ref 1.13–1.33)
CA-I BLD-SCNC: 1.1 MMOL/L (ref 1.15–1.33)
CALCIUM SERPL-MCNC: 6.8 MG/DL (ref 8.6–10.4)
CHLORIDE BLD-SCNC: 106 MMOL/L (ref 98–107)
CHLORIDE SERPL-SCNC: 104 MMOL/L (ref 98–107)
CO2 BLD CALC-SCNC: 15 MMOL/L (ref 22–30)
CO2 SERPL-SCNC: 20 MMOL/L (ref 20–31)
COMPONENT: NORMAL
CREAT SERPL-MCNC: 1.3 MG/DL (ref 0.5–0.9)
CRITICAL ACTION: NORMAL
CRITICAL NOTIFICATION DATE/TIME: NORMAL
CRITICAL NOTIFICATION: NORMAL
CRITICAL VALUE READ BACK: YES
EGFR, POC: >90 ML/MIN/1.73M2
EKG ATRIAL RATE: 44 BPM
EKG P AXIS: 46 DEGREES
EKG P-R INTERVAL: 176 MS
EKG Q-T INTERVAL: 544 MS
EKG QRS DURATION: 84 MS
EKG QTC CALCULATION (BAZETT): 465 MS
EKG R AXIS: 23 DEGREES
EKG T AXIS: 28 DEGREES
EKG VENTRICULAR RATE: 44 BPM
EOSINOPHIL # BLD: <0.03 K/UL (ref 0–0.44)
EOSINOPHILS RELATIVE PERCENT: 0 % (ref 1–4)
ERYTHROCYTE [DISTWIDTH] IN BLOOD BY AUTOMATED COUNT: 17.2 % (ref 11.8–14.4)
FIBRINOGEN PPP-MCNC: 249 MG/DL (ref 203–521)
FIO2: 30
FIO2: 30
GFR, ESTIMATED: 55 ML/MIN/1.73M2
GLOBULIN SER CALC-MCNC: 1.5 G/DL
GLUCOSE BLD-MCNC: 123 MG/DL (ref 65–105)
GLUCOSE BLD-MCNC: 132 MG/DL (ref 65–105)
GLUCOSE BLD-MCNC: 136 MG/DL (ref 74–100)
GLUCOSE BLD-MCNC: 304 MG/DL (ref 74–100)
GLUCOSE SERPL-MCNC: 137 MG/DL (ref 74–99)
HCT VFR BLD AUTO: 14 % (ref 36–46)
HCT VFR BLD AUTO: 28.8 % (ref 36.3–47.1)
HGB BLD-MCNC: 9.8 G/DL (ref 11.9–15.1)
IMM GRANULOCYTES # BLD AUTO: 0.11 K/UL (ref 0–0.3)
IMM GRANULOCYTES NFR BLD: 1 %
INR PPP: 1
LIPASE SERPL-CCNC: 67 U/L (ref 13–60)
LYMPHOCYTES NFR BLD: 0.98 K/UL (ref 1.1–3.7)
LYMPHOCYTES RELATIVE PERCENT: 8 % (ref 24–43)
MAGNESIUM SERPL-MCNC: 6.7 MG/DL (ref 1.6–2.6)
MCH RBC QN AUTO: 29.3 PG (ref 25.2–33.5)
MCHC RBC AUTO-ENTMCNC: 34 G/DL (ref 28.4–34.8)
MCV RBC AUTO: 86.2 FL (ref 82.6–102.9)
MODE: ABNORMAL
MONOCYTES NFR BLD: 0.58 K/UL (ref 0.1–1.2)
MONOCYTES NFR BLD: 5 % (ref 3–12)
NEGATIVE BASE EXCESS, ART: 12.5 MMOL/L (ref 0–2)
NEGATIVE BASE EXCESS, ART: 3.4 MMOL/L (ref 0–2)
NEUTROPHILS NFR BLD: 86 % (ref 36–65)
NEUTS SEG NFR BLD: 11.03 K/UL (ref 1.5–8.1)
NRBC BLD-RTO: 0.4 PER 100 WBC
O2 DELIVERY DEVICE: ABNORMAL
O2 DELIVERY DEVICE: ABNORMAL
PHOSPHATE SERPL-MCNC: 5.8 MG/DL (ref 2.5–4.5)
PLATELET # BLD AUTO: 98 K/UL (ref 138–453)
PMV BLD AUTO: 12.9 FL (ref 8.1–13.5)
POC ANION GAP: 20 MMOL/L (ref 7–16)
POC CREATININE: 0.7 MG/DL (ref 0.51–1.19)
POC HCO3: 15.1 MMOL/L (ref 21–28)
POC HCO3: 22.4 MMOL/L (ref 21–28)
POC HEMOGLOBIN (CALC): 4.9 G/DL (ref 12–16)
POC LACTIC ACID: 1.7 MMOL/L (ref 0.56–1.39)
POC LACTIC ACID: 11.8 MMOL/L (ref 0.56–1.39)
POC O2 SATURATION: 98.6 % (ref 94–98)
POC O2 SATURATION: 98.9 % (ref 94–98)
POC PCO2: 42.8 MM HG (ref 35–48)
POC PCO2: 44.1 MM HG (ref 35–48)
POC PH: 7.14 (ref 7.35–7.45)
POC PH: 7.33 (ref 7.35–7.45)
POC PO2: 125.4 MM HG (ref 83–108)
POC PO2: 163.3 MM HG (ref 83–108)
POTASSIUM BLD-SCNC: 4.8 MMOL/L (ref 3.5–4.5)
POTASSIUM SERPL-SCNC: 4.6 MMOL/L (ref 3.7–5.3)
PROT SERPL-MCNC: 4 G/DL (ref 6.6–8.7)
PROTHROMBIN TIME: 13.5 SEC (ref 11.7–14.9)
RBC # BLD AUTO: 3.34 M/UL (ref 3.95–5.11)
RBC # BLD: ABNORMAL 10*6/UL
SAMPLE SITE: ABNORMAL
SAMPLE SITE: ABNORMAL
SODIUM BLD-SCNC: 140 MMOL/L (ref 138–146)
SODIUM SERPL-SCNC: 134 MMOL/L (ref 136–145)
TRANSFUSION STATUS: NORMAL
UNIT DIVISION: 0
UNIT ISSUE DATE/TIME: NORMAL
UNIT ISSUE DATE/TIME: NORMAL
WBC OTHER # BLD: 12.7 K/UL (ref 3.5–11.3)

## 2024-07-15 PROCEDURE — 82140 ASSAY OF AMMONIA: CPT

## 2024-07-15 PROCEDURE — 99291 CRITICAL CARE FIRST HOUR: CPT | Performed by: SURGERY

## 2024-07-15 PROCEDURE — 85384 FIBRINOGEN ACTIVITY: CPT

## 2024-07-15 PROCEDURE — 2500000003 HC RX 250 WO HCPCS: Performed by: STUDENT IN AN ORGANIZED HEALTH CARE EDUCATION/TRAINING PROGRAM

## 2024-07-15 PROCEDURE — 82330 ASSAY OF CALCIUM: CPT

## 2024-07-15 PROCEDURE — 82803 BLOOD GASES ANY COMBINATION: CPT

## 2024-07-15 PROCEDURE — 84100 ASSAY OF PHOSPHORUS: CPT

## 2024-07-15 PROCEDURE — 71045 X-RAY EXAM CHEST 1 VIEW: CPT

## 2024-07-15 PROCEDURE — 2700000000 HC OXYGEN THERAPY PER DAY

## 2024-07-15 PROCEDURE — 83690 ASSAY OF LIPASE: CPT

## 2024-07-15 PROCEDURE — 2580000003 HC RX 258: Performed by: STUDENT IN AN ORGANIZED HEALTH CARE EDUCATION/TRAINING PROGRAM

## 2024-07-15 PROCEDURE — 2000000000 HC ICU R&B

## 2024-07-15 PROCEDURE — 6360000002 HC RX W HCPCS: Performed by: STUDENT IN AN ORGANIZED HEALTH CARE EDUCATION/TRAINING PROGRAM

## 2024-07-15 PROCEDURE — 85025 COMPLETE CBC W/AUTO DIFF WBC: CPT

## 2024-07-15 PROCEDURE — 83735 ASSAY OF MAGNESIUM: CPT

## 2024-07-15 PROCEDURE — 85610 PROTHROMBIN TIME: CPT

## 2024-07-15 PROCEDURE — 6370000000 HC RX 637 (ALT 250 FOR IP): Performed by: STUDENT IN AN ORGANIZED HEALTH CARE EDUCATION/TRAINING PROGRAM

## 2024-07-15 PROCEDURE — 36415 COLL VENOUS BLD VENIPUNCTURE: CPT

## 2024-07-15 PROCEDURE — 94003 VENT MGMT INPAT SUBQ DAY: CPT

## 2024-07-15 PROCEDURE — 37799 UNLISTED PX VASCULAR SURGERY: CPT

## 2024-07-15 PROCEDURE — 83605 ASSAY OF LACTIC ACID: CPT

## 2024-07-15 PROCEDURE — 82947 ASSAY GLUCOSE BLOOD QUANT: CPT

## 2024-07-15 PROCEDURE — 80048 BASIC METABOLIC PNL TOTAL CA: CPT

## 2024-07-15 PROCEDURE — 80076 HEPATIC FUNCTION PANEL: CPT

## 2024-07-15 PROCEDURE — 2500000003 HC RX 250 WO HCPCS

## 2024-07-15 PROCEDURE — 99231 SBSQ HOSP IP/OBS SF/LOW 25: CPT | Performed by: STUDENT IN AN ORGANIZED HEALTH CARE EDUCATION/TRAINING PROGRAM

## 2024-07-15 PROCEDURE — 6360000002 HC RX W HCPCS

## 2024-07-15 PROCEDURE — 94761 N-INVAS EAR/PLS OXIMETRY MLT: CPT

## 2024-07-15 RX ORDER — INSULIN LISPRO 100 [IU]/ML
0-16 INJECTION, SOLUTION INTRAVENOUS; SUBCUTANEOUS EVERY 4 HOURS
Status: DISCONTINUED | OUTPATIENT
Start: 2024-07-15 | End: 2024-07-17

## 2024-07-15 RX ORDER — CALCIUM GLUCONATE 20 MG/ML
2000 INJECTION, SOLUTION INTRAVENOUS ONCE
Status: COMPLETED | OUTPATIENT
Start: 2024-07-15 | End: 2024-07-15

## 2024-07-15 RX ORDER — HEPARIN SODIUM 5000 [USP'U]/ML
5000 INJECTION, SOLUTION INTRAVENOUS; SUBCUTANEOUS EVERY 8 HOURS SCHEDULED
Status: DISCONTINUED | OUTPATIENT
Start: 2024-07-15 | End: 2024-07-18

## 2024-07-15 RX ADMIN — DEXTROSE AND SODIUM CHLORIDE: 5; 450 INJECTION, SOLUTION INTRAVENOUS at 13:58

## 2024-07-15 RX ADMIN — Medication 2000 MG: at 11:12

## 2024-07-15 RX ADMIN — SODIUM CHLORIDE, PRESERVATIVE FREE 40 MG: 5 INJECTION INTRAVENOUS at 11:28

## 2024-07-15 RX ADMIN — Medication 2000 MG: at 22:30

## 2024-07-15 RX ADMIN — CALCIUM GLUCONATE 2000 MG: 20 INJECTION, SOLUTION INTRAVENOUS at 09:37

## 2024-07-15 RX ADMIN — PROPOFOL 40 MCG/KG/MIN: 10 INJECTION, EMULSION INTRAVENOUS at 01:40

## 2024-07-15 RX ADMIN — IBUPROFEN 600 MG: 600 TABLET ORAL at 17:25

## 2024-07-15 RX ADMIN — SODIUM CHLORIDE, PRESERVATIVE FREE 10 ML: 5 INJECTION INTRAVENOUS at 09:17

## 2024-07-15 RX ADMIN — PROPOFOL 40 MCG/KG/MIN: 10 INJECTION, EMULSION INTRAVENOUS at 21:14

## 2024-07-15 RX ADMIN — SODIUM CHLORIDE, PRESERVATIVE FREE 10 ML: 5 INJECTION INTRAVENOUS at 22:08

## 2024-07-15 RX ADMIN — ACETAMINOPHEN 1000 MG: 500 TABLET ORAL at 06:23

## 2024-07-15 RX ADMIN — PROPOFOL 40 MCG/KG/MIN: 10 INJECTION, EMULSION INTRAVENOUS at 11:27

## 2024-07-15 RX ADMIN — GABAPENTIN 300 MG: 300 CAPSULE ORAL at 11:15

## 2024-07-15 RX ADMIN — QUETIAPINE FUMARATE 100 MG: 100 TABLET ORAL at 22:23

## 2024-07-15 RX ADMIN — IBUPROFEN 600 MG: 600 TABLET ORAL at 06:23

## 2024-07-15 RX ADMIN — GABAPENTIN 300 MG: 300 CAPSULE ORAL at 17:24

## 2024-07-15 RX ADMIN — IBUPROFEN 600 MG: 600 TABLET ORAL at 13:45

## 2024-07-15 RX ADMIN — SODIUM CHLORIDE, PRESERVATIVE FREE 40 MG: 5 INJECTION INTRAVENOUS at 22:08

## 2024-07-15 RX ADMIN — PROPOFOL 40 MCG/KG/MIN: 10 INJECTION, EMULSION INTRAVENOUS at 06:10

## 2024-07-15 RX ADMIN — METHOCARBAMOL 750 MG: 750 TABLET ORAL at 07:37

## 2024-07-15 RX ADMIN — QUETIAPINE FUMARATE 100 MG: 100 TABLET ORAL at 07:37

## 2024-07-15 RX ADMIN — HEPARIN SODIUM 5000 UNITS: 5000 INJECTION INTRAVENOUS; SUBCUTANEOUS at 22:24

## 2024-07-15 RX ADMIN — Medication 100 MCG/HR: at 12:02

## 2024-07-15 RX ADMIN — SODIUM CHLORIDE, PRESERVATIVE FREE 10 ML: 5 INJECTION INTRAVENOUS at 09:18

## 2024-07-15 RX ADMIN — HEPARIN SODIUM 5000 UNITS: 5000 INJECTION INTRAVENOUS; SUBCUTANEOUS at 17:24

## 2024-07-15 RX ADMIN — Medication 0.2 MG/KG/HR: at 01:16

## 2024-07-15 RX ADMIN — GABAPENTIN 300 MG: 300 CAPSULE ORAL at 02:26

## 2024-07-15 RX ADMIN — PROPOFOL 40 MCG/KG/MIN: 10 INJECTION, EMULSION INTRAVENOUS at 15:53

## 2024-07-15 RX ADMIN — Medication 100 MCG/HR: at 01:18

## 2024-07-15 NOTE — CONSULTS
Nutrition Note    Consulted for TF recommendations. Already assessed by RD on this date. Noted feeds of Immune Enhancing formula started. If TF goal for Immune Enhancing formula desired, recommend rate of 60 ml/hr. See full note for additional details.    Electronically signed by Susan New MS, RD, LD on 7/15/24 at 3:40 PM EDT    Contact: [phone]

## 2024-07-15 NOTE — ANESTHESIA PRE PROCEDURE
0.9 % sodium chloride infusion   IntraVENous PRN Yasmine Real DO   Stopped at 24 0842   • ondansetron (ZOFRAN) injection 4 mg  4 mg IntraVENous Q6H PRN Yasmine Real DO           Allergies:    Allergies   Allergen Reactions   • Ceftin [Cefuroxime Axetil]    • Nicotine        Problem List:    Patient Active Problem List   Diagnosis Code   • H/O LEEP 21 Z98.890   • Abnormal Pap Smear R87.613   • Bipolar 1 disorder, depressed, severe (HCC) F31.4   • Viral hepatitis C B19.20   • Hx Gestational diabetes mellitus O24.419   • Polysubstance Drug Use F11.20, F19.20   • Thrombocytopenia affecting pregnancy O99.119, D69.6   • G3 Dichorionic diamniotic twin pregnancy O30.041   • Hx Left salpingoophorectomy  Z90.721   • Chroid Plexus Cysts (Baby A) O28.3   • Echogenic Intracardiac Focus (baby B) O28.3   • Breech presentation (babyB) O32.1XX0   • Low-lying placenta (baby A) O44.40   • Pruritus of pregnancy in second trimester O99.712, L29.9   • cHTN (no meds) O10.919   • 37 weeks gestation of pregnancy Z3A.37   • PLTCS with ex lap, survey abdomen, abthera wound vac 24 M/M WT 5#15, 6#13 Apg 89 and  O82       Past Medical History:        Diagnosis Date   • Abnormal cells of cervix    • ADD (attention deficit disorder)    • Anemia    • Complication of anesthesia    • Condyloma    • Depression    • Gestational diabetes mellitus    • HSIL (high grade squamous intraepithelial lesion) on Pap smear of cervix    • Liver disease    • MRSA (methicillin resistant staph aureus) culture positive 2011    BUTTOCK   • Severe dysplasia of cervix    • STD (sexually transmitted disease)    • Substance abuse (HCC)     heroin / on 18 pt states last used 3 yrs ago,marijuana 21. pt on buprenorphine SL every am       Past Surgical History:        Procedure Laterality Date   •  SECTION N/A 2024     SECTION performed by Sandra Gallegos MD at [facility] L&D OR   • LAPAROTOMY N/A 2024

## 2024-07-15 NOTE — CARE COORDINATION
Social Work    Mom remains in ICU, intubated, room 1004 Car [address].    CS ongoing CW is Alma Delia Kapoor    Assigned Investigative Cw is Kira Flannery reports that pt will not dc to mom.      Placement not yet identified, being worked on today.    Cw will need informed on dc time when it is known.      Sw to coordinate between medical and CS

## 2024-07-15 NOTE — CARE COORDINATION
[place] Quality Flow/Interdisciplinary Rounds Progress Note    Quality Flow Rounds held on July 15, 2024 at 0930    Disciplines Attending:  Bedside Nurse and     Barriers to Discharge: Intubation, open site.    Anticipated Discharge Date:   To be determined.    Anticipated Discharge Disposition:Home    Readmission Risk              Risk of Unplanned Readmission:  19           Discussed patient goal for the day, patient clinical progression, and barriers to discharge.  The following Goal(s) of the Day/Commitment(s) have been identified:   Patient remains intubated. Will return to surgery on tomorrow to close.      ORTEGA PHILLIPS  July 15, 2024

## 2024-07-16 ENCOUNTER — APPOINTMENT (OUTPATIENT)
Dept: GENERAL RADIOLOGY | Age: 34
DRG: 540 | End: 2024-07-16
Payer: MEDICAID

## 2024-07-16 ENCOUNTER — ANESTHESIA (OUTPATIENT)
Dept: OPERATING ROOM | Age: 34
End: 2024-07-16
Payer: MEDICAID

## 2024-07-16 LAB
ABO/RH: NORMAL
ALBUMIN SERPL-MCNC: 2.1 G/DL (ref 3.5–5.2)
ALBUMIN/GLOB SERPL: 1 {RATIO} (ref 1–2.5)
ALLEN TEST: ABNORMAL
ALP SERPL-CCNC: 94 U/L (ref 35–104)
ALT SERPL-CCNC: 82 U/L (ref 10–35)
AMMONIA PLAS-SCNC: 32 UMOL/L (ref 11–51)
ANION GAP SERPL CALCULATED.3IONS-SCNC: 6 MMOL/L (ref 9–16)
ANION GAP SERPL CALCULATED.3IONS-SCNC: 7 MMOL/L (ref 9–16)
ANTIBODY SCREEN: NEGATIVE
ARM BAND NUMBER: NORMAL
ARTERIAL PATENCY WRIST A: ABNORMAL
AST SERPL-CCNC: 401 U/L (ref 10–35)
BASOPHILS # BLD: 0 K/UL (ref 0–0.2)
BASOPHILS # BLD: 0 K/UL (ref 0–0.2)
BASOPHILS NFR BLD: 0 % (ref 0–2)
BASOPHILS NFR BLD: 0 % (ref 0–2)
BILIRUB DIRECT SERPL-MCNC: 0.8 MG/DL (ref 0–0.2)
BILIRUB INDIRECT SERPL-MCNC: 0.1 MG/DL (ref 0–1)
BILIRUB SERPL-MCNC: 0.9 MG/DL (ref 0–1.2)
BLOOD BANK BLOOD PRODUCT EXPIRATION DATE: NORMAL
BLOOD BANK DISPENSE STATUS: NORMAL
BLOOD BANK ISBT PRODUCT BLOOD TYPE: 600
BLOOD BANK ISBT PRODUCT BLOOD TYPE: 6200
BLOOD BANK PRODUCT CODE: NORMAL
BLOOD BANK SAMPLE EXPIRATION: NORMAL
BLOOD BANK UNIT TYPE AND RH: NORMAL
BODY TEMPERATURE: 37
BPU ID: NORMAL
BUN SERPL-MCNC: 27 MG/DL (ref 6–20)
BUN SERPL-MCNC: 30 MG/DL (ref 6–20)
CA-I BLD-SCNC: 1.04 MMOL/L (ref 1.13–1.33)
CA-I BLD-SCNC: 1.06 MMOL/L (ref 1.13–1.33)
CA-I BLD-SCNC: 1.08 MMOL/L (ref 1.13–1.33)
CALCIUM SERPL-MCNC: 6.9 MG/DL (ref 8.6–10.4)
CALCIUM SERPL-MCNC: 7 MG/DL (ref 8.6–10.4)
CHLORIDE SERPL-SCNC: 103 MMOL/L (ref 98–107)
CHLORIDE SERPL-SCNC: 104 MMOL/L (ref 98–107)
CHLORIDE, WHOLE BLOOD: 104 MMOL/L (ref 98–110)
CO2 SERPL-SCNC: 22 MMOL/L (ref 20–31)
CO2 SERPL-SCNC: 22 MMOL/L (ref 20–31)
COHGB MFR BLD: 1.8 % (ref 0–5)
COMPONENT: NORMAL
CREAT SERPL-MCNC: 1.3 MG/DL (ref 0.5–0.9)
CREAT SERPL-MCNC: 1.3 MG/DL (ref 0.5–0.9)
CROSSMATCH RESULT: NORMAL
EOSINOPHIL # BLD: 0 K/UL (ref 0–0.4)
EOSINOPHIL # BLD: 0 K/UL (ref 0–0.44)
EOSINOPHILS RELATIVE PERCENT: 0 % (ref 1–4)
EOSINOPHILS RELATIVE PERCENT: 0 % (ref 1–4)
ERYTHROCYTE [DISTWIDTH] IN BLOOD BY AUTOMATED COUNT: 17 % (ref 11.8–14.4)
ERYTHROCYTE [DISTWIDTH] IN BLOOD BY AUTOMATED COUNT: 17.2 % (ref 11.8–14.4)
FIBRINOGEN PPP-MCNC: 346 MG/DL (ref 203–521)
FIO2 ON VENT: ABNORMAL %
FIO2: 30
GFR, ESTIMATED: 55 ML/MIN/1.73M2
GFR, ESTIMATED: 57 ML/MIN/1.73M2
GLOBULIN SER CALC-MCNC: 1.7 G/DL
GLUCOSE BLD-MCNC: 117 MG/DL (ref 65–105)
GLUCOSE BLD-MCNC: 121 MG/DL (ref 74–100)
GLUCOSE BLD-MCNC: 122 MG/DL (ref 65–105)
GLUCOSE BLD-MCNC: 122 MG/DL (ref 65–105)
GLUCOSE BLD-MCNC: 124 MG/DL (ref 65–105)
GLUCOSE BLD-MCNC: 129 MG/DL (ref 74–100)
GLUCOSE BLD-MCNC: 132 MG/DL (ref 65–105)
GLUCOSE BLD-MCNC: 132 MG/DL (ref 74–100)
GLUCOSE SERPL-MCNC: 127 MG/DL (ref 74–99)
GLUCOSE SERPL-MCNC: 128 MG/DL (ref 74–99)
HCO3 ARTERIAL: 19.7 MMOL/L (ref 22–27)
HCT VFR BLD AUTO: 24 % (ref 36.3–47.1)
HCT VFR BLD AUTO: 25.4 % (ref 36.3–47.1)
HCT VFR BLD CALC: 24.8 % (ref 36.3–47.1)
HEMOGLOBIN: 8 GM/DL (ref 11.9–15.1)
HGB BLD-MCNC: 7.9 G/DL (ref 11.9–15.1)
HGB BLD-MCNC: 8.4 G/DL (ref 11.9–15.1)
IMM GRANULOCYTES # BLD AUTO: 0.11 K/UL (ref 0–0.3)
IMM GRANULOCYTES # BLD AUTO: 0.12 K/UL (ref 0–0.3)
IMM GRANULOCYTES NFR BLD: 1 %
IMM GRANULOCYTES NFR BLD: 1 %
INR PPP: 1.2
LACTIC ACID, WHOLE BLOOD: 1.8 MMOL/L (ref 0.7–2.1)
LIPASE SERPL-CCNC: 30 U/L (ref 13–60)
LYMPHOCYTES NFR BLD: 0.6 K/UL (ref 1.1–3.7)
LYMPHOCYTES NFR BLD: 0.98 K/UL (ref 1–4.8)
LYMPHOCYTES RELATIVE PERCENT: 5 % (ref 24–43)
LYMPHOCYTES RELATIVE PERCENT: 9 % (ref 24–44)
MAGNESIUM SERPL-MCNC: 3.6 MG/DL (ref 1.6–2.6)
MAGNESIUM SERPL-MCNC: 4.1 MG/DL (ref 1.6–2.6)
MCH RBC QN AUTO: 29 PG (ref 25.2–33.5)
MCH RBC QN AUTO: 29.4 PG (ref 25.2–33.5)
MCHC RBC AUTO-ENTMCNC: 32.9 G/DL (ref 28.4–34.8)
MCHC RBC AUTO-ENTMCNC: 33.1 G/DL (ref 28.4–34.8)
MCV RBC AUTO: 88.2 FL (ref 82.6–102.9)
MCV RBC AUTO: 88.8 FL (ref 82.6–102.9)
MODE: ABNORMAL
MONOCYTES NFR BLD: 0.44 K/UL (ref 0.1–0.8)
MONOCYTES NFR BLD: 0.48 K/UL (ref 0.1–1.2)
MONOCYTES NFR BLD: 4 % (ref 1–7)
MONOCYTES NFR BLD: 4 % (ref 3–12)
MORPHOLOGY: ABNORMAL
NEGATIVE BASE EXCESS, ART: 2.3 MMOL/L (ref 0–2)
NEGATIVE BASE EXCESS, ART: 3.6 MMOL/L (ref 0–2)
NEGATIVE BASE EXCESS, ART: 3.8 MMOL/L (ref 0–2)
NEGATIVE BASE EXCESS, ART: 4 MMOL/L (ref 0–2)
NEUTROPHILS NFR BLD: 86 % (ref 36–66)
NEUTROPHILS NFR BLD: 90 % (ref 36–65)
NEUTS SEG NFR BLD: 10.8 K/UL (ref 1.5–8.1)
NEUTS SEG NFR BLD: 9.37 K/UL (ref 1.8–7.7)
NRBC BLD-RTO: 0 PER 100 WBC
NRBC BLD-RTO: 0.2 PER 100 WBC
NUCLEATED RED BLOOD CELLS: 1 PER 100 WBC
O2 DELIVERY DEVICE: ABNORMAL
O2 DELIVERY DEVICE: ABNORMAL
O2 SAT, ARTERIAL: 99.2 % (ref 94–100)
PCO2 ARTERIAL: 32.1 MMHG (ref 32–45)
PH ARTERIAL: 7.41 (ref 7.35–7.45)
PHOSPHATE SERPL-MCNC: 5.3 MG/DL (ref 2.5–4.5)
PHOSPHATE SERPL-MCNC: 6 MG/DL (ref 2.5–4.5)
PLATELET # BLD AUTO: 102 K/UL (ref 138–453)
PLATELET # BLD AUTO: ABNORMAL K/UL (ref 138–453)
PLATELET, FLUORESCENCE: 125 K/UL (ref 138–453)
PLATELETS.RETICULATED NFR BLD AUTO: 7.4 % (ref 1.1–10.3)
PMV BLD AUTO: 11.9 FL (ref 8.1–13.5)
PO2 ARTERIAL: 152 MMHG (ref 75–95)
POC HCO3: 21.1 MMOL/L (ref 21–28)
POC HCO3: 22.2 MMOL/L (ref 21–28)
POC HCO3: 23.1 MMOL/L (ref 21–28)
POC LACTIC ACID: 0.8 MMOL/L (ref 0.56–1.39)
POC LACTIC ACID: 0.9 MMOL/L (ref 0.56–1.39)
POC LACTIC ACID: 0.9 MMOL/L (ref 0.56–1.39)
POC O2 SATURATION: 98.5 % (ref 94–98)
POC O2 SATURATION: 99 % (ref 94–98)
POC O2 SATURATION: 99.2 % (ref 94–98)
POC PCO2: 35.4 MM HG (ref 35–48)
POC PCO2: 42.2 MM HG (ref 35–48)
POC PCO2: 44 MM HG (ref 35–48)
POC PH: 7.31 (ref 7.35–7.45)
POC PH: 7.35 (ref 7.35–7.45)
POC PH: 7.38 (ref 7.35–7.45)
POC PO2: 127 MM HG (ref 83–108)
POC PO2: 137.4 MM HG (ref 83–108)
POC PO2: 141 MM HG (ref 83–108)
POTASSIUM SERPL-SCNC: 4.5 MMOL/L (ref 3.7–5.3)
POTASSIUM SERPL-SCNC: 4.6 MMOL/L (ref 3.7–5.3)
POTASSIUM, WHOLE BLOOD: 4.3 MMOL/L (ref 3.6–5)
PROT SERPL-MCNC: 3.8 G/DL (ref 6.6–8.7)
PROTHROMBIN TIME: 15 SEC (ref 11.7–14.9)
RBC # BLD AUTO: 2.72 M/UL (ref 3.95–5.11)
RBC # BLD AUTO: 2.86 M/UL (ref 3.95–5.11)
SAMPLE SITE: ABNORMAL
SODIUM SERPL-SCNC: 132 MMOL/L (ref 136–145)
SODIUM SERPL-SCNC: 132 MMOL/L (ref 136–145)
SODIUM, WHOLE BLOOD: 130 MMOL/L (ref 136–145)
TRANSFUSION STATUS: NORMAL
UNIT DIVISION: 0
UNIT ISSUE DATE/TIME: NORMAL
WBC OTHER # BLD: 10.9 K/UL (ref 3.5–11.3)
WBC OTHER # BLD: 12 K/UL (ref 3.5–11.3)

## 2024-07-16 PROCEDURE — 3700000000 HC ANESTHESIA ATTENDED CARE: Performed by: SURGERY

## 2024-07-16 PROCEDURE — 80048 BASIC METABOLIC PNL TOTAL CA: CPT

## 2024-07-16 PROCEDURE — 83690 ASSAY OF LIPASE: CPT

## 2024-07-16 PROCEDURE — 36415 COLL VENOUS BLD VENIPUNCTURE: CPT

## 2024-07-16 PROCEDURE — 2500000003 HC RX 250 WO HCPCS: Performed by: STUDENT IN AN ORGANIZED HEALTH CARE EDUCATION/TRAINING PROGRAM

## 2024-07-16 PROCEDURE — 2000000000 HC ICU R&B

## 2024-07-16 PROCEDURE — 2580000003 HC RX 258: Performed by: STUDENT IN AN ORGANIZED HEALTH CARE EDUCATION/TRAINING PROGRAM

## 2024-07-16 PROCEDURE — 2500000003 HC RX 250 WO HCPCS

## 2024-07-16 PROCEDURE — 2580000003 HC RX 258: Performed by: SURGERY

## 2024-07-16 PROCEDURE — 2500000003 HC RX 250 WO HCPCS: Performed by: NURSE ANESTHETIST, CERTIFIED REGISTERED

## 2024-07-16 PROCEDURE — 7100000000 HC PACU RECOVERY - FIRST 15 MIN

## 2024-07-16 PROCEDURE — 3600000004 HC SURGERY LEVEL 4 BASE: Performed by: SURGERY

## 2024-07-16 PROCEDURE — 6370000000 HC RX 637 (ALT 250 FOR IP)

## 2024-07-16 PROCEDURE — 2580000003 HC RX 258

## 2024-07-16 PROCEDURE — 82330 ASSAY OF CALCIUM: CPT

## 2024-07-16 PROCEDURE — 3600000014 HC SURGERY LEVEL 4 ADDTL 15MIN: Performed by: SURGERY

## 2024-07-16 PROCEDURE — 99291 CRITICAL CARE FIRST HOUR: CPT | Performed by: SURGERY

## 2024-07-16 PROCEDURE — 6360000002 HC RX W HCPCS

## 2024-07-16 PROCEDURE — 82805 BLOOD GASES W/O2 SATURATION: CPT

## 2024-07-16 PROCEDURE — 7100000001 HC PACU RECOVERY - ADDTL 15 MIN

## 2024-07-16 PROCEDURE — 6360000002 HC RX W HCPCS: Performed by: NURSE ANESTHETIST, CERTIFIED REGISTERED

## 2024-07-16 PROCEDURE — 85018 HEMOGLOBIN: CPT

## 2024-07-16 PROCEDURE — 84100 ASSAY OF PHOSPHORUS: CPT

## 2024-07-16 PROCEDURE — 85014 HEMATOCRIT: CPT

## 2024-07-16 PROCEDURE — 85384 FIBRINOGEN ACTIVITY: CPT

## 2024-07-16 PROCEDURE — 84132 ASSAY OF SERUM POTASSIUM: CPT

## 2024-07-16 PROCEDURE — 85055 RETICULATED PLATELET ASSAY: CPT

## 2024-07-16 PROCEDURE — 80076 HEPATIC FUNCTION PANEL: CPT

## 2024-07-16 PROCEDURE — 85610 PROTHROMBIN TIME: CPT

## 2024-07-16 PROCEDURE — 2580000003 HC RX 258: Performed by: NURSE ANESTHETIST, CERTIFIED REGISTERED

## 2024-07-16 PROCEDURE — 94761 N-INVAS EAR/PLS OXIMETRY MLT: CPT

## 2024-07-16 PROCEDURE — 6360000002 HC RX W HCPCS: Performed by: STUDENT IN AN ORGANIZED HEALTH CARE EDUCATION/TRAINING PROGRAM

## 2024-07-16 PROCEDURE — 82947 ASSAY GLUCOSE BLOOD QUANT: CPT

## 2024-07-16 PROCEDURE — 71045 X-RAY EXAM CHEST 1 VIEW: CPT

## 2024-07-16 PROCEDURE — 74018 RADEX ABDOMEN 1 VIEW: CPT

## 2024-07-16 PROCEDURE — 37799 UNLISTED PX VASCULAR SURGERY: CPT

## 2024-07-16 PROCEDURE — 2720000010 HC SURG SUPPLY STERILE: Performed by: SURGERY

## 2024-07-16 PROCEDURE — 0W3F3ZZ CONTROL BLEEDING IN ABDOMINAL WALL, PERCUTANEOUS APPROACH: ICD-10-PCS | Performed by: SURGERY

## 2024-07-16 PROCEDURE — 83735 ASSAY OF MAGNESIUM: CPT

## 2024-07-16 PROCEDURE — 3700000001 HC ADD 15 MINUTES (ANESTHESIA): Performed by: SURGERY

## 2024-07-16 PROCEDURE — 83605 ASSAY OF LACTIC ACID: CPT

## 2024-07-16 PROCEDURE — 82803 BLOOD GASES ANY COMBINATION: CPT

## 2024-07-16 PROCEDURE — 94003 VENT MGMT INPAT SUBQ DAY: CPT

## 2024-07-16 PROCEDURE — 2709999900 HC NON-CHARGEABLE SUPPLY: Performed by: SURGERY

## 2024-07-16 PROCEDURE — 82140 ASSAY OF AMMONIA: CPT

## 2024-07-16 PROCEDURE — 85025 COMPLETE CBC W/AUTO DIFF WBC: CPT

## 2024-07-16 PROCEDURE — 2700000000 HC OXYGEN THERAPY PER DAY

## 2024-07-16 PROCEDURE — 0HR7XK3 REPLACEMENT OF ABDOMEN SKIN WITH NONAUTOLOGOUS TISSUE SUBSTITUTE, FULL THICKNESS, EXTERNAL APPROACH: ICD-10-PCS | Performed by: SURGERY

## 2024-07-16 PROCEDURE — 0WJG0ZZ INSPECTION OF PERITONEAL CAVITY, OPEN APPROACH: ICD-10-PCS | Performed by: SURGERY

## 2024-07-16 DEVICE — MICROMATRIX® UBM STANDARD PARTICULATE, 1000MG
Type: IMPLANTABLE DEVICE | Site: ABDOMEN | Status: FUNCTIONAL
Brand: MICROMATRIX®

## 2024-07-16 DEVICE — CYTAL® WOUND MATRIX 3-LAYER, 10CM X 15CM
Type: IMPLANTABLE DEVICE | Site: ABDOMEN | Status: FUNCTIONAL
Brand: CYTAL®

## 2024-07-16 DEVICE — MICROMATRIX® UBM STANDARD PARTICULATE, 500MG
Type: IMPLANTABLE DEVICE | Site: ABDOMEN | Status: FUNCTIONAL
Brand: MICROMATRIX®

## 2024-07-16 RX ORDER — CALCIUM GLUCONATE 20 MG/ML
2000 INJECTION, SOLUTION INTRAVENOUS ONCE
Status: COMPLETED | OUTPATIENT
Start: 2024-07-16 | End: 2024-07-16

## 2024-07-16 RX ORDER — ROCURONIUM BROMIDE 10 MG/ML
INJECTION, SOLUTION INTRAVENOUS PRN
Status: DISCONTINUED | OUTPATIENT
Start: 2024-07-16 | End: 2024-07-16 | Stop reason: SDUPTHER

## 2024-07-16 RX ORDER — CALCIUM GLUCONATE 20 MG/ML
1000 INJECTION, SOLUTION INTRAVENOUS ONCE
Status: COMPLETED | OUTPATIENT
Start: 2024-07-16 | End: 2024-07-16

## 2024-07-16 RX ORDER — FENTANYL CITRATE 50 UG/ML
INJECTION, SOLUTION INTRAMUSCULAR; INTRAVENOUS PRN
Status: DISCONTINUED | OUTPATIENT
Start: 2024-07-16 | End: 2024-07-16 | Stop reason: SDUPTHER

## 2024-07-16 RX ORDER — MAGNESIUM HYDROXIDE 1200 MG/15ML
LIQUID ORAL CONTINUOUS PRN
Status: COMPLETED | OUTPATIENT
Start: 2024-07-16 | End: 2024-07-16

## 2024-07-16 RX ORDER — CALCIUM GLUCONATE 20 MG/ML
1000 INJECTION, SOLUTION INTRAVENOUS ONCE
Status: DISCONTINUED | OUTPATIENT
Start: 2024-07-16 | End: 2024-07-17

## 2024-07-16 RX ORDER — DEXMEDETOMIDINE HYDROCHLORIDE 4 UG/ML
.4-1.2 INJECTION, SOLUTION INTRAVENOUS CONTINUOUS
Status: DISCONTINUED | OUTPATIENT
Start: 2024-07-16 | End: 2024-07-17

## 2024-07-16 RX ORDER — FENTANYL CITRATE 50 UG/ML
50 INJECTION, SOLUTION INTRAMUSCULAR; INTRAVENOUS
Status: DISCONTINUED | OUTPATIENT
Start: 2024-07-16 | End: 2024-07-17

## 2024-07-16 RX ORDER — SODIUM CHLORIDE, SODIUM LACTATE, POTASSIUM CHLORIDE, CALCIUM CHLORIDE 600; 310; 30; 20 MG/100ML; MG/100ML; MG/100ML; MG/100ML
INJECTION, SOLUTION INTRAVENOUS CONTINUOUS PRN
Status: DISCONTINUED | OUTPATIENT
Start: 2024-07-16 | End: 2024-07-16 | Stop reason: SDUPTHER

## 2024-07-16 RX ADMIN — Medication 2000 MG: at 09:33

## 2024-07-16 RX ADMIN — HEPARIN SODIUM 5000 UNITS: 5000 INJECTION INTRAVENOUS; SUBCUTANEOUS at 06:05

## 2024-07-16 RX ADMIN — IBUPROFEN 200 MG: 200 SUSPENSION ORAL at 22:56

## 2024-07-16 RX ADMIN — ROCURONIUM BROMIDE 50 MG: 10 INJECTION, SOLUTION INTRAVENOUS at 09:09

## 2024-07-16 RX ADMIN — CALCIUM GLUCONATE 2000 MG: 20 INJECTION, SOLUTION INTRAVENOUS at 08:45

## 2024-07-16 RX ADMIN — IBUPROFEN 200 MG: 200 SUSPENSION ORAL at 00:28

## 2024-07-16 RX ADMIN — IBUPROFEN 200 MG: 200 SUSPENSION ORAL at 17:57

## 2024-07-16 RX ADMIN — GABAPENTIN 300 MG: 300 CAPSULE ORAL at 17:57

## 2024-07-16 RX ADMIN — SODIUM CHLORIDE, PRESERVATIVE FREE 10 ML: 5 INJECTION INTRAVENOUS at 08:46

## 2024-07-16 RX ADMIN — SODIUM CHLORIDE, PRESERVATIVE FREE 40 MG: 5 INJECTION INTRAVENOUS at 22:56

## 2024-07-16 RX ADMIN — CALCIUM GLUCONATE 1000 MG: 20 INJECTION, SOLUTION INTRAVENOUS at 18:13

## 2024-07-16 RX ADMIN — Medication 100 MCG/HR: at 14:30

## 2024-07-16 RX ADMIN — Medication 2000 MG: at 22:01

## 2024-07-16 RX ADMIN — SODIUM CHLORIDE, POTASSIUM CHLORIDE, SODIUM LACTATE AND CALCIUM CHLORIDE: 600; 310; 30; 20 INJECTION, SOLUTION INTRAVENOUS at 09:09

## 2024-07-16 RX ADMIN — Medication 0.2 MG/KG/HR: at 08:44

## 2024-07-16 RX ADMIN — ROCURONIUM BROMIDE 20 MG: 10 INJECTION, SOLUTION INTRAVENOUS at 12:24

## 2024-07-16 RX ADMIN — ROCURONIUM BROMIDE 10 MG: 10 INJECTION, SOLUTION INTRAVENOUS at 10:53

## 2024-07-16 RX ADMIN — HEPARIN SODIUM 5000 UNITS: 5000 INJECTION INTRAVENOUS; SUBCUTANEOUS at 22:01

## 2024-07-16 RX ADMIN — DEXTROSE AND SODIUM CHLORIDE: 5; 450 INJECTION, SOLUTION INTRAVENOUS at 00:33

## 2024-07-16 RX ADMIN — DEXTROSE AND SODIUM CHLORIDE: 5; 450 INJECTION, SOLUTION INTRAVENOUS at 21:20

## 2024-07-16 RX ADMIN — PROPOFOL 40 MCG/KG/MIN: 10 INJECTION, EMULSION INTRAVENOUS at 16:52

## 2024-07-16 RX ADMIN — FENTANYL CITRATE 50 MCG: 50 INJECTION, SOLUTION INTRAMUSCULAR; INTRAVENOUS at 10:05

## 2024-07-16 RX ADMIN — PROPOFOL 40 MCG/KG/MIN: 10 INJECTION, EMULSION INTRAVENOUS at 07:09

## 2024-07-16 RX ADMIN — DEXMEDETOMIDINE HYDROCHLORIDE 0.4 MCG/KG/HR: 400 INJECTION INTRAVENOUS at 17:56

## 2024-07-16 RX ADMIN — HEPARIN SODIUM 5000 UNITS: 5000 INJECTION INTRAVENOUS; SUBCUTANEOUS at 17:59

## 2024-07-16 RX ADMIN — QUETIAPINE FUMARATE 100 MG: 100 TABLET ORAL at 20:51

## 2024-07-16 NOTE — ANESTHESIA POSTPROCEDURE EVALUATION
Department of Anesthesiology  Postprocedure Note    Patient: Noelle Kim  MRN: [id number]  YOB: 1990  Date of evaluation: 2024    Procedure Summary       Date: 24 Room / Location: [address]    Anesthesia Start: 859 Anesthesia Stop: 1335    Procedure: RE-OPEN PREVIOUS LAPAROTOMY,CONTROL OF HEMHORRAGE WITH TOPICAL HEMOSTATIC AGENT, PREPARATION OF WOUND 30X22 , APPLICATION OF SKIN SUBSTITUTE GREATER THAN 30X22, ELEVATION OF MYOCUTANEOUS FLAP, APPLICATION OF NEGATIVE PRESSURE WOUND THERAPY GREATER THAN 50CM SQUARED Diagnosis:       Wound hemorrhage following  section, postpartum      Open wound of abdominal wall, subsequent encounter      (Wound hemorrhage following  section, postpartum [O90.2])      (Open wound of abdominal wall, subsequent encounter [S31.109D])    Surgeons: Gary Ragsdale MD Responsible Provider: Karan Akers MD    Anesthesia Type: general ASA Status: 3            Anesthesia Type: No value filed.    Rafia Phase I:      Rafia Phase II:      Anesthesia Post Evaluation    Patient location during evaluation: ICU  Patient participation: complete - patient cannot participate  Level of consciousness: sedated and ventilated  Airway patency: patent  Nausea & Vomiting: no nausea and no vomiting  Cardiovascular status: hemodynamically stable  Respiratory status: ventilator and intubated  Hydration status: euvolemic  Pain management: adequate    No notable events documented.

## 2024-07-16 NOTE — LACTATION NOTE
Per RN pt has no output, is pumping every 3 hours. No questions or concerns at this time. Encouraged to reach out as needed.

## 2024-07-16 NOTE — BRIEF OP NOTE
Brief Postoperative Note      Patient: Noelle Kim  YOB: 1990  MRN: [id number]    Date of Procedure: 7/13/2024    Pre-Op Diagnosis Codes:     * Miscarriage [O03.9]    Post-Op Diagnosis: intraabdominal hemorrhage       Procedure(s):  RE-OPEN LAPAROTOMY EXPLORATORY    Surgeon(s):  Gary Ragsdale MD Sullivan, Rachael C, MD    Assistant:  Resident: Yasmine Salazar DO    Anesthesia: General    Estimated Blood Loss (mL): 1000    Complications: None    Specimens:   * No specimens in log *    Implants:  * No implants in log *      Drains:   Negative Pressure Wound Therapy Abdomen Medial;Upper (Active)   Canister changed? Yes 07/13/24 1530   Output (ml) 800 ml 07/13/24 1530       NG/OG/NJ/NE Tube Nasogastric 16 fr Right nostril (Active)   Surrounding Skin Clean, dry & intact 07/13/24 1200   Securement device Tape 07/13/24 1200   Status Suction-low intermittent 07/13/24 1200   Placement Verified X-Ray (Initial) 07/13/24 1200   NG/OG/NJ/NE External Measurement (cm) 55 cm 07/13/24 1200   Drainage Appearance Green 07/13/24 1200   Action Taken Retaped;Placement verified (comment) 07/13/24 1200       Urinary Catheter 07/13/24 2 Way (Active)   Catheter Indications Need for fluid volume management of the critically ill patient in a critical care setting 07/13/24 1200   Site Assessment No urethral drainage 07/13/24 1200   Urine Color Orange 07/13/24 1200   Urine Appearance Clear 07/13/24 1200   Collection Container Standard 07/13/24 1200   Securement Method Leg strap 07/13/24 1200   Catheter Best Practices  Catheter secured to thigh;Tamper seal intact;Bag below bladder;Bag not on floor;Lack of dependent loop in tubing;Drainage bag less than half full 07/13/24 1200   Status Draining;Patent 07/13/24 1200   Output (mL) 950 mL 07/13/24 1530       Findings:  Infection Present At Time Of Surgery (PATOS) (choose all levels that have infection present):  No infection present  Other Findings: large volume clot evacuated from 
Brief Postoperative Note      Patient: Noelle Kim  YOB: 1990  MRN: [id number]    Date of Procedure: 2024    Pre-Op Diagnosis Codes:     * Wound hemorrhage following  section, postpartum [O90.2]     * Open wound of abdominal wall, subsequent encounter [S31.109D]    Post-Op Diagnosis: Same       Procedure(s):  RE-OPEN PREVIOUS LAPAROTOMY,CONTROL OF HEMHORRAGE WITH TOPICAL HEMOSTATIC AGENT, PREPARATION OF WOUND 30X22 , APPLICATION OF SKIN SUBSTITUTE GREATER THAN 30X22, ELEVATION OF MYOCUTANEOUS FLAP, APPLICATION OF NEGATIVE PRESSURE WOUND THERAPY GREATER THAN 50CM SQUARED    Surgeon(s):  Celeste Lombardi DO Moore, Aaron N, MD    Assistant:  Resident: Yasmine Salazar DO    Anesthesia: General    Estimated Blood Loss (mL): 25    Complications: None    Specimens:   * No specimens in log *    Implants:  Implant Name Type Inv. Item Serial No.  Lot No. LRB No. Used Action   DRESSING WND 3 LAYR 10X15 CM MTRX CYTAL - [id number]  DRESSING WND 3 LAYR 10X15 CM MTRX CYTAL  [employer] [id number] N/A 1 Implanted   DRESSING WND 3 LAYR 10X15 CM MTRX CYTAL - [id number]  DRESSING WND 3 LAYR 10X15 CM MTRX CYTAL  [employer] [id number] N/A 1 Implanted   DRESSING WND MICRONIZED PARTIC 1000 MG MATRISTEM MICROMATRIX - [id number]  DRESSING WND MICRONIZED PARTIC 1000 MG MATRISTEM MICROMATRIX  [employer] [id number] N/A 1 Implanted   DRESSING WND MICRONIZED PARTIC 500 MG MATRISTEM MICROMATRIX - [id number]  DRESSING WND MICRONIZED PARTIC 500 MG MATRISTEM MICROMATRIX  [employer] [id number] N/A 1 Implanted         Drains:   Negative Pressure Wound Therapy Abdomen Medial;Upper (Active)       NG/OG/NJ/NE Tube Nasogastric 16 fr Right nostril (Active)   Surrounding Skin Clean, dry & intact 24 0800   Securement device Adhesive based gunderson 24 08   Status Clamped 24 08   Placement Verified X-Ray (Initial) 24 0400   NG/OG/NJ/NE External 
Brief Postoperative Note      Patient: Noelle Kim  YOB: 1990  MRN: [id number]    Date of Procedure: 2024    Pre-Op Diagnosis Codes:     * Twin birth [Z37.9]    Post-Op Diagnosis: Same       Procedure(s):   SECTION  Exploratory laparotomy   Control of variceal hemorrhage  Abdominal packing  Temper abdominal closure with ABThera wound VAC    Surgeon(s):  Sandra Gallegos MD Russo, Celia Camardo, DO Aaron Moore, MD  Assistant:  Resident: Loyda Fairchild MD; Rose Robles DO Ming Crotty, DO PGY 5    Anesthesia: General    Estimated Blood Loss (mL): 2.5 L    Complications: Bleeding    Specimens:   * No specimens in log *    Implants:  * No implants in log *      Drains:   Negative Pressure Wound Therapy Abdomen Medial;Upper (Active)   Wound Type Surgical 24   Cycle Continuous 24   Target Pressure (mmHg) 125 24   Canister changed? Yes 24   Drainage Amount Small 24   Drainage Description Sanguinous 24   Output (ml) 200 ml 24 0000       NG/OG/NJ/NE Tube Nasogastric 16 fr Right nostril (Active)   Surrounding Skin Clean, dry & intact 24   Securement device Adhesive based gunderson 24   Status Suction-low intermittent 24   Placement Verified X-Ray (Initial) 24 1900   NG/OG/NJ/NE External Measurement (cm) 60 cm 24   Drainage Appearance Green 24   Free Water/Flush (mL) 30 mL 24 0000   Output (mL) 50 ml 24 1900   Action Taken Retaped;Placement verified (comment) 24 1600       Urinary Catheter 24 Frazier-Temperature (Active)   Catheter Indications Urinary retention (acute or chronic), continuous bladder irrigation or bladder outlet obstruction 24 0000   Site Assessment Swelling 24   Urine Color Mindy 24   Urine Appearance Clear 24   Collection Container Standard 24   Securement Method 
Care  Soap and water 07/13/24 2000   Catheter Best Practices  Drainage tube clipped to bed;Catheter secured to thigh;Tamper seal intact;Bag below bladder;Bag not on floor;Lack of dependent loop in tubing;Drainage bag less than half full 07/14/24 1600   Status Draining 07/14/24 1600   Output (mL) 40 mL 07/14/24 1800       [REMOVED] Urinary Catheter 07/13/24 2 Way (Removed)   Catheter Indications Need for fluid volume management of the critically ill patient in a critical care setting 07/13/24 2000   Site Assessment No urethral drainage 07/13/24 2000   Urine Color Mindy 07/13/24 2000   Urine Appearance Clear 07/13/24 2000   Collection Container Standard 07/13/24 2000   Securement Method Securing device (Describe) 07/13/24 2000   Catheter Best Practices  Drainage tube clipped to bed;Catheter secured to thigh;Tamper seal intact;Bag below bladder;Bag not on floor;Lack of dependent loop in tubing;Drainage bag less than half full 07/13/24 2000   Status Draining 07/13/24 2000   Output (mL) 400 mL 07/13/24 1826       Findings:  Infection Present At Time Of Surgery (PATOS) (choose all levels that have infection present):  No infection present  Other Findings: Continued oozing around the splenocolic ligament and raw surfaces of the left paracolic gutter, pelvis and right paracolic gutter. Continuing oozing through the Evarrest hemostatic fibrin patch that was placed inferior to the liver during last procedure. The uterine incision was hemostatic, uterus appeared boggy but without acute hemorrhage.     Electronically signed by Yasmine Real DO on 7/14/2024 at 8:01 PM  
the patient's :  Raul Kim [[id number]]   male   Birth Weight: 3.09 kg (6 lb 13 oz)   Information for the patient's :  Raul Kim [[id number]]      Information for the patient's :  Raul Kim [[id number]]        Findings:  Live Born Baby Boy A: 5 lb 15 oz male infant in breech  presentation with Apgars of 8 at 1 minute and 9 at five minutes, Baby Boy B: 6 lb 15 oz male infant in breech  presentation with Apgars of 8 at 1 minute and 9 at five minutes surgically absent left fallopian tube and ovary,   normal appearing uterus right fallopian tube and ovary. Significant mesenteric varicosities, right ovarian vein aneurysm   Qualitative Blood Loss: 8190 mL immediately post-operatively  Total IV Fluids: 1500 crystaloid, 3 units PRBCs, 2 FFP, 1 pack platelets, 1 cryo  Urine output: 150 clear urine   Drains:  thornton catheter  Specimens:  placenta sent to pathology, cord blood, and cord gases  Instrument and Sponge Count: Instrument count correct, 13 retained lap sponges for abdominal packing, AbThera Wound vac with planned delayed closure   Complications: intraoperative hemorrhage of  bleeding mesenteric varicosities, delayed closure and wound packing and AbThera in place    Condition: Infant A stable to NICU, Infant B stable to well baby nursery , Mother condition guarded, intubated and transferred to TICU for continued volume resuscitation.     See operative report for full details.    Rose Robles DO  Ob/Gyn Resident  2024, 10:03 AM    I was present and scrubbed for the entire repeat  section and trauma evaluation for bleeding mesenteric varicosities.     Sandra Gallegos MD

## 2024-07-16 NOTE — ANESTHESIA POSTPROCEDURE EVALUATION
Department of Anesthesiology  Postprocedure Note    Patient: Noelle Kim  MRN: [id number]  YOB: 1990  Date of evaluation: 7/16/2024    Procedure Summary       Date: 07/14/24 Room / Location: [address]    Anesthesia Start: 1841 Anesthesia Stop: 2025    Procedure: RE-OPENING OF PREVIOUS EXPLORATORY LAPAROTOMY, CONTROL OF HEMORRHAGE WITH HEMOSTATIC AGENTS AND PACKING, NEGATIVE WOUND PRESSURE APPLICATION OF WOUND OVER 50 SQUARE CM Diagnosis:       Bleeding      (Bleeding [R58])    Surgeons: Gary Ragsdale MD Responsible Provider: Mai Alcazar MD    Anesthesia Type: general ASA Status: 4            Anesthesia Type: No value filed.    Rafia Phase I:      Rafia Phase II:      Anesthesia Post Evaluation    Patient location during evaluation: PACU  Patient participation: complete - patient participated  Level of consciousness: awake and alert  Pain score: 1  Airway patency: patent  Nausea & Vomiting: no nausea and no vomiting  Cardiovascular status: hemodynamically stable  Respiratory status: acceptable  Hydration status: euvolemic  Pain management: adequate    No notable events documented.

## 2024-07-16 NOTE — CARE COORDINATION
[place] Quality Flow/Interdisciplinary Rounds Progress Note    Quality Flow Rounds held on July 16, 2024 at 0930    Disciplines Attending:  Bedside Nurse, , and Nursing Unit Leadership    Barriers to Discharge: Surgical procedure, IV drips.    Anticipated Discharge Date:   To be determined    Anticipated Discharge Disposition: Home with  and twins.    Readmission Risk              Risk of Unplanned Readmission:  21           Discussed patient goal for the day, patient clinical progression, and barriers to discharge.  The following Goal(s) of the Day/Commitment(s) have been identified:   Surgical procedure today. On Ketamine drip, Fentanyl and Propofol.      ORTEGA PHILLIPS  July 16, 2024

## 2024-07-16 NOTE — OP NOTE
Operative Note      Patient: Noelle Kim  YOB: 1990  MRN: [id number]    Date of Procedure: 7/14/2024    Pre-Op Diagnosis Codes:     * Bleeding [R58]    Post-Op Diagnosis: Same       Procedure(s):  RE-OPENING OF PREVIOUS EXPLORATORY LAPAROTOMY, CONTROL OF HEMORRHAGE WITH HEMOSTATIC AGENTS AND PACKING, NEGATIVE WOUND PRESSURE APPLICATION OF WOUND OVER 50 SQUARE CM    Surgeon(s):  Gary Ragsdale MD Russo, Celia Camardo, DO    Assistant:   Resident: Yasmine Real DO; Yasmine Fraser DO    Anesthesia: General    Estimated Blood Loss (mL): less than 100     Complications: Bleeding    Specimens:   * No specimens in log *    Implants:  * No implants in log *      Drains:   Negative Pressure Wound Therapy Abdomen Medial;Upper (Active)   Wound Type Surgical 07/14/24 1600   Cycle Continuous 07/14/24 0400   Target Pressure (mmHg) 125 07/14/24 0400   Canister changed? Yes 07/14/24 1100   Drainage Amount Small 07/14/24 0400   Drainage Description Sanguinous 07/14/24 1100   Output (ml) 800 ml 07/14/24 1100       NG/OG/NJ/NE Tube Nasogastric 16 fr Right nostril (Active)   Surrounding Skin Clean, dry & intact 07/14/24 1600   Securement device Adhesive based gunderson 07/14/24 1600   Status Clamped 07/14/24 1600   Placement Verified X-Ray (Initial) 07/13/24 1900   NG/OG/NJ/NE External Measurement (cm) 60 cm 07/14/24 0400   Drainage Appearance Green 07/14/24 1600   Free Water/Flush (mL) 30 mL 07/14/24 0400   Output (mL) 50 ml 07/14/24 1600   Action Taken Retaped;Placement verified (comment) 07/13/24 1600       Urinary Catheter 07/13/24 Frazier-Temperature (Active)   Catheter Indications Need for fluid volume management of the critically ill patient in a critical care setting 07/14/24 1600   Site Assessment Swelling 07/14/24 1600   Urine Color Mindy 07/14/24 1600   Urine Appearance Clear 07/14/24 1600   Collection Container Standard 07/14/24 1600   Securement Method Securing device (Describe) 07/14/24 1600   Catheter Care  
Operative Note      Patient: Noelle Kim  YOB: 1990  MRN: [id number]    Date of Procedure: 2024    Pre-Op Diagnosis Codes:     * Wound hemorrhage following  section, postpartum [O90.2]     * Open wound of abdominal wall, subsequent encounter [S31.109D]    Post-Op Diagnosis: Same       Procedure(s):  RE-OPEN PREVIOUS LAPAROTOMY,CONTROL OF HEMHORRAGE WITH TOPICAL HEMOSTATIC AGENT, PREPARATION OF WOUND 30X22 , APPLICATION OF SKIN SUBSTITUTE GREATER THAN 30X22, ELEVATION OF MYOCUTANEOUS FLAP, APPLICATION OF PREVENA INCISIONAL WOUND VAC    Surgeon(s):  Celeste Lombardi DO Moore, Aaron N, MD    Assistant:   Resident: Yasmine Salazar DO    Anesthesia: General    Estimated Blood Loss (mL): less than 50     Complications: None    Specimens:   * No specimens in log *    Implants:  Implant Name Type Inv. Item Serial No.  Lot No. LRB No. Used Action   DRESSING WND 3 LAYR 10X15 CM MTRX CYTAL - [id number]  DRESSING WND 3 LAYR 10X15 CM MTRX CYTAL  [employer] [id number] N/A 1 Implanted   DRESSING WND 3 LAYR 10X15 CM MTRX CYTAL - [id number]  DRESSING WND 3 LAYR 10X15 CM MTRX CYTAL  [employer] [id number] N/A 1 Implanted   DRESSING WND MICRONIZED PARTIC 1000 MG MATRISTEM MICROMATRIX - [id number]  DRESSING WND MICRONIZED PARTIC 1000 MG MATRISTEM MICROMATRIX  [employer] [id number] N/A 1 Implanted   DRESSING WND MICRONIZED PARTIC 500 MG MATRISTEM MICROMATRIX - [id number]  DRESSING WND MICRONIZED PARTIC 500 MG MATRISTEM MICROMATRIX  [employer] [id number] N/A 1 Implanted         Drains:   Negative Pressure Wound Therapy Abdomen Medial;Upper (Active)       NG/OG/NJ/NE Tube Nasogastric 16 fr Right nostril (Active)   Surrounding Skin Clean, dry & intact 24 0800   Securement device Adhesive based gunderson 24 08   Status Clamped 24 08   Placement Verified X-Ray (Initial) 24 0400   NG/OG/NJ/NE External Measurement (cm) 70 cm 24 
Operative Note      Patient: Noelle Kim  YOB: 1990  MRN: [id number]    Date of Procedure: 2024    Pre-Op Diagnosis Codes:     * Twin birth [Z37.9]    Post-Op Diagnosis: Same       Procedure(s):   SECTION  Exploratory laparotomy   Control of variceal hemorrhage  Abdominal packing  Temporary abdominal closure with Abthera wound vac     Surgeon(s):  Therese Abad MD Maloy, Kathryn S, MD Aaron Moore, MD     Assistant:   Zee Richard,  PGY-5    Anesthesia: Spinal    Estimated Blood Loss (mL): 2.5 L     Complications: Bleeding    Specimens:   * No specimens in log *    Implants:  * No implants in log *      Drains:   Negative Pressure Wound Therapy Abdomen Medial;Upper (Active)       NG/OG/NJ/NE Tube Nasogastric 16 fr Right nostril (Active)       Urinary Catheter [address] (Active)       Findings:  Infection Present At Time Of Surgery (PATOS) (choose all levels that have infection present):  No infection present  Other Findings: Variceal bleeding near the hepatoduodenal ligament. 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 Diffuse oozing near these sites. Abdomen temporarily closed with Abthera wound vac with plan to resuscitate in the TICU, and plan to return to OR in 24 hours for second look and closure.     Indications:  Patient is a 34-year-old female who underwent  delivery for twins.  After the delivery, the OB team noted brisk bleeding from the upper abdomen.they packed to the right upper quadrant, and general surgery was called for evaluation.    Detailed Description of Procedure:   Upon our evaluation, patient was already on the operating table in the supine position under general anesthesia.  She had just undergone a  delivery of twins, after which the OB team noted brisk bleeding from the upper abdomen.  They had noted over 2 L of blood loss.  They packed the right upper quadrant and consult surgery.  MTP was initiated.  We 
Operative Note  Department of Obstetrics and Gynecology  [facility]     Patient: Noelle Kim   : 1990  MRN: [id number]       Acct: [id number]   PCP: Balta Cristina MD  Date of Procedure: 24    Pre-operative Diagnosis: 34 y.o. female  at 37w5d   Contractions> spontaneous labor  Primary  section secondary to Di-Di twin pregnancy with breech presentation of Baby A  Chronic hypertension with superimposed preeclampsia without severe features  Di-Di twin pregnancy   Breech presentation (baby A)  Hepatitis C (quant 9,970,000)  Thrombocytopenia  Choroid Plexus Cysts (baby A)  Echogenic Intracardiac Focus (baby B)   History of LEEP   Bipolar   History of GDM   History of Polysubstance Use (UDS+ THC)   History of LSO    BMI 28     Post-operative Diagnosis:  living infant males  Contractions> spontaneous labor  Primary  section secondary to Di-Di twin pregnancy with breech presentation of Baby A  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  Intraoperative hemorrhage  Spontaneously bleeding Mesenteric Varicosities  Chronic hypertension with superimposed preeclampsia without severe features  Di-Di twin pregnancy   Breech presentation (baby A)  Hepatitis C (quant 9,970,000)  Thrombocytopenia  Choroid Plexus Cysts (baby A)  Echogenic Intracardiac Focus (baby B)   History of LEEP   Bipolar   History of GDM   History of Polysubstance Use (UDS+ THC)   History of LSO    BMI 28     Procedure: primary low transverse  section, exploratory laparotomy, survey of the abdomen, control of upper abdominal bleeding, abdominal packing placement of AbThera wound vac with planned delayed closure     Indications: Repeat low transverse  section for fetal malpresentation, di-di twin IUP, spontaneous labor, SROM.     Surgeon: Dr. El MD, Dr. Jn MD, Dr. Oren DO  Assistant(s): Zee Richard DO, Rose Robles DO, PGY2    Anesthesia: general endotracheal 
The bowel was then retracted laterally in the opposite direction with another area identified in the left paracolic gutter of brisk oozing of blood.  A Evarrest dressing was applied to both left and right paracolic gutter overlying the areas of bleeding.  BioGlue was then applied to the Evarrest dressing and around its borders.  At this point, the remainder of the abdomen was carefully inspected and hemostasis was noted.    Packing with dry lap sponges contained in bowel bags were then placed in bilateral upper quadrants and in the pelvis, anterior to the uterus.  Bowel was reduced back into the abdomen.  New ABThera wound VAC foam was placed into the wound cavity.  Ethibond retention sutures were placed until the fascial edges were apposed.  The remainder of the ABThera wound VAC was then placed and noted to hold good seal and suction once connected to the wound VAC.  Minimal drainage was noted from the VAC tubing while on suction, indicating continued hemostasis at this point.  The patient tolerated the procedure well.  She remained intubated and was then transferred back to the intensive care unit.    Dr. Ragsdale was present for the entirety of the case.  The patient's mother was contacted by phone at the conclusion of the case.    Electronically signed by Yasmine Salazar DO on 7/13/2024 at 5:43 PM

## 2024-07-16 NOTE — ANESTHESIA POSTPROCEDURE EVALUATION
Department of Anesthesiology  Postprocedure Note    Patient: Noelle Kim  MRN: [id number]  YOB: 1990  Date of evaluation: 7/16/2024    Procedure Summary       Date: 07/14/24 Room / Location: [address]    Anesthesia Start: 1841 Anesthesia Stop: 2025    Procedure: RE-OPENING OF PREVIOUS EXPLORATORY LAPAROTOMY, CONTROL OF HEMORRHAGE WITH HEMOSTATIC AGENTS AND PACKING, NEGATIVE WOUND PRESSURE APPLICATION OF WOUND OVER 50 SQUARE CM Diagnosis:       Bleeding      (Bleeding [R58])    Surgeons: Gary Ragsdale MD Responsible Provider: Mai Alcazar MD    Anesthesia Type: general ASA Status: 4            Anesthesia Type: No value filed.    Rafia Phase I:      Rafia Phase II:      Anesthesia Post Evaluation    Patient location during evaluation: PACU  Patient participation: complete - patient participated  Level of consciousness: awake and alert  Pain score: 1  Airway patency: patent  Nausea & Vomiting: no nausea and no vomiting  Cardiovascular status: hemodynamically stable  Respiratory status: acceptable  Hydration status: euvolemic  Pain management: adequate    No notable events documented.

## 2024-07-17 ENCOUNTER — APPOINTMENT (OUTPATIENT)
Dept: GENERAL RADIOLOGY | Age: 34
DRG: 540 | End: 2024-07-17
Payer: MEDICAID

## 2024-07-17 LAB
ANION GAP SERPL CALCULATED.3IONS-SCNC: 8 MMOL/L (ref 9–16)
BASOPHILS # BLD: 0.03 K/UL (ref 0–0.2)
BASOPHILS NFR BLD: 0 % (ref 0–2)
BUN SERPL-MCNC: 33 MG/DL (ref 6–20)
CA-I BLD-SCNC: 1.11 MMOL/L (ref 1.13–1.33)
CALCIUM SERPL-MCNC: 6.8 MG/DL (ref 8.6–10.4)
CHLORIDE SERPL-SCNC: 104 MMOL/L (ref 98–107)
CO2 SERPL-SCNC: 19 MMOL/L (ref 20–31)
CREAT SERPL-MCNC: 1.1 MG/DL (ref 0.5–0.9)
EOSINOPHIL # BLD: <0.03 K/UL (ref 0–0.44)
EOSINOPHILS RELATIVE PERCENT: 0 % (ref 1–4)
ERYTHROCYTE [DISTWIDTH] IN BLOOD BY AUTOMATED COUNT: 16.6 % (ref 11.8–14.4)
GFR, ESTIMATED: 68 ML/MIN/1.73M2
GLUCOSE BLD-MCNC: 100 MG/DL (ref 65–105)
GLUCOSE BLD-MCNC: 114 MG/DL (ref 65–105)
GLUCOSE BLD-MCNC: 115 MG/DL (ref 65–105)
GLUCOSE BLD-MCNC: 135 MG/DL (ref 65–105)
GLUCOSE SERPL-MCNC: 113 MG/DL (ref 74–99)
HCT VFR BLD AUTO: 25.2 % (ref 36.3–47.1)
HGB BLD-MCNC: 7.6 G/DL (ref 11.9–15.1)
IMM GRANULOCYTES # BLD AUTO: 0.2 K/UL (ref 0–0.3)
IMM GRANULOCYTES NFR BLD: 2 %
LYMPHOCYTES NFR BLD: 0.72 K/UL (ref 1.1–3.7)
LYMPHOCYTES RELATIVE PERCENT: 7 % (ref 24–43)
MAGNESIUM SERPL-MCNC: 3.1 MG/DL (ref 1.6–2.6)
MCH RBC QN AUTO: 29.5 PG (ref 25.2–33.5)
MCHC RBC AUTO-ENTMCNC: 30.2 G/DL (ref 28.4–34.8)
MCV RBC AUTO: 97.7 FL (ref 82.6–102.9)
MONOCYTES NFR BLD: 0.46 K/UL (ref 0.1–1.2)
MONOCYTES NFR BLD: 5 % (ref 3–12)
NEUTROPHILS NFR BLD: 86 % (ref 36–65)
NEUTS SEG NFR BLD: 8.53 K/UL (ref 1.5–8.1)
NRBC BLD-RTO: 0 PER 100 WBC
PHOSPHATE SERPL-MCNC: 4.8 MG/DL (ref 2.5–4.5)
PLATELET # BLD AUTO: 114 K/UL (ref 138–453)
PMV BLD AUTO: 11.3 FL (ref 8.1–13.5)
POTASSIUM SERPL-SCNC: 4.5 MMOL/L (ref 3.7–5.3)
RBC # BLD AUTO: 2.58 M/UL (ref 3.95–5.11)
RBC # BLD: ABNORMAL 10*6/UL
SODIUM SERPL-SCNC: 131 MMOL/L (ref 136–145)
SURGICAL PATHOLOGY REPORT: NORMAL
WBC OTHER # BLD: 9.9 K/UL (ref 3.5–11.3)

## 2024-07-17 PROCEDURE — 99024 POSTOP FOLLOW-UP VISIT: CPT | Performed by: SURGERY

## 2024-07-17 PROCEDURE — 6360000002 HC RX W HCPCS

## 2024-07-17 PROCEDURE — 2580000003 HC RX 258

## 2024-07-17 PROCEDURE — 6370000000 HC RX 637 (ALT 250 FOR IP)

## 2024-07-17 PROCEDURE — 36415 COLL VENOUS BLD VENIPUNCTURE: CPT

## 2024-07-17 PROCEDURE — 2700000000 HC OXYGEN THERAPY PER DAY

## 2024-07-17 PROCEDURE — 82330 ASSAY OF CALCIUM: CPT

## 2024-07-17 PROCEDURE — 82947 ASSAY GLUCOSE BLOOD QUANT: CPT

## 2024-07-17 PROCEDURE — 83735 ASSAY OF MAGNESIUM: CPT

## 2024-07-17 PROCEDURE — 80048 BASIC METABOLIC PNL TOTAL CA: CPT

## 2024-07-17 PROCEDURE — 94761 N-INVAS EAR/PLS OXIMETRY MLT: CPT

## 2024-07-17 PROCEDURE — 85025 COMPLETE CBC W/AUTO DIFF WBC: CPT

## 2024-07-17 PROCEDURE — 84100 ASSAY OF PHOSPHORUS: CPT

## 2024-07-17 PROCEDURE — 1220000000 HC SEMI PRIVATE OB R&B

## 2024-07-17 RX ORDER — SENNA AND DOCUSATE SODIUM 50; 8.6 MG/1; MG/1
2 TABLET, FILM COATED ORAL 2 TIMES DAILY
Status: DISCONTINUED | OUTPATIENT
Start: 2024-07-17 | End: 2024-07-18

## 2024-07-17 RX ORDER — ONDANSETRON 4 MG/1
4 TABLET, FILM COATED ORAL DAILY PRN
Qty: 30 TABLET | Refills: 0 | Status: SHIPPED | OUTPATIENT
Start: 2024-07-17

## 2024-07-17 RX ORDER — IBUPROFEN 600 MG/1
600 TABLET ORAL EVERY 6 HOURS PRN
Status: DISCONTINUED | OUTPATIENT
Start: 2024-07-17 | End: 2024-07-17

## 2024-07-17 RX ORDER — OXYCODONE HYDROCHLORIDE 5 MG/1
5 TABLET ORAL EVERY 4 HOURS PRN
Status: DISCONTINUED | OUTPATIENT
Start: 2024-07-17 | End: 2024-07-23 | Stop reason: HOSPADM

## 2024-07-17 RX ORDER — BENZONATATE 100 MG/1
100 CAPSULE ORAL 3 TIMES DAILY PRN
Status: DISCONTINUED | OUTPATIENT
Start: 2024-07-17 | End: 2024-07-23 | Stop reason: HOSPADM

## 2024-07-17 RX ORDER — POLYETHYLENE GLYCOL 3350 17 G/17G
17 POWDER, FOR SOLUTION ORAL DAILY
Status: DISCONTINUED | OUTPATIENT
Start: 2024-07-17 | End: 2024-07-17

## 2024-07-17 RX ORDER — OXYCODONE HYDROCHLORIDE 5 MG/1
5 TABLET ORAL EVERY 4 HOURS PRN
Status: DISCONTINUED | OUTPATIENT
Start: 2024-07-17 | End: 2024-07-17

## 2024-07-17 RX ORDER — ACETAMINOPHEN 500 MG
1000 TABLET ORAL EVERY 6 HOURS PRN
Qty: 30 TABLET | Refills: 1 | Status: SHIPPED | OUTPATIENT
Start: 2024-07-17

## 2024-07-17 RX ORDER — SENNA AND DOCUSATE SODIUM 50; 8.6 MG/1; MG/1
1 TABLET, FILM COATED ORAL 2 TIMES DAILY
Qty: 30 TABLET | Refills: 1 | Status: SHIPPED | OUTPATIENT
Start: 2024-07-17

## 2024-07-17 RX ORDER — OXYCODONE HYDROCHLORIDE 5 MG/1
5 TABLET ORAL EVERY 6 HOURS PRN
Qty: 20 TABLET | Refills: 0 | Status: SHIPPED | OUTPATIENT
Start: 2024-07-17 | End: 2024-07-22

## 2024-07-17 RX ORDER — IBUPROFEN 600 MG/1
600 TABLET ORAL EVERY 6 HOURS PRN
Qty: 40 TABLET | Refills: 1 | Status: SHIPPED | OUTPATIENT
Start: 2024-07-17

## 2024-07-17 RX ORDER — CYCLOBENZAPRINE HCL 10 MG
10 TABLET ORAL 3 TIMES DAILY PRN
Status: DISCONTINUED | OUTPATIENT
Start: 2024-07-17 | End: 2024-07-23 | Stop reason: HOSPADM

## 2024-07-17 RX ORDER — PANTOPRAZOLE SODIUM 40 MG/1
40 TABLET, DELAYED RELEASE ORAL
Status: DISCONTINUED | OUTPATIENT
Start: 2024-07-18 | End: 2024-07-23 | Stop reason: HOSPADM

## 2024-07-17 RX ORDER — GUAIFENESIN 600 MG/1
600 TABLET, EXTENDED RELEASE ORAL 2 TIMES DAILY
Status: DISCONTINUED | OUTPATIENT
Start: 2024-07-17 | End: 2024-07-23 | Stop reason: HOSPADM

## 2024-07-17 RX ORDER — ACETAMINOPHEN 500 MG
1000 TABLET ORAL EVERY 4 HOURS PRN
Status: DISCONTINUED | OUTPATIENT
Start: 2024-07-17 | End: 2024-07-17

## 2024-07-17 RX ORDER — BUPRENORPHINE 8 MG/1
TABLET SUBLINGUAL
Qty: 60 TABLET | Refills: 0 | Status: SHIPPED | OUTPATIENT
Start: 2024-07-17 | End: 2024-08-07

## 2024-07-17 RX ORDER — BUPRENORPHINE 8 MG/1
16 TABLET SUBLINGUAL NIGHTLY
Status: DISCONTINUED | OUTPATIENT
Start: 2024-07-17 | End: 2024-07-19

## 2024-07-17 RX ORDER — HYDROXYZINE HYDROCHLORIDE 25 MG/1
50 TABLET, FILM COATED ORAL 3 TIMES DAILY PRN
Status: DISCONTINUED | OUTPATIENT
Start: 2024-07-17 | End: 2024-07-23 | Stop reason: HOSPADM

## 2024-07-17 RX ORDER — NIFEDIPINE 30 MG/1
30 TABLET, EXTENDED RELEASE ORAL EVERY 24 HOURS
Status: DISCONTINUED | OUTPATIENT
Start: 2024-07-17 | End: 2024-07-23 | Stop reason: HOSPADM

## 2024-07-17 RX ORDER — OXYCODONE HYDROCHLORIDE 5 MG/1
10 TABLET ORAL EVERY 4 HOURS PRN
Status: DISCONTINUED | OUTPATIENT
Start: 2024-07-17 | End: 2024-07-23 | Stop reason: HOSPADM

## 2024-07-17 RX ORDER — SIMETHICONE 80 MG
80 TABLET,CHEWABLE ORAL EVERY 6 HOURS PRN
Status: DISCONTINUED | OUTPATIENT
Start: 2024-07-17 | End: 2024-07-23 | Stop reason: HOSPADM

## 2024-07-17 RX ORDER — SENNOSIDES A AND B 8.6 MG/1
2 TABLET, FILM COATED ORAL 2 TIMES DAILY
Status: DISCONTINUED | OUTPATIENT
Start: 2024-07-17 | End: 2024-07-23 | Stop reason: HOSPADM

## 2024-07-17 RX ORDER — ACETAMINOPHEN 500 MG
1000 TABLET ORAL EVERY 6 HOURS SCHEDULED
Status: DISCONTINUED | OUTPATIENT
Start: 2024-07-18 | End: 2024-07-22

## 2024-07-17 RX ADMIN — IBUPROFEN 600 MG: 600 TABLET, FILM COATED ORAL at 18:47

## 2024-07-17 RX ADMIN — POLYETHYLENE GLYCOL 3350 17 G: 17 POWDER, FOR SOLUTION ORAL at 18:06

## 2024-07-17 RX ADMIN — OXYCODONE 5 MG: 5 TABLET ORAL at 18:06

## 2024-07-17 RX ADMIN — HYDROXYZINE HYDROCHLORIDE 50 MG: 25 TABLET, FILM COATED ORAL at 21:42

## 2024-07-17 RX ADMIN — OXYCODONE 5 MG: 5 TABLET ORAL at 03:44

## 2024-07-17 RX ADMIN — BUPRENORPHINE HCL 16 MG: 8 TABLET SUBLINGUAL at 19:56

## 2024-07-17 RX ADMIN — HEPARIN SODIUM 5000 UNITS: 5000 INJECTION INTRAVENOUS; SUBCUTANEOUS at 13:54

## 2024-07-17 RX ADMIN — IBUPROFEN 200 MG: 200 SUSPENSION ORAL at 06:07

## 2024-07-17 RX ADMIN — GABAPENTIN 300 MG: 300 CAPSULE ORAL at 18:47

## 2024-07-17 RX ADMIN — ACETAMINOPHEN 1000 MG: 500 TABLET ORAL at 22:34

## 2024-07-17 RX ADMIN — SODIUM CHLORIDE, PRESERVATIVE FREE 40 MG: 5 INJECTION INTRAVENOUS at 09:27

## 2024-07-17 RX ADMIN — SODIUM CHLORIDE, PRESERVATIVE FREE 30 ML: 5 INJECTION INTRAVENOUS at 19:57

## 2024-07-17 RX ADMIN — OXYCODONE 5 MG: 5 TABLET ORAL at 13:54

## 2024-07-17 RX ADMIN — GABAPENTIN 300 MG: 300 CAPSULE ORAL at 10:35

## 2024-07-17 RX ADMIN — HEPARIN SODIUM 5000 UNITS: 5000 INJECTION INTRAVENOUS; SUBCUTANEOUS at 22:35

## 2024-07-17 RX ADMIN — BENZOCAINE AND MENTHOL 1 LOZENGE: 15; 3.6 LOZENGE ORAL at 21:42

## 2024-07-17 RX ADMIN — FENTANYL CITRATE 50 MCG: 50 INJECTION, SOLUTION INTRAMUSCULAR; INTRAVENOUS at 06:08

## 2024-07-17 RX ADMIN — NIFEDIPINE 30 MG: 30 TABLET, FILM COATED, EXTENDED RELEASE ORAL at 18:06

## 2024-07-17 RX ADMIN — Medication 2000 MG: at 10:35

## 2024-07-17 RX ADMIN — DEXTROSE AND SODIUM CHLORIDE: 5; 450 INJECTION, SOLUTION INTRAVENOUS at 07:25

## 2024-07-17 RX ADMIN — HEPARIN SODIUM 5000 UNITS: 5000 INJECTION INTRAVENOUS; SUBCUTANEOUS at 06:07

## 2024-07-17 RX ADMIN — GABAPENTIN 300 MG: 300 CAPSULE ORAL at 02:11

## 2024-07-17 RX ADMIN — GUAIFENESIN 600 MG: 600 TABLET, EXTENDED RELEASE ORAL at 21:41

## 2024-07-17 RX ADMIN — ACETAMINOPHEN 1000 MG: 500 TABLET ORAL at 17:08

## 2024-07-17 RX ADMIN — SENNOSIDES AND DOCUSATE SODIUM 2 TABLET: 50; 8.6 TABLET ORAL at 19:56

## 2024-07-17 RX ADMIN — OXYCODONE 10 MG: 5 TABLET ORAL at 21:41

## 2024-07-17 NOTE — LACTATION NOTE
Pt sitting up in chair. States she has not been pumping every 2-3 hours, \"a lot going on.\" Reassured pt, encouraged pumping every 2-3 hours and to use the initiation program while pumping. Pt denies needs, but did have questions about getting a free breast pump. [facility] phone number provided to pt as she does have WIC and will supply pt with medical necessity form for a breast pump. Pt verbalized understanding and declines further assistance. Pt states she knows how to use the breast pump and used one with her first child.

## 2024-07-17 NOTE — FLOWSHEET NOTE
Pt requesting something for anxiety. States she is afraid to go to sleep because she is afraid she won't wake up. Dr. Phillips notified.

## 2024-07-17 NOTE — FLOWSHEET NOTE
Received into room 735 per wheelchair from ICU accompanied by aides. Alert, oriented. Oriented to call light system with understanding voiced.

## 2024-07-18 PROBLEM — F43.0 ACUTE STRESS REACTION: Status: ACTIVE | Noted: 2024-07-18

## 2024-07-18 LAB
ALBUMIN SERPL-MCNC: 2.2 G/DL (ref 3.5–5.2)
ALBUMIN/GLOB SERPL: 1 {RATIO} (ref 1–2.5)
ALP SERPL-CCNC: 142 U/L (ref 35–104)
ALT SERPL-CCNC: 28 U/L (ref 10–35)
ANION GAP SERPL CALCULATED.3IONS-SCNC: 9 MMOL/L (ref 9–16)
AST SERPL-CCNC: 82 U/L (ref 10–35)
BASOPHILS # BLD: 0.04 K/UL (ref 0–0.2)
BASOPHILS NFR BLD: 0 % (ref 0–2)
BILIRUB SERPL-MCNC: 0.8 MG/DL (ref 0–1.2)
BUN SERPL-MCNC: 16 MG/DL (ref 6–20)
CALCIUM SERPL-MCNC: 7 MG/DL (ref 8.6–10.4)
CHLORIDE SERPL-SCNC: 112 MMOL/L (ref 98–107)
CO2 SERPL-SCNC: 20 MMOL/L (ref 20–31)
CREAT SERPL-MCNC: 0.6 MG/DL (ref 0.5–0.9)
EOSINOPHIL # BLD: 0.03 K/UL (ref 0–0.44)
EOSINOPHILS RELATIVE PERCENT: 0 % (ref 1–4)
ERYTHROCYTE [DISTWIDTH] IN BLOOD BY AUTOMATED COUNT: 16.8 % (ref 11.8–14.4)
GFR, ESTIMATED: >90 ML/MIN/1.73M2
GLUCOSE SERPL-MCNC: 88 MG/DL (ref 74–99)
HCT VFR BLD AUTO: 23.9 % (ref 36.3–47.1)
HGB BLD-MCNC: 7.4 G/DL (ref 11.9–15.1)
IMM GRANULOCYTES # BLD AUTO: 0.38 K/UL (ref 0–0.3)
IMM GRANULOCYTES NFR BLD: 4 %
LYMPHOCYTES NFR BLD: 0.95 K/UL (ref 1.1–3.7)
LYMPHOCYTES RELATIVE PERCENT: 9 % (ref 24–43)
MCH RBC QN AUTO: 29.2 PG (ref 25.2–33.5)
MCHC RBC AUTO-ENTMCNC: 31 G/DL (ref 28.4–34.8)
MCV RBC AUTO: 94.5 FL (ref 82.6–102.9)
MONOCYTES NFR BLD: 0.52 K/UL (ref 0.1–1.2)
MONOCYTES NFR BLD: 5 % (ref 3–12)
NEUTROPHILS NFR BLD: 81 % (ref 36–65)
NEUTS SEG NFR BLD: 8.37 K/UL (ref 1.5–8.1)
NRBC BLD-RTO: 0 PER 100 WBC
PLATELET # BLD AUTO: ABNORMAL K/UL (ref 138–453)
PLATELET, FLUORESCENCE: 162 K/UL (ref 138–453)
PLATELETS.RETICULATED NFR BLD AUTO: 5.1 % (ref 1.1–10.3)
POTASSIUM SERPL-SCNC: 3.6 MMOL/L (ref 3.7–5.3)
PROT SERPL-MCNC: 4.3 G/DL (ref 6.6–8.7)
RBC # BLD AUTO: 2.53 M/UL (ref 3.95–5.11)
RBC # BLD: ABNORMAL 10*6/UL
SODIUM SERPL-SCNC: 141 MMOL/L (ref 136–145)
WBC OTHER # BLD: 10.3 K/UL (ref 3.5–11.3)

## 2024-07-18 PROCEDURE — 36415 COLL VENOUS BLD VENIPUNCTURE: CPT

## 2024-07-18 PROCEDURE — 99024 POSTOP FOLLOW-UP VISIT: CPT | Performed by: SURGERY

## 2024-07-18 PROCEDURE — 80053 COMPREHEN METABOLIC PANEL: CPT

## 2024-07-18 PROCEDURE — 2580000003 HC RX 258

## 2024-07-18 PROCEDURE — 6370000000 HC RX 637 (ALT 250 FOR IP)

## 2024-07-18 PROCEDURE — 6370000000 HC RX 637 (ALT 250 FOR IP): Performed by: NURSE PRACTITIONER

## 2024-07-18 PROCEDURE — 85025 COMPLETE CBC W/AUTO DIFF WBC: CPT

## 2024-07-18 PROCEDURE — 85055 RETICULATED PLATELET ASSAY: CPT

## 2024-07-18 PROCEDURE — 1220000000 HC SEMI PRIVATE OB R&B

## 2024-07-18 PROCEDURE — 6360000002 HC RX W HCPCS

## 2024-07-18 PROCEDURE — 90792 PSYCH DIAG EVAL W/MED SRVCS: CPT | Performed by: PSYCHIATRY & NEUROLOGY

## 2024-07-18 PROCEDURE — 90832 PSYTX W PT 30 MINUTES: CPT | Performed by: SOCIAL WORKER

## 2024-07-18 RX ORDER — IBUPROFEN 400 MG/1
600 TABLET ORAL EVERY 6 HOURS
Status: DISCONTINUED | OUTPATIENT
Start: 2024-07-18 | End: 2024-07-23 | Stop reason: HOSPADM

## 2024-07-18 RX ORDER — QUETIAPINE FUMARATE 100 MG/1
100 TABLET, FILM COATED ORAL NIGHTLY
Status: DISCONTINUED | OUTPATIENT
Start: 2024-07-18 | End: 2024-07-18

## 2024-07-18 RX ORDER — QUETIAPINE FUMARATE 25 MG/1
100 TABLET, FILM COATED ORAL NIGHTLY
Status: DISCONTINUED | OUTPATIENT
Start: 2024-07-18 | End: 2024-07-23 | Stop reason: HOSPADM

## 2024-07-18 RX ORDER — ENOXAPARIN SODIUM 100 MG/ML
40 INJECTION SUBCUTANEOUS EVERY 24 HOURS
Status: DISCONTINUED | OUTPATIENT
Start: 2024-07-18 | End: 2024-07-23 | Stop reason: HOSPADM

## 2024-07-18 RX ADMIN — GABAPENTIN 300 MG: 300 CAPSULE ORAL at 03:20

## 2024-07-18 RX ADMIN — NIFEDIPINE 30 MG: 30 TABLET, FILM COATED, EXTENDED RELEASE ORAL at 17:28

## 2024-07-18 RX ADMIN — GUAIFENESIN 600 MG: 600 TABLET, EXTENDED RELEASE ORAL at 09:19

## 2024-07-18 RX ADMIN — GABAPENTIN 300 MG: 300 CAPSULE ORAL at 18:41

## 2024-07-18 RX ADMIN — ACETAMINOPHEN 1000 MG: 500 TABLET ORAL at 04:48

## 2024-07-18 RX ADMIN — IBUPROFEN 600 MG: 400 TABLET, FILM COATED ORAL at 13:50

## 2024-07-18 RX ADMIN — IBUPROFEN 600 MG: 400 TABLET, FILM COATED ORAL at 08:18

## 2024-07-18 RX ADMIN — SENNOSIDES 17.2 MG: 8.6 TABLET, COATED ORAL at 09:19

## 2024-07-18 RX ADMIN — ENOXAPARIN SODIUM 40 MG: 100 INJECTION SUBCUTANEOUS at 11:54

## 2024-07-18 RX ADMIN — HYDROXYZINE HYDROCHLORIDE 50 MG: 25 TABLET, FILM COATED ORAL at 21:11

## 2024-07-18 RX ADMIN — PANTOPRAZOLE SODIUM 40 MG: 40 TABLET, DELAYED RELEASE ORAL at 08:18

## 2024-07-18 RX ADMIN — IBUPROFEN 600 MG: 400 TABLET, FILM COATED ORAL at 21:11

## 2024-07-18 RX ADMIN — MAGNESIUM HYDROXIDE 30 ML: 1200 LIQUID ORAL at 09:19

## 2024-07-18 RX ADMIN — GUAIFENESIN 600 MG: 600 TABLET, EXTENDED RELEASE ORAL at 21:11

## 2024-07-18 RX ADMIN — SENNOSIDES AND DOCUSATE SODIUM 2 TABLET: 50; 8.6 TABLET ORAL at 09:19

## 2024-07-18 RX ADMIN — CYCLOBENZAPRINE 10 MG: 10 TABLET, FILM COATED ORAL at 09:31

## 2024-07-18 RX ADMIN — SODIUM CHLORIDE, PRESERVATIVE FREE 10 ML: 5 INJECTION INTRAVENOUS at 21:24

## 2024-07-18 RX ADMIN — ACETAMINOPHEN 1000 MG: 500 TABLET ORAL at 18:41

## 2024-07-18 RX ADMIN — QUETIAPINE FUMARATE 100 MG: 100 TABLET ORAL at 03:20

## 2024-07-18 RX ADMIN — BENZOCAINE AND MENTHOL 1 LOZENGE: 15; 3.6 LOZENGE ORAL at 03:31

## 2024-07-18 RX ADMIN — ACETAMINOPHEN 1000 MG: 500 TABLET ORAL at 11:54

## 2024-07-18 RX ADMIN — BUPRENORPHINE HCL 16 MG: 8 TABLET SUBLINGUAL at 21:10

## 2024-07-18 RX ADMIN — GABAPENTIN 300 MG: 300 CAPSULE ORAL at 10:59

## 2024-07-18 NOTE — CARE COORDINATION
CASE MANAGEMENT POST-PARTUM TRANSITIONAL CARE PLAN    Twin birth [Z37.9]  37 weeks gestation of pregnancy [Z3A.37]    OB Provider: Dr Lott    Writer met w/ Noelle at her bedside (with her mom Lali) to discuss DCP. She is S/P CS with ex-lap, abthera wound vac with hemorrhage on 7/13/2024    Writer verified address/phone number NOT correct on facesheet.     [address] [address]    She states she lives alone. Noelle denied barriers with transportation home, to doctors appointments or with paying for medications upon discharge home.     [place] PARK[employer] Medicaid insurance correct. Writer notified Noelle she has 30 days from date of birth to add newborns to insurance policy. She verbalized understanding.    Infant names on BC:   Male, A: Lauro Roman  Male, B: Luis E Robertdaniel Roman  .   Infant PCP Rustam Browne.     DME: No  HOME CARE: no    Anticipated DC of mother 5-7 days after CS delivery. Pt will overstay due to the traumatic nature of her hemorrhage and surgeries and requiring critical care for 3 days after surgery (Intubated and sedated)    Readmission Risk              Risk of Unplanned Readmission:  20

## 2024-07-18 NOTE — DISCHARGE INSTRUCTIONS
Discharge Instructions for General Surgery    No lifting above 10Ibs for next 2 weeks.  No soaking in bathtubs or submerging yourself in water for 4 weeks  No operating heavy equipment while using narcotics   Ok to remove prevena  wound vac on 7/23/24   Wash incision gently with soap and water, pat dry.      F/u in trauma/acute care surgery clinic in 1-2 weeks Call your doctor for the following:  Chills  Temperature greater than 101  Pain that is not tolerable despite taking pain medicine as ordered There is increased swelling, redness or warmth at surgical site There is increased drainage or bleeding from surgical site    General questions or concerns call [phone] for the General Surgery Clinic.  If needed, the clinic fax number is [phone]

## 2024-07-18 NOTE — CONSULTS
[facility] Inpatient Psychotherapy Note  KATHE Barragan   2024  11:05 AM  Noelle Kim  1990  [id number]      Time Spent with Patient: 16 to 37 minutes (30 minutes) [id number]    Pt was provided informed consent for the [facility]. Discussed with patient model of service to include the limits of confidentiality (i.e. abuse reporting, suicide intervention, etc.) and short-term intervention focused approach.  Pt indicated understanding.    [facility] Inpatient or Virtual Question: This was not a virtual session    Is consult a Victim of Crime?: No    Presenting Patient Report:  Patient was screened at the request of the Trauma Team.   Patient presents as a 34 y.o. female subsequent to hospital admission following traumatic delivery resulting in injury.  Pt mom in room during visit with pt's consent.      Patient was able to complete the following screens: ITSS, PC-PTSD-5, and PHQ-4    Pt reports she is \"doing pretty good\" other than fear of going to sleep.  Pt reports that she is \"afraid to go to sleep and not wake up.\"  Pt reports she has not slept much due to this fear. Pt reports that during birth of twins she experienced hallucinations of heaven and hell that left her afraid.  Pt denies any hallucinations after birth and endorsed some worry and anxiousness to get home with her babies.  Pt reports a hx of anxiety and trauma but hat she has been doing well.  Pt also reports she is connected with a therapist through [facility] in [place] where she receives her subutex and sees a counselor.  Pt endorsed a plan to reach out and schedule an appt.  Pt reports she is processing the fact that she \"almost .\"  Therapist offered emotional support, [facility] Info packet, and psychoeducation.  Pt declined further bedside support while in the hospital but is aware of how to contact therapist if needs arise.         MSE:     Appearance:   Hospital Gown, Poor Hygiene, and Poor Eye

## 2024-07-18 NOTE — FLOWSHEET NOTE
RN rounding on patient who is still awake and on phone, pt asking if she received her \"crazy pills\", RN clarifies she is referring to atarax. Pt reassured that atarax 50mg was given just before 10pm along with other meds, pt states that \"maybe I need a higher dose\", RN assures pt that 50mg is generally adequate and encourages patient to turn off phone and tv, or utilize phone to listen to calming music so she can rest.      Patient asks about seroquel, RN notifies pt that seroquel is not currently ordered for her, again encourages patient to take steps to decrease stimuli in room (turn off tv, stop texting on phone, etc) in order to relax and sleep, pt verbalizes understanding.  Pt denies having any other needs at this time.

## 2024-07-19 ENCOUNTER — APPOINTMENT (OUTPATIENT)
Dept: CT IMAGING | Age: 34
DRG: 540 | End: 2024-07-19
Payer: MEDICAID

## 2024-07-19 LAB
ALBUMIN SERPL-MCNC: 2 G/DL (ref 3.5–5.2)
ALBUMIN/GLOB SERPL: 1 {RATIO} (ref 1–2.5)
ALP SERPL-CCNC: 139 U/L (ref 35–104)
ALT SERPL-CCNC: 26 U/L (ref 10–35)
ANION GAP SERPL CALCULATED.3IONS-SCNC: 9 MMOL/L (ref 9–16)
AST SERPL-CCNC: 51 U/L (ref 10–35)
BASOPHILS # BLD: 0 K/UL (ref 0–0.2)
BASOPHILS NFR BLD: 0 % (ref 0–2)
BILIRUB SERPL-MCNC: 0.7 MG/DL (ref 0–1.2)
BUN SERPL-MCNC: 10 MG/DL (ref 6–20)
CALCIUM SERPL-MCNC: 7 MG/DL (ref 8.6–10.4)
CHLORIDE SERPL-SCNC: 110 MMOL/L (ref 98–107)
CO2 SERPL-SCNC: 22 MMOL/L (ref 20–31)
CREAT SERPL-MCNC: 0.5 MG/DL (ref 0.5–0.9)
EOSINOPHIL # BLD: 0.07 K/UL (ref 0–0.4)
EOSINOPHILS RELATIVE PERCENT: 1 % (ref 1–4)
ERYTHROCYTE [DISTWIDTH] IN BLOOD BY AUTOMATED COUNT: 16.5 % (ref 11.8–14.4)
FIBRINOGEN, FUNCTIONAL TEG: 28.4 MM (ref 15–32)
GFR, ESTIMATED: >90 ML/MIN/1.73M2
GLUCOSE SERPL-MCNC: 130 MG/DL (ref 74–99)
HCT VFR BLD AUTO: 19.5 % (ref 36.3–47.1)
HCT VFR BLD AUTO: 25.1 % (ref 36.3–47.1)
HGB BLD-MCNC: 6 G/DL (ref 11.9–15.1)
HGB BLD-MCNC: 7.5 G/DL (ref 11.9–15.1)
IMM GRANULOCYTES # BLD AUTO: 0.13 K/UL (ref 0–0.3)
IMM GRANULOCYTES NFR BLD: 2 %
LY30 (LYSIS) TEG: 0 % (ref 0–2.6)
LYMPHOCYTES NFR BLD: 1.01 K/UL (ref 1–4.8)
LYMPHOCYTES RELATIVE PERCENT: 15 % (ref 24–44)
MA(MAX CLOT) RAPID TEG: 67.6 MM (ref 52–70)
MCH RBC QN AUTO: 29.4 PG (ref 25.2–33.5)
MCHC RBC AUTO-ENTMCNC: 30.8 G/DL (ref 28.4–34.8)
MCV RBC AUTO: 95.6 FL (ref 82.6–102.9)
MONOCYTES NFR BLD: 0.4 K/UL (ref 0.1–0.8)
MONOCYTES NFR BLD: 6 % (ref 1–7)
MORPHOLOGY: ABNORMAL
NEUTROPHILS NFR BLD: 76 % (ref 36–66)
NEUTS SEG NFR BLD: 5.09 K/UL (ref 1.8–7.7)
NRBC BLD-RTO: 0 PER 100 WBC
PLATELET # BLD AUTO: 141 K/UL (ref 138–453)
PMV BLD AUTO: 10.7 FL (ref 8.1–13.5)
POTASSIUM SERPL-SCNC: 3.5 MMOL/L (ref 3.7–5.3)
PROT SERPL-MCNC: 4 G/DL (ref 6.6–8.7)
RBC # BLD AUTO: 2.04 M/UL (ref 3.95–5.11)
REACTION TIME TEG: 8.2 MIN (ref 4.6–9.1)
SODIUM SERPL-SCNC: 141 MMOL/L (ref 136–145)
WBC OTHER # BLD: 6.7 K/UL (ref 3.5–11.3)

## 2024-07-19 PROCEDURE — 86901 BLOOD TYPING SEROLOGIC RH(D): CPT

## 2024-07-19 PROCEDURE — 36430 TRANSFUSION BLD/BLD COMPNT: CPT

## 2024-07-19 PROCEDURE — 6360000002 HC RX W HCPCS

## 2024-07-19 PROCEDURE — 85576 BLOOD PLATELET AGGREGATION: CPT

## 2024-07-19 PROCEDURE — 86900 BLOOD TYPING SEROLOGIC ABO: CPT

## 2024-07-19 PROCEDURE — 85384 FIBRINOGEN ACTIVITY: CPT

## 2024-07-19 PROCEDURE — 6370000000 HC RX 637 (ALT 250 FOR IP): Performed by: NURSE PRACTITIONER

## 2024-07-19 PROCEDURE — 6370000000 HC RX 637 (ALT 250 FOR IP)

## 2024-07-19 PROCEDURE — 80053 COMPREHEN METABOLIC PANEL: CPT

## 2024-07-19 PROCEDURE — 86920 COMPATIBILITY TEST SPIN: CPT

## 2024-07-19 PROCEDURE — 85014 HEMATOCRIT: CPT

## 2024-07-19 PROCEDURE — 6360000004 HC RX CONTRAST MEDICATION: Performed by: STUDENT IN AN ORGANIZED HEALTH CARE EDUCATION/TRAINING PROGRAM

## 2024-07-19 PROCEDURE — 74177 CT ABD & PELVIS W/CONTRAST: CPT

## 2024-07-19 PROCEDURE — 85390 FIBRINOLYSINS SCREEN I&R: CPT

## 2024-07-19 PROCEDURE — 99231 SBSQ HOSP IP/OBS SF/LOW 25: CPT | Performed by: STUDENT IN AN ORGANIZED HEALTH CARE EDUCATION/TRAINING PROGRAM

## 2024-07-19 PROCEDURE — 85018 HEMOGLOBIN: CPT

## 2024-07-19 PROCEDURE — 85025 COMPLETE CBC W/AUTO DIFF WBC: CPT

## 2024-07-19 PROCEDURE — 1200000000 HC SEMI PRIVATE

## 2024-07-19 PROCEDURE — 36415 COLL VENOUS BLD VENIPUNCTURE: CPT

## 2024-07-19 PROCEDURE — 85347 COAGULATION TIME ACTIVATED: CPT

## 2024-07-19 PROCEDURE — 86850 RBC ANTIBODY SCREEN: CPT

## 2024-07-19 PROCEDURE — P9016 RBC LEUKOCYTES REDUCED: HCPCS

## 2024-07-19 RX ORDER — BUPRENORPHINE 8 MG/1
16 TABLET SUBLINGUAL NIGHTLY
Status: DISCONTINUED | OUTPATIENT
Start: 2024-07-19 | End: 2024-07-19

## 2024-07-19 RX ORDER — BUPRENORPHINE 8 MG/1
16 TABLET SUBLINGUAL NIGHTLY
Status: DISCONTINUED | OUTPATIENT
Start: 2024-07-19 | End: 2024-07-23 | Stop reason: HOSPADM

## 2024-07-19 RX ORDER — SODIUM CHLORIDE 9 MG/ML
INJECTION, SOLUTION INTRAVENOUS PRN
Status: DISCONTINUED | OUTPATIENT
Start: 2024-07-19 | End: 2024-07-23 | Stop reason: HOSPADM

## 2024-07-19 RX ADMIN — POTASSIUM BICARBONATE 40 MEQ: 782 TABLET, EFFERVESCENT ORAL at 09:01

## 2024-07-19 RX ADMIN — GUAIFENESIN 600 MG: 600 TABLET, EXTENDED RELEASE ORAL at 23:20

## 2024-07-19 RX ADMIN — SENNOSIDES 17.2 MG: 8.6 TABLET, COATED ORAL at 09:01

## 2024-07-19 RX ADMIN — GABAPENTIN 300 MG: 300 CAPSULE ORAL at 12:25

## 2024-07-19 RX ADMIN — ENOXAPARIN SODIUM 40 MG: 100 INJECTION SUBCUTANEOUS at 12:26

## 2024-07-19 RX ADMIN — GABAPENTIN 300 MG: 300 CAPSULE ORAL at 18:21

## 2024-07-19 RX ADMIN — IBUPROFEN 600 MG: 400 TABLET, FILM COATED ORAL at 03:46

## 2024-07-19 RX ADMIN — ACETAMINOPHEN 1000 MG: 500 TABLET ORAL at 18:20

## 2024-07-19 RX ADMIN — ACETAMINOPHEN 1000 MG: 500 TABLET ORAL at 09:00

## 2024-07-19 RX ADMIN — IBUPROFEN 600 MG: 400 TABLET, FILM COATED ORAL at 23:19

## 2024-07-19 RX ADMIN — ACETAMINOPHEN 1000 MG: 500 TABLET ORAL at 01:02

## 2024-07-19 RX ADMIN — OXYCODONE 5 MG: 5 TABLET ORAL at 09:00

## 2024-07-19 RX ADMIN — QUETIAPINE FUMARATE 100 MG: 100 TABLET ORAL at 01:03

## 2024-07-19 RX ADMIN — GUAIFENESIN 600 MG: 600 TABLET, EXTENDED RELEASE ORAL at 09:01

## 2024-07-19 RX ADMIN — GABAPENTIN 300 MG: 300 CAPSULE ORAL at 03:46

## 2024-07-19 RX ADMIN — IBUPROFEN 600 MG: 400 TABLET, FILM COATED ORAL at 09:45

## 2024-07-19 RX ADMIN — PANTOPRAZOLE SODIUM 40 MG: 40 TABLET, DELAYED RELEASE ORAL at 09:00

## 2024-07-19 RX ADMIN — OXYCODONE 10 MG: 5 TABLET ORAL at 16:10

## 2024-07-19 RX ADMIN — BUPRENORPHINE HCL 16 MG: 8 TABLET SUBLINGUAL at 23:20

## 2024-07-19 RX ADMIN — ACETAMINOPHEN 1000 MG: 500 TABLET ORAL at 16:10

## 2024-07-19 RX ADMIN — QUETIAPINE FUMARATE 100 MG: 100 TABLET ORAL at 23:20

## 2024-07-19 RX ADMIN — IOPAMIDOL 75 ML: 755 INJECTION, SOLUTION INTRAVENOUS at 14:39

## 2024-07-19 RX ADMIN — IBUPROFEN 600 MG: 400 TABLET, FILM COATED ORAL at 18:20

## 2024-07-19 NOTE — CONSULTS
Trauma Surgery Plan of Care:     Pt is a 35 yo F, with history of pre-eclampsia and hepatitis C, who is s/p twin delivery via . While in the operating room, pt noted to have intra-abdominal hemorrhage from the right upper quadrant. MTP activated and ex lap performed by trauma team to control variceal bleeding near the hepatoduodenal ligament, pt also has aneurysmal changes of the right uterine vein near IVC. Abdomen was temporarily closed with abthera, followed by several take backs and resuscitation in the SICU. Last procedure was  during which abdominal wall was closed and prevena was applied.    Today pt reports increased sanguinous output from her Prevena. Given 1u pRBC for Hgb 6, and responded appropriately up to 7.5. On evaluation, vital signs are within normal limits on room air. No output in Prevena since cannister changed earlier today. Abdomen is soft, not distended, no peritoneal signs, however she is endorsing abdominal fullness. CT abd/pelvis obtained showing hemoperitoneum near the spleen and simple free fluid throughout the abdomen.     Plan:   - No acute surgical intervention is indicated at this time.   - Will obtain TEG to ensure no coagulation abnormalities  - Monitor vital signs and abdominal exam. If further concerns for intraabdominal hemorrhage plan for CTA of the abdomen.     Vicki Dillard,   General Surgery PGY-2  24 7:55 PM  Attestation signed by Sushil Oates MD    I personally evaluated the patient and directed the medical decision making with Resident/CODY after the physical/radiologic exam and laboratory values were reviewed and confirmed.  Scans reviewed, will follow with you.     Sushil Oates MD

## 2024-07-19 NOTE — FLOWSHEET NOTE
Residents perfect served. This morning, prevena plus suctioning light pink fluid when Dr Lott was present. Now, after showering, bright red blood in Prevena canister. Resident to come evaluate.

## 2024-07-19 NOTE — FLOWSHEET NOTE
Phone call received from MITCHELL Gramajo from 2C. Report given. Pt to go to CT after zoom call complete, then to 2C.

## 2024-07-19 NOTE — LACTATION NOTE
Pt states she has not pumped since she has been transferred over here. Reviewed recommended frequency of pumping and supply demand of breastmilk. Encouraged pt to call out should she desire to start pumping again or if she has any questions. Reviewed potential delay in lactogenesis II due to significant blood loss.

## 2024-07-19 NOTE — CONSULTS
Department of Psychiatry   Psychiatric Assessment      Thank you very much for allowing us to participate in the care of this patient.      Reason for Consult:  recent trauma    HISTORY OF PRESENT ILLNESS:      Patient is a 34-year-old female with history of mood disorder and polysubstance abuse currently postpartum after giving birth to twins.  Psychiatry is consulted to help with the ongoing stressors.  Patient reports that the father of the child is not involved in the care and she is worried that CPS are trying to take her children away.  Reports that when she presented to the hospital she was in severe pain and felt like she had an out of body experience where she felt like she was going between heaven and hell.  Reports that she was not sleeping for several days before she presented to the hospital.  Reports that she is now more in touch with reality since she has been receiving her Seroquel and getting some sleep.  Denies any suicidal ideation plan or intent.  A lot of supportive psychotherapy offered today.  Denies any other significant psychotic symptoms today.      PSYCHIATRIC HISTORY:      Outpatient psychiatric provider:  [facility] in [place]  Suicide attempts: denies any  Inpatient psychiatric admissions: denies any  [facility] Psychiatric Review of Systems         Obsessions and Compulsions: Denies       Kylah or Hypomania: Denies     Hallucinations: Denies     Panic Attacks:  Denies     Delusions:  Denies     Phobias:  Denies     Trauma: Denies    Prior to Admission medications    Medication Sig Start Date End Date Taking? Authorizing Provider   buprenorphine (SUBUTEX) 8 MG SUBL SL tablet place 1 tablet under the tongue and ALLOW to dissolve twice a day 7/17/24 8/7/24 Yes Kelsie Ceja MD   ibuprofen (ADVIL;MOTRIN) 600 MG tablet Take 1 tablet by mouth every 6 hours as needed for Pain 7/17/24  Yes Kelsie Ceja MD   acetaminophen (TYLENOL) 500 MG tablet Take 2 tablets by mouth every 6

## 2024-07-20 LAB
BASOPHILS # BLD: 0 K/UL (ref 0–0.2)
BASOPHILS NFR BLD: 0 % (ref 0–2)
EOSINOPHIL # BLD: 0.12 K/UL (ref 0–0.4)
EOSINOPHILS RELATIVE PERCENT: 2 % (ref 1–4)
ERYTHROCYTE [DISTWIDTH] IN BLOOD BY AUTOMATED COUNT: 16.9 % (ref 11.8–14.4)
HCT VFR BLD AUTO: 22.3 % (ref 36.3–47.1)
HCT VFR BLD AUTO: 25.5 % (ref 36.3–47.1)
HGB BLD-MCNC: 6.7 G/DL (ref 11.9–15.1)
HGB BLD-MCNC: 8.3 G/DL (ref 11.9–15.1)
IMM GRANULOCYTES # BLD AUTO: 0.12 K/UL (ref 0–0.3)
IMM GRANULOCYTES NFR BLD: 2 %
LACTIC ACID, WHOLE BLOOD: 1 MMOL/L (ref 0.7–2.1)
LYMPHOCYTES NFR BLD: 0.7 K/UL (ref 1–4.8)
LYMPHOCYTES RELATIVE PERCENT: 12 % (ref 24–44)
MCH RBC QN AUTO: 28.5 PG (ref 25.2–33.5)
MCHC RBC AUTO-ENTMCNC: 30 G/DL (ref 28.4–34.8)
MCV RBC AUTO: 94.9 FL (ref 82.6–102.9)
MONOCYTES NFR BLD: 0.17 K/UL (ref 0.1–0.8)
MONOCYTES NFR BLD: 3 % (ref 1–7)
MORPHOLOGY: ABNORMAL
MORPHOLOGY: ABNORMAL
NEUTROPHILS NFR BLD: 81 % (ref 36–66)
NEUTS SEG NFR BLD: 4.69 K/UL (ref 1.8–7.7)
NRBC BLD-RTO: 0 PER 100 WBC
NUCLEATED RED BLOOD CELLS: 1 PER 100 WBC
PLATELET # BLD AUTO: 168 K/UL (ref 138–453)
PMV BLD AUTO: 10.5 FL (ref 8.1–13.5)
RBC # BLD AUTO: 2.35 M/UL (ref 3.95–5.11)
WBC OTHER # BLD: 5.8 K/UL (ref 3.5–11.3)

## 2024-07-20 PROCEDURE — 6370000000 HC RX 637 (ALT 250 FOR IP)

## 2024-07-20 PROCEDURE — 85018 HEMOGLOBIN: CPT

## 2024-07-20 PROCEDURE — 36415 COLL VENOUS BLD VENIPUNCTURE: CPT

## 2024-07-20 PROCEDURE — 83605 ASSAY OF LACTIC ACID: CPT

## 2024-07-20 PROCEDURE — 36430 TRANSFUSION BLD/BLD COMPNT: CPT

## 2024-07-20 PROCEDURE — 85014 HEMATOCRIT: CPT

## 2024-07-20 PROCEDURE — P9016 RBC LEUKOCYTES REDUCED: HCPCS

## 2024-07-20 PROCEDURE — 85025 COMPLETE CBC W/AUTO DIFF WBC: CPT

## 2024-07-20 PROCEDURE — 99024 POSTOP FOLLOW-UP VISIT: CPT | Performed by: SURGERY

## 2024-07-20 PROCEDURE — 2580000003 HC RX 258

## 2024-07-20 PROCEDURE — 6370000000 HC RX 637 (ALT 250 FOR IP): Performed by: NURSE PRACTITIONER

## 2024-07-20 PROCEDURE — 1200000000 HC SEMI PRIVATE

## 2024-07-20 RX ORDER — SODIUM CHLORIDE 9 MG/ML
INJECTION, SOLUTION INTRAVENOUS PRN
Status: DISCONTINUED | OUTPATIENT
Start: 2024-07-20 | End: 2024-07-23 | Stop reason: HOSPADM

## 2024-07-20 RX ADMIN — ACETAMINOPHEN 1000 MG: 500 TABLET ORAL at 13:40

## 2024-07-20 RX ADMIN — IBUPROFEN 600 MG: 400 TABLET, FILM COATED ORAL at 13:39

## 2024-07-20 RX ADMIN — IBUPROFEN 600 MG: 400 TABLET, FILM COATED ORAL at 18:47

## 2024-07-20 RX ADMIN — GUAIFENESIN 600 MG: 600 TABLET, EXTENDED RELEASE ORAL at 13:38

## 2024-07-20 RX ADMIN — OXYCODONE 10 MG: 5 TABLET ORAL at 20:53

## 2024-07-20 RX ADMIN — SENNOSIDES 17.2 MG: 8.6 TABLET, COATED ORAL at 13:38

## 2024-07-20 RX ADMIN — ACETAMINOPHEN 1000 MG: 500 TABLET ORAL at 20:53

## 2024-07-20 RX ADMIN — ACETAMINOPHEN 1000 MG: 500 TABLET ORAL at 02:02

## 2024-07-20 RX ADMIN — GABAPENTIN 300 MG: 300 CAPSULE ORAL at 18:47

## 2024-07-20 RX ADMIN — PANTOPRAZOLE SODIUM 40 MG: 40 TABLET, DELAYED RELEASE ORAL at 13:39

## 2024-07-20 RX ADMIN — SENNOSIDES 17.2 MG: 8.6 TABLET, COATED ORAL at 20:53

## 2024-07-20 RX ADMIN — SODIUM CHLORIDE, PRESERVATIVE FREE 10 ML: 5 INJECTION INTRAVENOUS at 20:53

## 2024-07-20 RX ADMIN — GABAPENTIN 300 MG: 300 CAPSULE ORAL at 02:02

## 2024-07-20 RX ADMIN — GUAIFENESIN 600 MG: 600 TABLET, EXTENDED RELEASE ORAL at 20:53

## 2024-07-20 RX ADMIN — OXYCODONE 10 MG: 5 TABLET ORAL at 13:41

## 2024-07-21 LAB
ABO/RH: NORMAL
ANTIBODY SCREEN: NEGATIVE
ARM BAND NUMBER: NORMAL
BLOOD BANK BLOOD PRODUCT EXPIRATION DATE: NORMAL
BLOOD BANK BLOOD PRODUCT EXPIRATION DATE: NORMAL
BLOOD BANK DISPENSE STATUS: NORMAL
BLOOD BANK DISPENSE STATUS: NORMAL
BLOOD BANK ISBT PRODUCT BLOOD TYPE: 6200
BLOOD BANK ISBT PRODUCT BLOOD TYPE: 6200
BLOOD BANK PRODUCT CODE: NORMAL
BLOOD BANK PRODUCT CODE: NORMAL
BLOOD BANK SAMPLE EXPIRATION: NORMAL
BLOOD BANK UNIT TYPE AND RH: NORMAL
BLOOD BANK UNIT TYPE AND RH: NORMAL
BPU ID: NORMAL
BPU ID: NORMAL
COMPONENT: NORMAL
COMPONENT: NORMAL
CROSSMATCH RESULT: NORMAL
CROSSMATCH RESULT: NORMAL
ERYTHROCYTE [DISTWIDTH] IN BLOOD BY AUTOMATED COUNT: 16.7 % (ref 11.8–14.4)
HCT VFR BLD AUTO: 26.3 % (ref 36.3–47.1)
HGB BLD-MCNC: 8.5 G/DL (ref 11.9–15.1)
MCH RBC QN AUTO: 29.6 PG (ref 25.2–33.5)
MCHC RBC AUTO-ENTMCNC: 32.3 G/DL (ref 28.4–34.8)
MCV RBC AUTO: 91.6 FL (ref 82.6–102.9)
NRBC BLD-RTO: 0 PER 100 WBC
PLATELET # BLD AUTO: 220 K/UL (ref 138–453)
PMV BLD AUTO: 10.3 FL (ref 8.1–13.5)
RBC # BLD AUTO: 2.87 M/UL (ref 3.95–5.11)
TRANSFUSION STATUS: NORMAL
TRANSFUSION STATUS: NORMAL
UNIT DIVISION: 0
UNIT DIVISION: 0
UNIT ISSUE DATE/TIME: NORMAL
UNIT ISSUE DATE/TIME: NORMAL
WBC OTHER # BLD: 7.3 K/UL (ref 3.5–11.3)

## 2024-07-21 PROCEDURE — 85027 COMPLETE CBC AUTOMATED: CPT

## 2024-07-21 PROCEDURE — 6370000000 HC RX 637 (ALT 250 FOR IP)

## 2024-07-21 PROCEDURE — 36415 COLL VENOUS BLD VENIPUNCTURE: CPT

## 2024-07-21 PROCEDURE — 6360000002 HC RX W HCPCS

## 2024-07-21 PROCEDURE — 1200000000 HC SEMI PRIVATE

## 2024-07-21 PROCEDURE — 6370000000 HC RX 637 (ALT 250 FOR IP): Performed by: OBSTETRICS & GYNECOLOGY

## 2024-07-21 PROCEDURE — 6370000000 HC RX 637 (ALT 250 FOR IP): Performed by: NURSE PRACTITIONER

## 2024-07-21 PROCEDURE — 2580000003 HC RX 258

## 2024-07-21 RX ORDER — FUROSEMIDE 20 MG/1
20 TABLET ORAL DAILY
Status: COMPLETED | OUTPATIENT
Start: 2024-07-21 | End: 2024-07-22

## 2024-07-21 RX ORDER — FUROSEMIDE 20 MG/1
20 TABLET ORAL DAILY
Status: DISCONTINUED | OUTPATIENT
Start: 2024-07-21 | End: 2024-07-21

## 2024-07-21 RX ADMIN — GUAIFENESIN 600 MG: 600 TABLET, EXTENDED RELEASE ORAL at 20:38

## 2024-07-21 RX ADMIN — ACETAMINOPHEN 1000 MG: 500 TABLET ORAL at 23:23

## 2024-07-21 RX ADMIN — ENOXAPARIN SODIUM 40 MG: 100 INJECTION SUBCUTANEOUS at 10:46

## 2024-07-21 RX ADMIN — GABAPENTIN 300 MG: 300 CAPSULE ORAL at 01:54

## 2024-07-21 RX ADMIN — ACETAMINOPHEN 1000 MG: 500 TABLET ORAL at 17:58

## 2024-07-21 RX ADMIN — SODIUM CHLORIDE, PRESERVATIVE FREE 10 ML: 5 INJECTION INTRAVENOUS at 20:38

## 2024-07-21 RX ADMIN — OXYCODONE 10 MG: 5 TABLET ORAL at 14:36

## 2024-07-21 RX ADMIN — BUPRENORPHINE HCL 16 MG: 8 TABLET SUBLINGUAL at 01:53

## 2024-07-21 RX ADMIN — QUETIAPINE FUMARATE 100 MG: 100 TABLET ORAL at 23:22

## 2024-07-21 RX ADMIN — IBUPROFEN 600 MG: 400 TABLET, FILM COATED ORAL at 17:58

## 2024-07-21 RX ADMIN — OXYCODONE 10 MG: 5 TABLET ORAL at 10:46

## 2024-07-21 RX ADMIN — ACETAMINOPHEN 1000 MG: 500 TABLET ORAL at 01:54

## 2024-07-21 RX ADMIN — FUROSEMIDE 20 MG: 20 TABLET ORAL at 14:36

## 2024-07-21 RX ADMIN — GUAIFENESIN 600 MG: 600 TABLET, EXTENDED RELEASE ORAL at 10:45

## 2024-07-21 RX ADMIN — SENNOSIDES 17.2 MG: 8.6 TABLET, COATED ORAL at 20:39

## 2024-07-21 RX ADMIN — PANTOPRAZOLE SODIUM 40 MG: 40 TABLET, DELAYED RELEASE ORAL at 10:45

## 2024-07-21 RX ADMIN — QUETIAPINE FUMARATE 100 MG: 100 TABLET ORAL at 01:54

## 2024-07-21 RX ADMIN — BUPRENORPHINE HCL 16 MG: 8 TABLET SUBLINGUAL at 20:37

## 2024-07-21 RX ADMIN — ACETAMINOPHEN 1000 MG: 500 TABLET ORAL at 10:45

## 2024-07-21 RX ADMIN — GABAPENTIN 300 MG: 300 CAPSULE ORAL at 10:45

## 2024-07-21 RX ADMIN — IBUPROFEN 600 MG: 400 TABLET, FILM COATED ORAL at 01:53

## 2024-07-21 RX ADMIN — IBUPROFEN 600 MG: 400 TABLET, FILM COATED ORAL at 10:46

## 2024-07-21 RX ADMIN — SENNOSIDES 17.2 MG: 8.6 TABLET, COATED ORAL at 10:45

## 2024-07-21 RX ADMIN — GABAPENTIN 300 MG: 300 CAPSULE ORAL at 23:23

## 2024-07-21 RX ADMIN — IBUPROFEN 600 MG: 400 TABLET, FILM COATED ORAL at 23:23

## 2024-07-22 LAB
BASOPHILS # BLD: 0 K/UL (ref 0–0.2)
BASOPHILS NFR BLD: 0 % (ref 0–2)
EOSINOPHIL # BLD: 0.09 K/UL (ref 0–0.44)
EOSINOPHILS RELATIVE PERCENT: 1 % (ref 1–4)
ERYTHROCYTE [DISTWIDTH] IN BLOOD BY AUTOMATED COUNT: 15.9 % (ref 11.8–14.4)
ERYTHROCYTE [DISTWIDTH] IN BLOOD BY AUTOMATED COUNT: 16.3 % (ref 11.8–14.4)
HCT VFR BLD AUTO: 23 % (ref 36.3–47.1)
HCT VFR BLD AUTO: 27.3 % (ref 36.3–47.1)
HGB BLD-MCNC: 7.5 G/DL (ref 11.9–15.1)
HGB BLD-MCNC: 7.8 G/DL (ref 11.9–15.1)
IMM GRANULOCYTES # BLD AUTO: 0.09 K/UL (ref 0–0.3)
IMM GRANULOCYTES NFR BLD: 1 %
LYMPHOCYTES NFR BLD: 0.7 K/UL (ref 1.1–3.7)
LYMPHOCYTES RELATIVE PERCENT: 8 % (ref 24–43)
MCH RBC QN AUTO: 29 PG (ref 25.2–33.5)
MCH RBC QN AUTO: 29.4 PG (ref 25.2–33.5)
MCHC RBC AUTO-ENTMCNC: 28.6 G/DL (ref 28.4–34.8)
MCHC RBC AUTO-ENTMCNC: 32.6 G/DL (ref 28.4–34.8)
MCV RBC AUTO: 101.5 FL (ref 82.6–102.9)
MCV RBC AUTO: 90.2 FL (ref 82.6–102.9)
MONOCYTES NFR BLD: 0.44 K/UL (ref 0.1–1.2)
MONOCYTES NFR BLD: 5 % (ref 3–12)
MORPHOLOGY: ABNORMAL
MORPHOLOGY: ABNORMAL
NEUTROPHILS NFR BLD: 85 % (ref 36–65)
NEUTS SEG NFR BLD: 7.48 K/UL (ref 1.5–8.1)
NRBC BLD-RTO: 0 PER 100 WBC
NRBC BLD-RTO: 0 PER 100 WBC
PLATELET # BLD AUTO: 244 K/UL (ref 138–453)
PLATELET # BLD AUTO: 264 K/UL (ref 138–453)
PMV BLD AUTO: 10.2 FL (ref 8.1–13.5)
PMV BLD AUTO: 10.3 FL (ref 8.1–13.5)
RBC # BLD AUTO: 2.55 M/UL (ref 3.95–5.11)
RBC # BLD AUTO: 2.69 M/UL (ref 3.95–5.11)
WBC OTHER # BLD: 10.2 K/UL (ref 3.5–11.3)
WBC OTHER # BLD: 8.8 K/UL (ref 3.5–11.3)

## 2024-07-22 PROCEDURE — 6360000002 HC RX W HCPCS

## 2024-07-22 PROCEDURE — 85027 COMPLETE CBC AUTOMATED: CPT

## 2024-07-22 PROCEDURE — 6370000000 HC RX 637 (ALT 250 FOR IP)

## 2024-07-22 PROCEDURE — 36415 COLL VENOUS BLD VENIPUNCTURE: CPT

## 2024-07-22 PROCEDURE — 6370000000 HC RX 637 (ALT 250 FOR IP): Performed by: OBSTETRICS & GYNECOLOGY

## 2024-07-22 PROCEDURE — 1200000000 HC SEMI PRIVATE

## 2024-07-22 PROCEDURE — 6370000000 HC RX 637 (ALT 250 FOR IP): Performed by: NURSE PRACTITIONER

## 2024-07-22 PROCEDURE — 85025 COMPLETE CBC W/AUTO DIFF WBC: CPT

## 2024-07-22 RX ORDER — ACETAMINOPHEN 500 MG
1000 TABLET ORAL EVERY 6 HOURS SCHEDULED
Status: DISCONTINUED | OUTPATIENT
Start: 2024-07-22 | End: 2024-07-23 | Stop reason: HOSPADM

## 2024-07-22 RX ADMIN — IBUPROFEN 600 MG: 400 TABLET, FILM COATED ORAL at 18:54

## 2024-07-22 RX ADMIN — ACETAMINOPHEN 1000 MG: 500 TABLET ORAL at 06:00

## 2024-07-22 RX ADMIN — OXYCODONE 10 MG: 5 TABLET ORAL at 06:00

## 2024-07-22 RX ADMIN — IBUPROFEN 600 MG: 400 TABLET, FILM COATED ORAL at 11:54

## 2024-07-22 RX ADMIN — ACETAMINOPHEN 1000 MG: 500 TABLET ORAL at 21:39

## 2024-07-22 RX ADMIN — GABAPENTIN 300 MG: 300 CAPSULE ORAL at 06:01

## 2024-07-22 RX ADMIN — ENOXAPARIN SODIUM 40 MG: 100 INJECTION SUBCUTANEOUS at 11:53

## 2024-07-22 RX ADMIN — GUAIFENESIN 600 MG: 600 TABLET, EXTENDED RELEASE ORAL at 08:12

## 2024-07-22 RX ADMIN — BUPRENORPHINE HCL 16 MG: 8 TABLET SUBLINGUAL at 21:39

## 2024-07-22 RX ADMIN — ACETAMINOPHEN 1000 MG: 500 TABLET ORAL at 15:45

## 2024-07-22 RX ADMIN — SENNOSIDES 17.2 MG: 8.6 TABLET, COATED ORAL at 21:40

## 2024-07-22 RX ADMIN — PANTOPRAZOLE SODIUM 40 MG: 40 TABLET, DELAYED RELEASE ORAL at 06:00

## 2024-07-22 RX ADMIN — GABAPENTIN 300 MG: 300 CAPSULE ORAL at 21:40

## 2024-07-22 RX ADMIN — IBUPROFEN 600 MG: 400 TABLET, FILM COATED ORAL at 05:59

## 2024-07-22 RX ADMIN — OXYCODONE 10 MG: 5 TABLET ORAL at 21:39

## 2024-07-22 RX ADMIN — OXYCODONE 10 MG: 5 TABLET ORAL at 15:44

## 2024-07-22 RX ADMIN — SENNOSIDES 17.2 MG: 8.6 TABLET, COATED ORAL at 08:10

## 2024-07-22 RX ADMIN — GUAIFENESIN 600 MG: 600 TABLET, EXTENDED RELEASE ORAL at 21:39

## 2024-07-22 RX ADMIN — FUROSEMIDE 20 MG: 20 TABLET ORAL at 08:10

## 2024-07-22 RX ADMIN — QUETIAPINE FUMARATE 100 MG: 100 TABLET ORAL at 21:38

## 2024-07-22 NOTE — PLAN OF CARE
Problem: Pain  Goal: Verbalizes/displays adequate comfort level or baseline comfort level  Outcome: Progressing     Problem: Infection - Adult  Goal: Absence of infection during hospitalization  Outcome: Progressing     Problem: Safety - Adult  Goal: Free from fall injury  Outcome: Progressing     Problem: Discharge Planning  Goal: Discharge to home or other facility with appropriate resources  Outcome: Progressing     Problem: Chronic Conditions and Co-morbidities  Goal: Patient's chronic conditions and co-morbidity symptoms are monitored and maintained or improved  Outcome: Progressing     Problem: Risk for Maternal Injury  Goal: Remains free from seizures and injury related to seizure activity  Description: INTERVENTIONS:.  -Maintain airway, patient safety and administer oxygen as ordered  -Monitor patient for seizure activity, document and report duration and descrition  -If Seizure occurs, turn patient to dide and suction secretions as needed  -Reorient patient post seizure  -Seizure Pads  -Instruct patient/family to notify RN of any seizure activity  -Instruct patient/family to call for assistance with activity based on assessment  Outcome: Progressing     Problem: Risk for Decreased Cardiac Output  Goal: Maintains optimal cardiac output and hemodynamic stability  Description: INTERVENTIONS:.  -Monitor blood pressure and heart rate  -Monitor urine output and notify licensed practitioner for values outside of   -Assess for signes of decreased cardiac output  -Administer fluid and /or volume expanders as ordered    Outcome: Progressing  Goal: Achieves optimal ventilation and oxygenation  Description: INTERVENTIONS:.  -Assess for changes in respiratory status   -Assess for changes in mentation and behavior  -Position to facilitate oxygenation and minimize respiratory effort  -Oxygen supplementation based on oxygen saturation or arterial blood gases  -Initiate smoking cessation protocol as indicated  -Encourage 
  Problem: Postpartum  Goal: Experiences normal postpartum course  Description:  Postpartum OB-Pregnancy care plan goal which identifies if the mother is experiencing a normal postpartum course  2024 by Jane Hernandez RN  Outcome: Progressing  2024 by Vickie Godinez RN  Outcome: Progressing  Goal: Appropriate maternal -  bonding  Description:  Postpartum OB-Pregnancy care plan goal which identifies if the mother and  are bonding appropriately  2024 by Jane Hernandez RN  Outcome: Progressing  2024 by Vickie Godinez RN  Outcome: Progressing  Goal: Establishment of infant feeding pattern  Description:  Postpartum OB-Pregnancy care plan goal which identifies if the mother is establishing a feeding pattern with their   2024 by Jane Hernandez RN  Outcome: Progressing  2024 by Vickie Godinez RN  Outcome: Progressing  Goal: Incisions, wounds, or drain sites healing without S/S of infection  2024 by Jane Hernandez RN  Outcome: Progressing  2024 by Vickie Godinez RN  Outcome: Progressing     Problem: Pain  Goal: Verbalizes/displays adequate comfort level or baseline comfort level  2024 by Jane Hernandez RN  Outcome: Progressing  2024 by Vickie Godinez RN  Outcome: Progressing     Problem: Infection - Adult  Goal: Absence of infection at discharge  2024 by Jane Hernandez RN  Outcome: Progressing  2024 by Vickie Godinez RN  Outcome: Progressing  Goal: Absence of infection during hospitalization  2024 by Jane Hernandez RN  Outcome: Progressing  2024 by Vickie Godinez RN  Outcome: Progressing  Goal: Absence of fever/infection during anticipated neutropenic period  2024 173 by Vickie Godinez RN  Outcome: Progressing     Problem: Safety - Adult  Goal: Free from fall injury  2024 by Vickie Godinez RN  Outcome: Progressing     Problem: 
  Problem: Postpartum  Goal: Experiences normal postpartum course  Description:  Postpartum OB-Pregnancy care plan goal which identifies if the mother is experiencing a normal postpartum course  Outcome: Progressing  Goal: Appropriate maternal -  bonding  Description:  Postpartum OB-Pregnancy care plan goal which identifies if the mother and  are bonding appropriately  Outcome: Progressing  Goal: Establishment of infant feeding pattern  Description:  Postpartum OB-Pregnancy care plan goal which identifies if the mother is establishing a feeding pattern with their   Outcome: Progressing  Goal: Incisions, wounds, or drain sites healing without S/S of infection  Outcome: Progressing     Problem: Pain  Goal: Verbalizes/displays adequate comfort level or baseline comfort level  Outcome: Progressing     Problem: Safety - Adult  Goal: Free from fall injury  2024 by Mera Tello, RN  Outcome: Progressing     Problem: Risk for Maternal Injury  Goal: Remains free from seizures and injury related to seizure activity  Description: INTERVENTIONS:.  -Maintain airway, patient safety and administer oxygen as ordered  -Monitor patient for seizure activity, document and report duration and descrition  -If Seizure occurs, turn patient to dide and suction secretions as needed  -Reorient patient post seizure  -Seizure Pads  -Instruct patient/family to notify RN of any seizure activity  -Instruct patient/family to call for assistance with activity based on assessment  2024 by Mera Tello, RN  Outcome: Progressing     Problem: Respiratory - Adult  Goal: Achieves optimal ventilation and oxygenation  2024 by Mera Tello, RN  Outcome: Progressing     Problem: Nutrition Deficit:  Goal: Optimize nutritional status  2024 by Mera Tello, RN  Outcome: Progressing     Problem: Skin/Tissue Integrity  Goal: Absence of new skin breakdown  Description: 1.  Monitor for areas of 
  Problem: Postpartum  Goal: Experiences normal postpartum course  Description:  Postpartum OB-Pregnancy care plan goal which identifies if the mother is experiencing a normal postpartum course  Outcome: Progressing  Goal: Appropriate maternal -  bonding  Description:  Postpartum OB-Pregnancy care plan goal which identifies if the mother and  are bonding appropriately  Outcome: Progressing  Goal: Establishment of infant feeding pattern  Description:  Postpartum OB-Pregnancy care plan goal which identifies if the mother is establishing a feeding pattern with their   Outcome: Progressing  Goal: Incisions, wounds, or drain sites healing without S/S of infection  Outcome: Progressing  Flowsheets (Taken 2024 1600 by Rox Alanis, RN)  Incisions, Wounds, or Drain Sites Healing Without Sign and Symptoms of Infection:   TWICE DAILY: Assess and document skin integrity   TWICE DAILY: Assess and document dressing/incision, wound bed, drain sites and surrounding tissue   Implement wound care per orders     Problem: Pain  Goal: Verbalizes/displays adequate comfort level or baseline comfort level  Outcome: Progressing     Problem: Infection - Adult  Goal: Absence of infection at discharge  Outcome: Progressing  Flowsheets (Taken 2024 1600 by Rox Alanis, RN)  Absence of infection at discharge:   Assess and monitor for signs and symptoms of infection   Monitor lab/diagnostic results   Monitor all insertion sites i.e., indwelling lines, tubes and drains   [place] appropriate cooling/warming therapies per order   Administer medications as ordered   Instruct and encourage patient and family to use good hand hygiene technique   Identify and instruct in appropriate isolation precautions for identified infection/condition     
  Problem: Postpartum  Goal: Experiences normal postpartum course  Description:  Postpartum OB-Pregnancy care plan goal which identifies if the mother is experiencing a normal postpartum course  Outcome: Progressing  Goal: Incisions, wounds, or drain sites healing without S/S of infection  Outcome: Progressing  Flowsheets (Taken 7/22/2024 0800)  Incisions, Wounds, or Drain Sites Healing Without Sign and Symptoms of Infection: TWICE DAILY: Assess and document dressing/incision, wound bed, drain sites and surrounding tissue     Problem: Pain  Goal: Verbalizes/displays adequate comfort level or baseline comfort level  Outcome: Progressing     Problem: Infection - Adult  Goal: Absence of infection at discharge  Outcome: Progressing  Flowsheets (Taken 7/22/2024 0800)  Absence of infection at discharge: Assess and monitor for signs and symptoms of infection     Problem: Safety - Adult  Goal: Free from fall injury  Outcome: Progressing  Flowsheets (Taken 7/22/2024 0800)  Free From Fall Injury: Instruct family/caregiver on patient safety     
  Problem: Respiratory - Adult  Goal: Achieves optimal ventilation and oxygenation  7/14/2024 0829 by Brandon Atkins, VIRAL  Outcome: Progressing    
  Problem: Respiratory - Adult  Goal: Achieves optimal ventilation and oxygenation  7/15/2024 0219 by Jenn Cason JUS  Outcome: Progressing     Problem: OXYGENATION/RESPIRATORY FUNCTION  Goal: Patient will maintain patent airway  Outcome: Ongoing  Goal: Patient will achieve/maintain normal respiratory rate/effort  Respiratory rate and effort will be within normal limits for the patient    Outcome: Ongoing     Problem: MECHANICAL VENTILATION  Goal: Patient will maintain patent airway  Outcome: Ongoing  Goal: Oral health is maintained or improved  Outcome: Ongoing  Goal: ET tube will be managed safely  Outcome: Ongoing  Goal: Ability to express needs and understand communication  Outcome: Ongoing  Goal: Mobility/activity is maintained at optimum level for patient  Outcome: Ongoing     Problem: ASPIRATION PRECAUTIONS  Goal: Patient’s risk of aspiration is minimized  Outcome: Ongoing     Problem: SKIN INTEGRITY  Goal: Skin integrity is maintained or improved  Outcome: Ongoing  
  Problem: Respiratory - Adult  Goal: Achieves optimal ventilation and oxygenation  7/15/2024 0831 by Christine Marlow RCP  Outcome: Progressing  Flowsheets (Taken 7/15/2024 0831)  Achieves optimal ventilation and oxygenation:   Assess for changes in respiratory status   Assess for changes in mentation and behavior   Position to facilitate oxygenation and minimize respiratory effort   Oxygen supplementation based on oxygen saturation or arterial blood gases   Encourage broncho-pulmonary hygiene including cough, deep breathe, incentive spirometry   Assess the need for suctioning and aspirate as needed   Assess and instruct to report shortness of breath or any respiratory difficulty   Respiratory therapy support as indicated  7/15/2024 0219 by Jenn Cason, RCP  Outcome: Progressing  7/14/2024 2249 by Dom Knight, RN  Outcome: Progressing     
  Problem: Respiratory - Adult  Goal: Achieves optimal ventilation and oxygenation  7/15/2024 2007 by John Oh RCP  Outcome: Progressing     Problem: OXYGENATION/RESPIRATORY FUNCTION  Goal: Patient will maintain patent airway  Outcome: Ongoing  Goal: Patient will achieve/maintain normal respiratory rate/effort  Respiratory rate and effort will be within normal limits for the patient  Outcome: Ongoing    Problem: MECHANICAL VENTILATION  Goal: Patient will maintain patent airway  Outcome: Ongoing  Goal: Oral health is maintained or improved  Outcome: Ongoing  Goal: ET tube will be managed safely  Outcome: Ongoing  Goal: Ability to express needs and understand communication  Outcome: Ongoing  Goal: Mobility/activity is maintained at optimum level for patient  Outcome: Ongoing    Problem: ASPIRATION PRECAUTIONS  Goal: Patient’s risk of aspiration is minimized  Outcome: Ongoing    Problem: SKIN INTEGRITY  Goal: Skin integrity is maintained or improved  Outcome: Ongoing                    
  Problem: Respiratory - Adult  Goal: Achieves optimal ventilation and oxygenation  7/16/2024 0816 by Amanda Dwyer RCP  Outcome: Progressing  7/15/2024 2238 by Loyda Cooney RN  Outcome: Progressing  7/15/2024 2007 by John Oh RCP  Outcome: Progressing     
  Problem: Respiratory - Adult  Goal: Achieves optimal ventilation and oxygenation  Outcome: Progressing     Problem: OXYGENATION/RESPIRATORY FUNCTION  Goal: Patient will maintain patent airway  Outcome: Ongoing  Goal: Patient will achieve/maintain normal respiratory rate/effort  Respiratory rate and effort will be within normal limits for the patient  Outcome: Ongoing    Problem: MECHANICAL VENTILATION  Goal: Patient will maintain patent airway  Outcome: Ongoing  Goal: Oral health is maintained or improved  Outcome: Ongoing  Goal: ET tube will be managed safely  Outcome: Ongoing  Goal: Ability to express needs and understand communication  Outcome: Ongoing  Goal: Mobility/activity is maintained at optimum level for patient  Outcome: Ongoing    Problem: ASPIRATION PRECAUTIONS  Goal: Patient’s risk of aspiration is minimized  Outcome: Ongoing    Problem: SKIN INTEGRITY  Goal: Skin integrity is maintained or improved  Outcome: Ongoing                    
  Problem: Safety - Adult  Goal: Free from fall injury  Outcome: Progressing     Problem: Risk for Maternal Injury  Goal: Remains free from seizures and injury related to seizure activity  Description: INTERVENTIONS:.  -Maintain airway, patient safety and administer oxygen as ordered  -Monitor patient for seizure activity, document and report duration and descrition  -If Seizure occurs, turn patient to dide and suction secretions as needed  -Reorient patient post seizure  -Seizure Pads  -Instruct patient/family to notify RN of any seizure activity  -Instruct patient/family to call for assistance with activity based on assessment  Outcome: Progressing     Problem: Respiratory - Adult  Goal: Achieves optimal ventilation and oxygenation  Outcome: Progressing     Problem: Nutrition Deficit:  Goal: Optimize nutritional status  Outcome: Progressing     Problem: Skin/Tissue Integrity  Goal: Absence of new skin breakdown  Description: 1.  Monitor for areas of redness and/or skin breakdown  2.  Assess vascular access sites hourly  3.  Every 4-6 hours minimum:  Change oxygen saturation probe site  4.  Every 4-6 hours:  If on nasal continuous positive airway pressure, respiratory therapy assess nares and determine need for appliance change or resting period.  Outcome: Progressing     
  Problem: Safety - Medical Restraint  Goal: Remains free of injury from restraints (Restraint for Interference with Medical Device)  Description: INTERVENTIONS:  1. Determine that other, less restrictive measures have been tried or would not be effective before applying the restraint  2. Evaluate the patient's condition at the time of restraint application  3. Inform patient/family regarding the reason for restraint  4. Q2H: Monitor safety, psychosocial status, comfort, nutrition and hydration  7/17/2024 0614 by Pham Ibrahim RN  Outcome: Completed  Flowsheets (Taken 7/16/2024 2000 by Loyda Cooney RN)  Remains free of injury from restraints (restraint for interference with medical device): Determine that other, less restrictive measures have been tried or would not be effective before applying the restraint  7/16/2024 1958 by Kalyn Servin RN  Outcome: Progressing  7/16/2024 1909 by Loyda Cooney RN  Outcome: Progressing  Flowsheets  Taken 7/16/2024 1800 by Kalyn Servin RN  Remains free of injury from restraints (restraint for interference with medical device):   Every 2 hours: Monitor safety, psychosocial status, comfort, nutrition and hydration   Determine that other, less restrictive measures have been tried or would not be effective before applying the restraint   Evaluate the patient's condition at the time of restraint application  Taken 7/16/2024 1600 by Kalyn Servin RN  Remains free of injury from restraints (restraint for interference with medical device):   Every 2 hours: Monitor safety, psychosocial status, comfort, nutrition and hydration   Determine that other, less restrictive measures have been tried or would not be effective before applying the restraint   Evaluate the patient's condition at the time of restraint application  Taken 7/16/2024 1407 by Inder Dupree RN  Remains free of injury from restraints (restraint for interference with medical device): (Simultaneous 
  Problem: Vaginal Birth or  Section  Goal: Fetal and maternal status remain reassuring during the birth process  Description:  Birth OB-Pregnancy care plan goal which identifies if the fetal and maternal status remain reassuring during the birth process  2024 by Daina Hawley, RN  Outcome: Completed  2024 by Bridgette Thompson, RN  Outcome: Progressing  2024 0808 by Italia Fenton RN  Outcome: Progressing     Problem: Safety - Medical Restraint  Goal: Remains free of injury from restraints (Restraint for Interference with Medical Device)  Description: INTERVENTIONS:  1. Determine that other, less restrictive measures have been tried or would not be effective before applying the restraint  2. Evaluate the patient's condition at the time of restraint application  3. Inform patient/family regarding the reason for restraint  4. Q2H: Monitor safety, psychosocial status, comfort, nutrition and hydration  2024 0614 by Pham Ibrahim, MITCHELL  Outcome: Completed  Flowsheets (Taken 2024 by Loyda Cooney, RN)  Remains free of injury from restraints (restraint for interference with medical device): Determine that other, less restrictive measures have been tried or would not be effective before applying the restraint     
  Problem: Vaginal Birth or  Section  Goal: Fetal and maternal status remain reassuring during the birth process  Description:  Birth OB-Pregnancy care plan goal which identifies if the fetal and maternal status remain reassuring during the birth process  2024 by Dom Knight RN  Outcome: Progressing  2024 135 by Inder Dupree RN  Outcome: Progressing     Problem: Postpartum  Goal: Experiences normal postpartum course  Description:  Postpartum OB-Pregnancy care plan goal which identifies if the mother is experiencing a normal postpartum course  2024 by Dom Knight RN  Outcome: Progressing  2024 1355 by Inder Dupree RN  Outcome: Progressing  Goal: Appropriate maternal -  bonding  Description:  Postpartum OB-Pregnancy care plan goal which identifies if the mother and  are bonding appropriately  2024 by Dom Knight RN  Outcome: Progressing  2024 135 by Inder Dupree RN  Outcome: Progressing  Goal: Establishment of infant feeding pattern  Description:  Postpartum OB-Pregnancy care plan goal which identifies if the mother is establishing a feeding pattern with their   2024 by Dom Knight RN  Outcome: Progressing  2024 135 by Inder Dupree RN  Outcome: Progressing  Goal: Incisions, wounds, or drain sites healing without S/S of infection  2024 by Dom Knight RN  Outcome: Progressing  2024 135 by Inder Dupree RN  Outcome: Progressing     Problem: Pain  Goal: Verbalizes/displays adequate comfort level or baseline comfort level  2024 by Dom Knight RN  Outcome: Progressing  2024 1355 by Inder Dupree RN  Outcome: Progressing     Problem: Infection - Adult  Goal: Absence of infection at discharge  2024 by Dom Knight RN  Outcome: Progressing  2024 1355 by Inder Dupree RN  Outcome: Progressing  Goal: Absence of infection during hospitalization  2024 by 
  Problem: Vaginal Birth or  Section  Goal: Fetal and maternal status remain reassuring during the birth process  Description:  Birth OB-Pregnancy care plan goal which identifies if the fetal and maternal status remain reassuring during the birth process  Outcome: Progressing     Problem: Postpartum  Goal: Experiences normal postpartum course  Description:  Postpartum OB-Pregnancy care plan goal which identifies if the mother is experiencing a normal postpartum course  Outcome: Progressing  Goal: Appropriate maternal -  bonding  Description:  Postpartum OB-Pregnancy care plan goal which identifies if the mother and  are bonding appropriately  Outcome: Progressing  Goal: Establishment of infant feeding pattern  Description:  Postpartum OB-Pregnancy care plan goal which identifies if the mother is establishing a feeding pattern with their   Outcome: Progressing  Goal: Incisions, wounds, or drain sites healing without S/S of infection  Outcome: Progressing     Problem: Pain  Goal: Verbalizes/displays adequate comfort level or baseline comfort level  Outcome: Progressing     Problem: Infection - Adult  Goal: Absence of infection at discharge  Outcome: Progressing  Goal: Absence of infection during hospitalization  Outcome: Progressing  Goal: Absence of fever/infection during anticipated neutropenic period  Outcome: Progressing     Problem: Safety - Adult  Goal: Free from fall injury  Outcome: Progressing     Problem: Chronic Conditions and Co-morbidities  Goal: Patient's chronic conditions and co-morbidity symptoms are monitored and maintained or improved  Outcome: Progressing     Problem: Risk for Maternal Injury  Goal: Remains free from seizures and injury related to seizure activity  Description: INTERVENTIONS:.  -Maintain airway, patient safety and administer oxygen as ordered  -Monitor patient for seizure activity, document and report duration and descrition  -If Seizure occurs, turn 
  Problem: Vaginal Birth or  Section  Goal: Fetal and maternal status remain reassuring during the birth process  Description:  Birth OB-Pregnancy care plan goal which identifies if the fetal and maternal status remain reassuring during the birth process  Outcome: Progressing     Problem: Postpartum  Goal: Experiences normal postpartum course  Description:  Postpartum OB-Pregnancy care plan goal which identifies if the mother is experiencing a normal postpartum course  Outcome: Progressing  Goal: Appropriate maternal -  bonding  Description:  Postpartum OB-Pregnancy care plan goal which identifies if the mother and  are bonding appropriately  Outcome: Progressing  Goal: Establishment of infant feeding pattern  Description:  Postpartum OB-Pregnancy care plan goal which identifies if the mother is establishing a feeding pattern with their   Outcome: Progressing  Goal: Incisions, wounds, or drain sites healing without S/S of infection  Outcome: Progressing     Problem: Pain  Goal: Verbalizes/displays adequate comfort level or baseline comfort level  Outcome: Progressing     Problem: Infection - Adult  Goal: Absence of infection at discharge  Outcome: Progressing  Goal: Absence of infection during hospitalization  Outcome: Progressing  Goal: Absence of fever/infection during anticipated neutropenic period  Outcome: Progressing     Problem: Safety - Adult  Goal: Free from fall injury  Outcome: Progressing     Problem: Discharge Planning  Goal: Discharge to home or other facility with appropriate resources  Outcome: Progressing     Problem: Chronic Conditions and Co-morbidities  Goal: Patient's chronic conditions and co-morbidity symptoms are monitored and maintained or improved  Outcome: Progressing     Problem: Risk for Maternal Injury  Goal: Remains free from seizures and injury related to seizure activity  Description: INTERVENTIONS:.  -Maintain airway, patient safety and administer oxygen 
  Problem: Safety - Adult  Goal: Free from fall injury  7/17/2024 1819 by Bridgette Thompson RN  Outcome: Progressing  7/17/2024 0808 by Italia Fenton RN  Outcome: Progressing  Flowsheets (Taken 7/16/2024 2035 by Loyda Cooney RN)  Free From Fall Injury: Instruct family/caregiver on patient safety     Problem: Discharge Planning  Goal: Discharge to home or other facility with appropriate resources  7/17/2024 1819 by Bridgette Thompson RN  Outcome: Progressing  7/17/2024 0808 by Italia Fenton RN  Outcome: Progressing  Flowsheets (Taken 7/16/2024 2000 by Loyda Cooney, RN)  Discharge to home or other facility with appropriate resources:   Identify barriers to discharge with patient and caregiver   Arrange for needed discharge resources and transportation as appropriate     Problem: Chronic Conditions and Co-morbidities  Goal: Patient's chronic conditions and co-morbidity symptoms are monitored and maintained or improved  7/17/2024 1819 by Bridgette Thompson RN  Outcome: Progressing  7/17/2024 0808 by Italia Fenton RN  Outcome: Progressing  Flowsheets (Taken 7/16/2024 2000 by Loyda Cooney RN)  Care Plan - Patient's Chronic Conditions and Co-Morbidity Symptoms are Monitored and Maintained or Improved: Monitor and assess patient's chronic conditions and comorbid symptoms for stability, deterioration, or improvement     Problem: Risk for Maternal Injury  Goal: Remains free from seizures and injury related to seizure activity  7/17/2024 1819 by Bridgette Thompson RN  Outcome: Progressing  7/17/2024 0808 by Italia Fenton RN  Outcome: Progressing     Problem: Risk for Decreased Cardiac Output  Goal: Maintains optimal cardiac output and hemodynamic stability  7/17/2024 1819 by Bridgette Thompson RN  Outcome: Progressing  7/17/2024 0808 by Italia Fenton RN  Outcome: Progressing  Goal: Achieves optimal ventilation and oxygenation  7/17/2024 1819 by Bridgette Thompson RN  Outcome: Progressing  7/17/2024 0808 by Eliceo 
new skin breakdown  Description: 1.  Monitor for areas of redness and/or skin breakdown  2.  Assess vascular access sites hourly  3.  Every 4-6 hours minimum:  Change oxygen saturation probe site  4.  Every 4-6 hours:  If on nasal continuous positive airway pressure, respiratory therapy assess nares and determine need for appliance change or resting period.  Outcome: Progressing     Problem: ABCDS Injury Assessment  Goal: Absence of physical injury  Outcome: Progressing     
new skin breakdown  Description: 1.  Monitor for areas of redness and/or skin breakdown  2.  Assess vascular access sites hourly  3.  Every 4-6 hours minimum:  Change oxygen saturation probe site  4.  Every 4-6 hours:  If on nasal continuous positive airway pressure, respiratory therapy assess nares and determine need for appliance change or resting period.  Outcome: Progressing     Problem: ABCDS Injury Assessment  Goal: Absence of physical injury  Outcome: Progressing     
output  -Administer fluid and /or volume expanders as ordered    7/14/2024 0152 by Dom Knight RN  Outcome: Progressing  7/13/2024 1938 by Inder Dupree RN  Outcome: Progressing  Goal: Achieves optimal ventilation and oxygenation  Description: INTERVENTIONS:.  -Assess for changes in respiratory status   -Assess for changes in mentation and behavior  -Position to facilitate oxygenation and minimize respiratory effort  -Oxygen supplementation based on oxygen saturation or arterial blood gases  -Initiate smoking cessation protocol as indicated  -Encourage broncho-pulmonary hygiene including cough, deep breathe, incentive spirometry  -Assess the need for suctioning and aspirate as needed  -Assess and instruct to report shortness of breath or any respiratory difficulty  -Respiratory therapy support as indicated      7/14/2024 0152 by Dom Knight RN  Outcome: Progressing  7/13/2024 1938 by Inder Dupree RN  Outcome: Progressing     Problem: Risk for Deficient Fluid Volume  Goal: Hemodynamic stability and optimal renal function maintained  Description: Interventions:.  -Monitor labs and assess for signs and symptoms of volume excess or deficit    -Monitor intake, output and patient weight  -Monitor response to interventions for patient's volume status, including labs, urine output, blood pressure (other measures as available)  -Fluid restriction as ordered  -Instruct patient on fluid and nutrition restrictions as appropriate      7/14/2024 0152 by Dom Knight RN  Outcome: Progressing  7/13/2024 1938 by Inder Dupree RN  Outcome: Progressing     Problem: Safety - Medical Restraint  Goal: Remains free of injury from restraints (Restraint for Interference with Medical Device)  Description: INTERVENTIONS:  1. Determine that other, less restrictive measures have been tried or would not be effective before applying the restraint  2. Evaluate the patient's condition at the time of restraint application  3. Inform 
by Sharp, Loyda, RN  Outcome: Progressing     Problem: Chronic Conditions and Co-morbidities  Goal: Patient's chronic conditions and co-morbidity symptoms are monitored and maintained or improved  7/17/2024 0808 by Italia Fenton RN  Outcome: Progressing  Flowsheets (Taken 7/16/2024 2000 by Loyda Cooney RN)  Care Plan - Patient's Chronic Conditions and Co-Morbidity Symptoms are Monitored and Maintained or Improved: Monitor and assess patient's chronic conditions and comorbid symptoms for stability, deterioration, or improvement  7/16/2024 1958 by Kalyn Servin RN  Outcome: Progressing  7/16/2024 1909 by Loyda Cooney RN  Outcome: Progressing     Problem: Risk for Maternal Injury  Goal: Remains free from seizures and injury related to seizure activity  Description: INTERVENTIONS:.  -Maintain airway, patient safety and administer oxygen as ordered  -Monitor patient for seizure activity, document and report duration and descrition  -If Seizure occurs, turn patient to dide and suction secretions as needed  -Reorient patient post seizure  -Seizure Pads  -Instruct patient/family to notify RN of any seizure activity  -Instruct patient/family to call for assistance with activity based on assessment  7/17/2024 0808 by Italia Fenton RN  Outcome: Progressing  7/16/2024 1958 by Kalyn Servin RN  Outcome: Progressing  7/16/2024 1909 by Loyda Cooney RN  Outcome: Progressing     Problem: Risk for Decreased Cardiac Output  Goal: Maintains optimal cardiac output and hemodynamic stability  Description: INTERVENTIONS:.  -Monitor blood pressure and heart rate  -Monitor urine output and notify licensed practitioner for values outside of   -Assess for signes of decreased cardiac output  -Administer fluid and /or volume expanders as ordered    7/17/2024 0808 by Italia Fenton RN  Outcome: Progressing  7/16/2024 1958 by Kalyn Servin RN  Outcome: Progressing  7/16/2024 1909 by Loyda Cooney RN  Outcome: 
determine need for appliance change or resting period.  7/18/2024 0338 by Daina Hawley RN  Outcome: Progressing  7/17/2024 1819 by Bridgette Thompson RN  Outcome: Progressing     Problem: ABCDS Injury Assessment  Goal: Absence of physical injury  7/18/2024 0338 by Daina Hawley RN  Outcome: Progressing  7/17/2024 1819 by Bridgette Thompson RN  Outcome: Progressing     
hourly  3.  Every 4-6 hours minimum:  Change oxygen saturation probe site  4.  Every 4-6 hours:  If on nasal continuous positive airway pressure, respiratory therapy assess nares and determine need for appliance change or resting period.  7/16/2024 1958 by Kalyn Servin, RN  Outcome: Progressing  7/16/2024 1909 by Loyda Cooney RN  Outcome: Progressing     Problem: ABCDS Injury Assessment  Goal: Absence of physical injury  7/16/2024 1958 by Kalyn Servin RN  Outcome: Progressing  7/16/2024 1909 by Loyda Cooney RN  Outcome: Progressing     
hours: Monitor safety, psychosocial status, comfort, nutrition and hydration  Taken 7/14/2024 0600 by Dom Knight RN  Remains free of injury from restraints (restraint for interference with medical device):   Determine that other, less restrictive measures have been tried or would not be effective before applying the restraint   Evaluate the patient's condition at the time of restraint application   Every 2 hours: Monitor safety, psychosocial status, comfort, nutrition and hydration   Inform patient/family regarding the reason for restraint  Taken 7/14/2024 0400 by Dom Knight RN  Remains free of injury from restraints (restraint for interference with medical device):   Determine that other, less restrictive measures have been tried or would not be effective before applying the restraint   Evaluate the patient's condition at the time of restraint application   Every 2 hours: Monitor safety, psychosocial status, comfort, nutrition and hydration   Inform patient/family regarding the reason for restraint  Taken 7/14/2024 0200 by Dom Knight RN  Remains free of injury from restraints (restraint for interference with medical device):   Determine that other, less restrictive measures have been tried or would not be effective before applying the restraint   Evaluate the patient's condition at the time of restraint application   Every 2 hours: Monitor safety, psychosocial status, comfort, nutrition and hydration   Inform patient/family regarding the reason for restraint  7/14/2024 0152 by Dom Knight RN  Outcome: Progressing  Flowsheets  Taken 7/14/2024 0000  Remains free of injury from restraints (restraint for interference with medical device):   Determine that other, less restrictive measures have been tried or would not be effective before applying the restraint   Evaluate the patient's condition at the time of restraint application   Every 2 hours: Monitor safety, psychosocial status, comfort, 
restraint application   Inform patient/family regarding the reason for restraint   Every 2 hours: Monitor safety, psychosocial status, comfort, nutrition and hydration  Taken 7/14/2024 1700 by Inder Dupree RN  Remains free of injury from restraints (restraint for interference with medical device):   Determine that other, less restrictive measures have been tried or would not be effective before applying the restraint   Evaluate the patient's condition at the time of restraint application   Inform patient/family regarding the reason for restraint   Every 2 hours: Monitor safety, psychosocial status, comfort, nutrition and hydration  Taken 7/14/2024 1600 by Inder Dupree RN  Remains free of injury from restraints (restraint for interference with medical device):   Determine that other, less restrictive measures have been tried or would not be effective before applying the restraint   Evaluate the patient's condition at the time of restraint application   Inform patient/family regarding the reason for restraint   Every 2 hours: Monitor safety, psychosocial status, comfort, nutrition and hydration  Taken 7/14/2024 1500 by Inder Dupree RN  Remains free of injury from restraints (restraint for interference with medical device):   Determine that other, less restrictive measures have been tried or would not be effective before applying the restraint   Evaluate the patient's condition at the time of restraint application   Inform patient/family regarding the reason for restraint   Every 2 hours: Monitor safety, psychosocial status, comfort, nutrition and hydration     Problem: Respiratory - Adult  Goal: Achieves optimal ventilation and oxygenation  7/15/2024 0854 by Inder Dupree RN  Outcome: Progressing  7/15/2024 0831 by Christine Marlow, RCP  Outcome: Progressing  Flowsheets (Taken 7/15/2024 0831)  Achieves optimal ventilation and oxygenation:   Assess for changes in respiratory status   Assess for changes in mentation and 
safety, psychosocial status, comfort, nutrition and hydration   Inform patient/family regarding the reason for restraint  Taken 7/15/2024 0400 by Dom Knight RN  Remains free of injury from restraints (restraint for interference with medical device):   Determine that other, less restrictive measures have been tried or would not be effective before applying the restraint   Evaluate the patient's condition at the time of restraint application   Every 2 hours: Monitor safety, psychosocial status, comfort, nutrition and hydration   Inform patient/family regarding the reason for restraint  Taken 7/15/2024 0200 by Dom Knight RN  Remains free of injury from restraints (restraint for interference with medical device):   Determine that other, less restrictive measures have been tried or would not be effective before applying the restraint   Evaluate the patient's condition at the time of restraint application   Every 2 hours: Monitor safety, psychosocial status, comfort, nutrition and hydration   Inform patient/family regarding the reason for restraint  Taken 7/15/2024 0000 by Dom Knight RN  Remains free of injury from restraints (restraint for interference with medical device):   Determine that other, less restrictive measures have been tried or would not be effective before applying the restraint   Evaluate the patient's condition at the time of restraint application   Every 2 hours: Monitor safety, psychosocial status, comfort, nutrition and hydration   Inform patient/family regarding the reason for restraint     Problem: Respiratory - Adult  Goal: Achieves optimal ventilation and oxygenation  7/15/2024 2238 by Loyda Cooney RN  Outcome: Progressing  7/15/2024 2007 by John Oh RCP  Outcome: Progressing  7/15/2024 0854 by Inder Dupree RN  Outcome: Progressing

## 2024-07-23 VITALS
BODY MASS INDEX: 30.66 KG/M2 | DIASTOLIC BLOOD PRESSURE: 87 MMHG | HEIGHT: 67 IN | RESPIRATION RATE: 16 BRPM | SYSTOLIC BLOOD PRESSURE: 128 MMHG | HEART RATE: 104 BPM | TEMPERATURE: 98.1 F | OXYGEN SATURATION: 99 % | WEIGHT: 195.33 LBS

## 2024-07-23 LAB
BASOPHILS # BLD: 0 K/UL (ref 0–0.2)
BASOPHILS NFR BLD: 0 % (ref 0–2)
EOSINOPHIL # BLD: 0 K/UL (ref 0–0.4)
EOSINOPHILS RELATIVE PERCENT: 0 % (ref 1–4)
ERYTHROCYTE [DISTWIDTH] IN BLOOD BY AUTOMATED COUNT: 16.1 % (ref 11.8–14.4)
HCT VFR BLD AUTO: 23.5 % (ref 36.3–47.1)
HGB BLD-MCNC: 7.7 G/DL (ref 11.9–15.1)
IMM GRANULOCYTES # BLD AUTO: 0 K/UL (ref 0–0.3)
IMM GRANULOCYTES NFR BLD: 0 %
LYMPHOCYTES NFR BLD: 0.5 K/UL (ref 1–4.8)
LYMPHOCYTES RELATIVE PERCENT: 5 % (ref 24–44)
MCH RBC QN AUTO: 29.5 PG (ref 25.2–33.5)
MCHC RBC AUTO-ENTMCNC: 32.8 G/DL (ref 28.4–34.8)
MCV RBC AUTO: 90 FL (ref 82.6–102.9)
MONOCYTES NFR BLD: 0.2 K/UL (ref 0.1–0.8)
MONOCYTES NFR BLD: 2 % (ref 1–7)
MORPHOLOGY: ABNORMAL
NEUTROPHILS NFR BLD: 93 % (ref 36–66)
NEUTS SEG NFR BLD: 9.2 K/UL (ref 1.8–7.7)
NRBC BLD-RTO: 0 PER 100 WBC
PLATELET # BLD AUTO: 287 K/UL (ref 138–453)
PMV BLD AUTO: 10.2 FL (ref 8.1–13.5)
RBC # BLD AUTO: 2.61 M/UL (ref 3.95–5.11)
WBC OTHER # BLD: 9.9 K/UL (ref 3.5–11.3)

## 2024-07-23 PROCEDURE — 6360000002 HC RX W HCPCS

## 2024-07-23 PROCEDURE — 36415 COLL VENOUS BLD VENIPUNCTURE: CPT

## 2024-07-23 PROCEDURE — 6370000000 HC RX 637 (ALT 250 FOR IP): Performed by: NURSE PRACTITIONER

## 2024-07-23 PROCEDURE — 6370000000 HC RX 637 (ALT 250 FOR IP)

## 2024-07-23 PROCEDURE — 85025 COMPLETE CBC W/AUTO DIFF WBC: CPT

## 2024-07-23 RX ADMIN — ACETAMINOPHEN 1000 MG: 500 TABLET ORAL at 10:03

## 2024-07-23 RX ADMIN — IBUPROFEN 600 MG: 400 TABLET, FILM COATED ORAL at 11:48

## 2024-07-23 RX ADMIN — IBUPROFEN 600 MG: 400 TABLET, FILM COATED ORAL at 17:41

## 2024-07-23 RX ADMIN — IBUPROFEN 600 MG: 400 TABLET, FILM COATED ORAL at 06:08

## 2024-07-23 RX ADMIN — PANTOPRAZOLE SODIUM 40 MG: 40 TABLET, DELAYED RELEASE ORAL at 06:08

## 2024-07-23 RX ADMIN — ACETAMINOPHEN 1000 MG: 500 TABLET ORAL at 06:08

## 2024-07-23 RX ADMIN — ACETAMINOPHEN 1000 MG: 500 TABLET ORAL at 16:03

## 2024-07-23 RX ADMIN — GABAPENTIN 300 MG: 300 CAPSULE ORAL at 06:08

## 2024-07-23 RX ADMIN — GUAIFENESIN 600 MG: 600 TABLET, EXTENDED RELEASE ORAL at 08:49

## 2024-07-23 RX ADMIN — GABAPENTIN 300 MG: 300 CAPSULE ORAL at 16:02

## 2024-07-23 RX ADMIN — ENOXAPARIN SODIUM 40 MG: 100 INJECTION SUBCUTANEOUS at 11:48

## 2024-07-24 NOTE — PROGRESS NOTES
POST OP NOTE    SUBJECTIVE  Pt s/p:  RE-OPEN PREVIOUS LAPAROTOMY,CONTROL OF HEMHORRAGE WITH TOPICAL HEMOSTATIC AGENT, PREPARATION OF WOUND 30X22 , APPLICATION OF SKIN SUBSTITUTE GREATER THAN 30X22, ELEVATION OF MYOCUTANEOUS FLAP, APPLICATION OF NEGATIVE PRESSURE WOUND THERAPY GREATER THAN 50CM SQUARED     OBJECTIVE  VITALS:  BP (!) 120/52   Pulse 65   Temp 99.5 °F (37.5 °C)   Resp 18   Ht 1.702 m (5' 7.01\")   LMP 10/01/2023 (Approximate)   SpO2 96%   Breastfeeding Unknown   BMI 28.50 kg/m²         GENERAL:  Intubated and sedated.  CARDIOVASCULAR:  regular rate and rhythm   LUNGS:  CTA Bilaterally  ABDOMEN:   Abdomen soft, non-distended  INCISION: Incision clean/dry/intact    ASSESSMENT  1. POD# 0 s/p:  RE-OPEN PREVIOUS LAPAROTOMY,CONTROL OF HEMHORRAGE WITH TOPICAL HEMOSTATIC AGENT, PREPARATION OF WOUND 30X22 , APPLICATION OF SKIN SUBSTITUTE GREATER THAN 30X22, ELEVATION OF MYOCUTANEOUS FLAP, APPLICATION OF NEGATIVE PRESSURE WOUND THERAPY GREATER THAN 50CM SQUARED     PLAN  Post-op labs  Plan to extubate if appropriate and patient doing well      Bakari Chan MD  Trauma/Surgery Service  7/16/2024 at 2:09 PM    
    PROGRESS NOTE          PATIENT NAME: Noelle Kim  MEDICAL RECORD NO. [id number]  DATE: 2024  SURGEON: Dr. Oates  PRIMARY CARE PHYSICIAN: Balta Cristina MD    HD: # 8    ASSESSMENT    Patient Active Problem List   Diagnosis    H/O LEEP 21    Abnormal Pap Smear    Bipolar 1 disorder, depressed, severe (HCC)    Viral hepatitis C    Hx Gestational diabetes mellitus    Polysubstance Drug Use    Thrombocytopenia affecting pregnancy    G3 Dichorionic diamniotic twin pregnancy    Hx Left salpingoophorectomy     Chroid Plexus Cysts (Baby A)    Echogenic Intracardiac Focus (baby B)    Breech presentation (babyB)    Low-lying placenta (baby A)    Pruritus of pregnancy in second trimester    cHTN (no meds)    37 weeks gestation of pregnancy    PLTCS with ex lap, survey abdomen, abthera wound vac / M/M WT 5#15, 6#13 Apg 8/9 and 8/    S/P  section    Intra-abdominal bleeding    Pre-eclampsia affecting puerperium    Acute stress reaction       MEDICAL DECISION MAKING AND PLAN    Hemoglobin 6.7 this morning patient mild tachycardia, lactic acid within normal limits.  Patient is having no increase in abdominal pain.  Blood pressures are within normal limits.  Will evaluate patient's response to blood transfusion this morning if appropriate, continue to monitor.  If there are signs of continued bleeding will obtain a CTA.  Will continue to follow    Chief Complaint: \"I want to go home\"    SUBJECTIVE    Noelle Kim was seen and evaluated at bedside.  Afebrile, mild tachycardia overnight.  Hemoglobin 6.7 receiving 1 unit PRBC this morning.      OBJECTIVE  VITALS: Temp: Temp: 99.7 °F (37.6 °C)Temp  Av.3 °F (37.4 °C)  Min: 98.8 °F (37.1 °C)  Max: 99.7 °F (37.6 °C) BP Systolic (24hrs), Av , Min:129 , Max:145   Diastolic (24hrs), Av, Min:86, Max:107   Pulse Pulse  Av.4  Min: 100  Max: 106 Resp Resp  Av.6  Min: 16  Max: 19 Pulse ox SpO2  Av.6 %  Min: 97 %  Max: 100 
    Trauma/Surgical Critical Care Sign Out Note:       Code Status: Full Code    Mode of provider to provider communication:        [x] Via telephone   [] In person     Date and time of sign-out: 7/17/2024 3:50 PM     Criteria Met for Transfer:  [x]   Oxygen saturation is > 90% on FiO2< 50% (exceptions may be made for patient pathophysiology)    [x]   Vital signs remain at or near baseline without pharmaceutical adjuncts, blood products, or > 2L fluid bolus in the last 24 hours  [x]   No suspicion or evidence of a new untreated infection (confusion, cool or cyanotic extremities, poor capillary refill, metabolic acidosis, low urine output)  [x]   Stable GCS, seizures controlled, no invasive neurological monitoring   [x]   Altered mental status is stable and able to be safely managed outside the ICU  [x]   No deterioration in renal function in the last 24 hours (creatinine > 50% increase, new onset oliguria)  [x]   Patient care needs do not exceed the capabilities of the unit they are being transferred to (suctioning needs, glucose monitoring, neurological monitoring, neurovascular checks, vital sign monitoring, I&O monitoring, drain management  [x]   No longer requiring mechanical ventilation via endotracheal intubation and/or decreasing O2/CPAP requirements  [x]   No need for medications that cannot be administered outside the ICU      Reason for ICU admission:  Significant intra-op hemorrhage      ICU course summary:  Patient transferred to TICU s/p RLTCS of di-di twins for breech presentation 7/13.  After delivery of children patient was noted to have significant bleeding in the posterior cul de sac found to be coming from the liver bed.  Trauma was brought into the case and areas of bleeding noted included several bleeding varicosities in the area of the liver and IVC an ovarian vein aneurysm was noted as well.  Patient had Abthera placed and was taken to TICU.  Patient had continued intraabdominal bleeding and 
  ICU PROGRESS NOTE      PATIENT NAME: Noelle Kim  MEDICAL RECORD NO. [id number]  DATE: 2024    PRIMARY CARE PHYSICIAN: Balta Cristina MD    HD: # 6    ASSESSMENT    Patient Active Problem List   Diagnosis    H/O LEEP 21    Abnormal Pap Smear    Bipolar 1 disorder, depressed, severe (HCC)    Viral hepatitis C    Hx Gestational diabetes mellitus    Polysubstance Drug Use    Thrombocytopenia affecting pregnancy    G3 Dichorionic diamniotic twin pregnancy    Hx Left salpingoophorectomy     Chroid Plexus Cysts (Baby A)    Echogenic Intracardiac Focus (baby B)    Breech presentation (babyB)    Low-lying placenta (baby A)    Pruritus of pregnancy in second trimester    cHTN (no meds)    37 weeks gestation of pregnancy    PLTCS with ex lap, survey abdomen, abthera wound vac / M/M WT 5#15, 6#13 Apg 89 and     S/P  section    Intra-abdominal bleeding    Pre-eclampsia affecting puerperium     Noelle Kim   is a 34 y.o. female who was admitted to Labor and Delivery and underwent  section for twin deliver. After the twins were delivered, OB noted significant intra-abdominal bleeding that appeared to be coming from near the liver. Packing was placed and an GAMALIEL alert was activated. Upon evaluation, bleeding was controlled. General Surgery scrubbed in and performed an exploratory laparotomy.  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 Please see operative dictation for details of the procedure. MTP was activated during this. The patient's abdomen was packed, was temporarily closed with an Abthera wound vac, and she was taken to the trauma/surgical intensive care unit for further resuscitation and care.     MEDICAL DECISION MAKING AND PLAN    Prevena for 7 days then can be removed   Follow up in clinic in 2 weeks for surgical 
  ICU PROGRESS NOTE      PATIENT NAME: Noelle Kim  MEDICAL RECORD NO. [id number]  DATE: 2024    PRIMARY CARE PHYSICIAN: Balta Cristina MD    HD: # 4    ASSESSMENT    Patient Active Problem List   Diagnosis    H/O LEEP 21    Abnormal Pap Smear    Bipolar 1 disorder, depressed, severe (HCC)    Viral hepatitis C    Hx Gestational diabetes mellitus    Polysubstance Drug Use    Thrombocytopenia affecting pregnancy    G3 Dichorionic diamniotic twin pregnancy    Hx Left salpingoophorectomy     Chroid Plexus Cysts (Baby A)    Echogenic Intracardiac Focus (baby B)    Breech presentation (babyB)    Low-lying placenta (baby A)    Pruritus of pregnancy in second trimester    cHTN (no meds)    37 weeks gestation of pregnancy    PLTCS with ex lap, survey abdomen, abthera wound vac / M/M WT 5#15, 6#13 Apg 8/9 and 8    S/P  section    Intra-abdominal bleeding    Pre-eclampsia affecting puerperium       MEDICAL DECISION MAKING AND PLAN    1. Neuro/Pain  Neuro intact  Neuro checks per protocol  Fentanyl, prop  Ketamine 0.2  Anabel 300 TID, Motrin 600 q6h  Seroquel 100 BID    2. CV  Hemodynamically stable  MAP goal 65  BP goal <160/110, IV antihypertensives prn    BP: Systolic (24hrs), Av , Min:120 , Max:150    Diastolic (24hrs), Av, Min:51, Max:61    HR: Pulse  Av.5  Min: 79  Max: 89    3. Heme  Drain/tube output:  800 mL (1300)  Subcutaneous Heparin  Patient has received significant blood products for continued post-operative bleeding and coagulopathy includinu pRBC, 11u FFP, 3u Plts, 3u Cryo, 2g TXA        4. Pulm  Maintain oxygen sats >92%  Pulmonary toilet  PRVC 30%/8/18/430      RR Resp  Avg: 15.9  Min: 6  Max: 23  Sat O2% SpO2  Av.7 %  Min: 96 %  Max: 97 %  Lab Results   Component Value Date/Time    PHART 7.399 2024 06:55 PM    UPK6PBJ 33.6 2024 06:55 PM    PO2ART 240.0 2024 06:55 PM    GHL6HYU 20.3 2024 06:55 PM    J9OFLBSI 99.7 2024 06:55 PM 
  ICU PROGRESS NOTE      PATIENT NAME: Noelle Kim  MEDICAL RECORD NO. [id number]  DATE: 7/15/2024    PRIMARY CARE PHYSICIAN: Balta Cristina MD    HD: # 3    ASSESSMENT    Patient Active Problem List   Diagnosis    H/O LEEP 21    Abnormal Pap Smear    Bipolar 1 disorder, depressed, severe (HCC)    Viral hepatitis C    Hx Gestational diabetes mellitus    Polysubstance Drug Use    Thrombocytopenia affecting pregnancy    G3 Dichorionic diamniotic twin pregnancy    Hx Left salpingoophorectomy     Chroid Plexus Cysts (Baby A)    Echogenic Intracardiac Focus (baby B)    Breech presentation (babyB)    Low-lying placenta (baby A)    Pruritus of pregnancy in second trimester    cHTN (no meds)    37 weeks gestation of pregnancy    PLTCS with ex lap, survey abdomen, abthera wound vac / M/M WT 5#15, 6#13 Apg  and        MEDICAL DECISION MAKING AND PLAN    1. Neuro/Pain  Neuro intact  Neuro checks per protocol  Fentanyl, prop  Ketamine 0.2  MMPT Tylenol 1g q8h, Anabel 300 TID, Robaxin 750 QID, Motrin 600 q6h  Seroquel 100 BID    2. CV  Hemodynamically stable  MAP goal 65  BP goal <160/110, IV antihypertensives prn    BP: Systolic (24hrs), Av , Min:95 , Max:129    Diastolic (24hrs), Av, Min:55, Max:76    HR: Pulse  Av.2  Min: 66  Max: 98    3. Heme  Drain/tube output:  1300 mL (3600)  Patient has received significant blood products for continued post-operative bleeding and coagulopathy includinu pRBC, 11u FFP, 3u Plts, 3u Cryo, 2g TXA  Additional blood products per H/H and TEG      4. Pulm  Maintain oxygen sats >92%  Pulmonary toilet  PRVC 30%/8/18/430      RR Resp  Av.1  Min: 13  Max: 23  Sat O2% SpO2  Av.5 %  Min: 96 %  Max: 99 %  Lab Results   Component Value Date/Time    PHART 7.399 2024 06:55 PM    [id number] 33.6 2024 06:55 PM    PO2ART 240.0 2024 06:55 PM    FBJ4JRM 20.3 2024 06:55 PM    B7PYCVPV 99.7 2024 06:55 PM    FIO2 30.0 07/15/2024 05:14 
10 LAPS PLACED INTO BOWEL BAGS AND LEFT INSIDE OF BODY  4 LAPS PLACED INSIDE OF BODY FREELY  14 TOTAL LAPS USED IN THIS PROCEDURE TO BE PLACED INTO BODY  
Breast pumps placed on patient at this time, no output.   1045: breast pumps placed on patient, no output   
CLINICAL PHARMACY NOTE: MEDS TO BEDS    Total # of Prescriptions Filled: 5   The following medications were delivered to the patient:  TYLENOL  MOTRIN  OXYCODONE  ZOFRAN  SENEXON    Additional Documentation:    
Called report to Bridgette on 7C, all questions answered. Patient transported via WC with aide.   
Comprehensive Nutrition Assessment    Type and Reason for Visit:  Initial, RD Nutrition Re-Screen/LOS    Nutrition Recommendations/Plan:   Recommend starting enteral nutrition support if Pt remains intubated.  Recommend starting Peptide-based High Protein formula at 20 ml/hr and advancing by 10 ml/hr every 4 hours to goal rate of 60 ml/hr.     Malnutrition Assessment:  Malnutrition Status:  Insufficient data (07/15/24 4113)    Context:  Acute Illness     Findings of the 6 clinical characteristics of malnutrition:  Energy Intake:  Unable to assess  Weight Loss:  No significant weight loss     Body Fat Loss:  Unable to assess     Muscle Mass Loss:  Unable to assess    Fluid Accumulation:  Mild Extremities, Generalized   Strength:  Not Performed    Nutrition Assessment:    Pt seen d/t intubation, admitted for twin birth at 37 weeks gestation with  and Ex Lap on  followed by reopening of  previous Ex Lap with wound vac application .  Pt with Hx gestational diabetes.  Weight records reviewed and show weight gain of ~4.5 kg (6%) x 4 months with previous weight of 62.1 kg in 2023.  Will use August weight for calculations d/t recent pregnancy.  Pt with NGT in place and Propofol running at 19.8 ml/hr as well as D5 1/2 NS at 125 ml/hr.  Nurse reports plan for further surgery tomorrow with hopeful extubation afterwards.  Nurse also reports plans to breastfeed when able, and staff has been pumping patient though have not gotten much breast milk yet.  Pt would benefit from nutrition support if she remains intubated.    Nutrition Related Findings:    Meds/Labs reviewed.  Labs:  Na 134, Mg 6.7, PO4 5.8 Wound Type: Surgical Incision, Wound Vac       Current Nutrition Intake & Therapies:    Average Meal Intake: NPO  Average Supplements Intake: NPO  Diet NPO  Additional Calorie Sources:  D5 1/2 NS @ 125ml/hr = 510 kcal/day.  Propofol @ 19.8 ml/hr = 523 kcal/day.  Combined = 1033 kcal/day    Anthropometric 
Comprehensive Nutrition Assessment    Type and Reason for Visit:  Reassess    Nutrition Recommendations/Plan:   Encourage PO intake as tolerated. Send high calorie Ensures with meals.        Nutrition Assessment:    Pt transferred to post-partum unit. States she is working on her PO intake - did not eat much yesterday d/t missing partial dentures and difficulty chewing/afraid of choking. States she has them in now and is able to chew without difficulty. Working on breakfast at time of visit. Reports variable appetite at baseline. Agreeable to trying Ensures.    Nutrition Related Findings:    meds/labs reviewed Wound Type: Surgical Incision, Wound Vac (abd)       Current Nutrition Intake & Therapies:    Average Meal Intake:  (working on PO)  Average Supplements Intake: None Ordered  ADULT DIET; Regular  ADULT ORAL NUTRITION SUPPLEMENT; Breakfast, Lunch, Dinner; Standard High Calorie/High Protein Oral Supplement    Anthropometric Measures:  Height: 170.2 cm (5' 7.01\")  Ideal Body Weight (IBW): 135 lbs (61 kg)       Current Body Weight: 88.6 kg (195 lb 5.2 oz), 144.7 % IBW. Weight Source: Bed Scale  Current BMI (kg/m2): 30.6  Usual Body Weight: 62.1 kg (136 lb 14.5 oz) (August 2023)  % Weight Change (Calculated): 33  Weight Adjustment For: No Adjustment                 BMI Categories: Obese Class 1 (BMI 30.0-34.9)    Estimated Daily Nutrient Needs:  Energy Requirements Based On: Formula  Weight Used for Energy Requirements: Current  Energy (kcal/day): 9635-4703 kcals/day  Weight Used for Protein Requirements: Ideal  Protein (g/day): 90-95 gm/day  Method Used for Fluid Requirements: Other (Comment)  Fluid (ml/day): per physician    Nutrition Diagnosis:   Inadequate oral intake related to acute injury/trauma (appetite) as evidenced by  (need for ONS)    Nutrition Interventions:   Food and/or Nutrient Delivery: Continue Current Diet, Start Oral Nutrition Supplement  Nutrition Education/Counseling: No recommendation at 
Dr. Ragsdale, Gilda Sullivan (RT), and writer at bedside to extubate patient.  Patient following commands x4, and completed SBT trial.  Patient extubated at 2114 per verbal order of Dr. Ragsdale.  ETT air leak present.  Bilateral breath sounds present after extubation, absence of stridor.  Strong cough intact.  Patient oriented x4.  Per verbal order of Dr. Ragsdale: remove arterial line tonight, remove thornton catheter tonight, and discontinue hemosphere.    Electronically signed by Pham Ibrahim RN on 7/16/2024    
Gynecology Progress Note    Date: 2024  Time: 6:12 AM    Noelle Kim 34 y.o. female , POD # 1 s/p PLTCS with ex lap, survey abdomen, abthera wound vac and second ex lap for evacuation of hematoma and repacking on 24    Patient seen and examined. She was intubated and partially sedated. Was able to respond to occasional commands. Nurse, TICU resident, TICU attending, OBGYN resident, and OBGYN attending at bedside to discuss plan of care.     Vitals:  Vitals:    24 0300 24 0310 24 0400 24 0500   BP:       Pulse: 59 61 70 69   Resp: 17 18 14 16   Temp: 97.7 °F (36.5 °C)  97.7 °F (36.5 °C) 97.9 °F (36.6 °C)   TempSrc:   Bladder Bladder   SpO2: 100% 100% 99% 99%       Intake/Output:   Last Shift: I/O last 3 completed shifts:  In: 9455.8 [I.V.:1800; Blood:7625.8; NG/GT:30]  Out: 35519 [Urine:1500; Emesis/NG output:50; Drains:2600; Blood:9690]  Current Shift: I/O this shift:  In: 4774.8 [I.V.:2999.4; NG/GT:90; IV Piggyback:1685.4]  Out: 1445 [Urine:295; Emesis/NG output:100; Drains:1050]  70 mL out in last 6.5 hrs ~ 10 mL/hr      Physical Exam:  General:  intubated, mildly sedated  Neurologic:  responsive to pain, following intermittent commands  Lungs:  intubated  Heart:  regular rate and rhythm    Abdomen: AbThera dressing in place  Incision: AbThera dressing in place connected to wound vac  Extremities:  jaundiced appearing skin, excoriations in various stages of healing over bilateral LE, SCDs in place and functioning    Lab:  Recent Results (from the past 12 hour(s))   Arterial Blood Gas, POC    Collection Time: 24  6:14 PM   Result Value Ref Range    POC pH 7.352 7.350 - 7.450    POC pCO2 40.0 35.0 - 48.0 mm Hg    POC PO2 176.0 (H) 83.0 - 108.0 mm Hg    POC HCO3 22.2 21.0 - 28.0 mmol/L    Negative Base Excess, Art 3.1 (H) 0.0 - 2.0 mmol/L    POC O2 SAT 99.5 (H) 94.0 - 98.0 %    O2 Delivery Device Adult Ventilator     Dean Test NOT APPLICABLE     Sample Site Arterial 
Gynecology Progress Note    Date: 2024  Time: 7:06 AM    Noelle Kim 34 y.o. female , POD # 8 S/p wound vac removal, fascial/skin closure 24  S/p re-opening ex lap w/ repacking and negative pressure 24  S/p ex lap with repacking and abthera   PLTCS with ex lap, survey abdomen, abthera wound vac 24 M/M WT 5#15    Patient seen and examined. She has no complaints at this time. Pain is controlled.  Patient is  tolerating oral intake. She is urinating well.  She denies any vaginal bleeding. She is  ambulating without difficulty.  She is  passing flatus. She denies Fever/Chills, Chest Pain, SOB, N/V, Calf Pain    Vitals:  Vitals:    24 1339 24 1608 24 2100 24 0153   BP: (!) 142/93 120/81 (!) 123/90 (!) 141/90   Pulse: (!) 104  92 90   Resp: 18  16 18   Temp: 99.3 °F (37.4 °C) 99 °F (37.2 °C) 98.7 °F (37.1 °C) 98.2 °F (36.8 °C)   TempSrc: Oral Oral Oral Oral   SpO2: 100% 100% 99% 100%   Weight:       Height:             Intake/Output:   Last Shift: I/O last 3 completed shifts:  In: 281 [P.O.:100; Blood:181]  Out: 1450 [Urine:1100; Blood:350]  Current Shift: No intake/output data recorded.  1100cc out in last 24 hrs ~ 45cc/hr      Physical Exam:  General:  no apparent distress, alert, and cooperative  Neurologic:  alert, oriented, normal speech, no focal findings or movement disorder noted  Lungs:  no increased work of breathing, good air exchange, and no retractions  Heart:  Regular rate    Abdomen: soft, non-distended, appropriate tenderness, no CVA tenderness, Wound vac in place  Incision: Wound vac in place, appropriately tender  Extremities:  no calf tenderness, non edematous, SCDs    Lab:  No results found for this or any previous visit (from the past 12 hour(s)).      Assessment/Plan:  Noelle Kim 34 y.o. female , S/p wound vac removal, fascial/skin closure 24  S/p re-opening ex lap w/ repacking and negative pressure 24  S/p ex lap with 
OB/GYN Interval Note    Updated patient's mother in room 752 regarding her daughter's labs and clinical status. She is unsure if Noelle desires to breast feed her twins. She states she had difficulty producing milk for her last child. All questions answered regarding her plan of care.       Aishwarya Lott [facility]  7/14/2024, 1:58 PM        
OB/GYN Interval Note    Updated patients mother about procedure and next step in plan.   She received an additional unit of pRBC intra op due to Hgb of 7.   CBC ordered to assess Hgb s/p transfusion and to check on plts   Mag will be turned off tonight  Will continue to trend labs and blood pressures s/p mag treatment.     All questions answered      Aishwarya Lott DO    [facility]  7/15/2024, 10:01 AM        
OB/GYN Physician Interval Note    Evaluated patient at bedside due to to increased bleeding that filled wound vac. She continues to have no complaints. States she has no abdominal pain. She was showering when she had increased output from vacuum. Blood transfusion just ended. Awaiting H&H 4 hrs from transfusion. Will obtain abdominal CT as she continues to have increased output.     Vitals are stable    Vitals:    07/19/24 0825 07/19/24 0903 07/19/24 0957 07/19/24 1125   BP: 118/72 117/88  120/82   Pulse: 88 89  93   Resp: 16 18  18   Temp: 98.5 °F (36.9 °C) 98.3 °F (36.8 °C)  98.9 °F (37.2 °C)   TempSrc: Oral Oral  Oral   SpO2: 98% 100%  100%   Weight:       Height:   1.702 m (5' 7.01\")         Aishwarya Lott [facility], [facility]  7/19/2024, 12:43 PM      
Obstetric/Gynecology Resident Interval Note    Called into room by RN. Patient experienced large gush of fluid. Large collection of fluid on patient bed, pooling in the vagina. Nitrazine +. SROM with thin meconium. SVE: 10/100/0.     Baby A and Baby B: both with Cat 1 FHT, contractions q2-3 minutes.    Senior resident notified.     Vitals:    07/12/24 2059 07/13/24 0015 07/13/24 0700   BP: 125/82 137/89 137/86   Pulse: 77 61 55   Resp: 16 16 18   Temp: 98.1 °F (36.7 °C) 99.1 °F (37.3 °C) 98 °F (36.7 °C)   TempSrc: Oral Oral Oral   SpO2: 97%          Barby Parsons DO  OB/GYN Resident, PGY1  [place], [place]  7/13/2024, 8:38 AM     
Obstetric/Gynecology Resident Interval Note    Labs reviewed. PreE labs wnl, P/C 0.43. Patient now meets criteria for cHTN (no meds) with LUIS without SF based on P/C 0.43. Cr 0.9, AST 64, Plt 132. Delivery is indicated. BP stable and patient asymptomatic. Plan for primary CS due to breech presentation of Baby A at 0800.     Risks, benefits and alternatives were discussed extensively with the patient and her family. Patient aware of risks of bleeding, infection, scarring, injury to infant, injury to surrounding pelvic tissues/organs, need for blood/blood products, need for additional surgery including hysterectomy, as well are rare risk for cardiac arrest, thromboembolic event, pneumonia, and maternal or fetal death. All questions were answered. Reassurance given. Consent for  section signed and placed in chart, orders placed and anesthesiology notified.     Vitals:    24 2059 24 0015   BP: 125/82 137/89   Pulse: 77 61   Resp: 16 16   Temp: 98.1 °F (36.7 °C) 99.1 °F (37.3 °C)   TempSrc: Oral Oral   SpO2: 97%          Recent Results (from the past 6 hour(s))   DRUG SCREEN MULTI URINE    Collection Time: 24  9:27 PM   Result Value Ref Range    Amphetamine Screen, Ur NEGATIVE NEGATIVE    Barbiturate Screen, Ur NEGATIVE NEGATIVE    Benzodiazepine Screen, Urine NEGATIVE NEGATIVE    Cocaine Metabolite, Urine NEGATIVE NEGATIVE    Methadone Screen, Urine NEGATIVE NEGATIVE    Opiates, Urine NEGATIVE NEGATIVE    Phencyclidine, Urine NEGATIVE NEGATIVE    Cannabinoid Scrn, Ur POSITIVE (A) NEGATIVE    Oxycodone Screen, Ur NEGATIVE NEGATIVE    Fentanyl, Ur NEGATIVE NEGATIVE    Test Information       Assay provides rapid clinical screening only.  Presumptive positive results for legal purposes should be confirmed by another method.  To request confirmation, please call the lab within 7 days of sample submission.   Protein / Creatinine Ratio, Urine    Collection Time: 24  9:28 PM   Result Value Ref 
Obstetric/Gynecology Resident Interval Note    Patient seen and evaluated. Difficult to trace both twins simultaneously. BSUS performed. Baby A (maternal right) is breech. Baby B (maternal left) is cephalic. Fetal movement noted from both babies. Fetal status reassuring x2. Patient sleeping, will attempt for better tracing when able.     Daiana Palomares,   OB/GYN Resident, PGY4  [place], [place]  7/13/2024, 5:09 AM      
Obstetric/Gynecology Resident Interval Note    Patient seen and evaluated. RN reports patient called out stating she SROM'd. Patient grossly ruptured with thin meconium. SVE performed and patient 10cm dilated. Plan to proceed to OR for primary CS due to spontaneous labor with breech presentation of baby A. NICU and Anesthesia teams updated and en route. Dr. Gallegos present for procedure while Dr. Lott is en route.     Daiana Palomares DO  OB/GYN Resident, PGY4  [place], [place]  7/13/2024, 8:41 AM      
Obstetric/Gynecology Resident Interval Note    STAT CBC and CMP ordered due to patient complaining of palpitations. Last Hgb was 7.4. Vitals showed slightly soft BP of 98/59 and mild tachycardia of 104.     Vitals:    24 0810 24 1200 24 2110 24 0346   BP: 109/72 107/78 122/78 (!) 98/59   Pulse: 79 79 89 (!) 104   Resp: 16 16 16 16   Temp: 97.4 °F (36.3 °C) 98.3 °F (36.8 °C) 98.2 °F (36.8 °C) 98.2 °F (36.8 °C)   TempSrc: Oral Oral Oral Oral   SpO2: 98%  100%    Weight:       Height:         Hgb resulted as 6.0. Plan to transfuse 1U pRBC and repeat posttransfusion H&H per protocol. CMP pending to trend continued resolution of RABIA after re-initiation of Motrin and starting Lovenox.     New T&S ordered as previous one has .     Recent Results (from the past 24 hour(s))   CBC with Auto Differential    Collection Time: 24  5:07 AM   Result Value Ref Range    WBC 6.7 3.5 - 11.3 k/uL    RBC 2.04 (L) 3.95 - 5.11 m/uL    Hemoglobin 6.0 (LL) 11.9 - 15.1 g/dL    Hematocrit 19.5 (L) 36.3 - 47.1 %    MCV 95.6 82.6 - 102.9 fL    MCH 29.4 25.2 - 33.5 pg    MCHC 30.8 28.4 - 34.8 g/dL    RDW 16.5 (H) 11.8 - 14.4 %    Platelets 141 138 - 453 k/uL    MPV 10.7 8.1 - 13.5 fL    NRBC Automated 0.0 0.0 per 100 WBC    Neutrophils % PENDING %    Lymphocytes % PENDING %    Monocytes % PENDING %    Eosinophils % PENDING %    Basophils % PENDING %    Immature Granulocytes % PENDING 0 %    Neutrophils Absolute PENDING k/uL    Lymphocytes Absolute PENDING k/uL    Monocytes Absolute PENDING k/uL    Eosinophils Absolute PENDING k/uL    Basophils Absolute PENDING 0.0 - 0.2 k/uL    Immature Granulocytes Absolute PENDING 0.00 - 0.30 k/uL     Will update attending.     Kun Doll MD  OB/GYN Resident, PGY4  [place], [place]  2024, 5:34 AM  
Order obtained for extubation.  SpO2 of 98 on 30% FiO2.   Patient extubated and placed on 4L O2 via nasal cannula.   Post extubation SpO2 is 98% with HR  63 bpm and RR 15 breaths/min.    Patient had moderate cough that was productive of white sputum.  Extubation Well tolerated by patient..   Breath Sounds: clear    Gilda Haywood RCP   9:19 PM  
POST OPERATIVE DAY # 2    Noelle Kim is a 34 y.o. female   This patient was seen and examined today.     Her pregnancy was complicated by:   Patient Active Problem List   Diagnosis    H/O LEEP 4/16/21    Abnormal Pap Smear    Bipolar 1 disorder, depressed, severe (HCC)    Viral hepatitis C    Hx Gestational diabetes mellitus    Polysubstance Drug Use    Thrombocytopenia affecting pregnancy    G3 Dichorionic diamniotic twin pregnancy    Hx Left salpingoophorectomy 2013    Chroid Plexus Cysts (Baby A)    Echogenic Intracardiac Focus (baby B)    Breech presentation (babyB)    Low-lying placenta (baby A)    Pruritus of pregnancy in second trimester    cHTN (no meds)    37 weeks gestation of pregnancy    PLTCS with ex lap, survey abdomen, abthera wound vac 7/13/24 M/M WT 5#15, 6#13 Apg 8/9 and 8/9     Patient intubated and sedated. Physical exam performed and chart reviewed/orders reviewed for rounding.     Vital Signs:  Vitals:    07/15/24 0351 07/15/24 0400 07/15/24 0500 07/15/24 0600   BP:       Pulse: 74 76 75 81   Resp: 20      Temp:  98.4 °F (36.9 °C) 98.6 °F (37 °C) 98.6 °F (37 °C)   TempSrc:  Bladder     SpO2: 97% 97% 97% 97%     Urine Input & Output last 24hrs:     Intake/Output Summary (Last 24 hours) at 7/15/2024 0451  Last data filed at 7/15/2024 0423  Gross per 24 hour   Intake 6481.35 ml   Output 1780 ml   Net 4701.35 ml     Physical Exam:  General:  intubated, sedated with NG tube in place  Neurologic:  unable to assess  Lungs:  currently intubated on ventilator  Heart:  regular rate and rhythm    Abdomen: abdomen soft, nondistended with abthera wound vac in place with ABD and tape over incision  Fundus: unable to palpate with wound vac and dressing over incision  Incision: wound vac in place  Extremities:  non edematous, SCDs on and functioning    Labs:  Lab Results   Component Value Date    WBC 9.9 07/14/2024    HGB 8.8 (L) 07/14/2024    HCT 26.6 (L) 07/14/2024    MCV 87.2 07/14/2024    PLT 70 (L) 
POST OPERATIVE DAY # 3    Noelle Kim is a 34 y.o. female   This patient was seen and examined today.     Her pregnancy was complicated by:   Patient Active Problem List   Diagnosis    H/O LEEP 21    Abnormal Pap Smear    Bipolar 1 disorder, depressed, severe (HCC)    Viral hepatitis C    Hx Gestational diabetes mellitus    Polysubstance Drug Use    Thrombocytopenia affecting pregnancy    G3 Dichorionic diamniotic twin pregnancy    Hx Left salpingoophorectomy     Chroid Plexus Cysts (Baby A)    Echogenic Intracardiac Focus (baby B)    Breech presentation (babyB)    Low-lying placenta (baby A)    Pruritus of pregnancy in second trimester    cHTN (no meds)    37 weeks gestation of pregnancy    PLTCS with ex lap, survey abdomen, abthera wound vac 24 M/M WT 5#15, 6#13 Apg 8/9 and     S/P  section    Intra-abdominal bleeding    Pre-eclampsia affecting puerperium     Patient intubated and sedated. Physical exam performed and chart reviewed/orders reviewed for rounding.     Vital Signs:  Vitals:    24 0400 24 0500 24 0600 24 0700   BP: (!) 131/53 (!) 120/52     Pulse: 77 75 73 71   Resp: 16 17 18 15   Temp: 100.2 °F (37.9 °C) 99.5 °F (37.5 °C) 99.3 °F (37.4 °C) 99.5 °F (37.5 °C)   TempSrc: Bladder Bladder     SpO2: 97% 97% 97% 98%   Height:         Urine Input & Output last 24hrs:     Intake/Output Summary (Last 24 hours) at 2024 0712  Last data filed at 2024 0700  Gross per 24 hour   Intake 3944.76 ml   Output 2036 ml   Net 1908.76 ml     Physical Exam:  General:  intubated, sedated with NG tube in place  Neurologic:  unable to assess  Lungs:  currently intubated on ventilator  Heart:  regular rate and rhythm    Abdomen: abdomen soft, nondistended with abthera wound vac in place with ABD and tape over incision  Fundus: unable to palpate with wound vac and dressing over incision  Incision: wound vac in place  Extremities:  non edematous, SCDs on and 
POST OPERATIVE DAY # 5    Noelle Kim is a 34 y.o. female   This patient was seen and examined today.     Her pregnancy was complicated by:   Patient Active Problem List   Diagnosis    H/O LEEP 21    Abnormal Pap Smear    Bipolar 1 disorder, depressed, severe (HCC)    Viral hepatitis C    Hx Gestational diabetes mellitus    Polysubstance Drug Use    Thrombocytopenia affecting pregnancy    G3 Dichorionic diamniotic twin pregnancy    Hx Left salpingoophorectomy     Chroid Plexus Cysts (Baby A)    Echogenic Intracardiac Focus (baby B)    Breech presentation (babyB)    Low-lying placenta (baby A)    Pruritus of pregnancy in second trimester    cHTN (no meds)    37 weeks gestation of pregnancy    PLTCS with ex lap, survey abdomen, abthera wound vac 24 M/M WT 5#15, 6#13 Apg  and     S/P  section    Intra-abdominal bleeding    Pre-eclampsia affecting puerperium     Today she is doing well without any chief complaint. Her lochia is light. She denies chest pain, shortness of breath, headache, lightheadedness, blurred vision, peripheral edema, and palpitations. She is bottle feeding and she denies any signs or symptoms of mastitis.  She is ambulating well. She is voiding without difficulty. She currently denies S/S of postpartum depression. Flatus present.  Bowel movement absent. She is tolerating solids.    Vital Signs:  Vitals:    24 1300 24 1420 24 2000 24 0330   BP: (!) 132/112 106/80 129/68 129/76   Pulse: 78 79 80 84   Resp: 18  16   Temp:  97.7 °F (36.5 °C) 98.6 °F (37 °C) 98.1 °F (36.7 °C)   TempSrc:  Oral Oral Oral   SpO2: 97% 98% 99%    Weight:       Height:         Urine Input & Output last 24hrs:     Intake/Output Summary (Last 24 hours) at 2024 0430  Last data filed at 2024 2240  Gross per 24 hour   Intake 436.91 ml   Output 1200 ml   Net -763.09 ml       Physical Exam:  General:  no apparent distress, alert, and cooperative  Neurologic:  alert, 
POST OPERATIVE DAY # 9    Noelle Kim is a 34 y.o. female   This patient was seen and examined today.     Her pregnancy was complicated by:   Patient Active Problem List   Diagnosis    H/O LEEP 21    Abnormal Pap Smear    Bipolar 1 disorder, depressed, severe (HCC)    Viral hepatitis C    Hx Gestational diabetes mellitus    Polysubstance Drug Use    Thrombocytopenia affecting pregnancy    G3 Dichorionic diamniotic twin pregnancy    Hx Left salpingoophorectomy     Chroid Plexus Cysts (Baby A)    Echogenic Intracardiac Focus (baby B)    Breech presentation (babyB)    Low-lying placenta (baby A)    Pruritus of pregnancy in second trimester    cHTN (no meds)    37 weeks gestation of pregnancy    PLTCS with ex lap, survey abdomen, abthera wound vac 24 M/M WT 5#15, 6#13 Apg  and     S/P  section    Intra-abdominal bleeding    Pre-eclampsia affecting puerperium    Acute stress reaction     Today she is doing well without any chief complaint. Her lochia is light. She denies chest pain, shortness of breath, headache, lightheadedness, blurred vision, peripheral edema, and palpitations. She is bottle feeding and she denies any signs or symptoms of mastitis.  She is ambulating well. She is voiding without difficulty. She currently denies S/S of postpartum depression. Flatus present.  Bowel movement present (last reported was the day prior to yesterday). She is tolerating solids.    Vital Signs:  Vitals:    24 0731 24 1116 24 1506 24   BP: 114/81   (!) 141/80   Pulse: 86   98   Resp: 18 18 18 18   Temp: 97.6 °F (36.4 °C)   99.2 °F (37.3 °C)   TempSrc: Oral   Oral   SpO2: 97%   98%   Weight:       Height:         Urine Input & Output last 24hrs:   No intake or output data in the 24 hours ending 24 0703    Physical Exam:  General:  no apparent distress, alert, and cooperative  Neurologic:  alert, oriented, normal speech, no focal findings or movement disorder 
POST OPERATIVE DAY #4    Noelle Kim is a 34 y.o. female   This patient was seen and examined today.     Her pregnancy was complicated by:   Patient Active Problem List   Diagnosis    H/O LEEP 21    Abnormal Pap Smear    Bipolar 1 disorder, depressed, severe (HCC)    Viral hepatitis C    Hx Gestational diabetes mellitus    Polysubstance Drug Use    Thrombocytopenia affecting pregnancy    G3 Dichorionic diamniotic twin pregnancy    Hx Left salpingoophorectomy     Chroid Plexus Cysts (Baby A)    Echogenic Intracardiac Focus (baby B)    Breech presentation (babyB)    Low-lying placenta (baby A)    Pruritus of pregnancy in second trimester    cHTN (no meds)    37 weeks gestation of pregnancy    PLTCS with ex lap, survey abdomen, abthera wound vac 24 M/M WT 5#15, 6#13 Apg 8 and     S/P  section    Intra-abdominal bleeding    Pre-eclampsia affecting puerperium     Patient extubated on . She is doing well and has no current complaints. She has NG tube in place. She denies any heavy vaginal bleeding. Denies any additional complaints at this time. She wants to know how babies are doing.     Vital Signs:  Vitals:    24 0600 24 0655 24 0700 24 0800   BP: 120/67  119/72    Pulse: 70  71    Resp: 14  11    Temp: 98.4 °F (36.9 °C)   98.3 °F (36.8 °C)   TempSrc: Oral   Oral   SpO2: 98%  97%    Weight:  88.6 kg (195 lb 5.2 oz)     Height:         Urine Input & Output last 24hrs:     Intake/Output Summary (Last 24 hours) at 2024 0801  Last data filed at 2024 0650  Gross per 24 hour   Intake 3458.47 ml   Output 1333 ml   Net 2125.47 ml     Physical Exam:  General:  AAOx3, hoarse voice  Neurologic:  unable to assess  Lungs:  no increased work of breathing  Heart:  regular rate and rhythm    Abdomen: abdomen soft, wound vac present over incision  Fundus: unable to palpate with wound vac over incision  Incision: wound vac in place  Extremities:  non edematous, SCDs on 
POST OPERATIVE DAY #6    Noelle Kim is a 34 y.o. female   This patient was seen and examined today.     Her pregnancy was complicated by:   Patient Active Problem List   Diagnosis    H/O LEEP 21    Abnormal Pap Smear    Bipolar 1 disorder, depressed, severe (HCC)    Viral hepatitis C    Hx Gestational diabetes mellitus    Polysubstance Drug Use    Thrombocytopenia affecting pregnancy    G3 Dichorionic diamniotic twin pregnancy    Hx Left salpingoophorectomy     Chroid Plexus Cysts (Baby A)    Echogenic Intracardiac Focus (baby B)    Breech presentation (babyB)    Low-lying placenta (baby A)    Pruritus of pregnancy in second trimester    cHTN (no meds)    37 weeks gestation of pregnancy    PLTCS with ex lap, survey abdomen, abthera wound vac 24 M/M WT 5#15, 6#13 Apg  and     S/P  section    Intra-abdominal bleeding    Pre-eclampsia affecting puerperium    Acute stress reaction     Today she is doing well. Her lochia is light. She denies chest pain, shortness of breath, headache, lightheadedness, blurred vision, peripheral edema, and palpitations. She is bottle feeding and she denies any signs or symptoms of mastitis.  She is ambulating well. She is voiding without difficulty. She currently denies S/S of postpartum depression. Flatus present.  Bowel movement present x2 yesterday. She is tolerating solids.    Vital Signs:  Vitals:    24 0810 24 1200 24 2110 24 0346   BP: 109/72 107/78 122/78 (!) 98/59   Pulse: 79 79 89 (!) 104   Resp: 16 16 16 16   Temp: 97.4 °F (36.3 °C) 98.3 °F (36.8 °C) 98.2 °F (36.8 °C) 98.2 °F (36.8 °C)   TempSrc: Oral Oral Oral Oral   SpO2: 98%  100%    Weight:       Height:         Urine Input & Output last 24hrs:     Intake/Output Summary (Last 24 hours) at 2024 0432  Last data filed at 2024 1355  Gross per 24 hour   Intake --   Output 1950 ml   Net -1950 ml     Physical Exam:  General:  no apparent distress, alert, and 
POST OPERATIVE DAY #7    Noelle Kim is a 34 y.o. female   This patient was seen and examined today.     Her pregnancy was complicated by:   Patient Active Problem List   Diagnosis    H/O LEEP 21    Abnormal Pap Smear    Bipolar 1 disorder, depressed, severe (HCC)    Viral hepatitis C    Hx Gestational diabetes mellitus    Polysubstance Drug Use    Thrombocytopenia affecting pregnancy    G3 Dichorionic diamniotic twin pregnancy    Hx Left salpingoophorectomy     Chroid Plexus Cysts (Baby A)    Echogenic Intracardiac Focus (baby B)    Breech presentation (babyB)    Low-lying placenta (baby A)    Pruritus of pregnancy in second trimester    cHTN (no meds)    37 weeks gestation of pregnancy    PLTCS with ex lap, survey abdomen, abthera wound vac 24 M/M WT 5#15, 6#13 Apg  and     S/P  section    Intra-abdominal bleeding    Pre-eclampsia affecting puerperium    Acute stress reaction     Today she is doing well. Her lochia is light. She denies chest pain, shortness of breath, headache, lightheadedness, blurred vision, peripheral edema, and palpitations. She is bottle feeding and she denies any signs or symptoms of mastitis.  She is ambulating well. She is voiding without difficulty. She currently denies S/S of postpartum depression. Flatus present.  Bowel movement present. She is tolerating solids.    Vital Signs:  Vitals:    24 0903 24 0957 24 1125 24 2037   BP: 117/88  120/82 131/86   Pulse: 89  93 (!) 104   Resp: 18  18 16   Temp: 98.3 °F (36.8 °C)  98.9 °F (37.2 °C) 99 °F (37.2 °C)   TempSrc: Oral  Oral Oral   SpO2: 100%  100% 100%   Weight:       Height:  1.702 m (5' 7.01\")       Urine Input & Output last 24hrs:     Intake/Output Summary (Last 24 hours) at 2024 0225  Last data filed at 2024 1119  Gross per 24 hour   Intake 317.42 ml   Output --   Net 317.42 ml     Physical Exam:  General:  no apparent distress, alert, and cooperative  Neurologic:  alert, 
Passed bedside swallow, per [place] ok for clears and ok to pull NGT.   
Patient febrile 100.5F, tachycardic 113bm. Notified Dr. Kim, no order at this time. Encourage patient to increase fluid intake. Monitored accordingly.  
Per OB, only going to treat blood pressure is SBP >160 and Diastolic >110.   
Pt arrived back to room 1004 from OR. Placed on monitor, vital signs stable. Drips, alarms and pumps all checked.   
Pt given discharge instructions, all questions answered. Pt discharged via wheelchair with all personal belongings to home.   
Pt's potassium 6.2 trauma made aware. Platelets 53 trauma made aware, see orders for details.   
Pt. Blood pressure steadily dropping, trauma at bedside. POC hemoglobin done. Per OB blood pressure okay as long as <140/<90.  
Pt. Bradycardic HR 39-42. Trauma made aware and at bedside, atropine at bedside.   
Resident Interval Magnesium Sulfate Note    Noelle Kim is a 34 y.o. female  POD # 1 s/p PLTCS with ex lap, survey abdomen, abthera wound vac and second ex lap for evacuation of hematoma and repacking on 24     Patient remains intubated/sedated.    Continuous Medications:    sodium chloride      magnesium sulfate 2,000 mg/hr (24)    sodium chloride      oxytocin      lactated ringers IV soln Stopped (24)    sodium chloride      lactated ringers IV soln 200 mL/hr at 24 0406    dexmedeTOMIDine Stopped (24)    ketamine 0.2 mg/kg/hr (24)    dextrose      norepinephrine 5 mcg/min (24 114)    dextrose      insulin Stopped (24)    sodium chloride      sodium chloride      propofol 35 mcg/kg/min (24 0542)    sodium chloride 50 mL/hr at 24 0406       Vitals:    Vitals:    24 0300 24 0310 24 0400 24 0500   BP:       Pulse: 59 61 70 69   Resp: 17 18 14 16   Temp: 97.7 °F (36.5 °C)  97.7 °F (36.5 °C) 97.9 °F (36.6 °C)   TempSrc:   Bladder Bladder   SpO2: 100% 100% 99% 99%         Physical Exam:  Chest: clear to auscultation bilaterally  Heart: RRR no murmur  Abdomen: soft, nontender, nondistended  Extremities: DTR normal Bilateral upper extremities Right: 1/4   Left: 1/4  Clonus: difficult to ascertain    Urine Output: 40cc/hr; Clear urine    Labs:  Last Magnesium Level:   Lab Results   Component Value Date/Time    MG 4.2 2024 04:37 AM       BMP:    Recent Labs     24  2300 24  0200 24  0437    140 139   K 6.2* 4.5 4.3   * 108* 108*   CO2 24 23 22   BUN 13 13 13   CREATININE 1.0* 1.0* 0.9   GLUCOSE 100* 105* 78       ASSESSMENT/PLAN  Noelle Kim is a 34 y.o. female  POD # 1 s/p PLTCS with ex lap, survey abdomen, abthera wound vac and second ex lap for evacuation of hematoma and repacking on 24    - Continue Magnesium Sulfate Treatment 2g/hr, off @ 0200 on 
Resident Interval Magnesium Sulfate Note    Noelle Kim is a 34 y.o. female  POD# 1 s/p PLTCS with ex lap, survey of abdomen, abthera wound vac placement and second ex lap for evacuation of hematoma and repacking 24  The patient is resting comfortably. She remains intubated and sedated.     Continuous Medications:    sodium chloride      magnesium sulfate 2,000 mg/hr (24 153)    sodium chloride      sodium chloride      fentaNYL 100 mcg/hr (24 153)    dextrose 5 % and 0.45 % NaCl 125 mL/hr at 24 1536    oxytocin      sodium chloride      dexmedeTOMIDine Stopped (24)    ketamine 0.2 mg/kg/hr (24 153)    dextrose      norepinephrine 5 mcg/min (24 114)    dextrose      insulin Stopped (24)    sodium chloride      sodium chloride      propofol 40 mcg/kg/min (24 153)    sodium chloride Stopped (24 0842)       Vitals:    Vitals:    24 1245 24 1300 24 1400 24 1527   BP:       Pulse: 86 87 85 83   Resp:    22   Temp: 98.8 °F (37.1 °C) 98.8 °F (37.1 °C) 98.6 °F (37 °C)    TempSrc:       SpO2: 98% 98% 98% 98%       Physical Exam:  Chest: clear to auscultation bilaterally  Heart: RRR no murmur  Abdomen: abthera wound vac in place, fluff pads covering wound vac  Extremities: DTR decreased Bilateral upper extremities Right: 1/4   Left: 1/4, difficult to obtain secondary to restraints, SCDs in place    Urine Output: 36mL/hr over the last 6 hours; barbara urine    Labs:  Last Magnesium Level:   Lab Results   Component Value Date/Time    MG 6.9 2024 02:26 PM       BMP:    Recent Labs     24  2300 24  0200 24  0437    140 139   K 6.2* 4.5 4.3   * 108* 108*   CO2 24 23 22   BUN 13 13 13   CREATININE 1.0* 1.0* 0.9   GLUCOSE 100* 105* 78       ASSESSMENT/PLAN  Noelle Kim is a 34 y.o. female  POD# 1 s/p PLTCS with ex lap, survey of abdomen, abthera wound vac placement and second ex lap 
Resident Interval Magnesium Sulfate Note    Noelle Kim is a 34 y.o. female  POD# 1 s/p PLTCS with ex lap, survey of abdomen, abthera wound vac placement and second ex lap for evacuation of hematoma and repacking 24  The patient is resting comfortably. She remains intubated and sedated. She is agitated and trying to ask questions. She is able to nod/shake her head and attempts to write question on paper. Trauma surgeon also at bedside. Planning for abdominal incision closure and extubation this afternoon.     Continuous Medications:    sodium chloride      magnesium sulfate 2,000 mg/hr (24 06)    sodium chloride      sodium chloride      fentaNYL 50 mcg/hr (24 0835)    dextrose 5 % and 0.45 % NaCl 125 mL/hr at 24 0916    oxytocin      sodium chloride      dexmedeTOMIDine Stopped (24)    ketamine 0.2 mg/kg/hr (24 0656)    dextrose      norepinephrine 5 mcg/min (24 1145)    dextrose      insulin Stopped (24 2202)    sodium chloride      sodium chloride      propofol 40 mcg/kg/min (24 1011)    sodium chloride 50 mL/hr at 24 0656       Vitals:    Vitals:    24 1118 24 1123 24 1128 24 1133   BP: (!) 121/55      Pulse: 94 93 94 92   Resp: 20      Temp: 99 °F (37.2 °C) 99 °F (37.2 °C) 99 °F (37.2 °C) 99 °F (37.2 °C)   TempSrc:       SpO2: 97% 97% 97% 97%       Physical Exam:  Chest: clear to auscultation bilaterally  Heart: RRR no murmur  Abdomen: soft, nontender, nondistended  Extremities: DTR decreased Bilateral upper extremities Right: 1/4   Left: 1/4    Urine Output: 60mL/hr; Clear urine    Labs:  Last Magnesium Level:   Lab Results   Component Value Date/Time    MG 4.2 2024 04:37 AM       BMP:    Recent Labs     24  2300 24  0200 24  0437    140 139   K 6.2* 4.5 4.3   * 108* 108*   CO2 24 23 22   BUN 13 13 13   CREATININE 1.0* 1.0* 0.9   GLUCOSE 100* 105* 78       ASSESSMENT/PLAN  Noelle 
Resident Interval Note    Presented to bedside regarding RN concern of abnormal vitals including fever of 100.5 Fahrenheit and tachycardia to 113.  Vitals were repeated at 10 AM and noted to be within normal limits, with a temperature of 99.1 and a heart rate of 98.  Patient received Tylenol shortly after recheck of vitals.  Patient was noted to be saturating appropriately 98% as documented in chart, 97% when seen at bedside.    Abdominal exam is benign, minimally tender with no evidence of worsening distention.  Patient denies any urinary complaints, has been ambulating and consumed breakfast this morning.  Will continue to monitor throughout today and reassess for discharge this evening.    WBC on a.m. lab draw 9.9.    Roderick Galeana MD  Obstetrics & Gynecology Resident, PGY-3  [place], [place]  7/23/2024 11:14 AM    
Resident Interval Note    Returned CBC this morning reveals patient's hemoglobin to be 6.5.  1 unit PRBCs ordered with repeat H&H, and high clinical suspicion for active hemorrhage at this time.  Trauma team recontacted this morning and likely to proceed with CTA to evaluate source of possible bleeding.  Will await further recommendations    Roderick Galeana MD  Obstetrics & Gynecology Resident, PGY-3  [place], [place]  7/20/2024 8:29 AM    
[facility] ([facility]) Inpatient Discharge Summary  KATHE Barragan  7/24/2024        Noelle Kim  1990  [id number]    Date & Time of Discharge: 7/23/2024  6:51 PM     Is consult a Victim of Crime?: No    Services provided:  Psychotherapy Individual, Screenings: ITSS, PC-PTSD-5, and PHQ-4, Informational Packet Provided, and Referrals    Screen Results (If any):    ITSS:  Date Screen Completed: 7/18/24  Patient's score= 2 for PTSD risk. ( ? 2 is positive for PTSD risk. )  Patient's score= 3 for depression risk.  ( ? 2 is positive for Depression risk )     PC-PTSD-5:  Date Screen Completed:  7/18/24  Patient's Score =  1  ( > 3 is positive for PTSD )     PHQ-4:   Date Screen Completed:  7/18/24  Patient's Score (Questions 1&2):  3  ? 3 suggests anxiety  Patient's Score (Questions 3&4):  0  ? 3 suggests depression       Discharge Recommendations:  Follow up with current mental health provider      The therapist may be reached through the [facility] offices at [phone].   
Social Work    Met with patient (mom) and maternal grandma Lali at bedside to complete consult and offer any assist or support.     Patient (mom) just transferred to OB from [facility] yesterday. She delivered twins and their names are Baby A: Lauro Foy and Baby B:  Luis E Ma.     FOB is Lauro. She reported they are not together and he is not involved. He is also the father of her son.     Resides alone. Maternal grandma resides in [place].     Has all needed baby items. Has a PNP with [facility] for safe [facility]. Was receiving food stamps but stated when she went to use her card recently there was nothing on it so she's not sure what is going on. Also needs to call WIC.     Is linked with [facility] in [place] for her PCP, suboxone and therapist. She reported that she is prescribed 8 mg 2x a day (am & pm). Does admit to THC use due to extreme itching.     Denied any current signs of anxiety or depression but did state that she felt very overwhelmed with what happened to her and was afraid to sleep because she didn't think she'd wake up. [facility] did come meet with her today.     Patient expressed concern of [facility] involvement and that they are taking Baby A. She alleged that she does not know why and reported that Baby A is going to her cousin Shelly. Patient did ask SW if she was getting Baby A back when she is discharged. Informed her that she would need to speak with [facility]  regarding her case plan. She did state she has been down to the NICU to see Baby B yesterday and hopes to go again today.     SW did provide update to [facility] caseworkers.       
Social Work    Patient is currently in TICU and intubated. SW will follow up when she transfers over to OB floor or is stable.   SW did call in referral to [facility] intake and spoke with Rose due to positive THC at delivery, no custody of 4 yr old and [facility] history.   Informed [facility] that patient is currently intubated and in TICU. Provided update on twins.   SW will follow up with mom when she is medically stable.   
When pt. was rolled on right side for changing bed sheet pt. Dumped 400mL of bright red blood into wound vac. Trauma and OB aware   
Writer switched out pt's thornton catheter for temperature sensing thornton catheter once temperature was plugged in temp was showing 33 degrees celsius. Writer than inserted esophageal temp to ensure accuracy. Esophageal temperature was 32.9. Trauma made aware and at bedside.   
by Chaplain Billy, on 7/13/2024 at 8:27 AM.  [facility]  [phone]      
negative pressure and POD# 1 s/p PLTCS with ex lap, survey of abdomen, abthera wound vac placement and second ex lap for evacuation of hematoma and repacking 7/13/24   - Continue Magnesium Sulfate Treatment 2g/hr, off @ 0200 on 7/15/24   -  Mag levels q6hrs per provider   - BPs persistently elevated and severe   - Patient denies any s/s PreE   - UOP adequate   - PreE labs abnormal, P/C 0.43   - ALT/AST 21/64 > 19/58 > 19/67 > 172/664 > 249/1078               - Plt 132 > 266 > 54 > 70   - Labetalol 300mg TID held until extubation   - Last IV anti-hypertensive Labetalol 20 IV @2130 on 7/14   - Continue to monitor closely and coordinate care with TERRENCE Fraser DO  Ob/Gyn Resident  7/14/2024, 9:45 PM     
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 Please see operative dictation for details of the procedure. MTP was activated during this. The patient's abdomen was packed, was temporarily closed with an Abthera wound vac, and she was taken to the trauma/surgical intensive care unit for further resuscitation and care.       OBJECTIVE  VITALS: Temp: Temp: 98.6 °F (37 °C)Temp  Av.7 °F (36.5 °C)  Min: 91.2 °F (32.9 °C)  Max: 99 °F (37.2 °C) BP Systolic (24hrs), Av , Min:91 , Max:121   Diastolic (24hrs), Av, Min:55, Max:68   Pulse Pulse  Av.1  Min: 41  Max: 98 Resp Resp  Av.2  Min: 13  Max: 23 Pulse ox SpO2  Av.1 %  Min: 97 %  Max: 100 %    Physical Exam  Constitutional:       Comments: Intubated   HENT:      Head: Normocephalic.      Nose: Nose normal.      Mouth/Throat:      Mouth: Mucous membranes are moist.   Cardiovascular:      Rate and Rhythm: Normal rate.   Pulmonary:      Comments: Tolerating mechanical ventilation  Abdomen: Soft.  Abthera in place with vac.  Musculoskeletal:      Cervical back: Neck supple.   Skin:     General: Skin is warm and dry.     LAB:  CBC:   Recent Labs     24  1715 24  2300 24  0437 24  0440 24  1138   WBC 8.5 7.9 7.0  --   --    HGB 9.2* 9.5* 7.4* 7.6* 9.0*   HCT 27.5* 27.1* 21.7* 21.9* 25.3*   MCV 87.3 84.4 84.1  --   --    PLT 57* See Reflexed IPF Result See Reflexed IPF Result  --   --      BMP:   Recent Labs     24  2300 24  0200 24  0437    140 139   K 6.2* 4.5 4.3   * 108* 108*   CO2 24 23 22   BUN 13 13 13   CREATININE 1.0* 1.0* 0.9   GLUCOSE 100* 105* 78         RADIOLOGY:  XR CHEST PORTABLE    Result Date: 2024  1. No acute cardiopulmonary process. 2. Lines and tubes as above.     XR CHEST PORTABLE    Result Date: 
Physician: Dr. Pollo Doll MD  Ob/Gyn Resident  7/23/2024, 3:47 AM    Attending Physician Statement  I have personally seen, evaluated and discussed the care of Noelle Kim, including pertinent history and exam findings with the resident. I have reviewed and edited their note in the electronic medical record. The key elements of all parts of the encounter have been performed/reviewed by me.  I agree with the assessment, plan and orders as documented by the resident.     The level of care submitted represents to the best of my ability the care documented in the medical record today.    GC Modifier.  This service has been performed in part by a resident under the direction of a teaching physician.    Patient seen this morning and has no complaints. Will monitor morning Hgb to ensure stability prior to possible discharge today. Reviewed lifting restrictions and when she can drive postpartum.    Attending's Name:  Aishwarya Johnston DO  Date: 7/23/2024  Time: 12:38 PM     
Surgery  10:10 AM 07/21/24     
to a licensed caregiver..     XR CHEST PORTABLE    Result Date: 7/13/2024  1. Endotracheal tube terminating 5.4 cm above the ekta. 2. Intestinal tube extending below the field of view. There is suggestion that the side port of the intestinal tube is just above the EG junction. 3. Tape like device overlies the left upper quadrant of the abdomen which may represent a drain, packing, or other structure external to the patient. RECOMMENDATION: Suggest film of the upper abdomen.          Yasmine Salazar DO  07/17/24   6:47 AM

## 2024-07-30 ENCOUNTER — POSTPARTUM VISIT (OUTPATIENT)
Dept: OBGYN CLINIC | Age: 34
End: 2024-07-30

## 2024-07-30 VITALS
HEIGHT: 67 IN | HEART RATE: 97 BPM | SYSTOLIC BLOOD PRESSURE: 140 MMHG | DIASTOLIC BLOOD PRESSURE: 92 MMHG | BODY MASS INDEX: 27.15 KG/M2 | WEIGHT: 173 LBS

## 2024-07-30 DIAGNOSIS — R58 INTRA-ABDOMINAL BLEEDING: ICD-10-CM

## 2024-07-30 PROCEDURE — 99024 POSTOP FOLLOW-UP VISIT: CPT | Performed by: OBSTETRICS & GYNECOLOGY

## 2024-07-30 RX ORDER — HYDROXYZINE HYDROCHLORIDE 25 MG/1
25 TABLET, FILM COATED ORAL EVERY 8 HOURS PRN
Qty: 30 TABLET | Refills: 0 | Status: SHIPPED | OUTPATIENT
Start: 2024-07-30

## 2024-07-30 RX ORDER — GUAIFENESIN 600 MG/1
1200 TABLET, EXTENDED RELEASE ORAL 2 TIMES DAILY
Qty: 40 TABLET | Refills: 0 | Status: SHIPPED | OUTPATIENT
Start: 2024-07-30 | End: 2024-08-09

## 2024-07-30 NOTE — PROGRESS NOTES
[facility]  [address]  [address]  [place] [address]  Dept: [phone]     Noelle Kim  3:59 PM  24            The patient was seen. She has no chief complaints today. She delivered by  section on 24 for spontaneous labor at 37w5d with a twin pregnancy.  She had profuse intra-abdominal bleeding requiring transfusions of various blood products and required packing with delayed closure due to her continued bleeding from her liver varicosities and other vessels.  She was discharged home on POD#10. She had CHTN with LUIS w/o SF.  She is not breast feeding and there is not any signs or symptoms of mastitis.  The patient completed the E.P.D.S. Evaluation form and scored 8.  She does not have any signs or symptoms of post partum depression. She denies any suicidal thoughts with a plan, intent to harm others, and delusional ideas. Today her lochia is light she denies any dizziness or shortness of breath.      Her pregnancy was complicated by:   Patient Active Problem List    Diagnosis Date Noted    Acute stress reaction 2024    S/P  section 07/15/2024    Intra-abdominal bleeding 07/15/2024    Pre-eclampsia affecting puerperium 07/15/2024    PLTCS with ex lap, survey abdomen, abthera wound vac 24 M/M WT 5#15, 6#13 Apg 8/9 and 8/9 2024    37 weeks gestation of pregnancy 2024    Pruritus of pregnancy in second trimester 2024    cHTN (no meds) 2024     Overview Note:     Diagnosed by MFM through chart review       Breech presentation (babyB) 2024    Low-lying placenta (baby A) 2024    Chroid Plexus Cysts (Baby A) 2024     Overview Note:     Noted MFM US 24  Resolved       Echogenic Intracardiac Focus (baby B) 2024     Overview Note:     Noted MFM US 24      Hx Gestational diabetes mellitus 2024     Overview Note:     Early 1 hr GTT

## 2024-08-01 ENCOUNTER — OFFICE VISIT (OUTPATIENT)
Age: 34
End: 2024-08-01

## 2024-08-01 ENCOUNTER — HOSPITAL ENCOUNTER (OUTPATIENT)
Age: 34
Setting detail: SPECIMEN
Discharge: HOME | End: 2024-08-01

## 2024-08-01 VITALS
TEMPERATURE: 97.5 F | HEIGHT: 67 IN | SYSTOLIC BLOOD PRESSURE: 132 MMHG | HEART RATE: 79 BPM | DIASTOLIC BLOOD PRESSURE: 89 MMHG | BODY MASS INDEX: 27.15 KG/M2 | WEIGHT: 173 LBS

## 2024-08-01 DIAGNOSIS — Z98.890 S/P EXPLORATORY LAPAROTOMY: Primary | ICD-10-CM

## 2024-08-01 LAB
BASOPHILS # BLD: 0.05 K/UL (ref 0–0.2)
BASOPHILS NFR BLD: 0 % (ref 0–2)
EOSINOPHIL # BLD: 0.05 K/UL (ref 0–0.44)
EOSINOPHILS RELATIVE PERCENT: 0 % (ref 1–4)
ERYTHROCYTE [DISTWIDTH] IN BLOOD BY AUTOMATED COUNT: 15.6 % (ref 11.8–14.4)
HCT VFR BLD AUTO: 22.8 % (ref 36.3–47.1)
HGB BLD-MCNC: 7.1 G/DL (ref 11.9–15.1)
IMM GRANULOCYTES # BLD AUTO: 0.32 K/UL (ref 0–0.3)
IMM GRANULOCYTES NFR BLD: 3 %
LYMPHOCYTES NFR BLD: 1.13 K/UL (ref 1.1–3.7)
LYMPHOCYTES RELATIVE PERCENT: 10 % (ref 24–43)
MCH RBC QN AUTO: 27.7 PG (ref 25.2–33.5)
MCHC RBC AUTO-ENTMCNC: 31.1 G/DL (ref 28.4–34.8)
MCV RBC AUTO: 89.1 FL (ref 82.6–102.9)
MONOCYTES NFR BLD: 0.85 K/UL (ref 0.1–1.2)
MONOCYTES NFR BLD: 7 % (ref 3–12)
NEUTROPHILS NFR BLD: 80 % (ref 36–65)
NEUTS SEG NFR BLD: 9.42 K/UL (ref 1.5–8.1)
NRBC BLD-RTO: 0.2 PER 100 WBC
PLATELET # BLD AUTO: 607 K/UL (ref 138–453)
PMV BLD AUTO: 10.7 FL (ref 8.1–13.5)
RBC # BLD AUTO: 2.56 M/UL (ref 3.95–5.11)
RBC # BLD: ABNORMAL 10*6/UL
WBC OTHER # BLD: 11.8 K/UL (ref 3.5–11.3)

## 2024-08-01 PROCEDURE — 99024 POSTOP FOLLOW-UP VISIT: CPT | Performed by: NURSE PRACTITIONER

## 2024-08-01 RX ORDER — DOXYCYCLINE HYCLATE 100 MG
100 TABLET ORAL 2 TIMES DAILY
Qty: 14 TABLET | Refills: 0 | Status: SHIPPED | OUTPATIENT
Start: 2024-08-01 | End: 2024-08-08

## 2024-08-01 ASSESSMENT — ENCOUNTER SYMPTOMS
ABDOMINAL DISTENTION: 0
ABDOMINAL PAIN: 0
VOMITING: 0
DIARRHEA: 0
CONSTIPATION: 0
NAUSEA: 1
RESPIRATORY NEGATIVE: 1

## 2024-08-04 ENCOUNTER — APPOINTMENT (OUTPATIENT)
Dept: GENERAL RADIOLOGY | Age: 34
DRG: 561 | End: 2024-08-04
Payer: MEDICAID

## 2024-08-04 ENCOUNTER — HOSPITAL ENCOUNTER (INPATIENT)
Age: 34
LOS: 3 days | Discharge: HOME | DRG: 561 | End: 2024-08-08
Attending: EMERGENCY MEDICINE | Admitting: STUDENT IN AN ORGANIZED HEALTH CARE EDUCATION/TRAINING PROGRAM
Payer: MEDICAID

## 2024-08-04 ENCOUNTER — APPOINTMENT (OUTPATIENT)
Dept: CT IMAGING | Age: 34
DRG: 561 | End: 2024-08-04
Payer: MEDICAID

## 2024-08-04 DIAGNOSIS — Z98.891 S/P CESAREAN SECTION: ICD-10-CM

## 2024-08-04 DIAGNOSIS — B99.9 INTRA-ABDOMINAL INFECTION: Primary | ICD-10-CM

## 2024-08-04 DIAGNOSIS — S30.1XXA ABDOMINAL WALL SEROMA, INITIAL ENCOUNTER: ICD-10-CM

## 2024-08-04 DIAGNOSIS — R58 INTRA-ABDOMINAL BLEEDING: ICD-10-CM

## 2024-08-04 DIAGNOSIS — T81.31XA DEHISCENCE OF OPERATIVE WOUND, INITIAL ENCOUNTER: ICD-10-CM

## 2024-08-04 LAB
ALBUMIN SERPL-MCNC: 2.9 G/DL (ref 3.5–5.2)
ALBUMIN/GLOB SERPL: 1 {RATIO} (ref 1–2.5)
ALP SERPL-CCNC: 186 U/L (ref 35–104)
ALT SERPL-CCNC: 24 U/L (ref 10–35)
ANION GAP SERPL CALCULATED.3IONS-SCNC: 12 MMOL/L (ref 9–16)
AST SERPL-CCNC: 48 U/L (ref 10–35)
BACTERIA URNS QL MICRO: NORMAL
BILIRUB DIRECT SERPL-MCNC: 0.4 MG/DL (ref 0–0.2)
BILIRUB INDIRECT SERPL-MCNC: 0.2 MG/DL (ref 0–1)
BILIRUB SERPL-MCNC: 0.6 MG/DL (ref 0–1.2)
BILIRUB UR QL STRIP: NEGATIVE
BUN SERPL-MCNC: 8 MG/DL (ref 6–20)
CALCIUM SERPL-MCNC: 8 MG/DL (ref 8.6–10.4)
CASTS #/AREA URNS LPF: NORMAL /LPF (ref 0–8)
CHLORIDE SERPL-SCNC: 102 MMOL/L (ref 98–107)
CLARITY UR: ABNORMAL
CO2 SERPL-SCNC: 27 MMOL/L (ref 20–31)
COLOR UR: YELLOW
CREAT SERPL-MCNC: 0.7 MG/DL (ref 0.5–0.9)
EPI CELLS #/AREA URNS HPF: NORMAL /HPF (ref 0–5)
ERYTHROCYTE [DISTWIDTH] IN BLOOD BY AUTOMATED COUNT: 15.3 % (ref 11.8–14.4)
GFR, ESTIMATED: >90 ML/MIN/1.73M2
GLOBULIN SER CALC-MCNC: 3.6 G/DL
GLUCOSE SERPL-MCNC: 106 MG/DL (ref 74–99)
GLUCOSE UR STRIP-MCNC: NEGATIVE MG/DL
HCT VFR BLD AUTO: 25.7 % (ref 36.3–47.1)
HGB BLD-MCNC: 7.8 G/DL (ref 11.9–15.1)
HGB UR QL STRIP.AUTO: ABNORMAL
KETONES UR STRIP-MCNC: NEGATIVE MG/DL
LACTIC ACID, SEPSIS WHOLE BLOOD: 0.7 MMOL/L (ref 0.5–1.9)
LACTIC ACID, SEPSIS WHOLE BLOOD: 1.1 MMOL/L (ref 0.5–1.9)
LEUKOCYTE ESTERASE UR QL STRIP: ABNORMAL
MCH RBC QN AUTO: 27.2 PG (ref 25.2–33.5)
MCHC RBC AUTO-ENTMCNC: 30.4 G/DL (ref 28.4–34.8)
MCV RBC AUTO: 89.5 FL (ref 82.6–102.9)
NITRITE UR QL STRIP: NEGATIVE
NRBC BLD-RTO: 0.2 PER 100 WBC
PH UR STRIP: 7.5 [PH] (ref 5–8)
PLATELET # BLD AUTO: 553 K/UL (ref 138–453)
PMV BLD AUTO: 10.6 FL (ref 8.1–13.5)
POTASSIUM SERPL-SCNC: 3.1 MMOL/L (ref 3.7–5.3)
PROT SERPL-MCNC: 6.5 G/DL (ref 6.6–8.7)
PROT UR STRIP-MCNC: ABNORMAL MG/DL
RBC # BLD AUTO: 2.87 M/UL (ref 3.95–5.11)
RBC #/AREA URNS HPF: NORMAL /HPF (ref 0–4)
SODIUM SERPL-SCNC: 141 MMOL/L (ref 136–145)
SP GR UR STRIP: 1.06 (ref 1–1.03)
UROBILINOGEN UR STRIP-ACNC: NORMAL EU/DL (ref 0–1)
WBC #/AREA URNS HPF: NORMAL /HPF (ref 0–5)
WBC OTHER # BLD: 12.3 K/UL (ref 3.5–11.3)

## 2024-08-04 PROCEDURE — 96365 THER/PROPH/DIAG IV INF INIT: CPT

## 2024-08-04 PROCEDURE — 85027 COMPLETE CBC AUTOMATED: CPT

## 2024-08-04 PROCEDURE — 87040 BLOOD CULTURE FOR BACTERIA: CPT

## 2024-08-04 PROCEDURE — 83605 ASSAY OF LACTIC ACID: CPT

## 2024-08-04 PROCEDURE — 80076 HEPATIC FUNCTION PANEL: CPT

## 2024-08-04 PROCEDURE — 96375 TX/PRO/DX INJ NEW DRUG ADDON: CPT

## 2024-08-04 PROCEDURE — 6370000000 HC RX 637 (ALT 250 FOR IP)

## 2024-08-04 PROCEDURE — 81001 URINALYSIS AUTO W/SCOPE: CPT

## 2024-08-04 PROCEDURE — 99285 EMERGENCY DEPT VISIT HI MDM: CPT

## 2024-08-04 PROCEDURE — 96367 TX/PROPH/DG ADDL SEQ IV INF: CPT

## 2024-08-04 PROCEDURE — 71045 X-RAY EXAM CHEST 1 VIEW: CPT

## 2024-08-04 PROCEDURE — 80048 BASIC METABOLIC PNL TOTAL CA: CPT

## 2024-08-04 PROCEDURE — 96366 THER/PROPH/DIAG IV INF ADDON: CPT

## 2024-08-04 PROCEDURE — 6360000004 HC RX CONTRAST MEDICATION

## 2024-08-04 PROCEDURE — 36415 COLL VENOUS BLD VENIPUNCTURE: CPT

## 2024-08-04 PROCEDURE — 6360000002 HC RX W HCPCS

## 2024-08-04 PROCEDURE — 2580000003 HC RX 258

## 2024-08-04 PROCEDURE — 74177 CT ABD & PELVIS W/CONTRAST: CPT

## 2024-08-04 RX ORDER — POTASSIUM CHLORIDE 20 MEQ/1
40 TABLET, EXTENDED RELEASE ORAL ONCE
Status: COMPLETED | OUTPATIENT
Start: 2024-08-04 | End: 2024-08-04

## 2024-08-04 RX ORDER — 0.9 % SODIUM CHLORIDE 0.9 %
1000 INTRAVENOUS SOLUTION INTRAVENOUS ONCE
Status: COMPLETED | OUTPATIENT
Start: 2024-08-04 | End: 2024-08-04

## 2024-08-04 RX ADMIN — PIPERACILLIN AND TAZOBACTAM 3375 MG: 3; .375 INJECTION, POWDER, LYOPHILIZED, FOR SOLUTION INTRAVENOUS at 21:09

## 2024-08-04 RX ADMIN — IOPAMIDOL 75 ML: 755 INJECTION, SOLUTION INTRAVENOUS at 21:44

## 2024-08-04 RX ADMIN — POTASSIUM CHLORIDE 40 MEQ: 1500 TABLET, EXTENDED RELEASE ORAL at 22:30

## 2024-08-04 RX ADMIN — SODIUM CHLORIDE 1250 MG: 9 INJECTION, SOLUTION INTRAVENOUS at 22:21

## 2024-08-04 RX ADMIN — SODIUM CHLORIDE 1000 ML: 9 INJECTION, SOLUTION INTRAVENOUS at 21:06

## 2024-08-04 ASSESSMENT — PAIN SCALES - GENERAL: PAINLEVEL_OUTOF10: 5

## 2024-08-04 ASSESSMENT — PAIN - FUNCTIONAL ASSESSMENT: PAIN_FUNCTIONAL_ASSESSMENT: 0-10

## 2024-08-05 ENCOUNTER — APPOINTMENT (OUTPATIENT)
Dept: ULTRASOUND IMAGING | Age: 34
DRG: 561 | End: 2024-08-05
Payer: MEDICAID

## 2024-08-05 ENCOUNTER — APPOINTMENT (OUTPATIENT)
Dept: CT IMAGING | Age: 34
DRG: 561 | End: 2024-08-05
Payer: MEDICAID

## 2024-08-05 PROBLEM — R16.0 HEPATOMEGALY: Status: ACTIVE | Noted: 2024-08-05

## 2024-08-05 PROBLEM — B99.9 INTRA-ABDOMINAL INFECTION: Status: ACTIVE | Noted: 2024-08-05

## 2024-08-05 PROBLEM — D50.0 BLOOD LOSS ANEMIA: Status: ACTIVE | Noted: 2024-08-05

## 2024-08-05 PROBLEM — N30.90 CYSTITIS: Status: ACTIVE | Noted: 2024-08-05

## 2024-08-05 PROBLEM — L29.9 PRURITUS OF PREGNANCY IN SECOND TRIMESTER: Status: RESOLVED | Noted: 2024-04-24 | Resolved: 2024-08-05

## 2024-08-05 PROBLEM — O44.40 LOW-LYING PLACENTA: Status: RESOLVED | Noted: 2024-02-29 | Resolved: 2024-08-05

## 2024-08-05 PROBLEM — Z3A.37 37 WEEKS GESTATION OF PREGNANCY: Status: RESOLVED | Noted: 2024-07-12 | Resolved: 2024-08-05

## 2024-08-05 PROBLEM — O28.3 ABNORMAL FETAL ULTRASOUND: Status: RESOLVED | Noted: 2024-02-19 | Resolved: 2024-08-05

## 2024-08-05 PROBLEM — E87.6 HYPOKALEMIA: Status: ACTIVE | Noted: 2024-08-05

## 2024-08-05 PROBLEM — A41.9 SEPSIS (HCC): Status: ACTIVE | Noted: 2024-08-05

## 2024-08-05 PROBLEM — O99.712 PRURITUS OF PREGNANCY IN SECOND TRIMESTER: Status: RESOLVED | Noted: 2024-04-24 | Resolved: 2024-08-05

## 2024-08-05 LAB
ANION GAP SERPL CALCULATED.3IONS-SCNC: 11 MMOL/L (ref 9–16)
BUN SERPL-MCNC: 7 MG/DL (ref 6–20)
CALCIUM SERPL-MCNC: 7.4 MG/DL (ref 8.6–10.4)
CHLORIDE SERPL-SCNC: 104 MMOL/L (ref 98–107)
CO2 SERPL-SCNC: 25 MMOL/L (ref 20–31)
CREAT SERPL-MCNC: 0.6 MG/DL (ref 0.5–0.9)
ERYTHROCYTE [DISTWIDTH] IN BLOOD BY AUTOMATED COUNT: 15.6 % (ref 11.8–14.4)
FIBRINOGEN, FUNCTIONAL TEG: 38.5 MM (ref 15–32)
GFR, ESTIMATED: >90 ML/MIN/1.73M2
GLUCOSE SERPL-MCNC: 90 MG/DL (ref 74–99)
HCT VFR BLD AUTO: 20.3 % (ref 36.3–47.1)
HCT VFR BLD AUTO: 24.7 % (ref 36.3–47.1)
HGB BLD-MCNC: 6.2 G/DL (ref 11.9–15.1)
HGB BLD-MCNC: 7.8 G/DL (ref 11.9–15.1)
INR PPP: 1.1
LY30 (LYSIS) TEG: 0.2 % (ref 0–2.6)
MA(MAX CLOT) RAPID TEG: 71.9 MM (ref 52–70)
MAGNESIUM SERPL-MCNC: 2.2 MG/DL (ref 1.6–2.6)
MCH RBC QN AUTO: 27.2 PG (ref 25.2–33.5)
MCHC RBC AUTO-ENTMCNC: 30.5 G/DL (ref 28.4–34.8)
MCV RBC AUTO: 89 FL (ref 82.6–102.9)
NRBC BLD-RTO: 0 PER 100 WBC
PLATELET # BLD AUTO: 437 K/UL (ref 138–453)
PMV BLD AUTO: 10.9 FL (ref 8.1–13.5)
POTASSIUM SERPL-SCNC: 3.3 MMOL/L (ref 3.7–5.3)
PROTHROMBIN TIME: 14.2 SEC (ref 11.7–14.9)
RBC # BLD AUTO: 2.28 M/UL (ref 3.95–5.11)
REACTION TIME TEG: 7.1 MIN (ref 4.6–9.1)
SODIUM SERPL-SCNC: 140 MMOL/L (ref 136–145)
WBC OTHER # BLD: 10.2 K/UL (ref 3.5–11.3)

## 2024-08-05 PROCEDURE — 6360000002 HC RX W HCPCS: Performed by: NURSE PRACTITIONER

## 2024-08-05 PROCEDURE — 85390 FIBRINOLYSINS SCREEN I&R: CPT

## 2024-08-05 PROCEDURE — 83735 ASSAY OF MAGNESIUM: CPT

## 2024-08-05 PROCEDURE — 1200000000 HC SEMI PRIVATE

## 2024-08-05 PROCEDURE — 85384 FIBRINOGEN ACTIVITY: CPT

## 2024-08-05 PROCEDURE — 85014 HEMATOCRIT: CPT

## 2024-08-05 PROCEDURE — 87070 CULTURE OTHR SPECIMN AEROBIC: CPT

## 2024-08-05 PROCEDURE — 6360000002 HC RX W HCPCS: Performed by: STUDENT IN AN ORGANIZED HEALTH CARE EDUCATION/TRAINING PROGRAM

## 2024-08-05 PROCEDURE — 2580000003 HC RX 258: Performed by: NURSE PRACTITIONER

## 2024-08-05 PROCEDURE — 85347 COAGULATION TIME ACTIVATED: CPT

## 2024-08-05 PROCEDURE — 87205 SMEAR GRAM STAIN: CPT

## 2024-08-05 PROCEDURE — P9016 RBC LEUKOCYTES REDUCED: HCPCS

## 2024-08-05 PROCEDURE — 6360000002 HC RX W HCPCS

## 2024-08-05 PROCEDURE — 76705 ECHO EXAM OF ABDOMEN: CPT

## 2024-08-05 PROCEDURE — 36430 TRANSFUSION BLD/BLD COMPNT: CPT

## 2024-08-05 PROCEDURE — 0W9G30Z DRAINAGE OF PERITONEAL CAVITY WITH DRAINAGE DEVICE, PERCUTANEOUS APPROACH: ICD-10-PCS | Performed by: RADIOLOGY

## 2024-08-05 PROCEDURE — 85018 HEMOGLOBIN: CPT

## 2024-08-05 PROCEDURE — 2580000003 HC RX 258: Performed by: STUDENT IN AN ORGANIZED HEALTH CARE EDUCATION/TRAINING PROGRAM

## 2024-08-05 PROCEDURE — C1729 CATH, DRAINAGE: HCPCS

## 2024-08-05 PROCEDURE — 99223 1ST HOSP IP/OBS HIGH 75: CPT | Performed by: STUDENT IN AN ORGANIZED HEALTH CARE EDUCATION/TRAINING PROGRAM

## 2024-08-05 PROCEDURE — 80048 BASIC METABOLIC PNL TOTAL CA: CPT

## 2024-08-05 PROCEDURE — 6370000000 HC RX 637 (ALT 250 FOR IP): Performed by: STUDENT IN AN ORGANIZED HEALTH CARE EDUCATION/TRAINING PROGRAM

## 2024-08-05 PROCEDURE — 86850 RBC ANTIBODY SCREEN: CPT

## 2024-08-05 PROCEDURE — 87075 CULTR BACTERIA EXCEPT BLOOD: CPT

## 2024-08-05 PROCEDURE — 6360000002 HC RX W HCPCS: Performed by: RADIOLOGY

## 2024-08-05 PROCEDURE — 85027 COMPLETE CBC AUTOMATED: CPT

## 2024-08-05 PROCEDURE — 6370000000 HC RX 637 (ALT 250 FOR IP): Performed by: NURSE PRACTITIONER

## 2024-08-05 PROCEDURE — 85576 BLOOD PLATELET AGGREGATION: CPT

## 2024-08-05 PROCEDURE — 85610 PROTHROMBIN TIME: CPT

## 2024-08-05 PROCEDURE — 86901 BLOOD TYPING SEROLOGIC RH(D): CPT

## 2024-08-05 PROCEDURE — 86920 COMPATIBILITY TEST SPIN: CPT

## 2024-08-05 PROCEDURE — 86900 BLOOD TYPING SEROLOGIC ABO: CPT

## 2024-08-05 PROCEDURE — 99222 1ST HOSP IP/OBS MODERATE 55: CPT | Performed by: SURGERY

## 2024-08-05 RX ORDER — MAGNESIUM SULFATE 1 G/100ML
1000 INJECTION INTRAVENOUS PRN
Status: DISCONTINUED | OUTPATIENT
Start: 2024-08-05 | End: 2024-08-08 | Stop reason: HOSPADM

## 2024-08-05 RX ORDER — ACETAMINOPHEN 325 MG/1
650 TABLET ORAL EVERY 6 HOURS PRN
Status: DISCONTINUED | OUTPATIENT
Start: 2024-08-05 | End: 2024-08-08 | Stop reason: HOSPADM

## 2024-08-05 RX ORDER — SODIUM CHLORIDE 0.9 % (FLUSH) 0.9 %
10 SYRINGE (ML) INJECTION PRN
Status: DISCONTINUED | OUTPATIENT
Start: 2024-08-05 | End: 2024-08-08 | Stop reason: HOSPADM

## 2024-08-05 RX ORDER — SODIUM CHLORIDE 9 MG/ML
INJECTION, SOLUTION INTRAVENOUS PRN
Status: DISCONTINUED | OUTPATIENT
Start: 2024-08-05 | End: 2024-08-08 | Stop reason: HOSPADM

## 2024-08-05 RX ORDER — SODIUM CHLORIDE 0.9 % (FLUSH) 0.9 %
5-40 SYRINGE (ML) INJECTION EVERY 12 HOURS SCHEDULED
Status: DISCONTINUED | OUTPATIENT
Start: 2024-08-05 | End: 2024-08-08 | Stop reason: HOSPADM

## 2024-08-05 RX ORDER — SODIUM CHLORIDE 9 MG/ML
INJECTION, SOLUTION INTRAVENOUS PRN
Status: DISCONTINUED | OUTPATIENT
Start: 2024-08-05 | End: 2024-08-05

## 2024-08-05 RX ORDER — FENTANYL CITRATE 50 UG/ML
INJECTION, SOLUTION INTRAMUSCULAR; INTRAVENOUS PRN
Status: COMPLETED | OUTPATIENT
Start: 2024-08-05 | End: 2024-08-05

## 2024-08-05 RX ORDER — ONDANSETRON 2 MG/ML
4 INJECTION INTRAMUSCULAR; INTRAVENOUS EVERY 6 HOURS PRN
Status: DISCONTINUED | OUTPATIENT
Start: 2024-08-05 | End: 2024-08-08 | Stop reason: HOSPADM

## 2024-08-05 RX ORDER — POTASSIUM CHLORIDE 7.45 MG/ML
10 INJECTION INTRAVENOUS PRN
Status: DISCONTINUED | OUTPATIENT
Start: 2024-08-05 | End: 2024-08-08 | Stop reason: HOSPADM

## 2024-08-05 RX ORDER — POLYETHYLENE GLYCOL 3350 17 G/17G
17 POWDER, FOR SOLUTION ORAL DAILY PRN
Status: DISCONTINUED | OUTPATIENT
Start: 2024-08-05 | End: 2024-08-08 | Stop reason: HOSPADM

## 2024-08-05 RX ORDER — KETOROLAC TROMETHAMINE 15 MG/ML
15 INJECTION, SOLUTION INTRAMUSCULAR; INTRAVENOUS ONCE
Status: COMPLETED | OUTPATIENT
Start: 2024-08-05 | End: 2024-08-05

## 2024-08-05 RX ORDER — OXYCODONE HYDROCHLORIDE AND ACETAMINOPHEN 5; 325 MG/1; MG/1
1 TABLET ORAL EVERY 6 HOURS PRN
Status: DISCONTINUED | OUTPATIENT
Start: 2024-08-05 | End: 2024-08-08 | Stop reason: HOSPADM

## 2024-08-05 RX ORDER — ACETAMINOPHEN 650 MG/1
650 SUPPOSITORY RECTAL EVERY 6 HOURS PRN
Status: DISCONTINUED | OUTPATIENT
Start: 2024-08-05 | End: 2024-08-08 | Stop reason: HOSPADM

## 2024-08-05 RX ORDER — POTASSIUM CHLORIDE 20 MEQ/1
40 TABLET, EXTENDED RELEASE ORAL PRN
Status: DISCONTINUED | OUTPATIENT
Start: 2024-08-05 | End: 2024-08-08 | Stop reason: HOSPADM

## 2024-08-05 RX ORDER — LIDOCAINE 4 G/G
1 PATCH TOPICAL DAILY
Status: DISCONTINUED | OUTPATIENT
Start: 2024-08-05 | End: 2024-08-08 | Stop reason: HOSPADM

## 2024-08-05 RX ORDER — SODIUM CHLORIDE 9 MG/ML
INJECTION, SOLUTION INTRAVENOUS CONTINUOUS
Status: ACTIVE | OUTPATIENT
Start: 2024-08-05 | End: 2024-08-07

## 2024-08-05 RX ORDER — ONDANSETRON 4 MG/1
4 TABLET, ORALLY DISINTEGRATING ORAL EVERY 8 HOURS PRN
Status: DISCONTINUED | OUTPATIENT
Start: 2024-08-05 | End: 2024-08-08 | Stop reason: HOSPADM

## 2024-08-05 RX ORDER — MORPHINE SULFATE 4 MG/ML
4 INJECTION, SOLUTION INTRAMUSCULAR; INTRAVENOUS ONCE
Status: COMPLETED | OUTPATIENT
Start: 2024-08-05 | End: 2024-08-05

## 2024-08-05 RX ADMIN — PIPERACILLIN AND TAZOBACTAM 3375 MG: 3; .375 INJECTION, POWDER, LYOPHILIZED, FOR SOLUTION INTRAVENOUS at 14:42

## 2024-08-05 RX ADMIN — PIPERACILLIN AND TAZOBACTAM 3375 MG: 3; .375 INJECTION, POWDER, LYOPHILIZED, FOR SOLUTION INTRAVENOUS at 06:09

## 2024-08-05 RX ADMIN — SODIUM CHLORIDE, PRESERVATIVE FREE 10 ML: 5 INJECTION INTRAVENOUS at 11:39

## 2024-08-05 RX ADMIN — SODIUM CHLORIDE, PRESERVATIVE FREE 40 MG: 5 INJECTION INTRAVENOUS at 11:39

## 2024-08-05 RX ADMIN — OXYCODONE HYDROCHLORIDE AND ACETAMINOPHEN 1 TABLET: 5; 325 TABLET ORAL at 17:17

## 2024-08-05 RX ADMIN — KETOROLAC TROMETHAMINE 15 MG: 15 INJECTION, SOLUTION INTRAMUSCULAR; INTRAVENOUS at 02:18

## 2024-08-05 RX ADMIN — POTASSIUM CHLORIDE 40 MEQ: 1500 TABLET, EXTENDED RELEASE ORAL at 08:44

## 2024-08-05 RX ADMIN — SODIUM CHLORIDE: 9 INJECTION, SOLUTION INTRAVENOUS at 11:47

## 2024-08-05 RX ADMIN — MORPHINE SULFATE 4 MG: 4 INJECTION INTRAVENOUS at 07:33

## 2024-08-05 RX ADMIN — SODIUM CHLORIDE 1250 MG: 9 INJECTION, SOLUTION INTRAVENOUS at 11:50

## 2024-08-05 RX ADMIN — FENTANYL CITRATE 25 MCG: 50 INJECTION, SOLUTION INTRAMUSCULAR; INTRAVENOUS at 10:35

## 2024-08-05 RX ADMIN — FENTANYL CITRATE 50 MCG: 50 INJECTION, SOLUTION INTRAMUSCULAR; INTRAVENOUS at 10:32

## 2024-08-05 RX ADMIN — SODIUM CHLORIDE 1250 MG: 9 INJECTION, SOLUTION INTRAVENOUS at 20:45

## 2024-08-05 RX ADMIN — SODIUM CHLORIDE: 9 INJECTION, SOLUTION INTRAVENOUS at 06:08

## 2024-08-05 RX ADMIN — PIPERACILLIN AND TAZOBACTAM 3375 MG: 3; .375 INJECTION, POWDER, LYOPHILIZED, FOR SOLUTION INTRAVENOUS at 22:56

## 2024-08-05 ASSESSMENT — PAIN DESCRIPTION - DESCRIPTORS: DESCRIPTORS: DULL;SHOOTING;SHARP

## 2024-08-05 ASSESSMENT — ENCOUNTER SYMPTOMS
EYE REDNESS: 0
COLOR CHANGE: 0
ABDOMINAL PAIN: 0
SINUS PAIN: 0
COUGH: 0
NAUSEA: 0
DIARRHEA: 0
BACK PAIN: 0
PHOTOPHOBIA: 0
SHORTNESS OF BREATH: 0
SINUS PRESSURE: 0
CHEST TIGHTNESS: 0
RHINORRHEA: 0

## 2024-08-05 ASSESSMENT — PAIN SCALES - GENERAL: PAINLEVEL_OUTOF10: 9

## 2024-08-05 ASSESSMENT — PAIN DESCRIPTION - ORIENTATION: ORIENTATION: MID;LOWER

## 2024-08-05 ASSESSMENT — PAIN DESCRIPTION - LOCATION: LOCATION: ABDOMEN;BACK

## 2024-08-05 ASSESSMENT — LIFESTYLE VARIABLES: HOW OFTEN DO YOU HAVE A DRINK CONTAINING ALCOHOL: NEVER

## 2024-08-05 NOTE — CARE COORDINATION
Case Management Assessment  Initial Evaluation    Date/Time of Evaluation: 8/5/2024 2:45 PM  Assessment Completed by: PAGE HUMMEL RN    If patient is discharged prior to next notation, then this note serves as note for discharge by case management.    Patient Name: Noelle Kim                   YOB: 1990  Diagnosis: Intra-abdominal infection [B99.9]                   Date / Time: 8/4/2024  8:40 PM    Patient Admission Status: Inpatient   Readmission Risk (Low < 19, Mod (19-27), High > 27): Readmission Risk Score: 19.4    Current PCP: Balta Cristina MD  PCP verified by CM? Yes    Chart Reviewed: Yes      History Provided by: Patient  Patient Orientation: Alert and Oriented    Patient Cognition: Alert    Hospitalization in the last 30 days (Readmission):  Yes    If yes, Readmission Assessment in  Navigator will be completed.    Advance Directives:      Code Status: Full Code   Patient's Primary Decision Maker is:        Discharge Planning:    Patient lives with: Alone Type of Home: Apartment  Primary Care Giver: Self  Patient Support Systems include: Family Members   Current Financial resources:    Current community resources:    Current services prior to admission: None            Current DME:              Type of Home Care services:  None    ADLS  Prior functional level: Independent in ADLs/IADLs  Current functional level: Independent in ADLs/IADLs    PT AM-PAC:   /24  OT AM-PAC:   /24    Family can provide assistance at DC: No  Would you like Case Management to discuss the discharge plan with any other family members/significant others, and if so, who? No  Plans to Return to Present Housing: Yes  Other Identified Issues/Barriers to RETURNING to current housing: none  Potential Assistance needed at discharge: N/A            Potential DME:    Patient expects to discharge to: Apartment  Plan for transportation at discharge:      Financial    Payor: [facility] MEDICAID / Plan: [facility] MEDICAID

## 2024-08-05 NOTE — ED PROVIDER NOTES
[facility] ED  Emergency Department Encounter  Emergency Medicine Resident     Pt Name:Noelle Kim  MRN: [id number]  Birthdate 1990  Date of evaluation: 24  PCP:  Balta Cristina MD  Note Started: 9:20 PM EDT      CHIEF COMPLAINT       Chief Complaint   Patient presents with    Bleeding/Bruising     From abdomen incision       HISTORY OF PRESENT ILLNESS  (Location/Symptom, Timing/Onset, Context/Setting, Quality, Duration, Modifying Factors, Severity.)      Noelle Kim is a 34 y.o. female who presents with increased drainage from her abdominal wound.  Patient did have a recurrent lateral transverse  for Di Di twin pregnancy for breech presentation on .  Patient did have significant bleeding intraoperatively, general surgery was also involved during the case.  Patient was transferred to TICU after this.  Patient needed return to the OR twice thereafter for packing and wound exploration and control of hemorrhage.  Patient ended up receiving a total of 12 units PRBC, 11 unit FFP, 3 unit platelets, 3 unit cryo, 2 g TXA.    Patient states that she was seen at the general surgery clinic on , was prescribed doxycycline which she has been taking twice daily as prescribed.  She was told to return to the emergency room if she had complications.    Patient presents because today she has been having increased drainage from her wound.  She denies any pain, denies feeling febrile however on arrival here she is febrile.    Patient denies any vaginal bleeding or discharge, denies dysuria.  She does endorse a cough however states that she is coughing up \"normal-looking\" phlegm.    She denies anticoagulation, states she is on no new medications.  She denies any trauma to the abdomen or groin.    PAST MEDICAL / SURGICAL / SOCIAL / FAMILY HISTORY      has a past medical history of Abnormal cells of cervix, ADD (attention deficit disorder), Anemia, Complication of anesthesia, Condyloma,

## 2024-08-05 NOTE — ED PROVIDER NOTES
[facility]   Emergency Department  Emergency Medicine Attending Sign-out   Note started: 11:16 PM EDT    Care of Noelle Kim was assumed from previous attending Dr. Maya at 11 PM and is being seen for Bleeding/Bruising (From abdomen incision)  .  The patient's initial evaluation and plan have been discussed with the prior provider who initially evaluated the patient.     Attestation  I was available and discussed any additional care issues that arose and coordinated the management plans with the resident(s) caring for the patient during my duty period. Any areas of disagreement with resident's documentation of care or procedures are noted on the chart. I was personally present for the key portions of any/all procedures, during my duty period. I have documented in the chart those procedures where I was not present during the key portions.     BRIEF PATIENT SUMMARY/MDM COURSE PER INITIAL PROVIDER:   RECENT VITALS:     Temp: 99.5 °F (37.5 °C),  Pulse: 86, Respirations: 12, BP: (!) 143/90, SpO2: 96 %    This patient is a 34 y.o. Female with history of recent  for twin delivery.  With significant complications.  They required a ex lap by general surgery.  Is very presenting with significant wound dehiscence, drainage.  CT scan shows fluid collection.  Awaiting readmission    DIAGNOSTICS/MEDICATIONS:     MEDICATIONS GIVEN:  ED Medication Orders (From admission, onward)      Start Ordered     Status Ordering Provider    24  potassium chloride (KLOR-CON M) extended release tablet 40 mEq  ONCE         Last MAR action: Given - by CYNTHIA JOHNSON on 24 at 2230 LISS FOFANA    24 2143 08/04/24 2143  iopamidol (ISOVUE-370) 76 % injection 75 mL  IMG ONCE PRN         Last MAR action: Given - by SHERLYN RUFFIN on 24 at 2144 LISS FOFANA    24  sodium chloride 0.9 % bolus 1,000 mL  ONCE         Last MAR action: Stopped  PELVIS WITH CONTRAST, 2024 9:03 pm TECHNIQUE: CT of the abdomen and pelvis was performed with the administration of intravenous contrast. Multiplanar reformatted images are provided for review. Automated exposure control, iterative reconstruction, and/or weight based adjustment of the mA/kV was utilized to reduce the radiation dose to as low as reasonably achievable. COMPARISON: 2024 HISTORY: ORDERING SYSTEM PROVIDED HISTORY: Febrile oozing erythema at ex lap site. TECHNOLOGIST PROVIDED HISTORY: Febrile oozing erythema at ex lap site. Decision Support Exception - unselect if not a suspected or confirmed emergency medical condition->Emergency Medical Condition ([place]). Reason for Exam:  Febrile oozing erythema at ex lap site. FINDINGS: Lower Chest: Small left and trace right pleural effusion with adjacent atelectasis is seen. Organs: The liver is enlarged measuring 22 cm in length.  Cholelithiasis is present.  Acute cholecystitis.  The spleen, pancreas, adrenal glands, and kidneys demonstrate no acute abnormality.  Punctate stone is seen in the left collecting system. GI/Bowel: The stomach, small bowel, and colon are normal in course and caliber without evidence of wall thickening or obstruction. Pelvis: Bladder demonstrates mild wall thickening with adjacent stranding but is otherwise unremarkable. The uterus is enlarged and heterogeneous.  Evidence of prior  scar is seen.  There is questionable dehiscence of the scar. Peritoneum/Retroperitoneum: Multiple loculated fluid collections with rim enhancement are scattered throughout the abdomen and pelvis. No free air. No pathologic lymphadenopathy.  Aorta and its branches are patent and normal in course and caliber. Bones/Soft Tissues: Interval enlargement of a large fluid collection within the subcutaneous fat of the abdomen measuring up to 25 x 18 x 5 cm.  There is partial rim enhancement of the collection.  Anasarca is noted. No acute or aggressive

## 2024-08-05 NOTE — CONSENT
I have discussed with the patient the rationale for blood component transfusion; its benefits in treating or preventing fatigue, organ damage, or death; and its risk which includes mild transfusion reactions, rare risk of blood borne infection, or more serious but rare reactions. I have discussed the alternatives to transfusion, including the risk and consequences of not receiving transfusion. The patient had an opportunity to ask questions and had agreed to proceed with transfusion of blood components.    Rose DIXON Resident, PGY2

## 2024-08-05 NOTE — H&P
[facility]  Office: [phone]  Portillo Winslow DO, Alonzo Rowley DO, Mariusz Del Cid DO, Felice Roberson DO, Ross Perez MD, Sada Rollins MD, Char Charles MD, Bri Gay MD,  Huy Minor MD, Raheem Pereyra MD, Song Garcia MD,  Joan Paris DO, Kelsey Barton MD, Eric Coulter MD, Jas Winslow DO, Jenna Mancia MD,  Chris Bernal DO, Eulalia Hameed MD, Fely Beck MD, Danitza Ramos MD, Amilcar Gutierres MD,  Chico Macias MD, Odalis Martins MD, Joey Shankar MD, Nanette Ortiz MD, Nate Green MD, Dennise Tolliver MD, Dimitri Ledesma DO, Tin Marcus DO, Bhavana Kamara MD,  Angelo Shaw MD, Shirley Waterhouse, CNP,  Maite Taylor CNP, Irving Nam, CNP,  Maren Smiley, DNP, Kira Uribe, CNP, Ruby Vann, CNP, Yasmine Jewell, CNP, Marivel Hare, CNP, Jessica Polanco, PA-C, Sepideh Khan PA-C, Yaima Su, CNP, Hillary Ravi, CNP, Trell Goins, CNP, Mindy Ragsdale, CNP, Comfort Damon, CNP, Kerry Curtis, CNS, Juliette Menendez, CNP, Maribel Conklin CNP, Tracy Schwab, CNP         [facility]   IN-PATIENT SERVICE   [facility]    HISTORY AND PHYSICAL EXAMINATION            Date:   2024  Patient name:  Noelle Kim  Date of admission:  2024  8:40 PM  MRN:   [id number]  Account:  [id number]  YOB: 1990  PCP:    Balta Cristina MD  Room:   /  Code Status:    Full Code    Chief Complaint:     Chief Complaint   Patient presents with    Bleeding/Bruising     From abdomen incision       History Obtained From:     patient, electronic medical record    History of Present Illness:     Noelle Kim is a 34 y.o. Non- / non  female who presents with Bleeding/Bruising (From abdomen incision)   and is admitted to the hospital for the management of Intra-abdominal infection.    34-year-old female with known medical history of recent  with complicated postop course presents to the hospital with

## 2024-08-05 NOTE — ED NOTES
WITH TOPICAL HEMOSTATIC AGENT, PREPARATION OF WOUND 30X22 , APPLICATION OF SKIN SUBSTITUTE GREATER THAN 30X22, ELEVATION OF MYOCUTANEOUS FLAP, APPLICATION OF NEGATIVE PRESSURE WOUND THERAPY GREATER THAN 50CM SQUARED performed by Gary Ragsdale MD at [facility] OR    LEEP N/A 04/16/2021    LEEP performed by Sushil Josue MD at [facility] OR    OVARY REMOVAL      left    TONSILLECTOMY         PAST MEDICAL HISTORY       Past Medical History:   Diagnosis Date    Abnormal cells of cervix     ADD (attention deficit disorder)     Anemia     Complication of anesthesia     Condyloma     Depression     Gestational diabetes mellitus     HSIL (high grade squamous intraepithelial lesion) on Pap smear of cervix     Liver disease     MRSA (methicillin resistant staph aureus) culture positive 09/2011    BUTTOCK    Pre-eclampsia affecting puerperium 7/15/2024    Severe dysplasia of cervix     STD (sexually transmitted disease)     Substance abuse (HCC)     heroin / on 12/29/18 pt states last used 3 yrs ago,marijuana 4/12/21. pt on buprenorphine SL every am       Labs:  Labs Reviewed   CBC - Abnormal; Notable for the following components:       Result Value    WBC 12.3 (*)     RBC 2.87 (*)     Hemoglobin 7.8 (*)     Hematocrit 25.7 (*)     RDW 15.3 (*)     Platelets 553 (*)     NRBC Automated 0.2 (*)     All other components within normal limits   BASIC METABOLIC PANEL - Abnormal; Notable for the following components:    Potassium 3.1 (*)     Glucose 106 (*)     Calcium 8.0 (*)     All other components within normal limits   HEPATIC FUNCTION PANEL - Abnormal; Notable for the following components:    Albumin 2.9 (*)     Alkaline Phosphatase 186 (*)     AST 48 (*)     Bilirubin, Direct 0.4 (*)     Total Protein 6.5 (*)     All other components within normal limits   URINALYSIS WITH REFLEX TO CULTURE - Abnormal; Notable for the following components:    Turbidity UA Turbid (*)     Specific Gravity, UA 1.056 (*)     Urine Hgb SMALL (*)

## 2024-08-05 NOTE — BRIEF OP NOTE
Brief Postoperative Note    Noelle Kim  YOB: 1990  [id number]    Pre-operative Diagnosis: Abd fluid collections    Post-operative Diagnosis: Same    Procedure: Drain placement    Anesthesia: Local    Surgeons/Assistants: WILDER CASAS MD     Estimated Blood Loss: less than 50     Complications: None    Specimens: Was Obtained: 10 mls    Findings: SQ collection smaller; 8 F tube placed in lower intra-abd collection, and 120 mls serous fluid removed. Attached to FRANCIS suction bulb and dressed in std fashion.    Electronically signed by WILDER CASAS MD on 8/5/2024 at 10:52 AM

## 2024-08-05 NOTE — CONSULTS
for infection.  Would be available for reevaluation after optimization and control of infection.  Recommend OB/GYN evaluation of incisional wound      HISTORY:   History of Chief Complaint:    Noelle Kim is a 34 y.o. female who presents with drainage from her Pfannenstiel incision.  Patient states yellow fluid began draining from her incision.  She denies nausea vomiting fevers chills abdominal pain recall patient underwent  section 2024 for delivery of twins.  General surgery was called in for control of hemorrhage and variceal hemorrhage was encountered.  Temporary abdominal closure was performed and patient was taken back again for patient was taken back for reexploration with findings of oozing from bilateral gutters, packing performed and again temporary closure.  She was taken back for final closure 2024 with application of skin substitute elevation myocutaneous flaps closure and Prevena incisional wound VAC.  Patient has leukocytosis of 13 CT scan with diffuse ascites and intra-abdominal ascites and connection with subcutaneous fluid collection.  No obvious abscess      Past Medical History   has a past medical history of Abnormal cells of cervix, ADD (attention deficit disorder), Anemia, Complication of anesthesia, Condyloma, Depression, Gestational diabetes mellitus, HSIL (high grade squamous intraepithelial lesion) on Pap smear of cervix, Liver disease, MRSA (methicillin resistant staph aureus) culture positive, Pre-eclampsia affecting puerperium, Severe dysplasia of cervix, STD (sexually transmitted disease), and Substance abuse (HCC).  Past Surgical History   has a past surgical history that includes Ovary removal; Tonsillectomy; LEEP (N/A, 2021);  section (N/A, 2024); laparotomy (N/A, 2024); laparotomy (N/A, 2024); and laparotomy (N/A, 2024).  Medications  Prior to Admission medications    Medication Sig Start Date End Date Taking? Authorizing

## 2024-08-05 NOTE — CARE COORDINATION
08/05/24 1443   Readmission Assessment   Number of Days since last admission? 8-30 days   Previous Disposition Home Alone   Who is being Interviewed Patient   What was the patient's/caregiver's perception as to why they think they needed to return back to the hospital? Other (Comment)  (symptoms)   Did you visit your Primary Care Physician after you left the hospital, before you returned this time? No   Why weren't you able to visit your PCP? Did not have an appointment   Did you see a specialist, such as Cardiac, Pulmonary, Orthopedic Physician, etc. after you left the hospital? Yes   Who advised the patient to return to the hospital? Self-referral   Does the patient report anything that got in the way of taking their medications? No   In our efforts to provide the best possible care to you and others like you, can you think of anything that we could have done to help you after you left the hospital the first time, so that you might not have needed to return so soon? Discharge instructions that are concise, clear, and non contradictory

## 2024-08-05 NOTE — ED PROVIDER NOTES
[facility] ED     Emergency Department     Faculty Attestation    I performed a history and physical examination of the patient and discussed management with the resident. I reviewed the resident’s note and agree with the documented findings and plan of care. Any areas of disagreement are noted on the chart. I was personally present for the key portions of any procedures. I have documented in the chart those procedures where I was not present during the key portions. I have reviewed the emergency nurses triage note. I agree with the chief complaint, past medical history, past surgical history, allergies, medications, social and family history as documented unless otherwise noted below. For Physician Assistant/ Nurse Practitioner cases/documentation I have personally evaluated this patient and have completed at least one if not all key elements of the E/M (history, physical exam, and MDM). Additional findings are as noted.    Note Started: 8:49 PM EDT    Patient here with abdominal pain is postop from  complicated by abdominal issues requiring an exploratory laparotomy with GEN surge.  Since going home has continued to have pain and is now having oozing drainage from the lower incision.  Did not realize she was febrile but is febrile here.  Does have a nonproductive cough.  Abdomen soft diffusely tender inferior portion of the laparotomy wound is mildly dehisced with serosanguineous drainage.  Will proceed with full septic workup CT surgery consult probable admit      Critical Care     none    Jas Maya MD, FACEP, FAAEM  Attending Emergency  Physician           Jas Maya MD  [phone]

## 2024-08-05 NOTE — PROGRESS NOTES
[facility]   Pharmacy Pharmacokinetic Monitoring Service - Vancomycin     Noelle Kim is a 34 y.o. female starting on vancomycin therapy for an intra-abdominal infection. Pharmacy consulted by Yasmine Jewell  for monitoring and adjustment.    Target Concentration: Goal AUC/RASHIDA 400-600 mg*hr/L    Additional Antimicrobials: Zosyn    Pertinent Laboratory Values:   Wt Readings from Last 1 Encounters:   08/01/24 78.5 kg (173 lb)     Temp Readings from Last 1 Encounters:   08/04/24 99.5 °F (37.5 °C) (Oral)     Estimated Creatinine Clearance: 125 mL/min (based on SCr of 0.7 mg/dL).  Recent Labs     08/04/24 2055   CREATININE 0.7   BUN 8   WBC 12.3*     Procalcitonin:     Pertinent Cultures:  Culture Date Source Results   pending     MRSA Nasal Swab: N/A. Non-respiratory infection.    Plan:  Dosing recommendations based on Bayesian software  Start vancomycin 1250 mg every 12 hours.  Anticipated AUC of 430 and trough concentration of 10.1 at steady state  Renal labs as indicated   Pharmacy will continue to monitor patient and adjust therapy as indicated    Thank you for the consult,  Irving Maher RPH  8/5/2024 5:40 AM

## 2024-08-05 NOTE — ED NOTES
ED to inpatient nurses report      Chief Complaint:  Chief Complaint   Patient presents with    Bleeding/Bruising     From abdomen incision     Present to ED from: home     MOA:     LOC: alert and orientated to name, place, date  Mobility: Independent  Oxygen Baseline: room air     Current needs required: monitor, bandage changing    Pending ED orders: none   Present condition: stable     Why did the patient come to the ED? Pt came into the ed via triage due to having bleeding and pain in her abdominal area   Pt had a  and has her incisions opening and bleeding   Pt has not been feeling good   Pt had twins   Pt is Aox4 and ambulatory with help   RR are equal and regular   Pt noticed the bleeding form the lower abdominal pain about an hour ago   Pt stated she was moving around   Pt stated she changed her diaper one time and it was half full with blood   Pt had birth on the 2024      What is the plan? Admitted   Any procedures or intervention occur? Labs, monitor , CT  Any safety concerns?? Fall risk     Mental Status:  Level of Consciousness: Alert (0)    Psych Assessment:   Psychosocial  Psychosocial (WDL): Within Defined Limits  Vital signs   Vitals:    24   BP: 138/88 (!) 147/95    Pulse: 84 83 87   Resp: 20 13 12   Temp: (!) 101.5 °F (38.6 °C)     TempSrc: Oral     SpO2: 97% 97% 97%        Vitals:  Patient Vitals for the past 24 hrs:   BP Temp Temp src Pulse Resp SpO2   240 -- -- -- 87 12 97 %   24 (!) 147/95 -- -- 83 13 97 %   24 138/88 (!) 101.5 °F (38.6 °C) Oral 84 20 97 %      Visit Vitals  BP (!) 147/95   Pulse 87   Temp (!) 101.5 °F (38.6 °C) (Oral)   Resp 12   SpO2 97%        LDAs:   Peripheral IV 24 Posterior;Right Wrist (Active)   Site Assessment Clean, dry & intact 24   Line Status Blood return noted;Specimen collected;Normal saline locked 24       Ambulatory Status:  Presents to emergency  DOXYCYCLINE HYCLATE (VIBRA-TABS) 100 MG TABLET    Take 1 tablet by mouth 2 times daily for 7 days    GUAIFENESIN (MUCINEX) 600 MG EXTENDED RELEASE TABLET    Take 2 tablets by mouth 2 times daily for 10 days    HYDROXYZINE HCL (ATARAX) 25 MG TABLET    Take 1 tablet by mouth every 8 hours as needed for Anxiety    IBUPROFEN (ADVIL;MOTRIN) 600 MG TABLET    Take 1 tablet by mouth every 6 hours as needed for Pain    OMEPRAZOLE (PRILOSEC) 40 MG DELAYED RELEASE CAPSULE    Take 1 capsule by mouth every morning (before breakfast)    ONDANSETRON (ZOFRAN) 4 MG TABLET    Take 1 tablet by mouth daily as needed for Nausea or Vomiting    PRENATAL VIT-FE FUMARATE-FA (PRENATAL VITAMIN) 27-0.8 MG TABS    Take 1 tablet by mouth daily    SENNOSIDES-DOCUSATE SODIUM (SENOKOT-S) 8.6-50 MG TABLET    Take 1 tablet by mouth in the morning and at bedtime    VITAMIN B-6 (PYRIDOXINE) 25 MG TABLET    Take 1 tablet by mouth in the morning, at noon, and at bedtime     Orders Placed This Encounter   Medications    sodium chloride 0.9 % bolus 1,000 mL    piperacillin-tazobactam (ZOSYN) 3,375 mg in sodium chloride 0.9 % 50 mL IVPB (mini-bag)     Order Specific Question:   Antimicrobial Indications     Answer:   Intra-Abdominal Infection    DISCONTD: vancomycin (VANCOCIN) 1,250 mg in sodium chloride 0.9 % syringe     Order Specific Question:   Antimicrobial Indications     Answer:   Intra-Abdominal Infection     Order Specific Question:   Antimicrobial Indications     Answer:   Skin and Soft Tissue Infection    vancomycin (VANCOCIN) 1,250 mg in sodium chloride 0.9 % 250 mL IVPB ([id number])     Order Specific Question:   Antimicrobial Indications     Answer:   Other     Order Specific Question:   Other Abx Indication     Answer:   na    potassium chloride (KLOR-CON M) extended release tablet 40 mEq    iopamidol (ISOVUE-370) 76 % injection 75 mL       SURGICAL HISTORY       Past Surgical History:   Procedure Laterality Date     SECTION N/A

## 2024-08-05 NOTE — PROGRESS NOTES
Obstetric/Gynecology Resident Interval Note    Writer to bedside at request of IM with concern over dropping Hgb. Patient's Hgb on admission 7.8 repeat noted to be 6.2. On exam, the patient is resting comfortably in bed, does appear pale, is in no acute distress. The patient and her mother report that she has had some pallor over the last week. Her vitals are stable and she denies symptoms of anemia    On abdominal exam, her abdomen has areas of firmness consistent with her seroma on CT findings, without rebound, guarding or rigidity. Fascial probe inserted at the intersection of T incision, immediate large volume return of serosanguinous fluid noted. Fascia probes intact, consistent with CT findings. Approximately 1L of sanguinous fluid was evacuated from the patient's incision, with improvement in the firmness of her subcutaneous tissue. The patient's vitals remained stable throughout the exam, and 4mg IV morphine was given for pain control. A temporary pressure dressing was placed for patient's hygiene on her pfannenstiel incision to catch continued draining fluid, while writer updated attending and primary team.     Patient to receive 1u PRBC given Hgb drop to 6.2    Vitals:    08/05/24 0733 08/05/24 0800 08/05/24 0803 08/05/24 0846   BP:  (!) 168/96  (!) 164/93   Pulse:  68  72   Resp: 16 10 16 16   Temp:    98.2 °F (36.8 °C)   TempSrc:       SpO2:    99%     Senior resident bedside with writer during exam.     Dr. Abad updated.     Rose Robles DO  OB/GYN Resident, PGY2  [place], [place]  8/5/2024, 7:59 AM

## 2024-08-05 NOTE — CONSULTS
acute cardiopulmonary process.     XR ABDOMEN (KUB) (SINGLE AP VIEW)    Result Date: 7/16/2024  EXAMINATION: ONE SUPINE XRAY VIEW(S) OF THE ABDOMEN 7/16/2024 11:06 am COMPARISON: 07/13/2024 HISTORY: ORDERING SYSTEM PROVIDED HISTORY: for retained product in OR TECHNOLOGIST PROVIDED HISTORY: for retained product in OR 34-year-old female; evaluate for retained products in the OR FINDINGS: Severe stool burden.  NG tube traverses the region of the GE junction with distal tip overlying the left upper quadrant likely within the body/fundus of the stomach.  Indeterminate metallic densities in the region of the GE junction and along the lateral left abdomen.  No abnormally dilated small bowel loops.  Radiodensity overlying the pelvis which could represent surgical instrumentation.  Pelvic phleboliths.     1. Severe stool burden. 2. Radiodensity overlying the pelvis which could represent surgical instrumentation. 3. NG tube overlying the left upper quadrant. 4. Indeterminate metallic radiodensities in the region of the GE junction and along the lateral left abdomen likely external to the patient.     XR CHEST PORTABLE    Result Date: 7/16/2024  EXAMINATION: ONE XRAY VIEW OF THE CHEST 7/16/2024 4:39 am COMPARISON: 07/15/2024 HISTORY: ORDERING SYSTEM PROVIDED HISTORY: intubated TECHNOLOGIST PROVIDED HISTORY: intubated Reason for Exam: PORT UPRIGHT FINDINGS: Endotracheal tube tip is approximately 5 cm above the ekta.  Right jugular central venous catheter tip is in the superior vena cava.  Nasogastric tube has been advanced with its tip now in the stomach.  Similar curvilinear density left upper quadrant likely due to drains and/or packing material. Stable cardiomediastinal silhouette.  Minimal hazy left basilar opacity could be technical or due to a small effusion and/or atelectasis.  The right lung is clear.  No focal lung consolidation is seen.     There may be minimal hazy left basilar opacity.  Lungs are otherwise clear.      XR CHEST PORTABLE    Result Date: 7/15/2024  EXAMINATION: ONE X-RAY VIEW OF THE CHEST 7/15/2024 6:16 am COMPARISON: 07/14/2024 HISTORY: ORDERING SYSTEM PROVIDED HISTORY:  Intubated TECHNOLOGIST PROVIDED HISTORY: Intubated FINDINGS: Endotracheal tube tip is at the clavicular heads approximately 6 cm above the ekta.  Right jugular central venous catheter tip projects over the expected location of the superior vena cava.  Esophageal monitoring probe tip is in the distal esophagus.  NG tube tip is in the distal esophagus and should be advanced 10 cm for more appropriate positioning in the stomach.  Curvilinear radiopaque densities in left upper quadrant abdomen likely related to drainage and/or packing material from the recent surgery.  Heart size is within normal limits.  Lungs remain clear.     Lungs remain essentially clear. Nasogastric tube tip is in the distal esophagus and should be advanced approximately 10 cm. Radiopaque curvilinear densities left upper quadrant likely related to the recent surgery.     XR CHEST PORTABLE    Result Date: 7/14/2024  EXAMINATION: ONE XRAY VIEW OF THE CHEST 7/14/2024 4:54 am COMPARISON: 13 July 2024 HISTORY: ORDERING SYSTEM PROVIDED HISTORY: intubated TECHNOLOGIST PROVIDED HISTORY: intubated Reason for Exam: port supine FINDINGS: AP portable view of the chest time stamped at 438 hours demonstrates endotracheal tube terminating 5.9 cm above the ekta, overlying cardiac monitoring electrodes, intestinal tube extending below the diaphragm, tip out of the field of view, and a right IJ catheter terminating in the proximal superior vena cava.  Heart size is normal.  No vascular congestion, focal consolidation, effusion, or pneumothorax is noted.  Osseous and mediastinal structures are age-appropriate.  Prior taped like density in the left upper abdomen likely packing material on prior study not included in the field of view.     1. No acute cardiopulmonary process. 2. Lines and

## 2024-08-05 NOTE — OR NURSING
8 fr m drain to left abdomen. Drained for 160 ml serous fluid. Specimen sent. Drain sutured and placed to FRANCIS drain. STayfix used. Report called and patient to US. Lot [id number]

## 2024-08-05 NOTE — ED NOTES
ED to inpatient nurses report      Chief Complaint:  Chief Complaint   Patient presents with    Bleeding/Bruising     From abdomen incision     Present to ED from: Home    MOA:     LOC: alert and orientated to name, place, date  Mobility: Requires assistance * 1  Oxygen Baseline: RA    Current needs required: for blood transfusion  Pending ED orders: none  Present condition: stable    Why did the patient come to the ED?  Pt came into the ed via triage due to having bleeding and pain in her abdominal area   Pt had a  and has her incisions opening and bleeding   Pt has not been feeling good   Pt had twins   Pt is Aox4 and ambulatory with help   RR are equal and regular   Pt noticed the bleeding form the lower abdominal pain about an hour ago   Pt stated she was moving around   Pt stated she changed her diaper one time and it was half full with blood   Pt had birth on the 2024   What is the plan? Admission with consults  Any procedures or intervention occur? IV infusion  Any safety concerns??    Mental Status:  Level of Consciousness: Alert (0)    Psych Assessment:   Psychosocial  Psychosocial (WDL): Within Defined Limits  Vital signs   Vitals:    24 0605 24 0615 24 0648 24 0650   BP: (!) 154/97      Pulse: 68  69 70   Resp: 17  21 21   Temp:  99 °F (37.2 °C)     TempSrc:  Oral     SpO2: 95%  96% 95%        Vitals:  Patient Vitals for the past 24 hrs:   BP Temp Temp src Pulse Resp SpO2   24 0650 -- -- -- 70 21 95 %   24 0648 -- -- -- 69 21 96 %   24 0615 -- 99 °F (37.2 °C) Oral -- -- --   24 0605 (!) 154/97 -- -- 68 17 95 %   24 0500 (!) 149/92 -- -- 71 22 95 %   24 0435 (!) 144/89 -- -- 72 12 97 %   24 0230 (!) 145/93 -- -- 81 17 95 %   24 0130 (!) 149/95 -- -- 83 14 97 %   24 0100 (!) 147/93 -- -- 82 14 95 %   24 0030 (!) 143/91 -- -- 84 15 95 %   24 2300 -- 99.5 °F (37.5 °C) Oral -- -- --   24 2255 (!)  Brother     Diabetes Maternal Grandmother     Diabetes Maternal Grandfather     No Known Problems Paternal Grandmother     No Known Problems Paternal Grandfather        ALLERGIES     Ceftin [cefuroxime axetil] and Nicotine    CURRENT MEDICATIONS       Previous Medications    ACETAMINOPHEN (TYLENOL) 500 MG TABLET    Take 2 tablets by mouth every 6 hours as needed for Pain    ASPIRIN LOW DOSE 81 MG EC TABLET        BUPRENORPHINE (SUBUTEX) 8 MG SUBL SL TABLET    place 1 tablet under the tongue and ALLOW to dissolve twice a day    DIPHENHYDRAMINE (BENADRYL ALLERGY) 25 MG CAPSULE    Take 1 capsule by mouth every 6 hours as needed for Itching, Allergies or Sleep    DOXYCYCLINE HYCLATE (VIBRA-TABS) 100 MG TABLET    Take 1 tablet by mouth 2 times daily for 7 days    GUAIFENESIN (MUCINEX) 600 MG EXTENDED RELEASE TABLET    Take 2 tablets by mouth 2 times daily for 10 days    HYDROXYZINE HCL (ATARAX) 25 MG TABLET    Take 1 tablet by mouth every 8 hours as needed for Anxiety    IBUPROFEN (ADVIL;MOTRIN) 600 MG TABLET    Take 1 tablet by mouth every 6 hours as needed for Pain    OMEPRAZOLE (PRILOSEC) 40 MG DELAYED RELEASE CAPSULE    Take 1 capsule by mouth every morning (before breakfast)    ONDANSETRON (ZOFRAN) 4 MG TABLET    Take 1 tablet by mouth daily as needed for Nausea or Vomiting    PRENATAL VIT-FE FUMARATE-FA (PRENATAL VITAMIN) 27-0.8 MG TABS    Take 1 tablet by mouth daily    SENNOSIDES-DOCUSATE SODIUM (SENOKOT-S) 8.6-50 MG TABLET    Take 1 tablet by mouth in the morning and at bedtime    VITAMIN B-6 (PYRIDOXINE) 25 MG TABLET    Take 1 tablet by mouth in the morning, at noon, and at bedtime     Orders Placed This Encounter   Medications    sodium chloride 0.9 % bolus 1,000 mL    piperacillin-tazobactam (ZOSYN) 3,375 mg in sodium chloride 0.9 % 50 mL IVPB (mini-bag)     Order Specific Question:   Antimicrobial Indications     Answer:   Intra-Abdominal Infection    DISCONTD: vancomycin (VANCOCIN) 1,250 mg in sodium

## 2024-08-05 NOTE — ED NOTES
The following labs were labeled with appropriate pt sticker and tubed to lab:     [] Blue     [] Lavender   [] on ice  [] Green/yellow  [] Green/black [] on ice  [] Grey  [] on ice  [] Yellow  [] Red  [] Pink  [x] Type/ Screen  [] ABG  [] VBG    [] COVID-19 swab    [] Rapid  [] PCR  [] Flu swab  [] Peds Viral Panel     [] Urine Sample  [] Fecal Sample  [] Pelvic Cultures  [] Blood Cultures  [] X 2  [] STREP Cultures  [] Wound Cultures

## 2024-08-05 NOTE — ED NOTES
Chiragr obtained Blood from IV placement for blood culture. MITCHELL Baxter placed blood into blood culture containers.

## 2024-08-05 NOTE — ED NOTES
Pt came into the ed via triage due to having bleeding and pain in her abdominal area   Pt had a  and has her incisions opening and bleeding   Pt has not been feeling good   Pt had twins   Pt is Aox4 and ambulatory with help   RR are equal and regular   Pt noticed the bleeding form the lower abdominal pain about an hour ago   Pt stated she was moving around   Pt stated she changed her diaper one time and it was half full with blood   Pt had birth on the 2024

## 2024-08-05 NOTE — PROGRESS NOTES
Obstetric/Gynecology Resident Interval Note    At patient's bedside for wound re-evaluation. Patient is resting comfortably and reports mild pain in the abdomen. Her vitals remain stable. On exam, her abdomen is non-tender to palpation, without rebound, guarding or rigidity. Patient's incision is covered with a pressure dressing and continues to drain serosanguinous fluid upon removal, unchanged since this morning. FRANCIS drain in place and draining serosanguinous fluid. A wound drainage bag connected to a thornton bag is placed over the site of dehiscence along her incision to catch continued draining fluid.     Vitals:    08/05/24 1105 08/05/24 1234 08/05/24 1600 08/05/24 1717   BP:  (!) 159/98 (!) 159/99    Pulse:  75 74    Resp: 16 13 19 16   Temp:  99.5 °F (37.5 °C) 99.3 °F (37.4 °C)    TempSrc:  Oral Oral    SpO2:  99% 96%        Lara Hannah DO  OB/GYN Resident, PGY1  [place], [place]  8/5/2024, 5:11 PM

## 2024-08-06 LAB
ABO/RH: NORMAL
ANION GAP SERPL CALCULATED.3IONS-SCNC: 9 MMOL/L (ref 9–16)
ANTIBODY SCREEN: NEGATIVE
ARM BAND NUMBER: NORMAL
BLOOD BANK BLOOD PRODUCT EXPIRATION DATE: NORMAL
BLOOD BANK DISPENSE STATUS: NORMAL
BLOOD BANK ISBT PRODUCT BLOOD TYPE: 6200
BLOOD BANK PRODUCT CODE: NORMAL
BLOOD BANK SAMPLE EXPIRATION: NORMAL
BLOOD BANK UNIT TYPE AND RH: NORMAL
BPU ID: NORMAL
BUN SERPL-MCNC: 7 MG/DL (ref 6–20)
CALCIUM SERPL-MCNC: 7.5 MG/DL (ref 8.6–10.4)
CHLORIDE SERPL-SCNC: 107 MMOL/L (ref 98–107)
CO2 SERPL-SCNC: 24 MMOL/L (ref 20–31)
COMPONENT: NORMAL
CREAT SERPL-MCNC: 0.6 MG/DL (ref 0.5–0.9)
CROSSMATCH RESULT: NORMAL
ERYTHROCYTE [DISTWIDTH] IN BLOOD BY AUTOMATED COUNT: 15.6 % (ref 11.8–14.4)
GFR, ESTIMATED: >90 ML/MIN/1.73M2
GLUCOSE SERPL-MCNC: 88 MG/DL (ref 74–99)
HCT VFR BLD AUTO: 26.2 % (ref 36.3–47.1)
HGB BLD-MCNC: 8.2 G/DL (ref 11.9–15.1)
MCH RBC QN AUTO: 28.1 PG (ref 25.2–33.5)
MCHC RBC AUTO-ENTMCNC: 31.3 G/DL (ref 28.4–34.8)
MCV RBC AUTO: 89.7 FL (ref 82.6–102.9)
NRBC BLD-RTO: 0.2 PER 100 WBC
PLATELET # BLD AUTO: 448 K/UL (ref 138–453)
PMV BLD AUTO: 10.5 FL (ref 8.1–13.5)
POTASSIUM SERPL-SCNC: 4.1 MMOL/L (ref 3.7–5.3)
RBC # BLD AUTO: 2.92 M/UL (ref 3.95–5.11)
SODIUM SERPL-SCNC: 140 MMOL/L (ref 136–145)
TRANSFUSION STATUS: NORMAL
UNIT DIVISION: 0
UNIT ISSUE DATE/TIME: NORMAL
VANCOMYCIN SERPL-MCNC: 13.9 UG/ML (ref 5–40)
WBC OTHER # BLD: 9.5 K/UL (ref 3.5–11.3)

## 2024-08-06 PROCEDURE — 1200000000 HC SEMI PRIVATE

## 2024-08-06 PROCEDURE — 36415 COLL VENOUS BLD VENIPUNCTURE: CPT

## 2024-08-06 PROCEDURE — 80048 BASIC METABOLIC PNL TOTAL CA: CPT

## 2024-08-06 PROCEDURE — 6360000002 HC RX W HCPCS: Performed by: STUDENT IN AN ORGANIZED HEALTH CARE EDUCATION/TRAINING PROGRAM

## 2024-08-06 PROCEDURE — 2580000003 HC RX 258: Performed by: NURSE PRACTITIONER

## 2024-08-06 PROCEDURE — 6370000000 HC RX 637 (ALT 250 FOR IP): Performed by: NURSE PRACTITIONER

## 2024-08-06 PROCEDURE — 6360000002 HC RX W HCPCS: Performed by: NURSE PRACTITIONER

## 2024-08-06 PROCEDURE — 99232 SBSQ HOSP IP/OBS MODERATE 35: CPT | Performed by: STUDENT IN AN ORGANIZED HEALTH CARE EDUCATION/TRAINING PROGRAM

## 2024-08-06 PROCEDURE — 6370000000 HC RX 637 (ALT 250 FOR IP)

## 2024-08-06 PROCEDURE — 80202 ASSAY OF VANCOMYCIN: CPT

## 2024-08-06 PROCEDURE — 85027 COMPLETE CBC AUTOMATED: CPT

## 2024-08-06 PROCEDURE — 2580000003 HC RX 258: Performed by: STUDENT IN AN ORGANIZED HEALTH CARE EDUCATION/TRAINING PROGRAM

## 2024-08-06 PROCEDURE — 99231 SBSQ HOSP IP/OBS SF/LOW 25: CPT | Performed by: SURGERY

## 2024-08-06 PROCEDURE — 99212 OFFICE O/P EST SF 10 MIN: CPT

## 2024-08-06 PROCEDURE — 6370000000 HC RX 637 (ALT 250 FOR IP): Performed by: STUDENT IN AN ORGANIZED HEALTH CARE EDUCATION/TRAINING PROGRAM

## 2024-08-06 RX ORDER — NIFEDIPINE 30 MG/1
30 TABLET, EXTENDED RELEASE ORAL DAILY
Status: DISCONTINUED | OUTPATIENT
Start: 2024-08-06 | End: 2024-08-08 | Stop reason: HOSPADM

## 2024-08-06 RX ORDER — HYDROXYZINE HYDROCHLORIDE 10 MG/1
10 TABLET, FILM COATED ORAL 3 TIMES DAILY PRN
Status: DISCONTINUED | OUTPATIENT
Start: 2024-08-06 | End: 2024-08-08 | Stop reason: HOSPADM

## 2024-08-06 RX ADMIN — PIPERACILLIN AND TAZOBACTAM 3375 MG: 3; .375 INJECTION, POWDER, LYOPHILIZED, FOR SOLUTION INTRAVENOUS at 14:18

## 2024-08-06 RX ADMIN — SODIUM CHLORIDE, PRESERVATIVE FREE 40 MG: 5 INJECTION INTRAVENOUS at 09:19

## 2024-08-06 RX ADMIN — SODIUM CHLORIDE 1250 MG: 9 INJECTION, SOLUTION INTRAVENOUS at 10:07

## 2024-08-06 RX ADMIN — SODIUM CHLORIDE 1250 MG: 9 INJECTION, SOLUTION INTRAVENOUS at 21:33

## 2024-08-06 RX ADMIN — PIPERACILLIN AND TAZOBACTAM 3375 MG: 3; .375 INJECTION, POWDER, LYOPHILIZED, FOR SOLUTION INTRAVENOUS at 05:48

## 2024-08-06 RX ADMIN — PIPERACILLIN AND TAZOBACTAM 3375 MG: 3; .375 INJECTION, POWDER, LYOPHILIZED, FOR SOLUTION INTRAVENOUS at 22:47

## 2024-08-06 RX ADMIN — OXYCODONE HYDROCHLORIDE AND ACETAMINOPHEN 1 TABLET: 5; 325 TABLET ORAL at 09:17

## 2024-08-06 RX ADMIN — ACETAMINOPHEN 650 MG: 325 TABLET ORAL at 21:27

## 2024-08-06 RX ADMIN — OXYCODONE HYDROCHLORIDE AND ACETAMINOPHEN 1 TABLET: 5; 325 TABLET ORAL at 19:30

## 2024-08-06 RX ADMIN — SODIUM CHLORIDE: 9 INJECTION, SOLUTION INTRAVENOUS at 21:30

## 2024-08-06 RX ADMIN — SODIUM CHLORIDE, PRESERVATIVE FREE 10 ML: 5 INJECTION INTRAVENOUS at 09:18

## 2024-08-06 RX ADMIN — NIFEDIPINE 30 MG: 30 TABLET, FILM COATED, EXTENDED RELEASE ORAL at 09:23

## 2024-08-06 RX ADMIN — HYDROXYZINE HYDROCHLORIDE 10 MG: 10 TABLET ORAL at 21:27

## 2024-08-06 RX ADMIN — SODIUM CHLORIDE, PRESERVATIVE FREE 10 ML: 5 INJECTION INTRAVENOUS at 21:27

## 2024-08-06 ASSESSMENT — ENCOUNTER SYMPTOMS: RESPIRATORY NEGATIVE: 1

## 2024-08-06 ASSESSMENT — PAIN SCALES - GENERAL
PAINLEVEL_OUTOF10: 5
PAINLEVEL_OUTOF10: 5
PAINLEVEL_OUTOF10: 8
PAINLEVEL_OUTOF10: 8

## 2024-08-06 ASSESSMENT — PAIN - FUNCTIONAL ASSESSMENT: PAIN_FUNCTIONAL_ASSESSMENT: ACTIVITIES ARE NOT PREVENTED

## 2024-08-06 ASSESSMENT — PAIN DESCRIPTION - ORIENTATION
ORIENTATION: MID;LOWER
ORIENTATION: MID
ORIENTATION: MID;LOWER

## 2024-08-06 ASSESSMENT — PAIN DESCRIPTION - LOCATION
LOCATION: ABDOMEN;BACK
LOCATION: ABDOMEN
LOCATION: ABDOMEN;BACK

## 2024-08-06 ASSESSMENT — PAIN DESCRIPTION - DESCRIPTORS
DESCRIPTORS: DISCOMFORT;ACHING
DESCRIPTORS: ACHING
DESCRIPTORS: ACHING

## 2024-08-06 NOTE — DISCHARGE INSTRUCTIONS
Discharge Instructions for General Surgery  No lifting above 10Ibs for next 2 weeks.  No soaking in bathtubs or submerging yourself in water for 4 weeks  Resume activity as tolerated, No operating heavy equipment while using narcotics Wash incision gently with soap and water, pat dry.  If steri-strips or surgical glue in place wash gently and leave in place until the glue or strips fall off. (Do not pull/tug)    F/u in trauma/acute care surgery clinic in 1-2 weeks Call your doctor for the following:  Chills  Temperature greater than 101  Pain that is not tolerable despite taking pain medicine as ordered There is increased swelling, redness or warmth at surgical site There is increased drainage or bleeding from surgical site    Recommended diet: regular diet  Depending on your injuries, your doctor may want you to follow a specific diet. Some pain medicine can cause constipation . To avoid this problem:  Drink plenty of fluids.  Eat foods high in fiber , such as:  Whole grain cereals and bread  Fruits and vegtables   Legumes (eg, beans, lentils)      If you are still having problems, talk to your doctor about using laxatives or stool softeners.    General questions or concerns call [phone] for the General Surgery Clinic.  If needed, the clinic fax number is [phone]        Wound care:  Maintain the wound pouch over the bikini incision wound, replace as needed, until the drainage is less than 30 ml/day.  May shower with pouch on or off.  OK to use barrier film spray to intact skin before placing the pouch.  Once the drainage is minimal, OK to use ABD pads and tape provided, replacing daily as needed.    Empty and measure the FRANCIS drainage, keeping the bulb compressed.  Wash surrounding skin with soap and water.

## 2024-08-06 NOTE — PROGRESS NOTES
[facility]   Pharmacy Pharmacokinetic Monitoring Service - Vancomycin    Consulting Provider:    Indication: INTRAABDOMINAL INFECTION  Target Concentration: Goal AUC/RASHIDA 400-600 mg*hr/L  Day of Therapy: 3  Additional Antimicrobials: ZOSYN    Pertinent Laboratory Values:   Wt Readings from Last 1 Encounters:   08/05/24 80.2 kg (176 lb 12.9 oz)     Temp Readings from Last 1 Encounters:   08/06/24 98.6 °F (37 °C)     Estimated Creatinine Clearance: 147 mL/min (based on SCr of 0.6 mg/dL).  Recent Labs     08/05/24  0611 08/06/24  0629   CREATININE 0.6 0.6   BUN 7 7   WBC 10.2 9.5     Procalcitonin:     Pertinent Cultures:  Culture Date Source Results        MRSA Nasal Swab:     Recent vancomycin administrations                     vancomycin (VANCOCIN) 1,250 mg in sodium chloride 0.9 % 250 mL IVPB ([id number]) (mg) 1,250 mg New Bag 08/06/24 1007     1,250 mg New Bag 08/05/24 2045     1,250 mg New Bag  1150    vancomycin (VANCOCIN) 1,250 mg in sodium chloride 0.9 % 250 mL IVPB ([id number]) (mg) 1,250 mg New Bag 08/04/24 2221                    Assessment:  Date/Time Current Dose Concentration Timing of Concentration (h) AUC   8/9 1250 MG Q 12 HRS  13.9 9 HRS POST DOSE  462   Note: Serum concentrations collected for AUC dosing may appear elevated if collected in close proximity to the dose administered, this is not necessarily an indication of toxicity    Plan:  Current dosing regimen is therapeutic  Continue current dose  Repeat vancomycin concentration ordered for TBD @ TBD   Pharmacy will continue to monitor patient and adjust therapy as indicated    Thank you for the consult,  Sebas Vargas RPH  8/6/2024 1:23 PM

## 2024-08-06 NOTE — PROGRESS NOTES
PROGRESS NOTE      PATIENT NAME: Noelle Kim  MEDICAL RECORD NO. [id number]  DATE: 2024    HD: # 1      Patient Active Problem List   Diagnosis    H/O LEEP 21    Abnormal Pap Smear    Bipolar 1 disorder, depressed, severe (HCC)    Viral hepatitis C    Hx Gestational diabetes mellitus    Polysubstance Drug Use    Thrombocytopenia affecting pregnancy    G3 Dichorionic diamniotic twin pregnancy    Hx Left salpingoophorectomy     cHTN (no meds)    PLTCS with ex lap, survey abdomen, abthera wound vac 24 M/M WT 5#15, 6#13 Apg  and     S/P  section    Intra-abdominal bleeding    Pre-eclampsia affecting puerperium    Acute stress reaction    Intra-abdominal infection    Hypokalemia    Hepatomegaly    Cystitis    Sepsis (HCC)    Blood loss anemia       DIAGNOSIS AND PLAN    Noelle Kim is a 34 y.o. female with history of cirrhosis s/p   complicated with bleeding, ex lap with closure  who presents with drainage from her Pfannenstiel incision with concern for fascial defect and concern for multiple intraabdominal abscesses.  IR placed abdominal drain . Leave drain until output less than 30 ml per day.  Continue local wound care, stoma consult for pouch management as needed for drainage. Continue antibiotics.  No further general surgical intervention at this time. May follow up at surgery clinic in 2 weeks.  Will sign off, please contact if further questions/concerns.    Chief Complaint: \"doing ok\"    SUBJECTIVE  No acute events overnight. She is tolerating diet without nausea/vomiting. IR placed an abdominal drain yesterday that continues to drain serous fluid. She required 2 units of PRBCs yesterday.     OBJECTIVE  VITALS:   Vitals:    24 0023   BP: (!) 158/109   Pulse: 72   Resp: 16   Temp: 98.4 °F (36.9 °C)   SpO2: 96%     Physical Exam  Constitutional:       Appearance: Normal appearance.   HENT:      Head: Normocephalic and atraumatic.   Cardiovascular:       Rate and Rhythm: Normal rate.   Pulmonary:      Effort: Pulmonary effort is normal.   Abdominal:      General: There is no distension.      Palpations: Abdomen is soft.      Tenderness: There is no abdominal tenderness.      Comments: Left side drain with serous fluid. Pouch over incision with serosanguinous fluid. Remaining incision is clean dry and intact.   Neurological:      Mental Status: She is alert.       Drain/tube output:  230 ml FRANCIS, 50 ml pouch    LAB:  CBC:   Recent Labs     08/04/24 2055 08/05/24  0611 08/05/24  1631 08/06/24  0629   WBC 12.3* 10.2  --  9.5   HGB 7.8* 6.2* 7.8* 8.2*   HCT 25.7* 20.3* 24.7* 26.2*   MCV 89.5 89.0  --  89.7   * 437  --  448     BMP:   Recent Labs     08/04/24 2055 08/05/24  0611 08/06/24  0629    140 140   K 3.1* 3.3* 4.1    104 107   CO2 27 25 24   BUN 8 7 7   CREATININE 0.7 0.6 0.6   GLUCOSE 106* 90 88       RADIOLOGY:      Irais Lundberg PA-C  8/6/2024, 8:02 AM

## 2024-08-06 NOTE — PROGRESS NOTES
[facility] Wound Ostomy  Nurse  Consult Note       NAME:  Noelle Kim  MEDICAL RECORD NUMBER:  [id number]  AGE: 34 y.o.   GENDER: female  : 1990  TODAY'S DATE:  2024    Subjective   Reason for [facility] Nurse Evaluation and Assessment:  \"post op dehiscence\"      Noelle Kim is a 34 y.o. female referred by:   [x] Physician  [] Nursing  [] Other:         Wound History:   Copied From H&P: \"34-year-old female with known medical history of recent  with complicated postop course presents to the hospital with complaint of bleeding from her incision site. Patient had surgery on  with ex lap, during  she had severe liver bed varicosities which were bleeding and ovarian vein aneurysm was noted. Due to significant intra-abdominal bleeding patient had delayed closure. She had complicated admission with multiple surgeries and exploratory laparotomy with trauma surgery. Abdominal incisions were ultimately close on \"  \"CT abdomen showed multiple loculated fluid collections with rim enhancement throughout abdominal and pelvis concern for multifocal abscesses.  Interval enlargement of large fluid collection with subcutaneous fat of abdomen measuring 25 into 18 into 5 cm, possible postop seroma.\"       Current Wound Care Treatment:    Drain placed by IR          Objective    BP (!) 153/101   Pulse 75   Temp 99.1 °F (37.3 °C)   Resp 15   Ht 1.702 m (5' 7.01\")   Wt 80.2 kg (176 lb 12.9 oz)   LMP 10/01/2023 (Approximate)   SpO2 98%   BMI 27.69 kg/m²     LABS:  WBC:    Lab Results   Component Value Date/Time    WBC 9.5 2024 06:29 AM     H/H:    Lab Results   Component Value Date/Time    HGB 8.2 2024 06:29 AM    HCT 26.2 2024 06:29 AM           Assessment   Jose Risk Score: Jose Scale Score: 20    Patient Active Problem List   Diagnosis Code    H/O LEEP 21 Z98.890    Abnormal Pap Smear R87.613    Bipolar 1 disorder, depressed, severe (HCC) F31.4    Viral hepatitis C  collection bag.     08/06/24 1359   Wound 08/06/24 Pelvis Anterior;Mid center of transverse lower abdominal incision   Date First Assessed: 08/06/24   Present on Original Admission: Yes  Primary Wound Type: Surgical Type  Location: Pelvis  Wound Location Orientation: Anterior;Mid  Wound Description (Comments): center of transverse lower abdominal incision   Wound Image    Wound Etiology Surgical   Dressing Status New dressing applied   Wound Cleansed Soap and water   Dressing/Treatment Barrier film;Fistula pouch   Wound Assessment Fibrinous;Subcutaneous   Drainage Description Serosanguinous

## 2024-08-06 NOTE — PROGRESS NOTES
Gynecology Progress Note    Date: 2024  Time: 8:59 AM    Noelle Kim 34 y.o. female , POD # 24 s/p PLTCS, ex lap, 24, Multiple exploratory laparotomies    Patient seen and examined. She complained of anger and anxiety. She denies SI/HI and signs and symptoms of PPD. She denies signs and symptoms of anemia. She is improving and doing better. Pain is controlled. Her vitals signs are stable.  Patient is  tolerating oral intake. She is urinating well.  She reports vaginal bleeding. She is ambulating without difficulty.  She is  passing flatus. She denies Fever/Chills, Chest Pain, SOB, N/V, Calf Pain    Vitals:  Vitals:    24 1911 24 0023 24 0812   BP: (!) 142/91 (!) 146/98 (!) 158/109 (!) 153/101   Pulse: 82 71 72 75   Resp:  16 16 18   Temp:  99.3 °F (37.4 °C) 98.4 °F (36.9 °C) 99.1 °F (37.3 °C)   TempSrc:  Oral Oral    SpO2:  96% 96% 98%   Weight: 80.2 kg (176 lb 12.9 oz)      Height: 1.702 m (5' 7.01\")            Intake/Output:   Last Shift: I/O last 3 completed shifts:  In: 1863.2 [P.O.:120; I.V.:631; Blood:475; IV Piggyback:637.2]  Out: 280 [Drains:230; Other:50]  Current Shift: No intake/output data recorded.  230 mL out in last 14 hrs ~ 16.4mL/hr      Physical Exam:  General:  no apparent distress, alert, and cooperative  Neurologic:  alert, oriented, normal speech, no focal findings or movement disorder noted  Lungs:  No increased work of breathing, good air exchange  Heart:  regular rate and rhythm    Abdomen: Minimal tenderness, non distended, no rigidity, no rebound tenderness, mildly firm on palpation  Incision: midline vertical incision intact and healing, Pfannenstiel incision well approximated; 1cm area of skin dehiscence noted at the junction between pfannenstiel and midline vertical incision covered with dressing and draining small amount of serosanguinous fluid  Extremities:  no calf tenderness, non edematous    Lab:  Recent Results (from the past 12  effusion with adjacent atelectasis. 5. Cholelithiasis without evidence of acute cholecystitis. 6. Hepatomegaly   - Abdomen non tender, non distended, no rebound tenderness, mildly firm on palpation   - S/p IR drain placement  - FRANCIS drain in place draining serosanguinous fluid; 230 mL out in last 14 hrs ~ 16.4mL/hr  - Fluid culture pending  - Wbc; 12.3>10.2> 9.5   - Zosyn 3375mg IV q8    - Vancomycin 1250 mg IV q12   - Internal medicine is primary and general surgery is consulted. Will follow up on fluid cultures. Agree with current antibiotic regimen. Overall, the patient has improved since presentation. Will continue to monitor closely     Incisional defect, small fascial defect   - 2x2 cm incisional defect where her pfannenstiel and midline vertical incision meet. Small fascial defect noted on CT    - General surgery on board and agree that no surgical intervention is necessary at this time.  - Wound drainage bag connected to a thornton bag in place over the site of defect to catch draining fluid   - Draining serosanguinous fluid; 50ml since placement   - Wound care consult placed   - Appreciate wound care and general surgery recommendations for packing and dressing    Hepatomegaly, concern for Cirrhosis, concern for Ascites, history of hep C   - ALT/AST on arrival 24/48   - General surgery concerned of ascites    - Internal med concerned for cirrhosis or reactive hep C   - US liver and spleen 8/5/24: 1. Hepatomegaly. 2. 1.2 cm hemangioma left lobe of liver. 3. Fluid collections over the inferior right medial aspect of the liver corresponding with a similar collection seen on the CT evaluation. 4. Cholelithiasis and sludge without evidence of acute cholecystitis. 5. Left pleural effusion.   - Patient was diagnosed with LUIS w SF at time of delivery and underwent Mag sulfate infusion. Hepatomegaly and current transaminitis not thought to be related to PreE at this time. Obstetrics will continue to monitor however,

## 2024-08-06 NOTE — PLAN OF CARE
Problem: Discharge Planning  Goal: Discharge to home or other facility with appropriate resources  8/6/2024 1548 by Sonia Sampson RN  Outcome: Progressing  8/6/2024 0508 by Mady Brothers RN  Outcome: Progressing     Problem: ABCDS Injury Assessment  Goal: Absence of physical injury  8/6/2024 1548 by Sonia Sampson RN  Outcome: Progressing  8/6/2024 0508 by Mady Brothers RN  Outcome: Progressing     Problem: Chronic Conditions and Co-morbidities  Goal: Patient's chronic conditions and co-morbidity symptoms are monitored and maintained or improved  Outcome: Progressing

## 2024-08-06 NOTE — PROGRESS NOTES
Hypertension Mother     No Known Problems Brother     Diabetes Maternal Grandmother     Diabetes Maternal Grandfather     No Known Problems Paternal Grandmother     No Known Problems Paternal Grandfather        Vitals:  BP (!) 142/92   Pulse 73   Temp 98.6 °F (37 °C)   Resp 18   Ht 1.702 m (5' 7.01\")   Wt 80.2 kg (176 lb 12.9 oz)   LMP 10/01/2023 (Approximate)   SpO2 97%   BMI 27.69 kg/m²   Temp (24hrs), Av °F (37.2 °C), Min:98.4 °F (36.9 °C), Max:99.3 °F (37.4 °C)    No results for input(s): \"POCGLU\" in the last 72 hours.    I/O (24Hr):    Intake/Output Summary (Last 24 hours) at 2024 1406  Last data filed at 2024 0605  Gross per 24 hour   Intake 1388.16 ml   Output 80 ml   Net 1308.16 ml       Labs:  Hematology:  Recent Labs     24  0237 24  0611 24  1631 24  0629   WBC 12.3*  --  10.2  --  9.5   RBC 2.87*  --  2.28*  --  2.92*   HGB 7.8*  --  6.2* 7.8* 8.2*   HCT 25.7*  --  20.3* 24.7* 26.2*   MCV 89.5  --  89.0  --  89.7   MCH 27.2  --  27.2  --  28.1   MCHC 30.4  --  30.5  --  31.3   RDW 15.3*  --  15.6*  --  15.6*   *  --  437  --  448   MPV 10.6  --  10.9  --  10.5   INR  --  1.1  --   --   --      Chemistry:  Recent Labs     24  0611 24  0629    140 140   K 3.1* 3.3* 4.1    104 107   CO2 27 25 24   GLUCOSE 106* 90 88   BUN 8 7 7   CREATININE 0.7 0.6 0.6   MG  --  2.2  --    ANIONGAP 12 11 9   LABGLOM >90 >90 >90   CALCIUM 8.0* 7.4* 7.5*     Recent Labs     24   AST 48*   ALT 24   ALKPHOS 186*   BILITOT 0.6   BILIDIR 0.4*     ABG:  Lab Results   Component Value Date/Time    POCPH 7.384 2024 08:48 PM    PHART 7.405 2024 12:51 PM    POCPCO2 35.4 2024 08:48 PM    LJC5SPH 32.1 2024 12:51 PM    POCPO2 141.0 2024 08:48 PM    PO2ART 152.0 2024 12:51 PM    POCHCO3 21.1 2024 08:48 PM    UJR8JWX 19.7 2024 12:51 PM    NBEA 3.6 2024 08:48 PM    JPLE9MPO  continue surgery and OB/GYN team is following appreciate input  Pain management  Correct electrolyte abnormalities  GERD continue PPI  Monitor H&H and transfuse hemoglobin less than 7  Hypertension continue nifedipine  PT-OT    Nanette Ortiz MD  8/6/2024  2:06 PM

## 2024-08-07 PROBLEM — S30.1XXA ABDOMINAL WALL SEROMA: Status: ACTIVE | Noted: 2024-08-07

## 2024-08-07 LAB
ANION GAP SERPL CALCULATED.3IONS-SCNC: 9 MMOL/L (ref 9–16)
BUN SERPL-MCNC: 4 MG/DL (ref 6–20)
CALCIUM SERPL-MCNC: 7.6 MG/DL (ref 8.6–10.4)
CHLORIDE SERPL-SCNC: 107 MMOL/L (ref 98–107)
CO2 SERPL-SCNC: 25 MMOL/L (ref 20–31)
CREAT SERPL-MCNC: 0.6 MG/DL (ref 0.5–0.9)
CRP SERPL HS-MCNC: 4.3 MG/L (ref 0–5)
ERYTHROCYTE [DISTWIDTH] IN BLOOD BY AUTOMATED COUNT: 15.7 % (ref 11.8–14.4)
ERYTHROCYTE [SEDIMENTATION RATE] IN BLOOD BY PHOTOMETRIC METHOD: 39 MM/HR (ref 0–20)
GFR, ESTIMATED: >90 ML/MIN/1.73M2
GLUCOSE SERPL-MCNC: 88 MG/DL (ref 74–99)
HCT VFR BLD AUTO: 30.1 % (ref 36.3–47.1)
HGB BLD-MCNC: 8.4 G/DL (ref 11.9–15.1)
MCH RBC QN AUTO: 27.5 PG (ref 25.2–33.5)
MCHC RBC AUTO-ENTMCNC: 27.9 G/DL (ref 28.4–34.8)
MCV RBC AUTO: 98.7 FL (ref 82.6–102.9)
NRBC BLD-RTO: 0 PER 100 WBC
PLATELET # BLD AUTO: 255 K/UL (ref 138–453)
PMV BLD AUTO: 11.6 FL (ref 8.1–13.5)
POTASSIUM SERPL-SCNC: 3.7 MMOL/L (ref 3.7–5.3)
RBC # BLD AUTO: 3.05 M/UL (ref 3.95–5.11)
SODIUM SERPL-SCNC: 141 MMOL/L (ref 136–145)
WBC OTHER # BLD: 8 K/UL (ref 3.5–11.3)

## 2024-08-07 PROCEDURE — 6370000000 HC RX 637 (ALT 250 FOR IP)

## 2024-08-07 PROCEDURE — 6370000000 HC RX 637 (ALT 250 FOR IP): Performed by: STUDENT IN AN ORGANIZED HEALTH CARE EDUCATION/TRAINING PROGRAM

## 2024-08-07 PROCEDURE — 1200000000 HC SEMI PRIVATE

## 2024-08-07 PROCEDURE — 6360000002 HC RX W HCPCS: Performed by: NURSE PRACTITIONER

## 2024-08-07 PROCEDURE — 36415 COLL VENOUS BLD VENIPUNCTURE: CPT

## 2024-08-07 PROCEDURE — 99232 SBSQ HOSP IP/OBS MODERATE 35: CPT | Performed by: STUDENT IN AN ORGANIZED HEALTH CARE EDUCATION/TRAINING PROGRAM

## 2024-08-07 PROCEDURE — 85652 RBC SED RATE AUTOMATED: CPT

## 2024-08-07 PROCEDURE — 85027 COMPLETE CBC AUTOMATED: CPT

## 2024-08-07 PROCEDURE — 2580000003 HC RX 258: Performed by: STUDENT IN AN ORGANIZED HEALTH CARE EDUCATION/TRAINING PROGRAM

## 2024-08-07 PROCEDURE — 86140 C-REACTIVE PROTEIN: CPT

## 2024-08-07 PROCEDURE — 2580000003 HC RX 258: Performed by: NURSE PRACTITIONER

## 2024-08-07 PROCEDURE — 80048 BASIC METABOLIC PNL TOTAL CA: CPT

## 2024-08-07 PROCEDURE — 6360000002 HC RX W HCPCS: Performed by: STUDENT IN AN ORGANIZED HEALTH CARE EDUCATION/TRAINING PROGRAM

## 2024-08-07 RX ADMIN — OXYCODONE HYDROCHLORIDE AND ACETAMINOPHEN 1 TABLET: 5; 325 TABLET ORAL at 09:17

## 2024-08-07 RX ADMIN — SODIUM CHLORIDE, PRESERVATIVE FREE 10 ML: 5 INJECTION INTRAVENOUS at 20:50

## 2024-08-07 RX ADMIN — OXYCODONE HYDROCHLORIDE AND ACETAMINOPHEN 1 TABLET: 5; 325 TABLET ORAL at 20:51

## 2024-08-07 RX ADMIN — SODIUM CHLORIDE, PRESERVATIVE FREE 40 MG: 5 INJECTION INTRAVENOUS at 09:18

## 2024-08-07 RX ADMIN — NIFEDIPINE 30 MG: 30 TABLET, FILM COATED, EXTENDED RELEASE ORAL at 09:18

## 2024-08-07 RX ADMIN — PIPERACILLIN AND TAZOBACTAM 3375 MG: 3; .375 INJECTION, POWDER, LYOPHILIZED, FOR SOLUTION INTRAVENOUS at 06:46

## 2024-08-07 ASSESSMENT — PAIN SCALES - GENERAL
PAINLEVEL_OUTOF10: 7
PAINLEVEL_OUTOF10: 0
PAINLEVEL_OUTOF10: 7

## 2024-08-07 ASSESSMENT — ENCOUNTER SYMPTOMS: RESPIRATORY NEGATIVE: 1

## 2024-08-07 ASSESSMENT — PAIN DESCRIPTION - DESCRIPTORS: DESCRIPTORS: ACHING

## 2024-08-07 ASSESSMENT — PAIN DESCRIPTION - LOCATION: LOCATION: ABDOMEN

## 2024-08-07 ASSESSMENT — PAIN SCALES - WONG BAKER: WONGBAKER_NUMERICALRESPONSE: NO HURT

## 2024-08-07 NOTE — PLAN OF CARE
Problem: Discharge Planning  Goal: Discharge to home or other facility with appropriate resources  8/7/2024 0342 by Leatha Rodriguez RN  Outcome: Progressing  Flowsheets (Taken 8/6/2024 2000)  Discharge to home or other facility with appropriate resources:   Identify barriers to discharge with patient and caregiver   Identify discharge learning needs (meds, wound care, etc)  8/6/2024 1548 by Sonia Sampson RN  Outcome: Progressing     Problem: ABCDS Injury Assessment  Goal: Absence of physical injury  8/7/2024 0342 by Leatha Rodriguez RN  Flowsheets (Taken 8/7/2024 0342)  Absence of Physical Injury: Implement safety measures based on patient assessment  8/6/2024 1548 by Sonia Sampson RN  Outcome: Progressing     Problem: Chronic Conditions and Co-morbidities  Goal: Patient's chronic conditions and co-morbidity symptoms are monitored and maintained or improved  8/7/2024 0342 by Leatha Rodriguez RN  Outcome: Progressing  Flowsheets (Taken 8/6/2024 2000)  Care Plan - Patient's Chronic Conditions and Co-Morbidity Symptoms are Monitored and Maintained or Improved:   Monitor and assess patient's chronic conditions and comorbid symptoms for stability, deterioration, or improvement   Collaborate with multidisciplinary team to address chronic and comorbid conditions and prevent exacerbation or deterioration   Update acute care plan with appropriate goals if chronic or comorbid symptoms are exacerbated and prevent overall improvement and discharge  8/6/2024 1548 by Sonia Sampson RN  Outcome: Progressing     Problem: Safety - Adult  Goal: Free from fall injury  Outcome: Progressing  Flowsheets (Taken 8/7/2024 0343)  Free From Fall Injury: Instruct family/caregiver on patient safety     Problem: Skin/Tissue Integrity  Goal: Absence of new skin breakdown  Description: 1.  Monitor for areas of redness and/or skin breakdown  2.  Assess vascular access sites hourly  3.  Every 4-6 hours minimum:  Change oxygen saturation probe

## 2024-08-07 NOTE — PROGRESS NOTES
[facility]  Office: [phone]  Portillo Winslow DO, Alonzo Rowley DO, Mariusz Del Cid DO, Felice Roberson DO, Ross Perez MD, Sada Rollins MD, Char Charles MD, Bri Gay MD,  Huy Minor MD, Raheem Pereyra MD, Song Garcia MD,  Joan Paris DO, Kelsey Barton MD, Eric Coulter MD, Jas Winslow DO, Jenna Mancia MD,  Chris Bernal DO, Eulalia Hameed MD, Fely Beck MD, Danitza Ramos MD, Amilcar Gutierres MD,  Chico Macias MD, Odalis Martins MD, Joey Shankar MD, Nanette Ortiz MD, Nate Green MD, Dennise Tolliver MD, Dimitri Ledesma DO, Tin Marcus DO, Bhavana Kamara MD,  Angelo Shaw MD, Shirley Waterhouse, CNP,  Maite Taylor CNP, Irving Nam, CNP,  Maren Smiley, DNP, Kira Uribe, CNP, Ruby Vann, CNP, Yasmine Jewell CNP, Marivel Hare, CNP, Jessica Polanco, PA-C, Sepideh Khan PA-C, Yaima Su, CNP, Hillary Ravi, CNP, Trell Goins, CNP, Mindy Ragsdale, CNP, Comfort Damon, CNP, Kerry Curtis, CNS, Juliette Menendez, CNP, Maribel Conklin CNP, Tracy Schwab, CNP         [facility]   IN-PATIENT SERVICE   [facility]    Progress Note    8/7/2024    2:40 PM    Name:   Noelle Kim  MRN:     [id number]     Acct:      [id number]   Room:   0328/0328-02   Day:  2  Admit Date:  8/4/2024  8:40 PM    PCP:   Balta Cristina MD  Code Status:  Full Code    Subjective:     C/C:   Chief Complaint   Patient presents with    Bleeding/Bruising     From abdomen incision     Interval History Status: improved.     Was seen and examined, feeling better today, She remained afebrile with no leukocytosis, was started on IV ETT due to concern of multifocal intra-abdominal abscess, blood cultures so far negative, OB/GYN is following recommend to discontinue IV antibiotic and to monitor off antibiotic.    Lab vital sign consultant notes reviewed    Review of Systems:     Review of Systems   Constitutional:  Positive for fatigue.

## 2024-08-07 NOTE — PROGRESS NOTES
Physical Therapy        Physical Therapy Cancel Note      DATE: 2024    NAME: Noelle Kim  MRN: [id number]   : 1990      Patient not seen this date for Physical Therapy due to:    Patient independent with functional mobility. Will defer PT evaluation at this time. Please reorder PT if future needs arise.       Electronically signed by Ga Moran PT on 2024 at 3:15 PM

## 2024-08-07 NOTE — PROGRESS NOTES
Patient expressing her frustration and sadness not being able to care for her  twins. Patient feels she has no connection with them. Patient also has five year old as well. Patient hasn't been able to care for any of her children since her complications. Depending on family to care for them.

## 2024-08-07 NOTE — PROGRESS NOTES
workup by medicine.      cHTN (no meds) w/ LUIS w SF s/p Mag   - BP hypertensive non-severe > normotensive in the last 24h, afebrile   - LFTs ALT 24, AST 48    - PreE labs otherwise wnl   - Denies s/s of PreE   - On Procardia 30mg XL daily with BP normotensive overnight    - Will continue to monitor BP through admission      Acute Blood loss anemia    - Hgb on admission, 7.8   - Hgb 7.8>6.2>7.8>8.2>8.4   - s/p 1U pRBC   - Patient currently stable, denies any vaginal bleeding    - No current concerns for intraabdominal bleeding   - Patient denies s/s of anemia   - Will continue to monitor closely    Anxiety   - Patient complains of anxiety   - Denies sx of PPD   - Denies SI/HI   - On Atarax 10mg TID PRN     PPD#25   - Doing well, afebrile   - CBC: Hgb 8.4   - Lochia light   - Bottle feeding   - Denies s/s of mastitis   - Denies s/s of PPD        Mabel Araiza DO  Ob/Gyn Resident  8/7/2024, 9:08 AM     Attending Physician Statement  I have personally seen, evaluated and discussed the care of Noelle Kim, including pertinent history and exam findings with the resident. I have reviewed and edited their note in the electronic medical record. The key elements of all parts of the encounter have been performed/reviewed by me.  I agree with the assessment, plan and orders as documented by the resident. I have personally seen, examined and discussed the care plan with the patient.     Discussed discontinuation of Abx given afebrile >24h and NGTD on fluid culture. Patient continues to have drainage from wound and FRANCIS drain (60ml in 24h). Agree with general surgery to leave FRANCIS drain until minimal OP (<30ml in 24h). Consult place for home wound care and home health care. Recommend close OP follow up on DC. Patient otherwise stable. VSS. Urinating well. + flatus. Could consider DC when deemed appropriate by primary.      Attending's Name:  Ene Mcneill DO  Date: 8/7/2024  Time: 9:44 AM

## 2024-08-07 NOTE — PLAN OF CARE
Problem: Discharge Planning  Goal: Discharge to home or other facility with appropriate resources  8/7/2024 1506 by Gabi Bonilla RN  Outcome: Progressing  8/7/2024 0342 by Leatha Rodriguez RN  Outcome: Progressing  Flowsheets (Taken 8/6/2024 2000)  Discharge to home or other facility with appropriate resources:   Identify barriers to discharge with patient and caregiver   Identify discharge learning needs (meds, wound care, etc)     Problem: ABCDS Injury Assessment  Goal: Absence of physical injury  8/7/2024 1506 by Gabi Bonilla RN  Outcome: Progressing  8/7/2024 0342 by Leatha Rodriguez RN  Flowsheets (Taken 8/7/2024 0342)  Absence of Physical Injury: Implement safety measures based on patient assessment     Problem: Chronic Conditions and Co-morbidities  Goal: Patient's chronic conditions and co-morbidity symptoms are monitored and maintained or improved  8/7/2024 1506 by Gabi Bonilla RN  Outcome: Progressing  8/7/2024 0342 by Leatha Rodriguez RN  Outcome: Progressing  Flowsheets (Taken 8/6/2024 2000)  Care Plan - Patient's Chronic Conditions and Co-Morbidity Symptoms are Monitored and Maintained or Improved:   Monitor and assess patient's chronic conditions and comorbid symptoms for stability, deterioration, or improvement   Collaborate with multidisciplinary team to address chronic and comorbid conditions and prevent exacerbation or deterioration   Update acute care plan with appropriate goals if chronic or comorbid symptoms are exacerbated and prevent overall improvement and discharge     Problem: Safety - Adult  Goal: Free from fall injury  8/7/2024 1506 by Gabi Bonilla RN  Outcome: Progressing  8/7/2024 0343 by Leatha Rodriguez RN  Outcome: Progressing  Flowsheets (Taken 8/7/2024 0343)  Free From Fall Injury: Instruct family/caregiver on patient safety     Problem: Skin/Tissue Integrity  Goal: Absence of new skin breakdown  Description: 1.  Monitor for areas of redness

## 2024-08-07 NOTE — CONSULTS
aspirate culture with no growth, few neutrophils, no organisms seen.     She was initiated on vancomycin and Zosyn.     ID service asked to evaluate and advice on treatment.    CURRENT EVALUATION- DAILY INTERVAL CHANGES 2024  /86   Pulse 67   Temp 99.1 °F (37.3 °C) (Oral)   Resp 18   Ht 1.702 m (5' 7.01\")   Wt 80.2 kg (176 lb 12.9 oz)   LMP 10/01/2023 (Approximate)   SpO2 96%   BMI 27.69 kg/m²       Afebrile  VS stable    Patient feels better  No complaints  No new issues reported    Medications reviewed:  Monitor off antibiotics     Labs, X rays reviewed: 2024 with independent review of X rays    BUN: 4  Cr: 0.6    WBC: 9.5 > 8.0  Hb: 8.4  Plat:  255    CRP:   ESR:     Cultures:  Urine:    Blood:  24: NG  Sputum :    Wound:  24: no growth, few neutrophils, no organisms seen  MRSA Nares:      Imagin/4/23 CT AP w/ contrast                  24:      I have personally reviewed the past medical history, past surgical history, medications, social history, and family history, and I have updated the database accordingly.  Past Medical History:     Past Medical History:   Diagnosis Date    Abnormal cells of cervix     ADD (attention deficit disorder)     Anemia     Complication of anesthesia     Condyloma     Depression     Gestational diabetes mellitus     HSIL (high grade squamous intraepithelial lesion) on Pap smear of cervix     Liver disease     MRSA (methicillin resistant staph aureus) culture positive 2011    BUTTOCK    Pre-eclampsia affecting puerperium 7/15/2024    Severe dysplasia of cervix     STD (sexually transmitted disease)     Substance abuse (HCC)     heroin / on 20 pt states last used 3 yrs ago,marijuana 23. pt on buprenorphine SL every am       Past Surgical  History:     Past Surgical History:   Procedure Laterality Date     SECTION N/A 2024     SECTION performed by Sandra Gallegos MD at [facility] L&D OR    CT ABSCESS  [id number]-[id number]:     Results       Procedure Component Value Units Date/Time    Culture, Anaerobic and Aerobic [[id number]] Collected: 08/05/24 1134    Order Status: Completed Specimen: Abscess Updated: 08/07/24 0839     Specimen Description .ABSCESS     Direct Exam FEW NEUTROPHILS      NO ORGANISMS SEEN     Culture NO GROWTH 2 DAYS    Culture, Blood 1 [[id number]] Collected: 08/04/24 2056    Order Status: Completed Specimen: Blood Updated: 08/06/24 2315     Specimen Description .BLOOD     Special Requests          Culture NO GROWTH 2 DAYS    Culture, Blood 1 [[id number]] Collected: 08/04/24 2056    Order Status: Completed Specimen: Blood Updated: 08/06/24 2315     Specimen Description .BLOOD     Special Requests          Culture NO GROWTH 2 DAYS              Medical Decision Making-Other:     Note:    Thank you for allowing us to participate in the care of this patient. Please call with questions.    MD Kvng Stubbs MD  PGY-1 Internal Medicine    ATTESTATION:    I have discussed the case, including pertinent history and exam findings with the medical resident. I have evaluated the  History, physical findings and pictures of the patient and the key elements of the encounter have been performed by me. I have reviewed the laboratory data, other diagnostic studies and discussed them with the medical resident. I have updated the medical record where necessary.    I agree with the assessment, plan and orders as documented by the medical resident and I have modified them as necessary.       Douglas Singh MD.      8/7/2024    Office: [phone]

## 2024-08-08 VITALS
HEART RATE: 76 BPM | TEMPERATURE: 98.4 F | WEIGHT: 176.81 LBS | HEIGHT: 67 IN | RESPIRATION RATE: 16 BRPM | SYSTOLIC BLOOD PRESSURE: 137 MMHG | BODY MASS INDEX: 27.75 KG/M2 | OXYGEN SATURATION: 96 % | DIASTOLIC BLOOD PRESSURE: 88 MMHG

## 2024-08-08 LAB
ANION GAP SERPL CALCULATED.3IONS-SCNC: 10 MMOL/L (ref 9–16)
BASOPHILS # BLD: 0.05 K/UL (ref 0–0.2)
BASOPHILS NFR BLD: 0 % (ref 0–2)
BUN SERPL-MCNC: 3 MG/DL (ref 6–20)
CALCIUM SERPL-MCNC: 7.9 MG/DL (ref 8.6–10.4)
CHLORIDE SERPL-SCNC: 105 MMOL/L (ref 98–107)
CO2 SERPL-SCNC: 27 MMOL/L (ref 20–31)
CREAT SERPL-MCNC: 0.6 MG/DL (ref 0.5–0.9)
EOSINOPHIL # BLD: 0.17 K/UL (ref 0–0.44)
EOSINOPHILS RELATIVE PERCENT: 2 % (ref 1–4)
ERYTHROCYTE [DISTWIDTH] IN BLOOD BY AUTOMATED COUNT: 15.8 % (ref 11.8–14.4)
GFR, ESTIMATED: >90 ML/MIN/1.73M2
GLUCOSE SERPL-MCNC: 89 MG/DL (ref 74–99)
HCT VFR BLD AUTO: 25.9 % (ref 36.3–47.1)
HGB BLD-MCNC: 8 G/DL (ref 11.9–15.1)
IMM GRANULOCYTES # BLD AUTO: 0.3 K/UL (ref 0–0.3)
IMM GRANULOCYTES NFR BLD: 3 %
LYMPHOCYTES NFR BLD: 1.43 K/UL (ref 1.1–3.7)
LYMPHOCYTES RELATIVE PERCENT: 13 % (ref 24–43)
MCH RBC QN AUTO: 27.6 PG (ref 25.2–33.5)
MCHC RBC AUTO-ENTMCNC: 30.9 G/DL (ref 28.4–34.8)
MCV RBC AUTO: 89.3 FL (ref 82.6–102.9)
MONOCYTES NFR BLD: 0.86 K/UL (ref 0.1–1.2)
MONOCYTES NFR BLD: 8 % (ref 3–12)
NEUTROPHILS NFR BLD: 75 % (ref 36–65)
NEUTS SEG NFR BLD: 8.37 K/UL (ref 1.5–8.1)
NRBC BLD-RTO: 0 PER 100 WBC
PLATELET # BLD AUTO: 427 K/UL (ref 138–453)
PMV BLD AUTO: 11.1 FL (ref 8.1–13.5)
POTASSIUM SERPL-SCNC: 3.4 MMOL/L (ref 3.7–5.3)
RBC # BLD AUTO: 2.9 M/UL (ref 3.95–5.11)
RBC # BLD: ABNORMAL 10*6/UL
SODIUM SERPL-SCNC: 142 MMOL/L (ref 136–145)
WBC OTHER # BLD: 11.2 K/UL (ref 3.5–11.3)

## 2024-08-08 PROCEDURE — 85025 COMPLETE CBC W/AUTO DIFF WBC: CPT

## 2024-08-08 PROCEDURE — 80048 BASIC METABOLIC PNL TOTAL CA: CPT

## 2024-08-08 PROCEDURE — 99239 HOSP IP/OBS DSCHRG MGMT >30: CPT | Performed by: STUDENT IN AN ORGANIZED HEALTH CARE EDUCATION/TRAINING PROGRAM

## 2024-08-08 PROCEDURE — 2580000003 HC RX 258: Performed by: NURSE PRACTITIONER

## 2024-08-08 PROCEDURE — 99212 OFFICE O/P EST SF 10 MIN: CPT

## 2024-08-08 PROCEDURE — 36415 COLL VENOUS BLD VENIPUNCTURE: CPT

## 2024-08-08 PROCEDURE — 2580000003 HC RX 258: Performed by: STUDENT IN AN ORGANIZED HEALTH CARE EDUCATION/TRAINING PROGRAM

## 2024-08-08 PROCEDURE — 6370000000 HC RX 637 (ALT 250 FOR IP): Performed by: STUDENT IN AN ORGANIZED HEALTH CARE EDUCATION/TRAINING PROGRAM

## 2024-08-08 PROCEDURE — 6370000000 HC RX 637 (ALT 250 FOR IP): Performed by: NURSE PRACTITIONER

## 2024-08-08 PROCEDURE — 6360000002 HC RX W HCPCS: Performed by: STUDENT IN AN ORGANIZED HEALTH CARE EDUCATION/TRAINING PROGRAM

## 2024-08-08 PROCEDURE — 6370000000 HC RX 637 (ALT 250 FOR IP)

## 2024-08-08 RX ORDER — NIFEDIPINE 30 MG/1
30 TABLET, EXTENDED RELEASE ORAL DAILY
Qty: 30 TABLET | Refills: 3 | Status: SHIPPED | OUTPATIENT
Start: 2024-08-09

## 2024-08-08 RX ORDER — POTASSIUM CHLORIDE 20 MEQ/1
40 TABLET, EXTENDED RELEASE ORAL ONCE
Status: DISCONTINUED | OUTPATIENT
Start: 2024-08-08 | End: 2024-08-08 | Stop reason: HOSPADM

## 2024-08-08 RX ORDER — OXYCODONE HYDROCHLORIDE AND ACETAMINOPHEN 5; 325 MG/1; MG/1
1 TABLET ORAL EVERY 6 HOURS PRN
Qty: 12 TABLET | Refills: 0 | Status: SHIPPED | OUTPATIENT
Start: 2024-08-08 | End: 2024-08-11

## 2024-08-08 RX ADMIN — POTASSIUM CHLORIDE 40 MEQ: 1500 TABLET, EXTENDED RELEASE ORAL at 08:24

## 2024-08-08 RX ADMIN — NIFEDIPINE 30 MG: 30 TABLET, FILM COATED, EXTENDED RELEASE ORAL at 08:31

## 2024-08-08 RX ADMIN — SODIUM CHLORIDE, PRESERVATIVE FREE 40 MG: 5 INJECTION INTRAVENOUS at 08:24

## 2024-08-08 RX ADMIN — SODIUM CHLORIDE, PRESERVATIVE FREE 10 ML: 5 INJECTION INTRAVENOUS at 08:24

## 2024-08-08 ASSESSMENT — ENCOUNTER SYMPTOMS: RESPIRATORY NEGATIVE: 1

## 2024-08-08 NOTE — PROGRESS NOTES
fascial defect noted on CT    - General surgery on board and agree that no surgical intervention is necessary at this time.  - Wound drainage bag connected to a thornton bag in place over the site of defect to catch draining fluid   - Draining serosanguinous fluid; 100 ml since placement   - Wound care consult placed   - Appreciate wound care and general surgery recommendations for packing and dressing    Hepatomegaly, concern for Cirrhosis, concern for Ascites, history of hep C   - ALT/AST on arrival 24/48   - General surgery concerned of ascites    - Internal med concerned for cirrhosis or reactive hep C   - US liver and spleen 8/5/24: 1. Hepatomegaly. 2. 1.2 cm hemangioma left lobe of liver. 3. Fluid collections over the inferior right medial aspect of the liver corresponding with a similar collection seen on the CT evaluation. 4. Cholelithiasis and sludge without evidence of acute cholecystitis. 5. Left pleural effusion.   - Patient was diagnosed with LUIS w SF at time of delivery and underwent Mag sulfate infusion. Hepatomegaly and current transaminitis likely not related to PreE at this time. Obstetrics will continue to monitor however, recommend continued workup by medicine.      cHTN (no meds) w/ LUIS w SF s/p Mag   - BP hypertensive non-severe > normotensive in the last 24h, afebrile   - LFTs ALT 24, AST 48    - PreE labs otherwise wnl   - Denies s/s of PreE   - On Procardia 30mg XL daily with BP normotensive overnight    - Will continue to monitor BP through admission      Acute Blood loss anemia    - Hgb on admission, 7.8   - Hgb 7.8>6.2>7.8>8.2>8.4   - s/p 1U pRBC   - Patient currently stable, denies any vaginal bleeding    - No current concerns for intraabdominal bleeding   - Patient denies s/s of anemia   - Will continue to monitor closely    Anxiety   - Patient complains of anxiety   - Denies sx of PPD   - Denies SI/HI   - On Atarax 10mg TID PRN     PPD#26   - Doing well, afebrile   - CBC: Hgb 8.4   -

## 2024-08-08 NOTE — PLAN OF CARE
Problem: Discharge Planning  Goal: Discharge to home or other facility with appropriate resources  Outcome: Progressing     Problem: ABCDS Injury Assessment  Goal: Absence of physical injury  Outcome: Progressing     Problem: Chronic Conditions and Co-morbidities  Goal: Patient's chronic conditions and co-morbidity symptoms are monitored and maintained or improved  Outcome: Progressing     Problem: Safety - Adult  Goal: Free from fall injury  Outcome: Progressing     Problem: Skin/Tissue Integrity  Goal: Absence of new skin breakdown  Description: 1.  Monitor for areas of redness and/or skin breakdown  2.  Assess vascular access sites hourly  3.  Every 4-6 hours minimum:  Change oxygen saturation probe site  4.  Every 4-6 hours:  If on nasal continuous positive airway pressure, respiratory therapy assess nares and determine need for appliance change or resting period.  Outcome: Progressing     Problem: Pain  Goal: Verbalizes/displays adequate comfort level or baseline comfort level  Outcome: Progressing

## 2024-08-08 NOTE — PROGRESS NOTES
Occupational Therapy    [facility]  Occupational Therapy Not Seen Note    DATE: 2024    NAME: Noelle Kim  MRN: [id number]   : 1990      Patient not seen this date for Occupational Therapy due to:    Patient independent with ADLs and functional tasks with no acute OT needs. Will defer OT evaluation at this time. Please reorder OT if future needs arise.       Electronically signed by Rebecca Guerrier OT on 2024 at 7:26 AM

## 2024-08-08 NOTE — PROGRESS NOTES
CLINICAL PHARMACY NOTE: MEDS TO BEDS    Total # of Prescriptions Filled: 2   The following medications were delivered to the patient:  Percocet  nifedipine    Additional Documentation:

## 2024-08-08 NOTE — PROGRESS NOTES
[facility] Wound Ostomy  Nurse  Follow up  Note       NAME:  Noelle Kim  MEDICAL RECORD NUMBER:  [id number]  AGE: 34 y.o.   GENDER: female  : 1990  TODAY'S DATE:  2024    Subjective   Reason for WOC Nurse Evaluation and Assessment:  \"post op dehiscence\"      Wound History:   Copied From H&P: \"34-year-old female with known medical history of recent  with complicated postop course presents to the hospital with complaint of bleeding from her incision site. Patient had surgery on  with ex lap, during  she had severe liver bed varicosities which were bleeding and ovarian vein aneurysm was noted. Due to significant intra-abdominal bleeding patient had delayed closure. She had complicated admission with multiple surgeries and exploratory laparotomy with trauma surgery. Abdominal incisions were ultimately close on \"  \"CT abdomen showed multiple loculated fluid collections with rim enhancement throughout abdominal and pelvis concern for multifocal abscesses.  Interval enlargement of large fluid collection with subcutaneous fat of abdomen measuring 25 into 18 into 5 cm, possible postop seroma.\"       Current Wound Care Treatment:    Drain placed by IR  8/6 Fistula pouch placed to low transverse incisional dehiscence for drainage collection.     8/8  Fistula pouch remains intact to low transverse incisional wound;  No output recorded yesterday; 80 ml serosanguinous fluid measured in the bag today.    She intends to be discharged today.    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    Hx Left  salpingoophorectomy  Z90.721    cHTN (no meds) O10.919    PLTCS with ex lap, survey abdomen, abthera wound vac 24 M/M WT 5#15, 6#13 Apg  and  O82    S/P  section Z98.891    Intra-abdominal bleeding R58    Pre-eclampsia affecting puerperium O14.95    Acute stress reaction F43.0    Intra-abdominal infection B99.9    Hypokalemia E87.6    Hepatomegaly R16.0    Cystitis N30.90    Sepsis (HCC) A41.9    Blood loss anemia D50.0    Abdominal wall seroma S30.1XXA    Twin birth delivered by  section in hospital Z38.31            24 1154   Wound 24 Pelvis Anterior;Mid center of transverse lower abdominal incision   Date First Assessed: 24   Present on Original Admission: Yes  Primary Wound Type: Surgical Type  Location: Pelvis  Wound Location Orientation: Anterior;Mid  Wound Description (Comments): center of transverse lower abdominal incision   Wound Etiology Surgical   Dressing Status Intact   Dressing/Treatment Fistula pouch   Drainage Description Serosanguinous        Plan   Plan of Care:   Discharging to home with follow up with [facility] in 2 weeks for drain removal  and [facility].    Fistula pouch remains intact; advised she may keep this appliance for as long as it remains intact.  She does not need to keep the straight drainage bag attached and may remove as desired.    5 Extra wound collection pouches provided with instructions for application and use.  Advised to continue with wound pouch if drainage exceeds 30 ml/day  ABD pads and tape also provided to use for drainage management once the pouches are no longer needed.          Current Diet: ADULT DIET; Regular    Discharge Plan:  Placement for patient upon discharge: home with support    Patient appropriate for Outpatient Wound Care Center: N/A      Patient/Caregiver Teaching:  Level of patient/caregiver understanding able to:   [] Indicates understanding       [] Needs

## 2024-08-08 NOTE — PROGRESS NOTES
Writer gave pt discharge instruction.  All questions answered.  Pt asked writer whether pt can take Subutex 8mg Sublingual tablet.   Dr. Alyssa Fry said to follow up pt primary doctor and pt ok to take home meds as prescribed.

## 2024-08-08 NOTE — PLAN OF CARE
Problem: Discharge Planning  Goal: Discharge to home or other facility with appropriate resources  8/8/2024 1200 by Dalton Tafoya RN  Outcome: Completed  8/8/2024 0542 by Johana Harry RN  Outcome: Progressing     Problem: ABCDS Injury Assessment  Goal: Absence of physical injury  8/8/2024 1200 by Dalton Tafoya RN  Outcome: Completed  8/8/2024 0542 by Johana Harry RN  Outcome: Progressing     Problem: Chronic Conditions and Co-morbidities  Goal: Patient's chronic conditions and co-morbidity symptoms are monitored and maintained or improved  8/8/2024 1200 by Dalton Tafoya RN  Outcome: Completed  8/8/2024 0542 by Johana Harry RN  Outcome: Progressing     Problem: Safety - Adult  Goal: Free from fall injury  8/8/2024 1200 by Dalton Tafoya RN  Outcome: Completed  8/8/2024 0542 by Johana Harry RN  Outcome: Progressing     Problem: Skin/Tissue Integrity  Goal: Absence of new skin breakdown  Description: 1.  Monitor for areas of redness and/or skin breakdown  2.  Assess vascular access sites hourly  3.  Every 4-6 hours minimum:  Change oxygen saturation probe site  4.  Every 4-6 hours:  If on nasal continuous positive airway pressure, respiratory therapy assess nares and determine need for appliance change or resting period.  8/8/2024 1200 by Dalton Tafoya RN  Outcome: Completed  8/8/2024 0542 by Johana Harry RN  Outcome: Progressing     Problem: Pain  Goal: Verbalizes/displays adequate comfort level or baseline comfort level  8/8/2024 1200 by Dalton Tafoya RN  Outcome: Completed  8/8/2024 0542 by Johana Harry RN  Outcome: Progressing

## 2024-08-08 NOTE — PROGRESS NOTES
SGEC7ODD 99.2 2024 08:48 PM    A8CZQKCV 99.2 2024 12:51 PM    FIO2 30.0 2024 08:48 PM     Lab Results   Component Value Date/Time    SPECIAL Site: Genital 07/10/2024 06:55 PM     Lab Results   Component Value Date/Time    CULTURE NO GROWTH 3 DAYS 2024 11:34 AM       Radiology:  US LIVER SPLEEN    Result Date: 2024  1. Hepatomegaly. 2. 1.2 cm hemangioma left lobe of liver. 3. Fluid collections over the inferior right medial aspect of the liver corresponding with a similar collection seen on the CT evaluation. 4. Cholelithiasis and sludge without evidence of acute cholecystitis. 5. Left pleural effusion.     CT ABSCESS DRAINAGE    Result Date: 2024  Successful CT guided placement of lower abdominal drainage catheter, as above.     CT ABDOMEN PELVIS W IV CONTRAST Additional Contrast? None    Result Date: 2024  1. Multiple loculated fluid collections with rim enhancement are seen throughout the abdomen and pelvis. Findings are concerning for multifocal abscesses. 2. Interval enlargement of a large fluid collection within the subcutaneous fat of the abdomen measuring up to 25 x 18 x 5 cm. There is partial rim enhancement of the collection.  Finding is compatible with a postoperative seroma, however, sterility cannot be determined on CT. 3. Enlarged heterogeneous uterus with evidence of prior  scar.  No obvious dehiscence of the  scar on CT. 4. Small left and trace right pleural effusion with adjacent atelectasis. 5. Cholelithiasis without evidence of acute cholecystitis. 6. Hepatomegaly. 7. Punctate nonobstructing left nephrolithiasis. 8. Mild bladder wall thickening with adjacent stranding. Correlate with urinalysis to exclude cystitis.     XR CHEST PORTABLE    Result Date: 2024  No acute abnormality.       Physical Examination:        Physical Exam  Constitutional:       General: She is not in acute distress.     Appearance: Normal appearance.   HENT:      Head:

## 2024-08-08 NOTE — PROGRESS NOTES
[facility] - Progress Note      Today's Date and Time: 2024, 8:01 AM    Impression :   Multifocal abdominal fluid collections  Seromas vs abscesses  Partial Dehiscence of surgical wound  Prior  section with bleeding complications  Bipolar 1  Hepatitis C  Hx Polysubstance use    Recommendations:   Monitor off antibiotics for the time being, at the suggestion of OB-GYN  Current data suggest seromas as opposed to abscesses  However, patient is at high risk of forming secondary abscesses or wound infection  Will need close clinical monitoring  Wound care    Medical Decision Making/Summary/Discussion:2024     Daily Elements of Decision Making provided by Consulting Physician:    Note: I have independently performed the steps listed below as part of the medical decision making and evaluation.    Review of current Problems:  Today I am seeing the patient for the following problems:  Multifocal abdominal fluid collections  Seromas vs abscesses  Partial Dehiscence of surgical wound  Prior  section with bleeding complications  Bipolar 1  Hepatitis C  Hx Polysubstance use  Evaluation of Patient:  Patient feels better  Has residual partial incisional dehiscence  Please see daily details in Interval Changes Section  Changes in physical exam:  Partial incisional dehiscence  Please see daily details in Physical Exam section and in Interval Changes Section  Changes in ROS:  Improved  Please see daily details in Review of Symptoms section and in Interval Changes Section  Discussion with Referring Physician or Service  Dr. Ortiz  Laboratory and Radiologic Studies with personal review of radiologic studies  Laboratory  Radiology  Microbiology  Culture: No growth   Please see Details in daily Interval Changes Section devoted to Lab, Micro and Radiology and in Medical Decision Laboratory, Imaging and Cultures sections.  Review of Infection Control and Prevention

## 2024-08-08 NOTE — PROGRESS NOTES
Pt got education of managing  incision site.  Pt got supplies for changing dressing.  General surgery wants to remain FRANCIS until next outpatinet appointment.

## 2024-08-09 ENCOUNTER — TELEPHONE (OUTPATIENT)
Dept: INFECTIOUS DISEASES | Age: 34
End: 2024-08-09

## 2024-08-09 LAB
MICROORGANISM SPEC CULT: NORMAL
MICROORGANISM SPEC CULT: NORMAL
SERVICE CMNT-IMP: NORMAL
SERVICE CMNT-IMP: NORMAL
SPECIMEN DESCRIPTION: NORMAL
SPECIMEN DESCRIPTION: NORMAL

## 2024-08-09 NOTE — DISCHARGE SUMMARY
tablet  oxyCODONE-acetaminophen 5-325 MG per tablet         No discharge procedures on file.    Time Spent on discharge is  34 mins in patient examination, evaluation, counseling as well as medication reconciliation, prescriptions for required medications, discharge plan and follow up.    Electronically signed by   Nanette Ortiz MD  8/9/2024  1:52 PM      Thank you Balta Ramos MD for the opportunity to be involved in this patient's care.

## 2024-08-09 NOTE — TELEPHONE ENCOUNTER
SUBJECTIVE: Seen in ICU, transferred 3/2 due to full-blown DTs , HR 170s and tongue swelling>>> required intubation     Remains intubated, vented, awake, tracking.Follows  commands. In restrains. Febrile  Daughter at  bedside    Physical Exam     Visit Vitals  /77   Pulse 84   Temp 100 °F (37.8 °C)   Resp (!) 22   Ht 5' 7\" (1.702 m)   Wt 93.4 kg (205 lb 14.6 oz)   SpO2 91%   BMI 32.25 kg/m²       General:   Adult male in no acute distress  HEENT:   Normocephalic atraumatic intubated tongue swelling   Cardio:   S1-S2, RRR  Resp:   diminished to auscultation bilaterally  Abdomen:   soft nontender positive bowel sounds  MS:    Nontender no edema.  Skin:    Warm.  No jaundice.  Neuro:   Follows  commands   Psych:   Intubated     Body mass index is 32.25 kg/m². Weight    03/04/23 0000 03/05/23 0600 03/07/23 0600 03/11/23 0600   Weight: 95 kg (209 lb 7 oz) 99 kg (218 lb 4.1 oz) 109.8 kg (242 lb 1 oz) 93.4 kg (205 lb 14.6 oz)        Labs / Imaging (Abridged)     Recent Results (from the past 24 hour(s))   GLUCOSE, BEDSIDE - POINT OF CARE    Collection Time: 03/12/23  5:30 PM   Result Value Ref Range    GLUCOSE, BEDSIDE - POINT OF CARE 178 (H) 70 - 99 mg/dL   GLUCOSE, BEDSIDE - POINT OF CARE    Collection Time: 03/12/23 11:16 PM   Result Value Ref Range    GLUCOSE, BEDSIDE - POINT OF CARE 166 (H) 70 - 99 mg/dL   BLOOD GAS, ARTERIAL WITH COOXIMETRY - RESPIRATORY    Collection Time: 03/13/23  5:19 AM   Result Value Ref Range    BASE EXCESS / DEFICIT, ARTERIAL - RESPIRATORY 7 (H) -2 - 3 mmol/L    HCO3, ARTERIAL - RESPIRATORY 30 (H) 22 - 28 mmol/L    O2 CONTENT, ARTERIAL - RESPIRATORY 17 15 - 23 %    PCO2, ARTERIAL - RESPIRATORY 39 35 - 48 mm Hg    PH, ARTERIAL - RESPIRATORY 7.50 (H) 7.35 - 7.45 Units    PO2, ARTERIAL - RESPIRATORY 77 (L) 83 - 108 mm Hg    O2 SATURATION, ARTERIAL - RESPIRATORY 97 95 - 99 %    CONDITION - RESPIRATORY ;ALLENS TEST DONE     CARBOXYHEMOGLOBIN - RESPIRATORY 1.5 (H) <1.5 %    FIO2 -  Spoke with Lara and she wants the patient put on the schedule for a hospital follow up 8/13 @1 ok to double book.  Called patient unable to leave a message VM is full    RESPIRATORY 60 %    HEMOGLOBIN - RESPIRATORY 12.5 (L) 13.0 - 17.0 g/dL    METHEMOGLOBIN - RESPIRATORY 1.1 <=1.6 %    OXYHEMOGLOBIN, ARTERIAL - RESPIRATORY 94.1 94.0 - 98.0 %    SITE - RESPIRATORY Right Radial    LACTIC ACID, ARTERIAL - RESPIRATORY    Collection Time: 03/13/23  5:19 AM   Result Value Ref Range    LACTIC ACID, ARTERIAL - RESPIRATORY 1.0 <1.6 mmol/L   POTASSIUM - RESPIRATORY    Collection Time: 03/13/23  5:19 AM   Result Value Ref Range    POTASSIUM - RESPIRATORY 5.0 3.4 - 5.1 mmol/L   CALCIUM, IONIZED - RESPIRATORY    Collection Time: 03/13/23  5:19 AM   Result Value Ref Range    CALCIUM, IONIZED - RESPIRATORY 1.28 1.15 - 1.29 mmol/L   SODIUM - RESPIRATORY    Collection Time: 03/13/23  5:19 AM   Result Value Ref Range    SODIUM - RESPIRATORY 133 (L) 135 - 145 mmol/L   CHLORIDE - RESPIRATORY    Collection Time: 03/13/23  5:19 AM   Result Value Ref Range    CHLORIDE - RESPIRATORY 101 97 - 110 mmol/L   GLUCOSE - RESPIRATORY    Collection Time: 03/13/23  5:19 AM   Result Value Ref Range    GLUCOSE - RESPIRATORY 193 (H) 65 - 99 mg/dL   HEMATOCRIT - RESPIRATORY    Collection Time: 03/13/23  5:19 AM   Result Value Ref Range    HEMATOCRIT - RESPIRATORY 38.0 (L) 39.0 - 51.0 %   Magnesium    Collection Time: 03/13/23  7:08 AM   Result Value Ref Range    Magnesium 2.5 (H) 1.7 - 2.4 mg/dL   Phosphorus    Collection Time: 03/13/23  7:08 AM   Result Value Ref Range    Phosphorus 4.8 (H) 2.4 - 4.7 mg/dL   Comprehensive Metabolic Panel    Collection Time: 03/13/23  7:08 AM   Result Value Ref Range    Fasting Status      Sodium 135 135 - 145 mmol/L    Potassium 4.9 3.4 - 5.1 mmol/L    Chloride 101 97 - 110 mmol/L    Carbon Dioxide 27 21 - 32 mmol/L    Anion Gap 12 7 - 19 mmol/L    Glucose 185 (H) 70 - 99 mg/dL    BUN 39 (H) 6 - 20 mg/dL    Creatinine 0.71 0.67 - 1.17 mg/dL    Glomerular Filtration Rate >90 >=60    BUN/Cr 55 (H) 7 - 25    Calcium 9.9 8.4 - 10.2 mg/dL    Bilirubin, Total 0.4 0.2 - 1.0 mg/dL    GOT/AST  89 (H) <=37 Units/L    GPT/ (H) <64 Units/L    Alkaline Phosphatase 157 (H) 45 - 117 Units/L    Albumin 2.5 (L) 3.6 - 5.1 g/dL    Protein, Total 8.4 (H) 6.4 - 8.2 g/dL    Globulin 5.9 (H) 2.0 - 4.0 g/dL    A/G Ratio 0.4 (L) 1.0 - 2.4   CBC with Automated Differential (performable only)    Collection Time: 03/13/23  7:08 AM   Result Value Ref Range    WBC 12.3 (H) 4.2 - 11.0 K/mcL    RBC 3.87 (L) 4.50 - 5.90 mil/mcL    HGB 12.1 (L) 13.0 - 17.0 g/dL    HCT 37.7 (L) 39.0 - 51.0 %    MCV 97.4 78.0 - 100.0 fl    MCH 31.3 26.0 - 34.0 pg    MCHC 32.1 32.0 - 36.5 g/dL    RDW-CV 14.1 11.0 - 15.0 %    RDW-SD 50.3 (H) 39.0 - 50.0 fL     140 - 450 K/mcL    NRBC 0 <=0 /100 WBC    Neutrophil, Percent 78 %    Lymphocytes, Percent 13 %    Mono, Percent 5 %    Eosinophils, Percent 2 %    Basophils, Percent 1 %    Immature Granulocytes 1 %    Absolute Neutrophils 9.7 (H) 1.8 - 7.7 K/mcL    Absolute Lymphocytes 1.6 1.0 - 4.8 K/mcL    Absolute Monocytes 0.7 0.3 - 0.9 K/mcL    Absolute Eosinophils  0.2 0.0 - 0.5 K/mcL    Absolute Basophils 0.1 0.0 - 0.3 K/mcL    Absolute Immature Granulocytes 0.1 0.0 - 0.2 K/mcL   GLUCOSE, BEDSIDE - POINT OF CARE    Collection Time: 03/13/23 11:17 AM   Result Value Ref Range    GLUCOSE, BEDSIDE - POINT OF CARE 174 (H) 70 - 99 mg/dL       Lab Results   Component Value Date    SODIUM 135 03/13/2023    POTASSIUM 4.9 03/13/2023    BUN 39 (H) 03/13/2023    CREATININE 0.71 03/13/2023    WBC 12.3 (H) 03/13/2023    HCT 37.7 (L) 03/13/2023    HGB 12.1 (L) 03/13/2023     03/13/2023    INR 1.1 03/01/2023    GLUCOSE 185 (H) 03/13/2023    MG 2.5 (H) 03/13/2023       XR CHEST AP OR PA    Result Date: 3/3/2023  EXAM: XR CHEST AP OR PA CLINICAL INDICATION: INTUBATED AND NGTUBE PLACEMENT, seizure, respiratory failure COMPARISON: No priors FINDINGS: A single frontal view of the chest is obtained.  Endotracheal tube and nasogastric tube appear normally positioned The cardiac silhouette and pulmonary  vasculature are slightly enlarged. The lungs show bibasilar and right mid lung atelectasis versus consolidation.  No significant effusions.  No pneumothorax.     1.    Normally positioned supportive devices. 2.    Cardiomegaly. 3.    Low lung volumes with bibasilar atelectasis versus consolidation. Electronically Signed by: BEBA GORDON M.D. Signed on: 3/3/2023 6:59 AM           Assessment and Plan     Aj Banuelos is a 46 year old male admitted 3/1/2023 for:  Hyperglycemia [R73.9]  Laceration of tongue, initial encounter [S01.512A]  Alcohol withdrawal seizure without complication (CMD) [F10.930, R56.9]  Atrial flutter, unspecified type (CMD) [I48.92]  Further evaluation and work up as follows:        Acute respiratory failure secondary to airway compromise.     Left lower lobe aspiration pneumonia  -continue antibx, follow cultures.     Alcohol withdraw seizure   ETOH abuse   Full blown DTs   - cont CIWA protocol, mgt per intesivist   - IVF, tele , intubated   - NG tube feeds     Laceration of tongue - repaired in ED. Continue to monitor resp status. Full blown DTs last night, tongue was extremmly swollen and pt intubated. Pain mgt.      Affluter- seen by cardiology, started on bb. Cont tele monitoring. Amiodarone not given in ED per report. On IV metoprolol, cardio signed off      Elevated LFTs due to ETOH- trend on labs. NPO, IVF.       DVT ppx:per intensivist     Tube Feeding Diet Jevity 1.5 Calorie/1.5 Calorie Formula - With Fiber; Without Diet Tray  IP CONSULT TO CARDIOLOGY  IP CONSULT TO SOCIAL WORK  IP CONSULT TO CHEMICAL DEPENDENCY    Therapy Orders:  No orders of the defined types were placed in this encounter.        __________________________________  Mary Ann Stevens DNP, APRN, FPA, FNP-C   C/o DARCIE LUKE MD  Internal Medicine Nurse Practitioner   Text via [employer] secure text message

## 2024-08-10 LAB
MICROORGANISM SPEC CULT: NORMAL
MICROORGANISM/AGENT SPEC: NORMAL
MICROORGANISM/AGENT SPEC: NORMAL
SPECIMEN DESCRIPTION: NORMAL

## 2024-08-12 NOTE — TELEPHONE ENCOUNTER
Patient is calling back to schedule an appt for 8/13 but there is already another double book in that place. Where should I schedule patient? Please advise.

## 2024-08-13 ENCOUNTER — OFFICE VISIT (OUTPATIENT)
Dept: INFECTIOUS DISEASES | Age: 34
End: 2024-08-13
Payer: MEDICAID

## 2024-08-13 VITALS
DIASTOLIC BLOOD PRESSURE: 80 MMHG | SYSTOLIC BLOOD PRESSURE: 122 MMHG | RESPIRATION RATE: 19 BRPM | WEIGHT: 150 LBS | BODY MASS INDEX: 23.54 KG/M2 | HEART RATE: 90 BPM | TEMPERATURE: 98.2 F | OXYGEN SATURATION: 96 % | HEIGHT: 67 IN

## 2024-08-13 DIAGNOSIS — B18.2 CHRONIC HEPATITIS C WITHOUT HEPATIC COMA (HCC): ICD-10-CM

## 2024-08-13 DIAGNOSIS — F31.4 BIPOLAR 1 DISORDER, DEPRESSED, SEVERE (HCC): Chronic | ICD-10-CM

## 2024-08-13 DIAGNOSIS — Z98.891 S/P CESAREAN SECTION: ICD-10-CM

## 2024-08-13 DIAGNOSIS — S30.1XXD ABDOMINAL WALL SEROMA, SUBSEQUENT ENCOUNTER: Primary | ICD-10-CM

## 2024-08-13 DIAGNOSIS — O30.041 DICHORIONIC DIAMNIOTIC TWIN PREGNANCY IN FIRST TRIMESTER: ICD-10-CM

## 2024-08-13 PROCEDURE — 99213 OFFICE O/P EST LOW 20 MIN: CPT | Performed by: INTERNAL MEDICINE

## 2024-08-13 NOTE — PROGRESS NOTES
[facility] - Office Progress Note      Today's Date and Time: 2024    Impression :   Multifocal abdominal fluid collections  Seromas vs abscesses  Partial Dehiscence of surgical wound  Prior  section with bleeding complications  Bipolar 1  Hepatitis C  Hx Polysubstance use    Recommendations:   Monitor off antibiotics for the time being, at the suggestion of OB-GYN  Current data suggest seromas as opposed to abscesses  However, patient is at high risk of forming secondary abscesses or wound infection  Will need close clinical monitoring  Wound care  Office follow up in 4 weeks     Medical Decision Making/Summary/Discussion:2024      Daily Elements of Decision Making provided by Consulting Physician:    Note: I have independently performed the steps listed below as part of the medical decision making and evaluation.    Review of current Problems:  Today I am seeing the patient for the following problems:  Multifocal abdominal fluid collections  Seromas vs abscesses  Partial Dehiscence of surgical wound  Prior  section with bleeding complications  Bipolar 1  Hepatitis C  Hx Polysubstance use  Evaluation of Patient:  Patient feels better  Has residual partial incisional dehiscence  Please see daily details in Interval Changes Section  Changes in physical exam:  Partial incisional dehiscence  Please see daily details in Physical Exam section and in Interval Changes Section  Changes in ROS:  Improved  Please see daily details in Review of Symptoms section and in Interval Changes Section  Discussion with Referring Physician or Service  Dr. Cristina  Laboratory and Radiologic Studies with personal review of radiologic studies  Laboratory  Radiology  Microbiology  Culture: No growth   Please see Details in daily Interval Changes Section devoted to Lab, Micro and Radiology and in Medical Decision Laboratory, Imaging and Cultures sections.  Review of Infection

## 2024-08-13 NOTE — PROGRESS NOTES
[facility] - Office Progress Note      Today's Date and Time: 2024    Impression :   Multifocal abdominal fluid collections  Seromas vs abscesses  Partial Dehiscence of surgical wound  Prior  section with bleeding complications  Bipolar 1  Hepatitis C  Hx Polysubstance use    Recommendations:   Monitor off antibiotics for the time being, at the suggestion of OB-GYN  Current data suggest seromas as opposed to abscesses  However, patient is at high risk of forming secondary abscesses or wound infection  Will need close clinical monitoring  Wound care    Medical Decision Making/Summary/Discussion:2024     Daily Elements of Decision Making provided by Consulting Physician:    Note: I have independently performed the steps listed below as part of the medical decision making and evaluation.    Review of current Problems:  Today I am seeing the patient for the following problems:  Multifocal abdominal fluid collections  Seromas vs abscesses  Partial Dehiscence of surgical wound  Prior  section with bleeding complications  Bipolar 1  Hepatitis C  Hx Polysubstance use  Evaluation of Patient:  Patient feels better  Has residual partial incisional dehiscence  Please see daily details in Interval Changes Section  Changes in physical exam:  Partial incisional dehiscence  Please see daily details in Physical Exam section and in Interval Changes Section  Changes in ROS:  Improved  Please see daily details in Review of Symptoms section and in Interval Changes Section  Discussion with Referring Physician or Service  Dr. Ortiz  Laboratory and Radiologic Studies with personal review of radiologic studies  Laboratory  Radiology  Microbiology  Culture: No growth   Please see Details in daily Interval Changes Section devoted to Lab, Micro and Radiology and in Medical Decision Laboratory, Imaging and Cultures sections.  Review of Infection Control and Prevention

## 2024-08-27 ENCOUNTER — OFFICE VISIT (OUTPATIENT)
Dept: SURGERY | Age: 34
End: 2024-08-27

## 2024-08-27 VITALS
HEART RATE: 69 BPM | HEIGHT: 67 IN | BODY MASS INDEX: 23.54 KG/M2 | WEIGHT: 150 LBS | SYSTOLIC BLOOD PRESSURE: 144 MMHG | DIASTOLIC BLOOD PRESSURE: 92 MMHG

## 2024-08-27 DIAGNOSIS — Z98.890 S/P EXPLORATORY LAPAROTOMY: ICD-10-CM

## 2024-08-27 DIAGNOSIS — K65.1 INTRA-ABDOMINAL ABSCESS (HCC): Primary | ICD-10-CM

## 2024-08-27 PROCEDURE — 99024 POSTOP FOLLOW-UP VISIT: CPT | Performed by: NURSE PRACTITIONER

## 2024-08-27 ASSESSMENT — ENCOUNTER SYMPTOMS
RESPIRATORY NEGATIVE: 1
GASTROINTESTINAL NEGATIVE: 1

## 2024-08-27 NOTE — PROGRESS NOTES
General Surgery   [id number] [facility], [facility] [address]  [place] [address]  [phone]  [address] [place] [address]  [phone]  [email or web address]    Clinic Note - Post-op Patient      Patient: Noelle Kim  MRN: [id number]  YOB: 1990 (34 y.o.)    Date of Office Visit: 2024     CC: Post op follow up    SUBJECTIVE:  Noelle Kim is a 34 y.o. female who is seen at the [facility] surgery clinic for post op follow up from  / re-open ex lap , re-open ex lap, hemostasis and wound vac placement  and re-open of previous laparotomy and wound vac change on . After her last office visit she presented to the ED with fevers and increased drainage from her wound. She had a CT which revealed  multiple loculated fluid collections concerning for abscesses. An abscess drain was placed in IR.     Today she reports that she still has some fatigue but overall she is doing better. She denies fevers and drainage from her incision sites. She reports only emptying her FRANCIS drain every other day and there is less than 30 ml of serous drainage.     Review of Systems:  Review of Systems   Constitutional: Negative.    Respiratory: Negative.     Cardiovascular: Negative.    Gastrointestinal: Negative.          Physical Exam:    Vitals:    24 1038   BP: (!) 144/92   Pulse: 69     Physical Exam  Constitutional:       General: She is not in acute distress.     Appearance: Normal appearance. She is not ill-appearing.   Cardiovascular:      Rate and Rhythm: Normal rate.   Pulmonary:      Effort: Pulmonary effort is normal.   Abdominal:      General: Abdomen is flat. There is no distension.      Comments: Drainage bag present from transverse  incision  LLQ FRANCIS drain with minimal amount of serous drainage  Healing incision sites   Skin:     General: Skin is cool.      Findings: No wound.   Neurological:      General: No focal deficit present.       Mental Status: She is alert and oriented to person, place, and time.       Assessment/Plan:  S/p IR abscess drain placement on 8/5  FRANCIS drain removed without difficulty, patient tolerated well  Advised to contact the office or return to the ER if she develops increased abd pain or fevers.   Advised to follow up with OB     We will order the following:  No orders of the defined types were placed in this encounter.    she is instructed to follow up prn  Discussed plan with patient and all questions answered.    SCOTT Ramos - JAY  8/27/2024

## 2024-08-28 NOTE — PROGRESS NOTES
[facility] - Office Progress Note      Today's Date and Time: 2024    Impression :   Multifocal abdominal fluid collections  Seromas vs abscesses  Partial Dehiscence of surgical wound  Prior  section with bleeding complications  Bipolar 1  Hepatitis C  Hx Polysubstance use    Recommendations:   Monitor off antibiotics for the time being, at the suggestion of OB-GYN  Current data suggest seromas as opposed to abscesses  However, patient is at high risk of forming secondary abscesses or wound infection  Will need close clinical monitoring  Wound care  Office follow up in 4 weeks     Medical Decision Making/Summary/Discussion.2024      Daily Elements of Decision Making provided by Consulting Physician:    Note: I have independently performed the steps listed below as part of the medical decision making and evaluation.    Review of current Problems:  Today I am seeing the patient for the following problems:  Multifocal abdominal fluid collections  Seromas vs abscesses  Partial Dehiscence of surgical wound  Prior  section with bleeding complications  Bipolar 1  Hepatitis C  Hx Polysubstance use  Evaluation of Patient:  Patient feels better  Has residual partial incisional dehiscence  Please see daily details in Interval Changes Section  Changes in physical exam:  Partial incisional dehiscence  Please see daily details in Physical Exam section and in Interval Changes Section  Changes in ROS:  Improved  Please see daily details in Review of Symptoms section and in Interval Changes Section  Discussion with Referring Physician or Service  Dr. Cristina  Laboratory and Radiologic Studies with personal review of radiologic studies  Laboratory  Radiology  Microbiology  Culture: No growth   Please see Details in daily Interval Changes Section devoted to Lab, Micro and Radiology and in Medical Decision Laboratory, Imaging and Cultures sections.  Review of Infection

## 2024-08-29 ENCOUNTER — OFFICE VISIT (OUTPATIENT)
Dept: INFECTIOUS DISEASES | Age: 34
End: 2024-08-29
Payer: MEDICAID

## 2024-08-29 VITALS
WEIGHT: 142 LBS | TEMPERATURE: 97 F | DIASTOLIC BLOOD PRESSURE: 75 MMHG | BODY MASS INDEX: 22.29 KG/M2 | SYSTOLIC BLOOD PRESSURE: 119 MMHG | HEART RATE: 83 BPM | HEIGHT: 67 IN

## 2024-08-29 DIAGNOSIS — B99.9 INTRA-ABDOMINAL INFECTION: Primary | ICD-10-CM

## 2024-08-29 PROCEDURE — 99213 OFFICE O/P EST LOW 20 MIN: CPT | Performed by: INTERNAL MEDICINE

## 2024-08-29 NOTE — PATIENT INSTRUCTIONS
Patient needs f/u with OB for seroma from c section and scheduled with wound care    10am on 9/30 with Dr Charito Bowser wound care 9/5 at 10:30am    Called patient to inform of both appointments, she voiced understanding. -JOSE

## 2024-08-30 ENCOUNTER — POSTPARTUM VISIT (OUTPATIENT)
Dept: OBGYN CLINIC | Age: 34
End: 2024-08-30

## 2024-08-30 VITALS
DIASTOLIC BLOOD PRESSURE: 77 MMHG | WEIGHT: 141 LBS | HEART RATE: 68 BPM | SYSTOLIC BLOOD PRESSURE: 122 MMHG | BODY MASS INDEX: 22.08 KG/M2

## 2024-08-30 DIAGNOSIS — S30.1XXD ABDOMINAL WALL SEROMA, SUBSEQUENT ENCOUNTER: ICD-10-CM

## 2024-08-30 DIAGNOSIS — Z98.891 S/P CESAREAN SECTION: ICD-10-CM

## 2024-08-30 RX ORDER — OSTOMY SUPPLY 1 3/4"
1 EACH MISCELLANEOUS DAILY
Qty: 30 EACH | Refills: 2 | Status: SHIPPED | OUTPATIENT
Start: 2024-08-30

## 2024-08-30 RX ORDER — BUPRENORPHINE AND NALOXONE 8; 2 MG/1; MG/1
FILM, SOLUBLE BUCCAL; SUBLINGUAL
COMMUNITY
Start: 2024-08-28

## 2024-08-30 NOTE — PROGRESS NOTES
OB/GYN Problem Visit    Noelle Kim  2024                       Primary Care Physician: Balta Cristina MD    CC:   Chief Complaint   Patient presents with    Postpartum Care         HPI: Noelle Kim is a 34 y.o. female     The patient was seen and examined. She is here for a wound examination. Patient has had a protracted and arduous post operative course after her  complicated by hemorrhage from vascular abnormality, wound dehiscence, and fascial defect. Currently she is following with ob/gyn, general surgery, infectious disease. She was seen in infectious disease office yesterday and the wound care nurse came in and changed her wound care drainage dressing. She recently had her FRANCIS drain pulled by general surgery ()    Patient is here today because the ostomy bag that is covering her wound broke open and she was unsure of how to change it herself. She does not have much help at home. Wound is currently healing by secondary intention. Next appointment with wound care is 24 who will continue to follow her.    Otherwise she is doing well. 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 Discussed lifting restrictions.    She states she is very hungry and is having a hard time gaining weight despite eating a lot. Discussed foods of high protein and caloric value and the importance of nutrition while she is healing.       OBSTETRICAL HISTORY:  OB History    Para Term  AB Living   3 2 2   1 3   SAB IAB Ectopic Molar Multiple Live Births     1     1 3      # Outcome Date GA Lbr Jae/2nd Weight Sex Type Anes PTL Lv   3A Term 24 37w5d / 00:27 3.09 kg (6 lb 13 oz) M CS-LTranv Gen N JAROD      Complications: Hemorrhage, Other (Comment)   3B Term 24 37w5d / 00:28 2.715 kg (5 lb 15.8 oz) M CS-LTranv Gen N JAROD      Complications: Hemorrhage, Other (Comment)   2 IAB            1 Term 19 39w6d / 00:59 3.535 kg (7 lb 12.7 oz) M

## 2024-09-03 NOTE — DISCHARGE INSTRUCTIONS
[facility] -Phone: [phone] Fax: [phone]   Visit  Discharge Instructions / Physician Orders    DATE: 9/5/2024     Home Care:      SUPPLIES ORDERED THRU:      Wound Location:      Cleanse with: Liquid antibacterial soap and water, rinse well      Dressing Orders:      Frequency:      Additional Orders: Increase protein to diet (meat, cheese, eggs, fish, peanut butter, nuts and beans)  ELEVATE LEGS AS MUCH AS POSSIBLE    Your next appointment with Wound Care Center is in 1 week     (Please note your next appointment above and if you are unable to keep, kindly give a 24 hour notice. Thank you.)  If more than 15 min late we cannot guarantee you will be seen due to clinician schedule  Per Policy, Excessive cancellation will call for dismissal from program.     If you experience any of the following, please call the Wound Care Center during business hours:  [phone]     * Increase in Pain  * Temperature over 101  * Increase in drainage from your wound  * Drainage with a foul odor  * Bleeding  * Increase in swelling  * Need for compression bandage changes due to slippage, breakthrough drainage.     If you need medical attention outside of the business hours of the Wound Care Centers please contact your PCP or go to the an urgent care or emergency department     The information contained in the After Visit Summary has been reviewed with me, the patient and/or responsible adult, by my health care provider(s). I had the opportunity to ask questions regarding this information. I have elected to receive;      []After Visit Summary  [x]Comprehensive Discharge Instruction      Patient signature______________________________________Date:________

## 2024-09-04 RX ORDER — NIFEDIPINE 30 MG/1
30 TABLET, EXTENDED RELEASE ORAL DAILY
Qty: 30 TABLET | Refills: 3 | Status: SHIPPED | OUTPATIENT
Start: 2024-09-04

## 2024-09-05 ENCOUNTER — HOSPITAL ENCOUNTER (OUTPATIENT)
Dept: WOUND CARE | Age: 34
Discharge: HOME | End: 2024-09-05

## 2024-09-08 ENCOUNTER — HOSPITAL ENCOUNTER (INPATIENT)
Age: 34
LOS: 2 days | Discharge: HOME HEALTH | DRG: 721 | End: 2024-09-11
Attending: EMERGENCY MEDICINE | Admitting: SURGERY
Payer: MEDICAID

## 2024-09-08 DIAGNOSIS — T81.89XA DRAINING POSTOPERATIVE WOUND, INITIAL ENCOUNTER: Primary | ICD-10-CM

## 2024-09-08 DIAGNOSIS — S31.109D OPEN WOUND OF ABDOMINAL WALL, SUBSEQUENT ENCOUNTER: ICD-10-CM

## 2024-09-08 DIAGNOSIS — Z51.89 VISIT FOR WOUND CHECK: ICD-10-CM

## 2024-09-08 LAB
ANION GAP SERPL CALCULATED.3IONS-SCNC: 13 MMOL/L (ref 9–16)
BASOPHILS # BLD: <0.03 K/UL (ref 0–0.2)
BASOPHILS NFR BLD: 0 % (ref 0–2)
BUN SERPL-MCNC: 15 MG/DL (ref 6–20)
CALCIUM SERPL-MCNC: 8.9 MG/DL (ref 8.6–10.4)
CHLORIDE SERPL-SCNC: 99 MMOL/L (ref 98–107)
CO2 SERPL-SCNC: 24 MMOL/L (ref 20–31)
CREAT SERPL-MCNC: 0.6 MG/DL (ref 0.5–0.9)
EOSINOPHIL # BLD: 0.2 K/UL (ref 0–0.44)
EOSINOPHILS RELATIVE PERCENT: 3 % (ref 1–4)
ERYTHROCYTE [DISTWIDTH] IN BLOOD BY AUTOMATED COUNT: 15.1 % (ref 11.8–14.4)
GFR, ESTIMATED: >90 ML/MIN/1.73M2
GLUCOSE SERPL-MCNC: 129 MG/DL (ref 74–99)
HCG SERPL QL: NEGATIVE
HCT VFR BLD AUTO: 33.8 % (ref 36.3–47.1)
HGB BLD-MCNC: 10.3 G/DL (ref 11.9–15.1)
IMM GRANULOCYTES # BLD AUTO: <0.03 K/UL (ref 0–0.3)
IMM GRANULOCYTES NFR BLD: 0 %
LYMPHOCYTES NFR BLD: 1.35 K/UL (ref 1.1–3.7)
LYMPHOCYTES RELATIVE PERCENT: 22 % (ref 24–43)
MCH RBC QN AUTO: 26.8 PG (ref 25.2–33.5)
MCHC RBC AUTO-ENTMCNC: 30.5 G/DL (ref 28.4–34.8)
MCV RBC AUTO: 87.8 FL (ref 82.6–102.9)
MONOCYTES NFR BLD: 0.37 K/UL (ref 0.1–1.2)
MONOCYTES NFR BLD: 6 % (ref 3–12)
NEUTROPHILS NFR BLD: 69 % (ref 36–65)
NEUTS SEG NFR BLD: 4.21 K/UL (ref 1.5–8.1)
NRBC BLD-RTO: 0 PER 100 WBC
PLATELET # BLD AUTO: 378 K/UL (ref 138–453)
PMV BLD AUTO: 11 FL (ref 8.1–13.5)
POTASSIUM SERPL-SCNC: 3.8 MMOL/L (ref 3.7–5.3)
RBC # BLD AUTO: 3.85 M/UL (ref 3.95–5.11)
RBC # BLD: ABNORMAL 10*6/UL
SODIUM SERPL-SCNC: 136 MMOL/L (ref 136–145)
WBC OTHER # BLD: 6.2 K/UL (ref 3.5–11.3)

## 2024-09-08 PROCEDURE — 80076 HEPATIC FUNCTION PANEL: CPT

## 2024-09-08 PROCEDURE — 85610 PROTHROMBIN TIME: CPT

## 2024-09-08 PROCEDURE — 80048 BASIC METABOLIC PNL TOTAL CA: CPT

## 2024-09-08 PROCEDURE — 85025 COMPLETE CBC W/AUTO DIFF WBC: CPT

## 2024-09-08 PROCEDURE — 84703 CHORIONIC GONADOTROPIN ASSAY: CPT

## 2024-09-08 PROCEDURE — 99285 EMERGENCY DEPT VISIT HI MDM: CPT

## 2024-09-08 PROCEDURE — 99222 1ST HOSP IP/OBS MODERATE 55: CPT | Performed by: SURGERY

## 2024-09-09 ENCOUNTER — ANESTHESIA (OUTPATIENT)
Dept: OPERATING ROOM | Age: 34
DRG: 721 | End: 2024-09-09
Payer: MEDICAID

## 2024-09-09 ENCOUNTER — ANESTHESIA EVENT (OUTPATIENT)
Dept: OPERATING ROOM | Age: 34
DRG: 721 | End: 2024-09-09
Payer: MEDICAID

## 2024-09-09 ENCOUNTER — APPOINTMENT (OUTPATIENT)
Dept: CT IMAGING | Age: 34
DRG: 721 | End: 2024-09-09
Payer: MEDICAID

## 2024-09-09 PROBLEM — T81.49XA SURGICAL SITE INFECTION: Status: ACTIVE | Noted: 2024-09-09

## 2024-09-09 PROBLEM — T81.89XA DRAINING POSTOPERATIVE WOUND: Status: ACTIVE | Noted: 2024-09-09

## 2024-09-09 LAB
ABO + RH BLD: NORMAL
ALBUMIN SERPL-MCNC: 4.2 G/DL (ref 3.5–5.2)
ALBUMIN/GLOB SERPL: 1 {RATIO} (ref 1–2.5)
ALP SERPL-CCNC: 118 U/L (ref 35–104)
ALT SERPL-CCNC: 26 U/L (ref 10–35)
ARM BAND NUMBER: NORMAL
AST SERPL-CCNC: 58 U/L (ref 10–35)
BILIRUB DIRECT SERPL-MCNC: 0.2 MG/DL (ref 0–0.2)
BILIRUB INDIRECT SERPL-MCNC: 0.2 MG/DL (ref 0–1)
BILIRUB SERPL-MCNC: 0.4 MG/DL (ref 0–1.2)
BLOOD BANK SAMPLE EXPIRATION: NORMAL
BLOOD GROUP ANTIBODIES SERPL: NEGATIVE
GLOBULIN SER CALC-MCNC: 4.1 G/DL
INR PPP: 1
PROT SERPL-MCNC: 8.3 G/DL (ref 6.6–8.7)
PROTHROMBIN TIME: 13.1 SEC (ref 11.7–14.9)

## 2024-09-09 PROCEDURE — 87070 CULTURE OTHR SPECIMN AEROBIC: CPT

## 2024-09-09 PROCEDURE — 6360000002 HC RX W HCPCS: Performed by: SURGERY

## 2024-09-09 PROCEDURE — 2500000003 HC RX 250 WO HCPCS

## 2024-09-09 PROCEDURE — 2580000003 HC RX 258: Performed by: SURGERY

## 2024-09-09 PROCEDURE — 6370000000 HC RX 637 (ALT 250 FOR IP)

## 2024-09-09 PROCEDURE — 2709999900 HC NON-CHARGEABLE SUPPLY: Performed by: SURGERY

## 2024-09-09 PROCEDURE — 0J9800Z DRAINAGE OF ABDOMEN SUBCUTANEOUS TISSUE AND FASCIA WITH DRAINAGE DEVICE, OPEN APPROACH: ICD-10-PCS | Performed by: SURGERY

## 2024-09-09 PROCEDURE — 87075 CULTR BACTERIA EXCEPT BLOOD: CPT

## 2024-09-09 PROCEDURE — 6360000004 HC RX CONTRAST MEDICATION

## 2024-09-09 PROCEDURE — 86900 BLOOD TYPING SEROLOGIC ABO: CPT

## 2024-09-09 PROCEDURE — 6360000002 HC RX W HCPCS

## 2024-09-09 PROCEDURE — 87205 SMEAR GRAM STAIN: CPT

## 2024-09-09 PROCEDURE — 74177 CT ABD & PELVIS W/CONTRAST: CPT

## 2024-09-09 PROCEDURE — 87181 SC STD AGAR DILUTION PER AGT: CPT

## 2024-09-09 PROCEDURE — 3700000000 HC ANESTHESIA ATTENDED CARE: Performed by: SURGERY

## 2024-09-09 PROCEDURE — 3700000001 HC ADD 15 MINUTES (ANESTHESIA): Performed by: SURGERY

## 2024-09-09 PROCEDURE — 3600000014 HC SURGERY LEVEL 4 ADDTL 15MIN: Performed by: SURGERY

## 2024-09-09 PROCEDURE — 3600000004 HC SURGERY LEVEL 4 BASE: Performed by: SURGERY

## 2024-09-09 PROCEDURE — 7100000001 HC PACU RECOVERY - ADDTL 15 MIN: Performed by: SURGERY

## 2024-09-09 PROCEDURE — 2580000003 HC RX 258

## 2024-09-09 PROCEDURE — 86850 RBC ANTIBODY SCREEN: CPT

## 2024-09-09 PROCEDURE — 87184 SC STD DISK METHOD PER PLATE: CPT

## 2024-09-09 PROCEDURE — 1200000000 HC SEMI PRIVATE

## 2024-09-09 PROCEDURE — 86901 BLOOD TYPING SEROLOGIC RH(D): CPT

## 2024-09-09 PROCEDURE — 87186 SC STD MICRODIL/AGAR DIL: CPT

## 2024-09-09 PROCEDURE — 7100000000 HC PACU RECOVERY - FIRST 15 MIN: Performed by: SURGERY

## 2024-09-09 PROCEDURE — 87077 CULTURE AEROBIC IDENTIFY: CPT

## 2024-09-09 RX ORDER — MAGNESIUM HYDROXIDE 1200 MG/15ML
LIQUID ORAL CONTINUOUS PRN
Status: DISCONTINUED | OUTPATIENT
Start: 2024-09-09 | End: 2024-09-09 | Stop reason: HOSPADM

## 2024-09-09 RX ORDER — LIDOCAINE HYDROCHLORIDE 10 MG/ML
INJECTION, SOLUTION EPIDURAL; INFILTRATION; INTRACAUDAL; PERINEURAL PRN
Status: DISCONTINUED | OUTPATIENT
Start: 2024-09-09 | End: 2024-09-09 | Stop reason: SDUPTHER

## 2024-09-09 RX ORDER — SODIUM CHLORIDE 0.9 % (FLUSH) 0.9 %
5-40 SYRINGE (ML) INJECTION PRN
Status: DISCONTINUED | OUTPATIENT
Start: 2024-09-09 | End: 2024-09-11 | Stop reason: HOSPADM

## 2024-09-09 RX ORDER — SODIUM CHLORIDE, SODIUM LACTATE, POTASSIUM CHLORIDE, CALCIUM CHLORIDE 600; 310; 30; 20 MG/100ML; MG/100ML; MG/100ML; MG/100ML
INJECTION, SOLUTION INTRAVENOUS CONTINUOUS
Status: ACTIVE | OUTPATIENT
Start: 2024-09-09 | End: 2024-09-09

## 2024-09-09 RX ORDER — GABAPENTIN 300 MG/1
300 CAPSULE ORAL EVERY 8 HOURS
Status: DISCONTINUED | OUTPATIENT
Start: 2024-09-09 | End: 2024-09-11 | Stop reason: HOSPADM

## 2024-09-09 RX ORDER — METHOCARBAMOL 750 MG/1
750 TABLET, FILM COATED ORAL EVERY 6 HOURS
Status: DISCONTINUED | OUTPATIENT
Start: 2024-09-09 | End: 2024-09-11 | Stop reason: HOSPADM

## 2024-09-09 RX ORDER — NIFEDIPINE 30 MG/1
30 TABLET, EXTENDED RELEASE ORAL DAILY
Status: DISCONTINUED | OUTPATIENT
Start: 2024-09-09 | End: 2024-09-11 | Stop reason: HOSPADM

## 2024-09-09 RX ORDER — SODIUM CHLORIDE 0.9 % (FLUSH) 0.9 %
5-40 SYRINGE (ML) INJECTION EVERY 12 HOURS SCHEDULED
Status: DISCONTINUED | OUTPATIENT
Start: 2024-09-09 | End: 2024-09-11 | Stop reason: HOSPADM

## 2024-09-09 RX ORDER — METOCLOPRAMIDE HYDROCHLORIDE 5 MG/ML
10 INJECTION INTRAMUSCULAR; INTRAVENOUS
Status: ACTIVE | OUTPATIENT
Start: 2024-09-09 | End: 2024-09-10

## 2024-09-09 RX ORDER — PANTOPRAZOLE SODIUM 40 MG/1
40 TABLET, DELAYED RELEASE ORAL
Status: DISCONTINUED | OUTPATIENT
Start: 2024-09-09 | End: 2024-09-11 | Stop reason: HOSPADM

## 2024-09-09 RX ORDER — HYDROXYZINE HYDROCHLORIDE 25 MG/1
25 TABLET, FILM COATED ORAL EVERY 8 HOURS PRN
Status: DISCONTINUED | OUTPATIENT
Start: 2024-09-09 | End: 2024-09-11 | Stop reason: HOSPADM

## 2024-09-09 RX ORDER — PROCHLORPERAZINE EDISYLATE 5 MG/ML
5 INJECTION INTRAMUSCULAR; INTRAVENOUS
Status: ACTIVE | OUTPATIENT
Start: 2024-09-09 | End: 2024-09-10

## 2024-09-09 RX ORDER — ONDANSETRON 2 MG/ML
INJECTION INTRAMUSCULAR; INTRAVENOUS PRN
Status: DISCONTINUED | OUTPATIENT
Start: 2024-09-09 | End: 2024-09-09 | Stop reason: SDUPTHER

## 2024-09-09 RX ORDER — DIPHENHYDRAMINE HYDROCHLORIDE 50 MG/ML
INJECTION INTRAMUSCULAR; INTRAVENOUS PRN
Status: DISCONTINUED | OUTPATIENT
Start: 2024-09-09 | End: 2024-09-09 | Stop reason: SDUPTHER

## 2024-09-09 RX ORDER — SODIUM CHLORIDE, SODIUM LACTATE, POTASSIUM CHLORIDE, CALCIUM CHLORIDE 600; 310; 30; 20 MG/100ML; MG/100ML; MG/100ML; MG/100ML
INJECTION, SOLUTION INTRAVENOUS CONTINUOUS
Status: DISCONTINUED | OUTPATIENT
Start: 2024-09-09 | End: 2024-09-09

## 2024-09-09 RX ORDER — LABETALOL HYDROCHLORIDE 5 MG/ML
10 INJECTION, SOLUTION INTRAVENOUS
Status: DISCONTINUED | OUTPATIENT
Start: 2024-09-09 | End: 2024-09-11 | Stop reason: HOSPADM

## 2024-09-09 RX ORDER — SODIUM CHLORIDE 9 MG/ML
INJECTION, SOLUTION INTRAVENOUS CONTINUOUS PRN
Status: DISCONTINUED | OUTPATIENT
Start: 2024-09-09 | End: 2024-09-09 | Stop reason: SDUPTHER

## 2024-09-09 RX ORDER — POLYETHYLENE GLYCOL 3350 17 G/17G
17 POWDER, FOR SOLUTION ORAL DAILY
Status: DISCONTINUED | OUTPATIENT
Start: 2024-09-09 | End: 2024-09-11 | Stop reason: HOSPADM

## 2024-09-09 RX ORDER — SODIUM CHLORIDE 9 MG/ML
INJECTION, SOLUTION INTRAVENOUS PRN
Status: DISCONTINUED | OUTPATIENT
Start: 2024-09-09 | End: 2024-09-11 | Stop reason: HOSPADM

## 2024-09-09 RX ORDER — IOPAMIDOL 755 MG/ML
75 INJECTION, SOLUTION INTRAVASCULAR
Status: COMPLETED | OUTPATIENT
Start: 2024-09-09 | End: 2024-09-09

## 2024-09-09 RX ORDER — ACETAMINOPHEN 500 MG
1000 TABLET ORAL EVERY 8 HOURS
Status: DISCONTINUED | OUTPATIENT
Start: 2024-09-09 | End: 2024-09-11 | Stop reason: HOSPADM

## 2024-09-09 RX ORDER — DEXAMETHASONE SODIUM PHOSPHATE 10 MG/ML
INJECTION, SOLUTION INTRAMUSCULAR; INTRAVENOUS PRN
Status: DISCONTINUED | OUTPATIENT
Start: 2024-09-09 | End: 2024-09-09 | Stop reason: SDUPTHER

## 2024-09-09 RX ORDER — ONDANSETRON 2 MG/ML
4 INJECTION INTRAMUSCULAR; INTRAVENOUS EVERY 6 HOURS PRN
Status: DISCONTINUED | OUTPATIENT
Start: 2024-09-09 | End: 2024-09-11 | Stop reason: HOSPADM

## 2024-09-09 RX ORDER — OXYCODONE HYDROCHLORIDE 5 MG/1
5 TABLET ORAL
Status: ACTIVE | OUTPATIENT
Start: 2024-09-09 | End: 2024-09-10

## 2024-09-09 RX ORDER — FENTANYL CITRATE 50 UG/ML
INJECTION, SOLUTION INTRAMUSCULAR; INTRAVENOUS PRN
Status: DISCONTINUED | OUTPATIENT
Start: 2024-09-09 | End: 2024-09-09 | Stop reason: SDUPTHER

## 2024-09-09 RX ORDER — ENOXAPARIN SODIUM 100 MG/ML
40 INJECTION SUBCUTANEOUS DAILY
Status: DISCONTINUED | OUTPATIENT
Start: 2024-09-10 | End: 2024-09-11 | Stop reason: HOSPADM

## 2024-09-09 RX ORDER — HYDRALAZINE HYDROCHLORIDE 20 MG/ML
10 INJECTION INTRAMUSCULAR; INTRAVENOUS
Status: DISCONTINUED | OUTPATIENT
Start: 2024-09-09 | End: 2024-09-11 | Stop reason: HOSPADM

## 2024-09-09 RX ORDER — PIPERACILLIN SODIUM, TAZOBACTAM SODIUM 3; .375 G/15ML; G/15ML
INJECTION, POWDER, LYOPHILIZED, FOR SOLUTION INTRAVENOUS PRN
Status: DISCONTINUED | OUTPATIENT
Start: 2024-09-09 | End: 2024-09-09 | Stop reason: SDUPTHER

## 2024-09-09 RX ORDER — IBUPROFEN 400 MG/1
400 TABLET, FILM COATED ORAL EVERY 6 HOURS
Status: DISCONTINUED | OUTPATIENT
Start: 2024-09-09 | End: 2024-09-11 | Stop reason: HOSPADM

## 2024-09-09 RX ORDER — OXYCODONE HYDROCHLORIDE 5 MG/1
5 TABLET ORAL EVERY 6 HOURS PRN
Status: DISCONTINUED | OUTPATIENT
Start: 2024-09-09 | End: 2024-09-11 | Stop reason: HOSPADM

## 2024-09-09 RX ORDER — NALOXONE HYDROCHLORIDE 0.4 MG/ML
INJECTION, SOLUTION INTRAMUSCULAR; INTRAVENOUS; SUBCUTANEOUS PRN
Status: DISCONTINUED | OUTPATIENT
Start: 2024-09-09 | End: 2024-09-11 | Stop reason: HOSPADM

## 2024-09-09 RX ORDER — IPRATROPIUM BROMIDE AND ALBUTEROL SULFATE 2.5; .5 MG/3ML; MG/3ML
1 SOLUTION RESPIRATORY (INHALATION)
Status: DISPENSED | OUTPATIENT
Start: 2024-09-09 | End: 2024-09-10

## 2024-09-09 RX ORDER — OXYCODONE HYDROCHLORIDE 5 MG/1
5 TABLET ORAL EVERY 4 HOURS PRN
Status: CANCELLED | OUTPATIENT
Start: 2024-09-09

## 2024-09-09 RX ORDER — MIDAZOLAM HYDROCHLORIDE 1 MG/ML
INJECTION INTRAMUSCULAR; INTRAVENOUS PRN
Status: DISCONTINUED | OUTPATIENT
Start: 2024-09-09 | End: 2024-09-09 | Stop reason: SDUPTHER

## 2024-09-09 RX ORDER — PROPOFOL 10 MG/ML
INJECTION, EMULSION INTRAVENOUS PRN
Status: DISCONTINUED | OUTPATIENT
Start: 2024-09-09 | End: 2024-09-09 | Stop reason: SDUPTHER

## 2024-09-09 RX ORDER — ONDANSETRON 4 MG/1
4 TABLET, ORALLY DISINTEGRATING ORAL EVERY 8 HOURS PRN
Status: DISCONTINUED | OUTPATIENT
Start: 2024-09-09 | End: 2024-09-11 | Stop reason: HOSPADM

## 2024-09-09 RX ORDER — ROCURONIUM BROMIDE 10 MG/ML
INJECTION, SOLUTION INTRAVENOUS PRN
Status: DISCONTINUED | OUTPATIENT
Start: 2024-09-09 | End: 2024-09-09 | Stop reason: SDUPTHER

## 2024-09-09 RX ADMIN — SODIUM CHLORIDE 1250 MG: 9 INJECTION, SOLUTION INTRAVENOUS at 17:41

## 2024-09-09 RX ADMIN — SODIUM CHLORIDE, POTASSIUM CHLORIDE, SODIUM LACTATE AND CALCIUM CHLORIDE: 600; 310; 30; 20 INJECTION, SOLUTION INTRAVENOUS at 11:34

## 2024-09-09 RX ADMIN — SODIUM CHLORIDE, POTASSIUM CHLORIDE, SODIUM LACTATE AND CALCIUM CHLORIDE: 600; 310; 30; 20 INJECTION, SOLUTION INTRAVENOUS at 04:16

## 2024-09-09 RX ADMIN — PANTOPRAZOLE SODIUM 40 MG: 40 TABLET, DELAYED RELEASE ORAL at 07:42

## 2024-09-09 RX ADMIN — METHOCARBAMOL 750 MG: 750 TABLET ORAL at 21:04

## 2024-09-09 RX ADMIN — MIDAZOLAM 2 MG: 1 INJECTION INTRAMUSCULAR; INTRAVENOUS at 09:35

## 2024-09-09 RX ADMIN — PROPOFOL 180 MG: 10 INJECTION, EMULSION INTRAVENOUS at 09:40

## 2024-09-09 RX ADMIN — DIPHENHYDRAMINE HYDROCHLORIDE 12.5 MG: 50 INJECTION INTRAMUSCULAR; INTRAVENOUS at 10:00

## 2024-09-09 RX ADMIN — METHOCARBAMOL 750 MG: 750 TABLET ORAL at 16:46

## 2024-09-09 RX ADMIN — SODIUM CHLORIDE: 9 INJECTION, SOLUTION INTRAVENOUS at 09:33

## 2024-09-09 RX ADMIN — METHOCARBAMOL 750 MG: 750 TABLET ORAL at 07:45

## 2024-09-09 RX ADMIN — FENTANYL CITRATE 25 MCG: 50 INJECTION, SOLUTION INTRAMUSCULAR; INTRAVENOUS at 11:02

## 2024-09-09 RX ADMIN — PIPERACILLIN AND TAZOBACTAM 3.38 G: 3; .375 INJECTION, POWDER, LYOPHILIZED, FOR SOLUTION INTRAVENOUS at 10:23

## 2024-09-09 RX ADMIN — NIFEDIPINE 30 MG: 30 TABLET, FILM COATED, EXTENDED RELEASE ORAL at 07:54

## 2024-09-09 RX ADMIN — FENTANYL CITRATE 100 MCG: 50 INJECTION, SOLUTION INTRAMUSCULAR; INTRAVENOUS at 09:40

## 2024-09-09 RX ADMIN — PIPERACILLIN AND TAZOBACTAM 3375 MG: 3; .375 INJECTION, POWDER, LYOPHILIZED, FOR SOLUTION INTRAVENOUS at 05:01

## 2024-09-09 RX ADMIN — GABAPENTIN 300 MG: 300 CAPSULE ORAL at 21:04

## 2024-09-09 RX ADMIN — SODIUM CHLORIDE, PRESERVATIVE FREE 10 ML: 5 INJECTION INTRAVENOUS at 21:06

## 2024-09-09 RX ADMIN — ONDANSETRON 4 MG: 2 INJECTION INTRAMUSCULAR; INTRAVENOUS at 10:39

## 2024-09-09 RX ADMIN — ACETAMINOPHEN 1000 MG: 500 TABLET ORAL at 21:03

## 2024-09-09 RX ADMIN — IBUPROFEN 400 MG: 400 TABLET, FILM COATED ORAL at 16:46

## 2024-09-09 RX ADMIN — LIDOCAINE HYDROCHLORIDE 50 MG: 10 INJECTION, SOLUTION EPIDURAL; INFILTRATION; INTRACAUDAL; PERINEURAL at 09:40

## 2024-09-09 RX ADMIN — DEXAMETHASONE SODIUM PHOSPHATE 10 MG: 10 INJECTION INTRAMUSCULAR; INTRAVENOUS at 10:00

## 2024-09-09 RX ADMIN — IOPAMIDOL 75 ML: 755 INJECTION, SOLUTION INTRAVENOUS at 00:30

## 2024-09-09 RX ADMIN — SUGAMMADEX 200 MG: 100 INJECTION, SOLUTION INTRAVENOUS at 10:46

## 2024-09-09 RX ADMIN — IBUPROFEN 400 MG: 400 TABLET, FILM COATED ORAL at 07:42

## 2024-09-09 RX ADMIN — BENZOCAINE AND MENTHOL 5 LOZENGE: 15; 3.6 LOZENGE ORAL at 22:59

## 2024-09-09 RX ADMIN — IBUPROFEN 400 MG: 400 TABLET, FILM COATED ORAL at 21:04

## 2024-09-09 RX ADMIN — ROCURONIUM BROMIDE 50 MG: 10 INJECTION, SOLUTION INTRAVENOUS at 09:40

## 2024-09-09 ASSESSMENT — PAIN SCALES - GENERAL
PAINLEVEL_OUTOF10: 7
PAINLEVEL_OUTOF10: 7
PAINLEVEL_OUTOF10: 2
PAINLEVEL_OUTOF10: 3

## 2024-09-09 ASSESSMENT — PAIN - FUNCTIONAL ASSESSMENT: PAIN_FUNCTIONAL_ASSESSMENT: NONE - DENIES PAIN

## 2024-09-09 ASSESSMENT — PAIN DESCRIPTION - LOCATION
LOCATION: THROAT
LOCATION: ABDOMEN

## 2024-09-09 ASSESSMENT — PAIN DESCRIPTION - DESCRIPTORS
DESCRIPTORS: ACHING;DISCOMFORT
DESCRIPTORS: SORE

## 2024-09-09 ASSESSMENT — PAIN DESCRIPTION - PAIN TYPE: TYPE: SURGICAL PAIN;ACUTE PAIN

## 2024-09-10 PROCEDURE — 6370000000 HC RX 637 (ALT 250 FOR IP)

## 2024-09-10 PROCEDURE — 6360000002 HC RX W HCPCS: Performed by: STUDENT IN AN ORGANIZED HEALTH CARE EDUCATION/TRAINING PROGRAM

## 2024-09-10 PROCEDURE — 6360000002 HC RX W HCPCS

## 2024-09-10 PROCEDURE — 1200000000 HC SEMI PRIVATE

## 2024-09-10 PROCEDURE — 2580000003 HC RX 258

## 2024-09-10 PROCEDURE — 99024 POSTOP FOLLOW-UP VISIT: CPT

## 2024-09-10 RX ADMIN — PANTOPRAZOLE SODIUM 40 MG: 40 TABLET, DELAYED RELEASE ORAL at 05:05

## 2024-09-10 RX ADMIN — GABAPENTIN 300 MG: 300 CAPSULE ORAL at 04:48

## 2024-09-10 RX ADMIN — PIPERACILLIN AND TAZOBACTAM 3375 MG: 3; .375 INJECTION, POWDER, LYOPHILIZED, FOR SOLUTION INTRAVENOUS at 20:53

## 2024-09-10 RX ADMIN — METHOCARBAMOL 750 MG: 750 TABLET ORAL at 04:48

## 2024-09-10 RX ADMIN — GABAPENTIN 300 MG: 300 CAPSULE ORAL at 11:30

## 2024-09-10 RX ADMIN — ENOXAPARIN SODIUM 40 MG: 100 INJECTION SUBCUTANEOUS at 11:31

## 2024-09-10 RX ADMIN — METHOCARBAMOL 750 MG: 750 TABLET ORAL at 20:54

## 2024-09-10 RX ADMIN — PIPERACILLIN AND TAZOBACTAM 4500 MG: 4; .5 INJECTION, POWDER, LYOPHILIZED, FOR SOLUTION INTRAVENOUS at 14:56

## 2024-09-10 RX ADMIN — METHOCARBAMOL 750 MG: 750 TABLET ORAL at 14:57

## 2024-09-10 RX ADMIN — NIFEDIPINE 30 MG: 30 TABLET, FILM COATED, EXTENDED RELEASE ORAL at 11:31

## 2024-09-10 RX ADMIN — SODIUM CHLORIDE, PRESERVATIVE FREE 10 ML: 5 INJECTION INTRAVENOUS at 11:31

## 2024-09-10 RX ADMIN — ACETAMINOPHEN 1000 MG: 500 TABLET ORAL at 04:48

## 2024-09-10 RX ADMIN — GABAPENTIN 300 MG: 300 CAPSULE ORAL at 20:54

## 2024-09-10 RX ADMIN — ACETAMINOPHEN 1000 MG: 500 TABLET ORAL at 20:54

## 2024-09-10 RX ADMIN — ACETAMINOPHEN 1000 MG: 500 TABLET ORAL at 11:31

## 2024-09-10 RX ADMIN — IBUPROFEN 400 MG: 400 TABLET, FILM COATED ORAL at 20:54

## 2024-09-10 RX ADMIN — IBUPROFEN 400 MG: 400 TABLET, FILM COATED ORAL at 14:57

## 2024-09-10 RX ADMIN — IBUPROFEN 400 MG: 400 TABLET, FILM COATED ORAL at 04:48

## 2024-09-10 RX ADMIN — POLYETHYLENE GLYCOL 3350 17 G: 17 POWDER, FOR SOLUTION ORAL at 11:31

## 2024-09-10 ASSESSMENT — PAIN SCALES - GENERAL
PAINLEVEL_OUTOF10: 3
PAINLEVEL_OUTOF10: 0
PAINLEVEL_OUTOF10: 3
PAINLEVEL_OUTOF10: 4
PAINLEVEL_OUTOF10: 0

## 2024-09-10 ASSESSMENT — PAIN DESCRIPTION - DESCRIPTORS: DESCRIPTORS: ACHING;SORE

## 2024-09-10 ASSESSMENT — PAIN DESCRIPTION - LOCATION: LOCATION: ABDOMEN

## 2024-09-11 VITALS
HEIGHT: 67 IN | HEART RATE: 68 BPM | WEIGHT: 141 LBS | RESPIRATION RATE: 18 BRPM | TEMPERATURE: 98.2 F | SYSTOLIC BLOOD PRESSURE: 132 MMHG | DIASTOLIC BLOOD PRESSURE: 92 MMHG | OXYGEN SATURATION: 100 % | BODY MASS INDEX: 22.13 KG/M2

## 2024-09-11 LAB
ANION GAP SERPL CALCULATED.3IONS-SCNC: 11 MMOL/L (ref 9–16)
BASOPHILS # BLD: 0 K/UL (ref 0–0.2)
BASOPHILS NFR BLD: 0 % (ref 0–2)
BUN SERPL-MCNC: 11 MG/DL (ref 6–20)
CALCIUM SERPL-MCNC: 8.6 MG/DL (ref 8.6–10.4)
CHLORIDE SERPL-SCNC: 105 MMOL/L (ref 98–107)
CO2 SERPL-SCNC: 25 MMOL/L (ref 20–31)
CREAT SERPL-MCNC: 0.7 MG/DL (ref 0.5–0.9)
EOSINOPHIL # BLD: 0.54 K/UL (ref 0–0.4)
EOSINOPHILS RELATIVE PERCENT: 9 % (ref 1–4)
ERYTHROCYTE [DISTWIDTH] IN BLOOD BY AUTOMATED COUNT: 15.2 % (ref 11.8–14.4)
GFR, ESTIMATED: >90 ML/MIN/1.73M2
GLUCOSE SERPL-MCNC: 89 MG/DL (ref 74–99)
HCT VFR BLD AUTO: 36.4 % (ref 36.3–47.1)
HGB BLD-MCNC: 10.3 G/DL (ref 11.9–15.1)
IMM GRANULOCYTES # BLD AUTO: 0.06 K/UL (ref 0–0.3)
IMM GRANULOCYTES NFR BLD: 1 %
LYMPHOCYTES NFR BLD: 1.98 K/UL (ref 1–4.8)
LYMPHOCYTES RELATIVE PERCENT: 33 % (ref 24–44)
MCH RBC QN AUTO: 26.1 PG (ref 25.2–33.5)
MCHC RBC AUTO-ENTMCNC: 28.3 G/DL (ref 28.4–34.8)
MCV RBC AUTO: 92.4 FL (ref 82.6–102.9)
MICROORGANISM SPEC CULT: ABNORMAL
MICROORGANISM/AGENT SPEC: ABNORMAL
MONOCYTES NFR BLD: 0.24 K/UL (ref 0.1–0.8)
MONOCYTES NFR BLD: 4 % (ref 1–7)
MORPHOLOGY: ABNORMAL
MORPHOLOGY: ABNORMAL
NEUTROPHILS NFR BLD: 53 % (ref 36–66)
NEUTS SEG NFR BLD: 3.18 K/UL (ref 1.8–7.7)
NRBC BLD-RTO: 0 PER 100 WBC
PLATELET # BLD AUTO: 360 K/UL (ref 138–453)
PMV BLD AUTO: 10.6 FL (ref 8.1–13.5)
POTASSIUM SERPL-SCNC: 3.5 MMOL/L (ref 3.7–5.3)
RBC # BLD AUTO: 3.94 M/UL (ref 3.95–5.11)
SODIUM SERPL-SCNC: 141 MMOL/L (ref 136–145)
SPECIMEN DESCRIPTION: ABNORMAL
WBC OTHER # BLD: 6 K/UL (ref 3.5–11.3)

## 2024-09-11 PROCEDURE — 80048 BASIC METABOLIC PNL TOTAL CA: CPT

## 2024-09-11 PROCEDURE — 85025 COMPLETE CBC W/AUTO DIFF WBC: CPT

## 2024-09-11 PROCEDURE — 6360000002 HC RX W HCPCS

## 2024-09-11 PROCEDURE — 36415 COLL VENOUS BLD VENIPUNCTURE: CPT

## 2024-09-11 PROCEDURE — 2580000003 HC RX 258

## 2024-09-11 PROCEDURE — 99024 POSTOP FOLLOW-UP VISIT: CPT | Performed by: SURGERY

## 2024-09-11 PROCEDURE — 6370000000 HC RX 637 (ALT 250 FOR IP)

## 2024-09-11 PROCEDURE — 6360000002 HC RX W HCPCS: Performed by: STUDENT IN AN ORGANIZED HEALTH CARE EDUCATION/TRAINING PROGRAM

## 2024-09-11 RX ORDER — POLYETHYLENE GLYCOL 3350 17 G/17G
17 POWDER, FOR SOLUTION ORAL DAILY PRN
Qty: 30 PACKET | Refills: 0 | Status: SHIPPED | OUTPATIENT
Start: 2024-09-11 | End: 2024-10-11

## 2024-09-11 RX ORDER — GABAPENTIN 300 MG/1
300 CAPSULE ORAL 3 TIMES DAILY
Qty: 42 CAPSULE | Refills: 0 | Status: SHIPPED | OUTPATIENT
Start: 2024-09-11 | End: 2024-09-25

## 2024-09-11 RX ORDER — IBUPROFEN 400 MG/1
400 TABLET, FILM COATED ORAL EVERY 8 HOURS PRN
Qty: 21 TABLET | Refills: 0 | Status: SHIPPED | OUTPATIENT
Start: 2024-09-11 | End: 2024-09-18

## 2024-09-11 RX ORDER — ACETAMINOPHEN 325 MG/1
650 TABLET ORAL EVERY 6 HOURS PRN
Qty: 56 TABLET | Refills: 0 | Status: SHIPPED | OUTPATIENT
Start: 2024-09-11 | End: 2024-09-18

## 2024-09-11 RX ORDER — METHOCARBAMOL 750 MG/1
750 TABLET, FILM COATED ORAL EVERY 6 HOURS
Qty: 40 TABLET | Refills: 0 | Status: SHIPPED | OUTPATIENT
Start: 2024-09-11 | End: 2024-09-21

## 2024-09-11 RX ADMIN — METHOCARBAMOL 750 MG: 750 TABLET ORAL at 16:19

## 2024-09-11 RX ADMIN — PIPERACILLIN AND TAZOBACTAM 3375 MG: 3; .375 INJECTION, POWDER, LYOPHILIZED, FOR SOLUTION INTRAVENOUS at 13:00

## 2024-09-11 RX ADMIN — ENOXAPARIN SODIUM 40 MG: 100 INJECTION SUBCUTANEOUS at 08:52

## 2024-09-11 RX ADMIN — PIPERACILLIN AND TAZOBACTAM 3375 MG: 3; .375 INJECTION, POWDER, LYOPHILIZED, FOR SOLUTION INTRAVENOUS at 03:01

## 2024-09-11 RX ADMIN — SODIUM CHLORIDE, PRESERVATIVE FREE 10 ML: 5 INJECTION INTRAVENOUS at 08:53

## 2024-09-11 RX ADMIN — IBUPROFEN 400 MG: 400 TABLET, FILM COATED ORAL at 16:19

## 2024-09-11 RX ADMIN — ACETAMINOPHEN 1000 MG: 500 TABLET ORAL at 12:58

## 2024-09-11 RX ADMIN — METHOCARBAMOL 750 MG: 750 TABLET ORAL at 03:01

## 2024-09-11 RX ADMIN — METHOCARBAMOL 750 MG: 750 TABLET ORAL at 08:52

## 2024-09-11 RX ADMIN — GABAPENTIN 300 MG: 300 CAPSULE ORAL at 03:01

## 2024-09-11 RX ADMIN — ACETAMINOPHEN 1000 MG: 500 TABLET ORAL at 03:01

## 2024-09-11 RX ADMIN — IBUPROFEN 400 MG: 400 TABLET, FILM COATED ORAL at 03:01

## 2024-09-11 RX ADMIN — NIFEDIPINE 30 MG: 30 TABLET, FILM COATED, EXTENDED RELEASE ORAL at 08:52

## 2024-09-11 RX ADMIN — IBUPROFEN 400 MG: 400 TABLET, FILM COATED ORAL at 08:52

## 2024-09-11 RX ADMIN — PANTOPRAZOLE SODIUM 40 MG: 40 TABLET, DELAYED RELEASE ORAL at 05:19

## 2024-09-11 RX ADMIN — GABAPENTIN 300 MG: 300 CAPSULE ORAL at 12:58

## 2024-09-11 ASSESSMENT — PAIN SCALES - GENERAL: PAINLEVEL_OUTOF10: 2

## 2024-09-16 ENCOUNTER — HOSPITAL ENCOUNTER (EMERGENCY)
Age: 34
Discharge: HOME | End: 2024-09-17
Attending: EMERGENCY MEDICINE
Payer: MEDICAID

## 2024-09-16 ENCOUNTER — TELEPHONE (OUTPATIENT)
Age: 34
End: 2024-09-16

## 2024-09-16 VITALS
HEART RATE: 84 BPM | RESPIRATION RATE: 14 BRPM | TEMPERATURE: 99.7 F | SYSTOLIC BLOOD PRESSURE: 143 MMHG | DIASTOLIC BLOOD PRESSURE: 98 MMHG | OXYGEN SATURATION: 99 %

## 2024-09-16 DIAGNOSIS — Z46.89 ENCOUNTER FOR MANAGEMENT OF WOUND VAC: Primary | ICD-10-CM

## 2024-09-16 PROCEDURE — 99282 EMERGENCY DEPT VISIT SF MDM: CPT

## 2024-09-16 ASSESSMENT — PAIN DESCRIPTION - DESCRIPTORS: DESCRIPTORS: DISCOMFORT

## 2024-09-16 ASSESSMENT — PAIN SCALES - GENERAL: PAINLEVEL_OUTOF10: 5

## 2024-09-16 ASSESSMENT — PAIN - FUNCTIONAL ASSESSMENT: PAIN_FUNCTIONAL_ASSESSMENT: 0-10

## 2024-09-16 ASSESSMENT — PAIN DESCRIPTION - LOCATION: LOCATION: ABDOMEN

## 2024-09-17 ASSESSMENT — ENCOUNTER SYMPTOMS
COUGH: 0
GASTROINTESTINAL NEGATIVE: 1
ABDOMINAL PAIN: 0
NAUSEA: 0
VOMITING: 0
ALLERGIC/IMMUNOLOGIC NEGATIVE: 1
SHORTNESS OF BREATH: 0
EYES NEGATIVE: 1

## 2024-09-23 ENCOUNTER — HOSPITAL ENCOUNTER (OUTPATIENT)
Dept: WOUND CARE | Age: 34
Discharge: HOME | End: 2024-09-23
Payer: MEDICAID

## 2024-09-23 VITALS
DIASTOLIC BLOOD PRESSURE: 78 MMHG | RESPIRATION RATE: 16 BRPM | SYSTOLIC BLOOD PRESSURE: 118 MMHG | TEMPERATURE: 98 F | HEIGHT: 67 IN | BODY MASS INDEX: 22.13 KG/M2 | WEIGHT: 141 LBS | HEART RATE: 87 BPM

## 2024-09-23 DIAGNOSIS — Z98.891 S/P CESAREAN SECTION: ICD-10-CM

## 2024-09-23 DIAGNOSIS — S31.109D OPEN ABDOMINAL WALL WOUND, SUBSEQUENT ENCOUNTER: Primary | ICD-10-CM

## 2024-09-23 DIAGNOSIS — B37.2 YEAST DERMATITIS: ICD-10-CM

## 2024-09-23 DIAGNOSIS — T81.89XD NONHEALING SURGICAL WOUND, SUBSEQUENT ENCOUNTER: ICD-10-CM

## 2024-09-23 PROCEDURE — 11042 DBRDMT SUBQ TIS 1ST 20SQCM/<: CPT

## 2024-09-23 PROCEDURE — 11042 DBRDMT SUBQ TIS 1ST 20SQCM/<: CPT | Performed by: NURSE PRACTITIONER

## 2024-09-23 PROCEDURE — 99203 OFFICE O/P NEW LOW 30 MIN: CPT | Performed by: NURSE PRACTITIONER

## 2024-09-23 PROCEDURE — 11045 DBRDMT SUBQ TISS EACH ADDL: CPT

## 2024-09-23 PROCEDURE — 99213 OFFICE O/P EST LOW 20 MIN: CPT

## 2024-09-23 PROCEDURE — 11045 DBRDMT SUBQ TISS EACH ADDL: CPT | Performed by: NURSE PRACTITIONER

## 2024-09-23 RX ORDER — LIDOCAINE HYDROCHLORIDE 40 MG/ML
SOLUTION TOPICAL ONCE
OUTPATIENT
Start: 2024-09-23 | End: 2024-09-23

## 2024-09-23 RX ORDER — LIDOCAINE HYDROCHLORIDE 20 MG/ML
JELLY TOPICAL ONCE
OUTPATIENT
Start: 2024-09-23 | End: 2024-09-23

## 2024-09-23 RX ORDER — FLUCONAZOLE 100 MG/1
100 TABLET ORAL DAILY
Qty: 5 TABLET | Refills: 0 | Status: SHIPPED | OUTPATIENT
Start: 2024-09-23 | End: 2024-09-28

## 2024-09-23 RX ORDER — DOXYCYCLINE 100 MG/1
100 CAPSULE ORAL 2 TIMES DAILY
COMMUNITY

## 2024-09-23 ASSESSMENT — PAIN DESCRIPTION - LOCATION: LOCATION: ABDOMEN

## 2024-09-23 ASSESSMENT — ENCOUNTER SYMPTOMS
SHORTNESS OF BREATH: 0
COUGH: 0
NAUSEA: 0
RHINORRHEA: 0
DIARRHEA: 0
VOMITING: 0

## 2024-09-23 ASSESSMENT — PAIN SCALES - GENERAL: PAINLEVEL_OUTOF10: 4

## 2024-09-23 ASSESSMENT — PAIN DESCRIPTION - DESCRIPTORS: DESCRIPTORS: SORE

## 2024-09-23 ASSESSMENT — PAIN - FUNCTIONAL ASSESSMENT: PAIN_FUNCTIONAL_ASSESSMENT: PREVENTS OR INTERFERES SOME ACTIVE ACTIVITIES AND ADLS

## 2024-09-23 ASSESSMENT — PAIN DESCRIPTION - ORIENTATION: ORIENTATION: MID;LOWER

## 2024-09-23 ASSESSMENT — PAIN DESCRIPTION - PAIN TYPE: TYPE: SURGICAL PAIN

## 2024-09-25 NOTE — DISCHARGE INSTRUCTIONS
[facility] -Phone: [phone] Fax: [phone]    Visit  Discharge Instructions / Physician Orders     DATE: 9/30/2024     Home Care: [facility] [employer] [facility] BACK TO COMPANY     SUPPLIES ORDERED THRU: [place]     Wound Location: Abdomen     Cleanse with: Liquid antibacterial soap and water, rinse well      Dressing Orders: Vashe-moistened gauze to wound bed, cover with ABD and secure with Mefix tape. (DO NOT get any of the moistened gauze on skin surrounding wound)     Frequency: DAILY for ONE MORE week. Or as many visits as possible     Additional Orders: Increase protein to diet (meat, cheese, eggs, fish, peanut butter, nuts and beans)  9/23/2024- Diflucan from pharmacy     Schedule appointments with OBGYN Dr. Therese Abad [phone] and Infectious Disease, Dr. Douglas Singh [phone]     Your next appointment with Wound Care Center is in 1 week     (Please note your next appointment above and if you are unable to keep, kindly give a 24 hour notice. Thank you.)  If more than 15 min late we cannot guarantee you will be seen due to clinician schedule  Per Policy, Excessive cancellation will call for dismissal from program.     If you experience any of the following, please call the Wound Care Center during business hours:  [phone]     * Increase in Pain  * Temperature over 101  * Increase in drainage from your wound  * Drainage with a foul odor  * Bleeding  * Increase in swelling  * Need for compression bandage changes due to slippage, breakthrough drainage.     If you need medical attention outside of the business hours of the Wound Care Centers please contact your PCP or go to the an urgent care or emergency department     The information contained in the After Visit Summary has been reviewed with me, the patient and/or responsible adult, by my health care provider(s). I had the opportunity to ask questions regarding this information. I have elected to receive;

## 2024-09-30 ENCOUNTER — HOSPITAL ENCOUNTER (OUTPATIENT)
Dept: WOUND CARE | Age: 34
Discharge: HOME | End: 2024-09-30
Payer: MEDICAID

## 2024-09-30 VITALS
DIASTOLIC BLOOD PRESSURE: 66 MMHG | RESPIRATION RATE: 18 BRPM | SYSTOLIC BLOOD PRESSURE: 108 MMHG | HEART RATE: 71 BPM | TEMPERATURE: 96.9 F

## 2024-09-30 DIAGNOSIS — Z98.891 S/P CESAREAN SECTION: ICD-10-CM

## 2024-09-30 DIAGNOSIS — T81.89XD NONHEALING SURGICAL WOUND, SUBSEQUENT ENCOUNTER: ICD-10-CM

## 2024-09-30 DIAGNOSIS — S31.109D OPEN ABDOMINAL WALL WOUND, SUBSEQUENT ENCOUNTER: Primary | ICD-10-CM

## 2024-09-30 PROCEDURE — 11045 DBRDMT SUBQ TISS EACH ADDL: CPT | Performed by: NURSE PRACTITIONER

## 2024-09-30 PROCEDURE — 11042 DBRDMT SUBQ TIS 1ST 20SQCM/<: CPT

## 2024-09-30 PROCEDURE — 11045 DBRDMT SUBQ TISS EACH ADDL: CPT

## 2024-09-30 PROCEDURE — 11042 DBRDMT SUBQ TIS 1ST 20SQCM/<: CPT | Performed by: NURSE PRACTITIONER

## 2024-09-30 RX ORDER — LIDOCAINE HYDROCHLORIDE 20 MG/ML
JELLY TOPICAL ONCE
Status: COMPLETED | OUTPATIENT
Start: 2024-09-30 | End: 2024-09-30

## 2024-09-30 RX ORDER — LIDOCAINE HYDROCHLORIDE 20 MG/ML
JELLY TOPICAL ONCE
OUTPATIENT
Start: 2024-09-30 | End: 2024-09-30

## 2024-09-30 RX ORDER — LIDOCAINE HYDROCHLORIDE 40 MG/ML
SOLUTION TOPICAL ONCE
OUTPATIENT
Start: 2024-09-30 | End: 2024-09-30

## 2024-09-30 RX ADMIN — LIDOCAINE HYDROCHLORIDE 12 ML: 20 JELLY TOPICAL at 09:43

## 2024-09-30 ASSESSMENT — ENCOUNTER SYMPTOMS
COUGH: 0
SHORTNESS OF BREATH: 0
DIARRHEA: 0
RHINORRHEA: 0
NAUSEA: 0
VOMITING: 0

## 2024-09-30 ASSESSMENT — PAIN SCALES - GENERAL: PAINLEVEL_OUTOF10: 4

## 2024-09-30 ASSESSMENT — PAIN DESCRIPTION - DESCRIPTORS: DESCRIPTORS: SORE

## 2024-09-30 ASSESSMENT — PAIN DESCRIPTION - LOCATION: LOCATION: ABDOMEN

## 2024-10-09 ENCOUNTER — HOSPITAL ENCOUNTER (OUTPATIENT)
Dept: WOUND CARE | Age: 34
Discharge: HOME | End: 2024-10-09
Payer: MEDICAID

## 2024-10-09 VITALS — DIASTOLIC BLOOD PRESSURE: 70 MMHG | HEART RATE: 67 BPM | SYSTOLIC BLOOD PRESSURE: 126 MMHG | TEMPERATURE: 97.4 F

## 2024-10-09 DIAGNOSIS — S31.109D OPEN ABDOMINAL WALL WOUND, SUBSEQUENT ENCOUNTER: ICD-10-CM

## 2024-10-09 DIAGNOSIS — Z98.891 S/P CESAREAN SECTION: ICD-10-CM

## 2024-10-09 DIAGNOSIS — T81.89XD NONHEALING SURGICAL WOUND, SUBSEQUENT ENCOUNTER: Primary | ICD-10-CM

## 2024-10-09 PROCEDURE — 11045 DBRDMT SUBQ TISS EACH ADDL: CPT

## 2024-10-09 PROCEDURE — 11042 DBRDMT SUBQ TIS 1ST 20SQCM/<: CPT

## 2024-10-09 RX ORDER — LIDOCAINE HYDROCHLORIDE 40 MG/ML
SOLUTION TOPICAL ONCE
OUTPATIENT
Start: 2024-10-09 | End: 2024-10-09

## 2024-10-09 RX ORDER — FLUCONAZOLE 100 MG/1
100 TABLET ORAL DAILY
Qty: 5 TABLET | Refills: 0 | Status: SHIPPED | OUTPATIENT
Start: 2024-10-09 | End: 2024-10-14

## 2024-10-09 RX ORDER — LIDOCAINE HYDROCHLORIDE 20 MG/ML
JELLY TOPICAL ONCE
Status: COMPLETED | OUTPATIENT
Start: 2024-10-09 | End: 2024-10-09

## 2024-10-09 RX ORDER — LIDOCAINE HYDROCHLORIDE 20 MG/ML
JELLY TOPICAL ONCE
OUTPATIENT
Start: 2024-10-09 | End: 2024-10-09

## 2024-10-09 RX ADMIN — LIDOCAINE HYDROCHLORIDE 6 ML: 20 JELLY TOPICAL at 15:21

## 2024-10-09 NOTE — PROGRESS NOTES
Wound Care Supplies      Supply Company:     [employer]  Phone: [phone]  Fax: [phone]      Ordering Center:     [facility] WOUND [address] [facility] [address]  [phone]  WOUND CARE Dept: [phone]   FAX NUMBER [phone]    Patient Information:      Noelle Kim  [address]  [facility] [address]   [phone]   : 1990  AGE: 34 y.o.     GENDER: female   TODAYS DATE:  10/9/2024    Insurance:      PRIMARY INSURANCE:  Plan: [facility] MEDICAID  Coverage: [facility] MEDICAID  Effective Date: 2023  [id number] - (Medicaid Managed)        Patient Wound Information:       Codes Comments   Nonhealing surgical wound, subsequent encounter  - Primary T81.89XD    Open abdominal wall wound, subsequent encounter S31.109D    S/P  section Z98.891          WOUNDS REQUIRING DRESSING SUPPLIES:     Wound 24 Pelvis Anterior;Mid center of transverse lower abdominal incision (Active)   Number of days: 64       Wound 24 Abdomen Lower;Mid #1 (Active)   Wound Image   24 1303   Wound Etiology Non-Healing Surgical 10/09/24 1518   Dressing Status New drainage noted;Old drainage noted 10/09/24 1518   Wound Cleansed Soap and water 10/09/24 1518   Dressing/Treatment Other (comment) 24 1410   Wound Length (cm) 9.5 cm 10/09/24 1518   Wound Width (cm) 4.5 cm 10/09/24 1518   Wound Depth (cm) 2.5 cm 10/09/24 1518   Wound Surface Area (cm^2) 42.75 cm^2 10/09/24 1518   Change in Wound Size % (l*w) 40.21 10/09/24 1518   Wound Volume (cm^3) 106.875 cm^3 10/09/24 1518   Wound Healing % 67 10/09/24 1518   Post-Procedure Length (cm) 9.5 cm 10/09/24 1518   Post-Procedure Width (cm) 4.5 cm 10/09/24 1518   Post-Procedure Depth (cm) 2.5 cm 10/09/24 1518   Post-Procedure Surface Area (cm^2) 42.75 cm^2 10/09/24 1518   Post-Procedure Volume (cm^3) 106.875 cm^3 10/09/24 1518   Wound Assessment Pink/red;Hyper granulation tissue;Slough 10/09/24 1518   Drainage Amount Moderate (25-50%) 
in the lower abdominal skin fold.    Head: normocephalic and atraumatic  Pulmonary: normal air movement, no respiratory distress  Cardiovascular/Circulation: No edema, no cyanosis  Extremities: no cyanosis and no clubbing   Musculoskeletal: no joint swelling, deformity or tenderness  Neurologic: gait, coordination normal and speech normal      Assessment:     Problem List Items Addressed This Visit          Other    S/P  section    Relevant Orders    Initiate Outpatient Wound Care Protocol    Open abdominal wall wound, subsequent encounter    Relevant Orders    Initiate Outpatient Wound Care Protocol    Nonhealing surgical wound, subsequent encounter - Primary    Relevant Orders    Initiate Outpatient Wound Care Protocol        Procedure Note  Indications:  Based on my examination of this patient's wound(s)/ulcer(s) today, debridement is required to promote healing and evaluate the wound base.    Performed by: SCOTT Mi CNP    Consent obtained:  Yes    Time out taken:  Yes    Pain Control: Anesthetic  Anesthetic: 2% Lidocaine Gel Topical       Debridement:Excisional Debridement    Using curette the wound(s)/ulcer(s) was/were sharply debrided down through and including the removal of subcutaneous tissue.        Devitalized Tissue Debrided:  fibrin and biofilm    Pre Debridement Measurements:  Are located in the Wound/Ulcer Documentation Flow Sheet    Wound/Ulcer #: 1    Post Debridement Measurements:  Wound/Ulcer Descriptions are Pre Debridement except measurements:    Wound 24 Pelvis Anterior;Mid center of transverse lower abdominal incision (Active)   Number of days: 64       Wound 24 Abdomen Lower;Mid #1 (Active)   Wound Image   24 1303   Wound Etiology Non-Healing Surgical 10/09/24 1518   Dressing Status New drainage noted;Old drainage noted 10/09/24 1518   Wound Cleansed Soap and water 10/09/24 1518   Dressing/Treatment Other (comment) 24 1410   Wound Length (cm) 9.5

## 2024-10-09 NOTE — PLAN OF CARE
Problem: Wound:  Goal: Will show signs of wound healing; wound closure and no evidence of infection  Description: Will show signs of wound healing; wound closure and no evidence of infection  Outcome: Progressing     Problem: Falls - Risk of:  Goal: Will remain free from falls  Description: Will remain free from falls  Outcome: Progressing

## 2024-10-09 NOTE — DISCHARGE INSTRUCTIONS
[facility] -Phone: [phone] Fax: [phone]    Visit  Discharge Instructions / Physician Orders     DATE: 10/9/2024     Home Care: [facility]END [employer] WOUND V[facility] BACK TO COMPANY     SUPPLIES ORDERED THRU: [facility]     Wound Location: Abdomen     Cleanse with: Liquid antibacterial soap and water, rinse well      Dressing Orders: collagen with silver-moistened gauze to wound bed, cover with ABD and secure with Mefix tape. (DO NOT get any of the moistened gauze on skin surrounding wound), nystatin powder to red areas under wound     Frequency: DAILY for ONE MORE week. Or as many visits as possible     Additional Orders: Increase protein to diet (meat, cheese, eggs, fish, peanut butter, nuts and beans)  9/23/2024- Diflucan from pharmacy  10/9/24-diflucan sent to pharmacy   Schedule appointments with OBGYN Dr. Therese Abad [phone] and Infectious Disease, Dr. Douglas Singh [phone]     Your next appointment with Wound Care Center is in 1 week     (Please note your next appointment above and if you are unable to keep, kindly give a 24 hour notice. Thank you.)  If more than 15 min late we cannot guarantee you will be seen due to clinician schedule  Per Policy, Excessive cancellation will call for dismissal from program.     If you experience any of the following, please call the Wound Care Center during business hours:  [phone]     * Increase in Pain  * Temperature over 101  * Increase in drainage from your wound  * Drainage with a foul odor  * Bleeding  * Increase in swelling  * Need for compression bandage changes due to slippage, breakthrough drainage.     If you need medical attention outside of the business hours of the Wound Care Centers please contact your PCP or go to the an urgent care or emergency department     The information contained in the After Visit Summary has been reviewed with me, the patient and/or responsible adult, by my health care provider(s). I had the

## 2024-10-16 ENCOUNTER — HOSPITAL ENCOUNTER (OUTPATIENT)
Dept: WOUND CARE | Age: 34
Discharge: HOME | End: 2024-10-16
Payer: MEDICAID

## 2024-10-16 VITALS
BODY MASS INDEX: 22.13 KG/M2 | HEART RATE: 69 BPM | WEIGHT: 141 LBS | HEIGHT: 67 IN | TEMPERATURE: 96.4 F | DIASTOLIC BLOOD PRESSURE: 80 MMHG | RESPIRATION RATE: 18 BRPM | SYSTOLIC BLOOD PRESSURE: 128 MMHG

## 2024-10-16 DIAGNOSIS — Z98.891 S/P CESAREAN SECTION: ICD-10-CM

## 2024-10-16 DIAGNOSIS — T81.89XD NONHEALING SURGICAL WOUND, SUBSEQUENT ENCOUNTER: Primary | ICD-10-CM

## 2024-10-16 DIAGNOSIS — B37.2 YEAST DERMATITIS: ICD-10-CM

## 2024-10-16 DIAGNOSIS — S31.109D OPEN ABDOMINAL WALL WOUND, SUBSEQUENT ENCOUNTER: ICD-10-CM

## 2024-10-16 PROCEDURE — 11045 DBRDMT SUBQ TISS EACH ADDL: CPT

## 2024-10-16 PROCEDURE — 11042 DBRDMT SUBQ TIS 1ST 20SQCM/<: CPT

## 2024-10-16 RX ORDER — NYSTATIN 100000 [USP'U]/G
POWDER TOPICAL
Qty: 30 G | Refills: 1 | Status: SHIPPED | OUTPATIENT
Start: 2024-10-16

## 2024-10-16 RX ORDER — LIDOCAINE HYDROCHLORIDE 20 MG/ML
JELLY TOPICAL ONCE
Status: COMPLETED | OUTPATIENT
Start: 2024-10-16 | End: 2024-10-16

## 2024-10-16 RX ORDER — LIDOCAINE HYDROCHLORIDE 40 MG/ML
SOLUTION TOPICAL ONCE
OUTPATIENT
Start: 2024-10-16 | End: 2024-10-16

## 2024-10-16 RX ORDER — LIDOCAINE HYDROCHLORIDE 20 MG/ML
JELLY TOPICAL ONCE
OUTPATIENT
Start: 2024-10-16 | End: 2024-10-16

## 2024-10-16 RX ADMIN — LIDOCAINE HYDROCHLORIDE 6 ML: 20 JELLY TOPICAL at 12:55

## 2024-10-16 ASSESSMENT — PAIN SCALES - GENERAL
PAINLEVEL_OUTOF10: 4
PAINLEVEL_OUTOF10: 0

## 2024-10-16 NOTE — PROGRESS NOTES
SYSTEMS    Constitutional: negative for chills and fevers  Respiratory: negative for cough and shortness of breath  Cardiovascular: negative for chest pain and palpitations  Gastrointestinal: negative for abdominal pain and nausea  Genitourinary:negative  Integument:  Midline abdominal wound  Musculoskeletal:negative  Behavioral/Psych: negative, history of depression substance abuse in past.  Currently taking Suboxone.  Endocrine: negative  Hematologic/Immunologic: negative    Objective:      /80   Pulse 69   Temp (!) 96.4 °F (35.8 °C) (Tympanic)   Resp 18   Ht 1.702 m (5' 7\")   Wt 64 kg (141 lb)   BMI 22.08 kg/m²     Wt Readings from Last 3 Encounters:   10/16/24 64 kg (141 lb)   24 64 kg (141 lb)   24 64 kg (141 lb)       LABS    CBC:   Lab Results   Component Value Date/Time    WBC 6.0 2024 08:13 AM    RBC 3.94 2024 08:13 AM    HGB 10.3 2024 08:13 AM    HCT 36.4 2024 08:13 AM     SED RATE:   Lab Results   Component Value Date    SEDRATE 39 (H) 2024     CRP:   Lab Results   Component Value Date/Time    CRP 4.3 2024 06:06 PM     CMP:  Creatinine:   Lab Results   Component Value Date/Time    CREATININE 0.7 2024 08:13 AM     HgBA1c:    Lab Results   Component Value Date/Time    LABA1C 4.8 2024 01:23 PM    LABA1C 4.9 2021 01:20 AM         PHYSICAL EXAM    Constitutional: negative for chills and fevers  Respiratory: negative for cough and shortness of breath  Cardiovascular: negative for chest pain and palpitations  Gastrointestinal: negative for abdominal pain and nausea  Genitourinary:negative  Integument:  Midline abdominal wound  Musculoskeletal:negative  Behavioral/Psych: negative, history of depression substance abuse in past.  Currently taking Suboxone.  Endocrine: negative  Hematologic/Immunologic: negative      Assessment:     Problem List Items Addressed This Visit          Other    S/P  section    Relevant Orders

## 2024-10-16 NOTE — DISCHARGE INSTRUCTIONS
[facility] WOUND CARE CENTER -Phone: [phone] Fax: [phone]    Visit  Discharge Instructions / Physician Orders     DATE: 10/16/2024     Home Care: [facility]-SEND [employer] WOUND VAC BACK TO COMPANY     SUPPLIES ORDERED THRU: [facility]     Wound Location: Abdomen     Cleanse with: Liquid antibacterial soap and water, rinse well      Dressing Orders: fibracol, silvercel to wound bed, cover with ABD and secure with abdominal binder, nystatin powder to red areas under wound     Frequency: DAILY      Additional Orders: Increase protein to diet (meat, cheese, eggs, fish, peanut butter, nuts and beans)  9/23/2024- Diflucan from pharmacy  10/9/24-diflucan sent to pharmacy   10/16/24 nystatin powder sent to pharmacy    Schedule appointments with OBGYN Dr. Therese Abad [phone] and Infectious Disease, Dr. Douglas Singh [phone]     Your next appointment with Wound Care Center is in 1 week     (Please note your next appointment above and if you are unable to keep, kindly give a 24 hour notice. Thank you.)  If more than 15 min late we cannot guarantee you will be seen due to clinician schedule  Per Policy, Excessive cancellation will call for dismissal from program.     If you experience any of the following, please call the Wound Care Center during business hours:  [phone]     * Increase in Pain  * Temperature over 101  * Increase in drainage from your wound  * Drainage with a foul odor  * Bleeding  * Increase in swelling  * Need for compression bandage changes due to slippage, breakthrough drainage.     If you need medical attention outside of the business hours of the Wound Care Centers please contact your PCP or go to the an urgent care or emergency department     The information contained in the After Visit Summary has been reviewed with me, the patient and/or responsible adult, by my health care provider(s). I had the opportunity to ask questions regarding this information. I have elected to

## 2024-10-21 NOTE — DISCHARGE INSTRUCTIONS
[facility] WOUND CARE CENTER -Phone: [phone] Fax: [phone]    Visit  Discharge Instructions / Physician Orders     DATE: 10/23/2024     Home Care: [facility]-SEND [employer] WOUND VAC BACK TO COMPANY     SUPPLIES ORDERED THRU: [facility]     Wound Location: Abdomen     Cleanse with: Liquid antibacterial soap and water, rinse well      Dressing Orders: fibracol, silvercel to wound bed, cover with ABD and secure with abdominal binder, nystatin powder to red areas under wound     Frequency: DAILY      Additional Orders: Increase protein to diet (meat, cheese, eggs, fish, peanut butter, nuts and beans)  9/23/2024- Diflucan from pharmacy  10/9/24-diflucan sent to pharmacy   10/16/24 nystatin powder sent to pharmacy     Schedule appointments with OBGYN Dr. Therese Abad [phone] and Infectious Disease, Dr. Douglas Singh [phone]     Your next appointment with Wound Care Center is in 1 week     (Please note your next appointment above and if you are unable to keep, kindly give a 24 hour notice. Thank you.)  If more than 15 min late we cannot guarantee you will be seen due to clinician schedule  Per Policy, Excessive cancellation will call for dismissal from program.     If you experience any of the following, please call the Wound Care Center during business hours:  [phone]     * Increase in Pain  * Temperature over 101  * Increase in drainage from your wound  * Drainage with a foul odor  * Bleeding  * Increase in swelling  * Need for compression bandage changes due to slippage, breakthrough drainage.     If you need medical attention outside of the business hours of the Wound Care Centers please contact your PCP or go to the an urgent care or emergency department     The information contained in the After Visit Summary has been reviewed with me, the patient and/or responsible adult, by my health care provider(s). I had the opportunity to ask questions regarding this information. I have elected to

## 2024-10-23 ENCOUNTER — HOSPITAL ENCOUNTER (OUTPATIENT)
Dept: WOUND CARE | Age: 34
Discharge: HOME | End: 2024-10-23
Payer: MEDICAID

## 2024-10-23 VITALS
RESPIRATION RATE: 16 BRPM | HEART RATE: 52 BPM | TEMPERATURE: 96.7 F | SYSTOLIC BLOOD PRESSURE: 131 MMHG | DIASTOLIC BLOOD PRESSURE: 83 MMHG

## 2024-10-23 DIAGNOSIS — S31.109D OPEN ABDOMINAL WALL WOUND, SUBSEQUENT ENCOUNTER: ICD-10-CM

## 2024-10-23 DIAGNOSIS — Z98.891 S/P CESAREAN SECTION: ICD-10-CM

## 2024-10-23 DIAGNOSIS — T81.89XD NONHEALING SURGICAL WOUND, SUBSEQUENT ENCOUNTER: Primary | ICD-10-CM

## 2024-10-23 DIAGNOSIS — B37.2 YEAST DERMATITIS: ICD-10-CM

## 2024-10-23 PROCEDURE — 11045 DBRDMT SUBQ TISS EACH ADDL: CPT

## 2024-10-23 PROCEDURE — 11042 DBRDMT SUBQ TIS 1ST 20SQCM/<: CPT

## 2024-10-23 RX ORDER — LIDOCAINE HYDROCHLORIDE 20 MG/ML
JELLY TOPICAL ONCE
OUTPATIENT
Start: 2024-10-23 | End: 2024-10-23

## 2024-10-23 RX ORDER — LIDOCAINE HYDROCHLORIDE 40 MG/ML
SOLUTION TOPICAL ONCE
OUTPATIENT
Start: 2024-10-23 | End: 2024-10-23

## 2024-10-23 RX ORDER — LIDOCAINE HYDROCHLORIDE 20 MG/ML
JELLY TOPICAL ONCE
Status: COMPLETED | OUTPATIENT
Start: 2024-10-23 | End: 2024-10-23

## 2024-10-23 RX ADMIN — LIDOCAINE HYDROCHLORIDE 6 ML: 20 JELLY TOPICAL at 12:57

## 2024-10-23 NOTE — PROGRESS NOTES
[facility] Wound Care Center   Progress Note and Procedure Note      Noelle Kim  MEDICAL RECORD NUMBER:  [id number]  AGE: 34 y.o.   GENDER: female  : 1990  EPISODE DATE:  10/23/2024    Subjective:     Chief Complaint   Patient presents with    Wound Check     Abdomen         HISTORY of PRESENT ILLNESS HPI     Noelle Kim is a 34 y.o. female who presents today for wound/ulcer evaluation.     History of Wound Context: 24 during  of healthy twins, the patient was found to have severe liver bed varicosities which were bleeding and ovarian vein aneurysm was noted. Due to significant intra-abdominal bleeding patient had delayed closure using AbThera wound vac and postop timeframe complicated by multiple follow up surgeries on  reopen, , and  for wound closure with application of skin sub, elevation of mycocutaneous flap, and wound vac application. She was taken back to OR 24 for wound infection/debridement and vac application. Has home care with [facility], was using wound vac therapy until initial visit to Wound Care Center 2324.      Interval history: Continued improvement.  Does still have a yeast dermatitis albeit improved.       Wound/Ulcer Pain Timing/Severity: waxing and waning  Quality of pain: aching, tender  Severity:  2 / 10   Modifying Factors: Pain is relieved/improved with rest  Associated Signs/Symptoms: none     Wound/Ulcer Identification:  Ulcer Type: non-healing surgical  Contributing Factors: none          PAST MEDICAL HISTORY        Diagnosis Date    Abnormal cells of cervix     ADD (attention deficit disorder)     Anemia     Complication of anesthesia     Condyloma     Depression     Gestational diabetes mellitus     HSIL (high grade squamous intraepithelial lesion) on Pap smear of cervix     Liver disease     MRSA (methicillin resistant staph aureus) culture positive 2011    BUTTOCK    Pre-eclampsia affecting puerperium 7/15/2024    Severe

## 2024-10-28 NOTE — DISCHARGE INSTRUCTIONS
[facility] WOUND CARE CENTER -Phone: [phone] Fax: [phone]    Visit  Discharge Instructions / Physician Orders     DATE: 10/30/2024     Home Care: [facility]     SUPPLIES ORDERED THRU: [facility]     Wound Location: Abdomen     Cleanse with: Liquid antibacterial soap and water, rinse well      Dressing Orders: fibracol, silvercel to wound bed, triad cream(thin layer) to red areas around wound,  cover with ABD and secure with abdominal binder, nystatin powder to red areas under wound     Frequency: DAILY      Additional Orders: Increase protein to diet (meat, cheese, eggs, fish, peanut butter, nuts and beans)  9/23/2024- Diflucan from pharmacy  10/9/24-diflucan sent to pharmacy   10/16/24 nystatin powder sent to pharmacy     Schedule appointments with OBGYN Dr. Therese Abad [phone] and Infectious Disease, Dr. Douglas Singh [phone]     Your next appointment with Wound Care Center is in 1 week     (Please note your next appointment above and if you are unable to keep, kindly give a 24 hour notice. Thank you.)  If more than 15 min late we cannot guarantee you will be seen due to clinician schedule  Per Policy, Excessive cancellation will call for dismissal from program.     If you experience any of the following, please call the Wound Care Center during business hours:  [phone]     * Increase in Pain  * Temperature over 101  * Increase in drainage from your wound  * Drainage with a foul odor  * Bleeding  * Increase in swelling  * Need for compression bandage changes due to slippage, breakthrough drainage.     If you need medical attention outside of the business hours of the Wound Care Centers please contact your PCP or go to the an urgent care or emergency department     The information contained in the After Visit Summary has been reviewed with me, the patient and/or responsible adult, by my health care provider(s). I had the opportunity to ask questions regarding this information. I have

## 2024-10-30 ENCOUNTER — HOSPITAL ENCOUNTER (OUTPATIENT)
Dept: WOUND CARE | Age: 34
Discharge: HOME | End: 2024-10-30
Payer: MEDICAID

## 2024-10-30 VITALS — DIASTOLIC BLOOD PRESSURE: 95 MMHG | SYSTOLIC BLOOD PRESSURE: 132 MMHG | HEART RATE: 76 BPM | TEMPERATURE: 97.4 F

## 2024-10-30 DIAGNOSIS — B37.2 YEAST DERMATITIS: ICD-10-CM

## 2024-10-30 DIAGNOSIS — S31.109D OPEN ABDOMINAL WALL WOUND, SUBSEQUENT ENCOUNTER: ICD-10-CM

## 2024-10-30 DIAGNOSIS — T81.89XD NONHEALING SURGICAL WOUND, SUBSEQUENT ENCOUNTER: Primary | ICD-10-CM

## 2024-10-30 DIAGNOSIS — Z98.891 S/P CESAREAN SECTION: ICD-10-CM

## 2024-10-30 PROCEDURE — 11042 DBRDMT SUBQ TIS 1ST 20SQCM/<: CPT

## 2024-10-30 RX ORDER — LIDOCAINE HYDROCHLORIDE 20 MG/ML
JELLY TOPICAL ONCE
OUTPATIENT
Start: 2024-10-30 | End: 2024-10-30

## 2024-10-30 RX ORDER — LIDOCAINE HYDROCHLORIDE 20 MG/ML
JELLY TOPICAL ONCE
Status: COMPLETED | OUTPATIENT
Start: 2024-10-30 | End: 2024-10-30

## 2024-10-30 RX ORDER — LIDOCAINE HYDROCHLORIDE 40 MG/ML
SOLUTION TOPICAL ONCE
OUTPATIENT
Start: 2024-10-30 | End: 2024-10-30

## 2024-10-30 RX ADMIN — LIDOCAINE HYDROCHLORIDE 6 ML: 20 JELLY TOPICAL at 14:09

## 2024-10-30 NOTE — PROGRESS NOTES
speech normal      Assessment:     Problem List Items Addressed This Visit          Other    S/P  section    Relevant Orders    Initiate Outpatient Wound Care Protocol    Open abdominal wall wound, subsequent encounter    Relevant Orders    Initiate Outpatient Wound Care Protocol    Nonhealing surgical wound, subsequent encounter - Primary    Relevant Orders    Initiate Outpatient Wound Care Protocol    Yeast dermatitis    Relevant Orders    Initiate Outpatient Wound Care Protocol        Procedure Note  Indications:  Based on my examination of this patient's wound(s)/ulcer(s) today, debridement is required to promote healing and evaluate the wound base.    Performed by: SOCTT Mi CNP    Consent obtained:  Yes    Time out taken:  Yes    Pain Control: Anesthetic  Anesthetic: 2% Lidocaine Gel Topical       Debridement:Excisional Debridement    Using curette the wound(s)/ulcer(s) was/were sharply debrided down through and including the removal of subcutaneous tissue.        Devitalized Tissue Debrided:  fibrin and biofilm    Pre Debridement Measurements:  Are located in the Wound/Ulcer Documentation Flow Sheet    Wound/Ulcer #: 1    Post Debridement Measurements:  Wound/Ulcer Descriptions are Pre Debridement except measurements:    Wound 24 Pelvis Anterior;Mid center of transverse lower abdominal incision (Active)   Number of days: 85       Wound 24 Abdomen Lower;Mid #1 (Active)   Wound Image   24 1303   Wound Etiology Non-Healing Surgical 10/30/24 1410   Dressing Status New drainage noted;Old drainage noted 10/30/24 1410   Wound Cleansed Vashe 10/30/24 1410   Dressing/Treatment Other (comment) 10/23/24 1316   Wound Length (cm) 10 cm 10/30/24 1410   Wound Width (cm) 1.5 cm 10/30/24 1410   Wound Depth (cm) 1.2 cm 10/30/24 1410   Wound Surface Area (cm^2) 15 cm^2 10/30/24 1410   Change in Wound Size % (l*w) 79.02 10/30/24 1410   Wound Volume (cm^3) 18 cm^3 10/30/24 1410   Wound

## 2024-10-31 NOTE — DISCHARGE INSTRUCTIONS
[facility] WOUND CARE CENTER -Phone: [phone] Fax: [phone]    Visit  Discharge Instructions / Physician Orders     DATE: 11/6/2024     Home Care: [facility]     SUPPLIES ORDERED THRU: [facility]     Wound Location: Abdomen     Cleanse with: Liquid antibacterial soap and water, rinse well      Dressing Orders: Collagen with Silver, triad cream(thin layer) to red areas around wound,  cover with ABD and secure with abdominal binder, nystatin powder to red areas under wound     Frequency: DAILY      Additional Orders: Increase protein to diet (meat, cheese, eggs, fish, peanut butter, nuts and beans)  9/23/2024- Diflucan from pharmacy  10/9/24-diflucan sent to pharmacy   10/16/24 nystatin powder sent to pharmacy     Schedule appointments with OBGYN Dr. Therese Abad [phone] and Infectious Disease, Dr. Douglas Singh [phone]     Your next appointment with Wound Care Center is in 2 weeks     (Please note your next appointment above and if you are unable to keep, kindly give a 24 hour notice. Thank you.)  If more than 15 min late we cannot guarantee you will be seen due to clinician schedule  Per Policy, Excessive cancellation will call for dismissal from program.     If you experience any of the following, please call the Wound Care Center during business hours:  [phone]     * Increase in Pain  * Temperature over 101  * Increase in drainage from your wound  * Drainage with a foul odor  * Bleeding  * Increase in swelling  * Need for compression bandage changes due to slippage, breakthrough drainage.     If you need medical attention outside of the business hours of the Wound Care Centers please contact your PCP or go to the an urgent care or emergency department     The information contained in the After Visit Summary has been reviewed with me, the patient and/or responsible adult, by my health care provider(s). I had the opportunity to ask questions regarding this information. I have elected to

## 2024-11-07 ENCOUNTER — HOSPITAL ENCOUNTER (OUTPATIENT)
Dept: WOUND CARE | Age: 34
Discharge: HOME | End: 2024-11-07
Payer: MEDICAID

## 2024-11-07 VITALS
SYSTOLIC BLOOD PRESSURE: 118 MMHG | HEIGHT: 67 IN | WEIGHT: 141 LBS | RESPIRATION RATE: 18 BRPM | TEMPERATURE: 96.8 F | DIASTOLIC BLOOD PRESSURE: 85 MMHG | HEART RATE: 87 BPM | BODY MASS INDEX: 22.13 KG/M2

## 2024-11-07 DIAGNOSIS — T81.89XD NONHEALING SURGICAL WOUND, SUBSEQUENT ENCOUNTER: Primary | ICD-10-CM

## 2024-11-07 DIAGNOSIS — Z98.891 S/P CESAREAN SECTION: ICD-10-CM

## 2024-11-07 DIAGNOSIS — B37.2 YEAST DERMATITIS: ICD-10-CM

## 2024-11-07 DIAGNOSIS — S31.109D OPEN ABDOMINAL WALL WOUND, SUBSEQUENT ENCOUNTER: ICD-10-CM

## 2024-11-07 PROCEDURE — 11042 DBRDMT SUBQ TIS 1ST 20SQCM/<: CPT

## 2024-11-07 RX ORDER — LIDOCAINE HYDROCHLORIDE 20 MG/ML
JELLY TOPICAL ONCE
Status: COMPLETED | OUTPATIENT
Start: 2024-11-07 | End: 2024-11-07

## 2024-11-07 RX ORDER — LIDOCAINE HYDROCHLORIDE 40 MG/ML
SOLUTION TOPICAL ONCE
OUTPATIENT
Start: 2024-11-07 | End: 2024-11-07

## 2024-11-07 RX ORDER — LIDOCAINE HYDROCHLORIDE 20 MG/ML
JELLY TOPICAL ONCE
OUTPATIENT
Start: 2024-11-07 | End: 2024-11-07

## 2024-11-07 RX ADMIN — LIDOCAINE HYDROCHLORIDE 6 ML: 20 JELLY TOPICAL at 11:12

## 2024-11-07 NOTE — PROGRESS NOTES
Wound Care Supplies      Supply Company:     [employer]  Phone: [phone]  Fax: [phone]    Ordering Center:     [facility] WOUND [place] [id number] [facility] [address]  [phone]  WOUND CARE Dept: [phone]   FAX NUMBER [phone]    Patient Information:      Noelle Kim  [address] [address]   [phone]   : 1990  AGE: 34 y.o.     GENDER: female   TODAYS DATE:  2024    Insurance:      PRIMARY INSURANCE:  Plan: [facility] MEDICAID  Coverage: [facility] MEDICAID  Effective Date: 2023  [id number] - (Medicaid Managed)    Patient Wound Information:      Diagnoses     Codes Comments   Nonhealing surgical wound, subsequent encounter  - Primary T81.89XD    Open abdominal wall wound, subsequent encounter S31.109D    S/P  section Z98.891    Yeast dermatitis B37.2        WOUNDS REQUIRING DRESSING SUPPLIES:     Wound 24 Pelvis Anterior;Mid center of transverse lower abdominal incision (Active)   Number of days: 93       Wound 24 Abdomen Lower;Mid #1 (Active)   Wound Image   24 1303   Wound Etiology Non-Healing Surgical 24 1108   Dressing Status New drainage noted;Old drainage noted 24 1108   Wound Cleansed Cleansed with saline 24 1108   Dressing/Treatment Other (comment) 10/30/24 1449   Wound Length (cm) 9 cm 24 1108   Wound Width (cm) 1.4 cm 24 1108   Wound Depth (cm) 0.4 cm 24 1108   Wound Surface Area (cm^2) 12.6 cm^2 24 1108   Change in Wound Size % (l*w) 82.38 24 1108   Wound Volume (cm^3) 5.04 cm^3 24 1108   Wound Healing % 98 24 1108   Post-Procedure Length (cm) 9 cm 24 1108   Post-Procedure Width (cm) 1.4 cm 24 1108   Post-Procedure Depth (cm) 0.4 cm 24 1108   Post-Procedure Surface Area (cm^2) 12.6 cm^2 24 1108   Post-Procedure Volume (cm^3) 5.04 cm^3 24 1108   Wound Assessment Pink/red 24 1108   Drainage Amount Moderate (25-50%) 24 
1108   Post-Procedure Length (cm) 9 cm 11/07/24 1108   Post-Procedure Width (cm) 1.4 cm 11/07/24 1108   Post-Procedure Depth (cm) 0.4 cm 11/07/24 1108   Post-Procedure Surface Area (cm^2) 12.6 cm^2 11/07/24 1108   Post-Procedure Volume (cm^3) 5.04 cm^3 11/07/24 1108   Wound Assessment Pink/red 11/07/24 1108   Drainage Amount Moderate (25-50%) 11/07/24 1108   Drainage Description Serosanguinous 11/07/24 1108   Odor None 11/07/24 1108   Luciana-wound Assessment Blanchable erythema 11/07/24 1108   Margins Defined edges 11/07/24 1108   Wound Thickness Description not for Pressure Injury Full thickness 11/07/24 1108   Number of days: 44     Incision 07/13/24 Abdomen Mid;Upper (Active)   Number of days: 117       Percent of Wound(s)/Ulcer(s) Debrided: 100%    Total Surface Area Debrided:  12.6 sq cm     Diabetic/Pressure/Non Pressure Ulcers only:  Ulcer: Non-Pressure ulcer, fat layer exposed      Estimated Blood Loss:  Minimal    Hemostasis Achieved:  by pressure    Procedural Pain:  2  / 10     Post Procedural Pain:  2 / 10     Response to treatment:  Well tolerated by patient.                Plan:       -Continue Syeda     -Return to clinic 2 weeks    -Given work release with 5 pound restriction    -I have reviewed instructions with the patient, answering all questions to satisfaction    -Patient to call or return to clinic as needed if wound symptoms worsen or fail to improve as anticipated    -Provided with printed Discharge Instructions for wound management        Written patient dismissal instructions given to patient and signed by patient or POA.           Electronically signed by SCOTT Mi CNP on 11/7/2024 at 11:42 AM

## 2024-11-18 NOTE — DISCHARGE INSTRUCTIONS
[facility] WOUND CARE CENTER -Phone: [phone] Fax: [phone]    Visit  Discharge Instructions / Physician Orders     DATE: 11/21/2024     Home Care: [facility]     SUPPLIES ORDERED THRU: [facility]     Wound Location: Abdomen     Cleanse with: Liquid antibacterial soap and water, rinse well      Dressing Orders: Collagen with Silver, triad cream (thin layer) to red areas around wound,  cover with ABD tucked into Abdomen, and secure with abdominal binder, nystatin powder to red areas under wound     Frequency: DAILY      Additional Orders: Increase protein to diet (meat, cheese, eggs, fish, peanut butter, nuts and beans)  9/23/2024- Diflucan from pharmacy  10/9/24-diflucan sent to pharmacy   10/16/24 nystatin powder sent to pharmacy     Schedule appointments with OBGYN Dr. Therese Abad [phone] and Infectious Disease, Dr. Douglas Singh [phone]     Your next appointment with Wound Care Center is in 2 weeks     (Please note your next appointment above and if you are unable to keep, kindly give a 24 hour notice. Thank you.)  If more than 15 min late we cannot guarantee you will be seen due to clinician schedule  Per Policy, Excessive cancellation will call for dismissal from program.     If you experience any of the following, please call the Wound Care Center during business hours:  [phone]     * Increase in Pain  * Temperature over 101  * Increase in drainage from your wound  * Drainage with a foul odor  * Bleeding  * Increase in swelling  * Need for compression bandage changes due to slippage, breakthrough drainage.     If you need medical attention outside of the business hours of the Wound Care Centers please contact your PCP or go to the an urgent care or emergency department     The information contained in the After Visit Summary has been reviewed with me, the patient and/or responsible adult, by my health care provider(s). I had the opportunity to ask questions regarding this

## 2024-11-21 ENCOUNTER — HOSPITAL ENCOUNTER (OUTPATIENT)
Dept: WOUND CARE | Age: 34
Discharge: HOME | End: 2024-11-21
Payer: MEDICAID

## 2024-11-21 VITALS
DIASTOLIC BLOOD PRESSURE: 82 MMHG | HEART RATE: 70 BPM | RESPIRATION RATE: 18 BRPM | HEIGHT: 67 IN | BODY MASS INDEX: 22.13 KG/M2 | WEIGHT: 141 LBS | TEMPERATURE: 96.8 F | SYSTOLIC BLOOD PRESSURE: 116 MMHG

## 2024-11-21 DIAGNOSIS — S31.109D OPEN ABDOMINAL WALL WOUND, SUBSEQUENT ENCOUNTER: ICD-10-CM

## 2024-11-21 DIAGNOSIS — B37.2 YEAST DERMATITIS: ICD-10-CM

## 2024-11-21 DIAGNOSIS — Z98.891 S/P CESAREAN SECTION: ICD-10-CM

## 2024-11-21 DIAGNOSIS — T81.89XD NONHEALING SURGICAL WOUND, SUBSEQUENT ENCOUNTER: Primary | ICD-10-CM

## 2024-11-21 PROCEDURE — 11042 DBRDMT SUBQ TIS 1ST 20SQCM/<: CPT

## 2024-11-21 RX ORDER — LIDOCAINE HYDROCHLORIDE 20 MG/ML
JELLY TOPICAL ONCE
Status: COMPLETED | OUTPATIENT
Start: 2024-11-21 | End: 2024-11-21

## 2024-11-21 RX ORDER — LIDOCAINE HYDROCHLORIDE 20 MG/ML
JELLY TOPICAL ONCE
OUTPATIENT
Start: 2024-11-21 | End: 2024-11-21

## 2024-11-21 RX ORDER — LIDOCAINE HYDROCHLORIDE 40 MG/ML
SOLUTION TOPICAL ONCE
OUTPATIENT
Start: 2024-11-21 | End: 2024-11-21

## 2024-11-21 RX ADMIN — LIDOCAINE HYDROCHLORIDE 6 ML: 20 JELLY TOPICAL at 10:06

## 2024-11-21 ASSESSMENT — PAIN DESCRIPTION - DESCRIPTORS: DESCRIPTORS: SORE

## 2024-11-21 ASSESSMENT — PAIN DESCRIPTION - LOCATION: LOCATION: ABDOMEN

## 2024-11-21 ASSESSMENT — PAIN SCALES - GENERAL: PAINLEVEL_OUTOF10: 2

## 2024-11-21 NOTE — PROGRESS NOTES
[facility] Wound Care Center   Progress Note and Procedure Note      Noelle Kim  MEDICAL RECORD NUMBER:  [id number]  AGE: 34 y.o.   GENDER: female  : 1990  EPISODE DATE:  2024    Subjective:     Chief Complaint   Patient presents with    Wound Check     Abdomen         HISTORY of PRESENT ILLNESS HPI     Noelle Kim is a 34 y.o. female who presents today for wound/ulcer evaluation.     History of Wound Context: 24 during  of healthy twins, the patient was found to have severe liver bed varicosities which were bleeding and ovarian vein aneurysm was noted. Due to significant intra-abdominal bleeding patient had delayed closure using AbThera wound vac and postop timeframe complicated by multiple follow up surgeries on  reopen, , and  for wound closure with application of skin sub, elevation of mycocutaneous flap, and wound vac application. She was taken back to OR 24 for wound infection/debridement and vac application. Has home care with [facility], was using wound vac therapy until initial visit to Wound Care Center 2324.      Interval history: The wound has not improved much this week and there is significant moisture associated skin damage around the wound within the cavity in which the wound exists.  The patient states that the drainage also runs down to the lower section of her abdomen.     Wound/Ulcer Pain Timing/Severity: waxing and waning  Quality of pain: aching, tender  Severity:  2 / 10   Modifying Factors: Pain is relieved/improved with rest  Associated Signs/Symptoms: none     Wound/Ulcer Identification:  Ulcer Type: non-healing surgical  Contributing Factors: none          PAST MEDICAL HISTORY        Diagnosis Date    Abnormal cells of cervix     ADD (attention deficit disorder)     Anemia     Complication of anesthesia     Condyloma     Depression     Gestational diabetes mellitus     HSIL (high grade squamous intraepithelial lesion) on Pap

## 2024-12-04 NOTE — DISCHARGE INSTRUCTIONS
[facility] WOUND CARE CENTER -Phone: [phone] Fax: [phone]    Visit  Discharge Instructions / Physician Orders     DATE: 12/5/2024     Home Care: [facility]     SUPPLIES ORDERED THRU: [facility] (ordered 12/5/2024)     Wound Location: Abdomen     Cleanse with: Liquid antibacterial soap and water, rinse well      Dressing Orders: Collagen with Silver, triad cream (thin layer) to red areas around wound, cover with ABD tucked into Abdomen, and secure with abdominal binder, nystatin powder to red areas under wound     Frequency: DAILY      Additional Orders: Increase protein to diet (meat, cheese, eggs, fish, peanut butter, nuts and beans)  9/23/2024- Diflucan from pharmacy  10/9/24-diflucan sent to pharmacy   10/16/24 nystatin powder sent to pharmacy     Schedule appointments with OBGYN Dr. Therese Abad [phone] and Infectious Disease, Dr. Douglas Singh [phone]     Your next appointment with Wound Care Center is in 2 weeks     (Please note your next appointment above and if you are unable to keep, kindly give a 24 hour notice. Thank you.)  If more than 15 min late we cannot guarantee you will be seen due to clinician schedule  Per Policy, Excessive cancellation will call for dismissal from program.     If you experience any of the following, please call the Wound Care Center during business hours:  [phone]     * Increase in Pain  * Temperature over 101  * Increase in drainage from your wound  * Drainage with a foul odor  * Bleeding  * Increase in swelling  * Need for compression bandage changes due to slippage, breakthrough drainage.     If you need medical attention outside of the business hours of the Wound Care Centers please contact your PCP or go to the an urgent care or emergency department     The information contained in the After Visit Summary has been reviewed with me, the patient and/or responsible adult, by my health care provider(s). I had the opportunity to ask questions

## 2024-12-05 ENCOUNTER — HOSPITAL ENCOUNTER (OUTPATIENT)
Dept: WOUND CARE | Age: 34
Discharge: HOME | End: 2024-12-05
Payer: MEDICAID

## 2024-12-05 VITALS
WEIGHT: 141 LBS | BODY MASS INDEX: 22.13 KG/M2 | HEART RATE: 68 BPM | RESPIRATION RATE: 18 BRPM | DIASTOLIC BLOOD PRESSURE: 103 MMHG | SYSTOLIC BLOOD PRESSURE: 152 MMHG | HEIGHT: 67 IN | TEMPERATURE: 97.2 F

## 2024-12-05 DIAGNOSIS — S31.109D OPEN ABDOMINAL WALL WOUND, SUBSEQUENT ENCOUNTER: ICD-10-CM

## 2024-12-05 DIAGNOSIS — B37.2 YEAST DERMATITIS: ICD-10-CM

## 2024-12-05 DIAGNOSIS — T81.89XD NONHEALING SURGICAL WOUND, SUBSEQUENT ENCOUNTER: Primary | ICD-10-CM

## 2024-12-05 DIAGNOSIS — Z98.891 S/P CESAREAN SECTION: ICD-10-CM

## 2024-12-05 PROCEDURE — 11042 DBRDMT SUBQ TIS 1ST 20SQCM/<: CPT

## 2024-12-05 RX ORDER — LIDOCAINE HYDROCHLORIDE 20 MG/ML
JELLY TOPICAL ONCE
Status: COMPLETED | OUTPATIENT
Start: 2024-12-05 | End: 2024-12-05

## 2024-12-05 RX ORDER — LIDOCAINE HYDROCHLORIDE 20 MG/ML
JELLY TOPICAL ONCE
OUTPATIENT
Start: 2024-12-05 | End: 2024-12-05

## 2024-12-05 RX ORDER — LIDOCAINE HYDROCHLORIDE 40 MG/ML
SOLUTION TOPICAL ONCE
OUTPATIENT
Start: 2024-12-05 | End: 2024-12-05

## 2024-12-05 RX ADMIN — LIDOCAINE HYDROCHLORIDE 6 ML: 20 JELLY TOPICAL at 10:10

## 2024-12-05 ASSESSMENT — PAIN DESCRIPTION - LOCATION: LOCATION: ABDOMEN

## 2024-12-05 ASSESSMENT — PAIN SCALES - GENERAL: PAINLEVEL_OUTOF10: 2

## 2024-12-05 NOTE — PROGRESS NOTES
Wound Care Supplies      Supply Company:     [employer]  Phone: [phone]  Fax: [phone]    Ordering Center:     [facility] WOUND [place] [address] [facility] [address]  [phone]  WOUND CARE Dept: [phone]   FAX NUMBER [phone]    Patient Information:      Noelle Kim  [address]  [facility] [address]   [phone]   : 1990  AGE: 34 y.o.     GENDER: female   TODAYS DATE:  2024    Insurance:      PRIMARY INSURANCE:  Plan: [facility] MEDICAID  Coverage: [facility] MEDICAID  Effective Date: 2023  [id number] - (Medicaid Managed)      Patient Wound Information:       Codes Comments   Nonhealing surgical wound, subsequent encounter  - Primary T81.89XD    Open abdominal wall wound, subsequent encounter S31.109D    S/P  section Z98.891    Yeast dermatitis B37.2            WOUNDS REQUIRING DRESSING SUPPLIES:     Wound 24 Pelvis Anterior;Mid center of transverse lower abdominal incision (Active)   Number of days: 121       Wound 24 Abdomen Lower;Mid #1 (Active)   Wound Image   24 1010   Wound Etiology Non-Healing Surgical 24 1010   Dressing Status New drainage noted;Old drainage noted 24 1010   Wound Cleansed Cleansed with saline 24 1010   Dressing/Treatment Other (comment) 10/30/24 1449   Wound Length (cm) 2 cm 24 1010   Wound Width (cm) 0.3 cm 24 1010   Wound Depth (cm) 0.2 cm 24 1010   Wound Surface Area (cm^2) 0.6 cm^2 24 1010   Change in Wound Size % (l*w) 99.16 24 1010   Wound Volume (cm^3) 0.12 cm^3 24 1010   Wound Healing % 100 24 1010   Post-Procedure Length (cm) 2 cm 24 1010   Post-Procedure Width (cm) 0.3 cm 24 1010   Post-Procedure Depth (cm) 0.2 cm 24 1010   Post-Procedure Surface Area (cm^2) 0.6 cm^2 24 1010   Post-Procedure Volume (cm^3) 0.12 cm^3 24 1010   Wound Assessment Pink/red 24 1010   Drainage Amount Moderate (25-50%) 24 1010 
12/05/24 1010   Wound Surface Area (cm^2) 0.6 cm^2 12/05/24 1010   Change in Wound Size % (l*w) 99.16 12/05/24 1010   Wound Volume (cm^3) 0.12 cm^3 12/05/24 1010   Wound Healing % 100 12/05/24 1010   Post-Procedure Length (cm) 2 cm 12/05/24 1010   Post-Procedure Width (cm) 0.3 cm 12/05/24 1010   Post-Procedure Depth (cm) 0.2 cm 12/05/24 1010   Post-Procedure Surface Area (cm^2) 0.6 cm^2 12/05/24 1010   Post-Procedure Volume (cm^3) 0.12 cm^3 12/05/24 1010   Wound Assessment Pink/red 12/05/24 1010   Drainage Amount Moderate (25-50%) 12/05/24 1010   Drainage Description Serosanguinous 12/05/24 1010   Odor None 12/05/24 1010   Luciana-wound Assessment Blanchable erythema 12/05/24 1010   Margins Defined edges 12/05/24 1010   Wound Thickness Description not for Pressure Injury Full thickness 12/05/24 1010   Number of days: 72       Wound 12/05/24 Abdomen Lower;Distal Wound #2 (Active)   Wound Image   12/05/24 1010   Wound Etiology Non-Healing Surgical 12/05/24 1010   Dressing Status New drainage noted;Old drainage noted 12/05/24 1010   Wound Cleansed Cleansed with saline 12/05/24 1010   Wound Length (cm) 1.6 cm 12/05/24 1010   Wound Width (cm) 0.6 cm 12/05/24 1010   Wound Depth (cm) 0.2 cm 12/05/24 1010   Wound Surface Area (cm^2) 0.96 cm^2 12/05/24 1010   Wound Volume (cm^3) 0.192 cm^3 12/05/24 1010   Post-Procedure Length (cm) 1.6 cm 12/05/24 1010   Post-Procedure Width (cm) 0.6 cm 12/05/24 1010   Post-Procedure Depth (cm) 0.2 cm 12/05/24 1010   Post-Procedure Surface Area (cm^2) 0.96 cm^2 12/05/24 1010   Post-Procedure Volume (cm^3) 0.192 cm^3 12/05/24 1010   Wound Assessment Pink/red;Slough 12/05/24 1010   Drainage Amount Moderate (25-50%) 12/05/24 1010   Drainage Description Serosanguinous 12/05/24 1010   Odor None 12/05/24 1010   Luciana-wound Assessment Blanchable erythema 12/05/24 1010   Margins Attached edges;Defined edges 12/05/24 1010   Wound Thickness Description not for Pressure Injury Full thickness 12/05/24 1010

## 2024-12-23 NOTE — DISCHARGE INSTRUCTIONS
regarding this information. I have elected to receive;      []After Visit Summary  [x]Comprehensive Discharge Instruction        Patient signature______________________________________Date:________

## 2024-12-26 ENCOUNTER — HOSPITAL ENCOUNTER (OUTPATIENT)
Dept: WOUND CARE | Age: 34
Discharge: HOME | End: 2024-12-26

## 2025-01-09 NOTE — DISCHARGE INSTRUCTIONS
[facility] -Phone: [phone] Fax: [phone]    Visit  Discharge Instructions / Physician Orders     DATE: 1/10/2025     Home Care: [facility]     SUPPLIES ORDERED THRU: [facility] (ordered 12/5/2024)     Wound Location: Abdomen- Healed     Cleanse with: As Normal     Dressing Orders:      Frequency:      Additional Orders: Increase protein to diet (meat, cheese, eggs, fish, peanut butter, nuts and beans)  9/23/2024- Diflucan from pharmacy  10/9/24-diflucan sent to pharmacy   10/16/24 nystatin powder sent to pharmacy     Schedule appointments with OBGYN Dr. Therese Abad [phone] and Infectious Disease, Dr. Douglas Singh [phone]     Your next appointment with Wound Care Center is- call if needed     (Please note your next appointment above and if you are unable to keep, kindly give a 24 hour notice. Thank you.)  If more than 15 min late we cannot guarantee you will be seen due to clinician schedule  Per Policy, Excessive cancellation will call for dismissal from program.     If you experience any of the following, please call the Wound Care Center during business hours:  [phone]     * Increase in Pain  * Temperature over 101  * Increase in drainage from your wound  * Drainage with a foul odor  * Bleeding  * Increase in swelling  * Need for compression bandage changes due to slippage, breakthrough drainage.     If you need medical attention outside of the business hours of the Wound Care Centers please contact your PCP or go to the an urgent care or emergency department     The information contained in the After Visit Summary has been reviewed with me, the patient and/or responsible adult, by my health care provider(s). I had the opportunity to ask questions regarding this information. I have elected to receive;      []After Visit Summary  [x]Comprehensive Discharge Instruction        Patient signature______________________________________Date:________   Electronically signed by

## 2025-01-10 ENCOUNTER — HOSPITAL ENCOUNTER (OUTPATIENT)
Dept: WOUND CARE | Age: 35
Discharge: HOME | End: 2025-01-10
Payer: MEDICAID

## 2025-01-10 VITALS
DIASTOLIC BLOOD PRESSURE: 8 MMHG | TEMPERATURE: 96.4 F | HEIGHT: 67 IN | RESPIRATION RATE: 19 BRPM | SYSTOLIC BLOOD PRESSURE: 111 MMHG | WEIGHT: 141 LBS | BODY MASS INDEX: 22.13 KG/M2 | HEART RATE: 58 BPM

## 2025-01-10 DIAGNOSIS — Z98.891 S/P CESAREAN SECTION: ICD-10-CM

## 2025-01-10 DIAGNOSIS — S31.109D OPEN ABDOMINAL WALL WOUND, SUBSEQUENT ENCOUNTER: Primary | ICD-10-CM

## 2025-01-10 DIAGNOSIS — T81.89XD NONHEALING SURGICAL WOUND, SUBSEQUENT ENCOUNTER: ICD-10-CM

## 2025-01-10 PROBLEM — B37.2 YEAST DERMATITIS: Status: RESOLVED | Noted: 2024-09-23 | Resolved: 2025-01-10

## 2025-01-10 PROBLEM — A41.9 SEPSIS (HCC): Status: RESOLVED | Noted: 2024-08-05 | Resolved: 2025-01-10

## 2025-01-10 PROBLEM — T81.89XA DRAINING POSTOPERATIVE WOUND: Status: RESOLVED | Noted: 2024-09-09 | Resolved: 2025-01-10

## 2025-01-10 PROBLEM — T81.49XA SURGICAL SITE INFECTION: Status: RESOLVED | Noted: 2024-09-09 | Resolved: 2025-01-10

## 2025-01-10 PROBLEM — B99.9 INTRA-ABDOMINAL INFECTION: Status: RESOLVED | Noted: 2024-08-05 | Resolved: 2025-01-10

## 2025-01-10 PROBLEM — R58 INTRA-ABDOMINAL BLEEDING: Status: RESOLVED | Noted: 2024-07-15 | Resolved: 2025-01-10

## 2025-01-10 PROBLEM — S30.1XXA ABDOMINAL WALL SEROMA: Status: RESOLVED | Noted: 2024-08-07 | Resolved: 2025-01-10

## 2025-01-10 PROCEDURE — 99211 OFF/OP EST MAY X REQ PHY/QHP: CPT

## 2025-01-10 PROCEDURE — 99212 OFFICE O/P EST SF 10 MIN: CPT | Performed by: NURSE PRACTITIONER

## 2025-01-10 RX ORDER — LIDOCAINE HYDROCHLORIDE 40 MG/ML
SOLUTION TOPICAL ONCE
Status: CANCELLED | OUTPATIENT
Start: 2025-01-10 | End: 2025-01-10

## 2025-01-10 RX ORDER — LIDOCAINE HYDROCHLORIDE 20 MG/ML
JELLY TOPICAL ONCE
Status: CANCELLED | OUTPATIENT
Start: 2025-01-10 | End: 2025-01-10

## 2025-01-10 RX ORDER — LIDOCAINE HYDROCHLORIDE 20 MG/ML
JELLY TOPICAL ONCE
Status: DISCONTINUED | OUTPATIENT
Start: 2025-01-10 | End: 2025-01-10

## 2025-01-10 ASSESSMENT — ENCOUNTER SYMPTOMS
COUGH: 0
RHINORRHEA: 0
VOMITING: 0
DIARRHEA: 0
SHORTNESS OF BREATH: 0
NAUSEA: 0

## 2025-01-10 NOTE — PROGRESS NOTES
[facility]   Progress Note and Procedure Note      Noelle iKm  MEDICAL RECORD NUMBER:  [id number]  AGE: 34 y.o.   GENDER: female  : 1990  EPISODE DATE:  1/10/2025    Subjective:     Chief Complaint   Patient presents with    Wound Check     abdomen         HISTORY of PRESENT ILLNESS HPI     Noelle Kim is a 34 y.o. female who presents today for wound/ulcer evaluation.   History of Wound Context: presents in follow up on abdominal wound that is healed today. She is able to return to work without restrictions.   Wound/Ulcer Pain Timing/Severity: none  Quality of pain: N/A  Severity:  0 / 10   Modifying Factors: None  Associated Signs/Symptoms: none    Ulcer Identification:  Ulcer Type: non-healing surgical  Contributing Factors: non-adherence and substance abuse    Wound: N/A        PAST MEDICAL HISTORY        Diagnosis Date    Abnormal cells of cervix     ADD (attention deficit disorder)     Anemia     Complication of anesthesia     Condyloma     Depression     Gestational diabetes mellitus     HSIL (high grade squamous intraepithelial lesion) on Pap smear of cervix     Liver disease     MRSA (methicillin resistant staph aureus) culture positive 2011    BUTTOCK    Pre-eclampsia affecting puerperium 7/15/2024    Severe dysplasia of cervix     STD (sexually transmitted disease)     Substance abuse (HCC)     heroin / on 18 pt states last used 3 yrs ago,marijuana 23. pt on buprenorphine SL every am       PAST SURGICAL HISTORY    Past Surgical History:   Procedure Laterality Date    ABDOMEN SURGERY N/A 2024    ** ADD ON**  DEBRIDEMENT OF MIDLINE WOUND WITH  WOUND VAC PLACEMENT performed by Gary Ragsdale MD at [facility] OR     SECTION N/A 2024     SECTION performed by Sandra Gallegos MD at [facility] L&D OR    CT ABSCESS DRAINAGE  2024    CT ABSCESS DRAIN SUBCUTANEOUS 2024 [facility] CT SCAN    DEBRIDEMENT  2024    DEBRIDEMENT OF

## 2025-02-10 ENCOUNTER — TRANSCRIBE ORDERS (OUTPATIENT)
Dept: ADMINISTRATIVE | Age: 35
End: 2025-02-10

## 2025-02-10 DIAGNOSIS — B18.2 CHRONIC HEPATITIS C WITHOUT HEPATIC COMA (HCC): Primary | ICD-10-CM

## 2025-05-27 ENCOUNTER — OFFICE VISIT (OUTPATIENT)
Age: 35
End: 2025-05-27

## 2025-05-27 VITALS
HEIGHT: 68 IN | OXYGEN SATURATION: 98 % | DIASTOLIC BLOOD PRESSURE: 75 MMHG | TEMPERATURE: 98.3 F | SYSTOLIC BLOOD PRESSURE: 114 MMHG | BODY MASS INDEX: 28.2 KG/M2 | WEIGHT: 186.07 LBS | HEART RATE: 70 BPM

## 2025-05-27 DIAGNOSIS — A08.4 VIRAL GASTROENTERITIS: Primary | ICD-10-CM

## 2025-05-27 RX ORDER — ONDANSETRON 4 MG/1
8 TABLET, ORALLY DISINTEGRATING ORAL EVERY 12 HOURS PRN
Qty: 6 TABLET | Refills: 0 | Status: SHIPPED | OUTPATIENT
Start: 2025-05-27

## 2025-05-27 ASSESSMENT — ENCOUNTER SYMPTOMS
DIARRHEA: 1
NAUSEA: 1

## 2025-05-27 NOTE — PROGRESS NOTES
A/P:  Noelle Kim (: 1990) is a 35 y.o. female with The encounter diagnosis was Viral gastroenteritis..    Diagnosis Orders   1. Viral gastroenteritis  ondansetron (ZOFRAN-ODT) 4 MG disintegrating tablet      - Recommend hydration and a bland diet for the next 3 to 4 days, patient agreed with plan  - Recommend 1 to 2 days off from work to recover if needed    -time spent with patient: 12 mins    Discharge instructions:   - Drink enough amount of fluids throughout the day to prevent dehydration  - Rest. Advance activities and diet as tolerated   - Take the prescribed medications as instructed.  - Tylenol / Motrin as needed for pain and fever and follow up with your PCP as instructed  Patient was instructed to return for re evaluation or go to the ER if symptoms  worsen, persist, or any other concerns . Patient verbalized understanding       Chief complaint(s): No chief complaint on file.    Noelle Kim (: 1990) is a 35 y.o. female, New patient, here for evaluation of the following     Patient presents complaining of 1 day of nausea and diarrhea.  Patient states son has similar symptoms.      History provided by:  Relative   used: No         PAST MEDICAL HISTORY    Past Medical History:   Diagnosis Date    Abnormal cells of cervix     ADD (attention deficit disorder)     Anemia     Complication of anesthesia     Condyloma     Depression     Gestational diabetes mellitus     HSIL (high grade squamous intraepithelial lesion) on Pap smear of cervix     Liver disease     MRSA (methicillin resistant staph aureus) culture positive 2011    BUTTOCK    Pre-eclampsia affecting puerperium 7/15/2024    Severe dysplasia of cervix     STD (sexually transmitted disease)     Substance abuse (HCC)     heroin / on 18 pt states last used 3 yrs ago,marijuana 23. pt on buprenorphine SL every am       SURGICAL HISTORY    Past Surgical History:   Procedure Laterality Date    ABDOMEN

## 2025-05-29 ENCOUNTER — HOSPITAL ENCOUNTER (EMERGENCY)
Age: 35
Discharge: HOME | End: 2025-05-30
Payer: MEDICAID

## 2025-05-29 VITALS
WEIGHT: 185 LBS | DIASTOLIC BLOOD PRESSURE: 72 MMHG | OXYGEN SATURATION: 97 % | BODY MASS INDEX: 29.03 KG/M2 | RESPIRATION RATE: 16 BRPM | HEART RATE: 76 BPM | HEIGHT: 67 IN | SYSTOLIC BLOOD PRESSURE: 117 MMHG | TEMPERATURE: 97.4 F

## 2025-05-29 DIAGNOSIS — R19.7 NAUSEA VOMITING AND DIARRHEA: ICD-10-CM

## 2025-05-29 DIAGNOSIS — K52.9 GASTROENTERITIS: Primary | ICD-10-CM

## 2025-05-29 DIAGNOSIS — R11.2 NAUSEA VOMITING AND DIARRHEA: ICD-10-CM

## 2025-05-29 PROCEDURE — 80048 BASIC METABOLIC PNL TOTAL CA: CPT

## 2025-05-29 PROCEDURE — 83690 ASSAY OF LIPASE: CPT

## 2025-05-29 PROCEDURE — 84703 CHORIONIC GONADOTROPIN ASSAY: CPT

## 2025-05-29 PROCEDURE — 83735 ASSAY OF MAGNESIUM: CPT

## 2025-05-29 PROCEDURE — 99285 EMERGENCY DEPT VISIT HI MDM: CPT

## 2025-05-29 PROCEDURE — 85025 COMPLETE CBC W/AUTO DIFF WBC: CPT

## 2025-05-29 PROCEDURE — 80076 HEPATIC FUNCTION PANEL: CPT

## 2025-05-29 RX ORDER — 0.9 % SODIUM CHLORIDE 0.9 %
1000 INTRAVENOUS SOLUTION INTRAVENOUS ONCE
Status: COMPLETED | OUTPATIENT
Start: 2025-05-29 | End: 2025-05-30

## 2025-05-29 RX ORDER — ONDANSETRON 2 MG/ML
4 INJECTION INTRAMUSCULAR; INTRAVENOUS ONCE
Status: COMPLETED | OUTPATIENT
Start: 2025-05-29 | End: 2025-05-30

## 2025-05-29 ASSESSMENT — LIFESTYLE VARIABLES
HOW MANY STANDARD DRINKS CONTAINING ALCOHOL DO YOU HAVE ON A TYPICAL DAY: PATIENT DOES NOT DRINK
HOW OFTEN DO YOU HAVE A DRINK CONTAINING ALCOHOL: NEVER

## 2025-05-30 ENCOUNTER — APPOINTMENT (OUTPATIENT)
Dept: CT IMAGING | Age: 35
End: 2025-05-30
Payer: MEDICAID

## 2025-05-30 LAB
ALBUMIN SERPL-MCNC: 4.3 G/DL (ref 3.5–5.2)
ALBUMIN/GLOB SERPL: 1.6 {RATIO} (ref 1–2.5)
ALP SERPL-CCNC: 53 U/L (ref 35–104)
ALT SERPL-CCNC: 26 U/L (ref 10–35)
ANION GAP SERPL CALCULATED.3IONS-SCNC: 12 MMOL/L (ref 9–16)
AST SERPL-CCNC: 24 U/L (ref 10–35)
BASOPHILS # BLD: <0.03 K/UL (ref 0–0.2)
BASOPHILS NFR BLD: 0 % (ref 0–2)
BILIRUB DIRECT SERPL-MCNC: 0.6 MG/DL (ref 0–0.2)
BILIRUB INDIRECT SERPL-MCNC: 0.6 MG/DL
BILIRUB SERPL-MCNC: 1.2 MG/DL (ref 0–1.2)
BUN SERPL-MCNC: 11 MG/DL (ref 6–20)
CALCIUM SERPL-MCNC: 8.9 MG/DL (ref 8.6–10.4)
CHLORIDE SERPL-SCNC: 104 MMOL/L (ref 98–107)
CO2 SERPL-SCNC: 22 MMOL/L (ref 20–31)
CREAT SERPL-MCNC: 0.6 MG/DL (ref 0.5–0.9)
EOSINOPHIL # BLD: 0.18 K/UL (ref 0–0.44)
EOSINOPHILS RELATIVE PERCENT: 2 % (ref 1–4)
ERYTHROCYTE [DISTWIDTH] IN BLOOD BY AUTOMATED COUNT: 12.8 % (ref 11.8–14.4)
GFR, ESTIMATED: >90 ML/MIN/1.73M2
GLUCOSE SERPL-MCNC: 92 MG/DL (ref 74–99)
HCG SERPL QL: NEGATIVE
HCT VFR BLD AUTO: 38.3 % (ref 36.3–47.1)
HGB BLD-MCNC: 13.5 G/DL (ref 11.9–15.1)
IMM GRANULOCYTES # BLD AUTO: 0.02 K/UL (ref 0–0.3)
IMM GRANULOCYTES NFR BLD: 0 %
LIPASE SERPL-CCNC: 12 U/L (ref 13–60)
LYMPHOCYTES NFR BLD: 1.47 K/UL (ref 1.1–3.7)
LYMPHOCYTES RELATIVE PERCENT: 19 % (ref 24–43)
MAGNESIUM SERPL-MCNC: 1.7 MG/DL (ref 1.6–2.6)
MCH RBC QN AUTO: 31.5 PG (ref 25.2–33.5)
MCHC RBC AUTO-ENTMCNC: 35.2 G/DL (ref 28.4–34.8)
MCV RBC AUTO: 89.5 FL (ref 82.6–102.9)
MONOCYTES NFR BLD: 0.45 K/UL (ref 0.1–1.2)
MONOCYTES NFR BLD: 6 % (ref 3–12)
NEUTROPHILS NFR BLD: 73 % (ref 36–65)
NEUTS SEG NFR BLD: 5.6 K/UL (ref 1.5–8.1)
NRBC BLD-RTO: 0 PER 100 WBC
PLATELET # BLD AUTO: 135 K/UL (ref 138–453)
PMV BLD AUTO: 12.2 FL (ref 8.1–13.5)
POTASSIUM SERPL-SCNC: 3.6 MMOL/L (ref 3.7–5.3)
PROT SERPL-MCNC: 6.9 G/DL (ref 6.6–8.7)
RBC # BLD AUTO: 4.28 M/UL (ref 3.95–5.11)
SODIUM SERPL-SCNC: 137 MMOL/L (ref 136–145)
WBC OTHER # BLD: 7.7 K/UL (ref 3.5–11.3)

## 2025-05-30 PROCEDURE — 2500000003 HC RX 250 WO HCPCS

## 2025-05-30 PROCEDURE — 6360000004 HC RX CONTRAST MEDICATION

## 2025-05-30 PROCEDURE — 74177 CT ABD & PELVIS W/CONTRAST: CPT

## 2025-05-30 PROCEDURE — 6360000002 HC RX W HCPCS

## 2025-05-30 PROCEDURE — 2580000003 HC RX 258

## 2025-05-30 PROCEDURE — 96374 THER/PROPH/DIAG INJ IV PUSH: CPT

## 2025-05-30 RX ORDER — SODIUM CHLORIDE 0.9 % (FLUSH) 0.9 %
10 SYRINGE (ML) INJECTION PRN
Status: DISCONTINUED | OUTPATIENT
Start: 2025-05-30 | End: 2025-05-30 | Stop reason: HOSPADM

## 2025-05-30 RX ORDER — IOPAMIDOL 755 MG/ML
75 INJECTION, SOLUTION INTRAVASCULAR
Status: COMPLETED | OUTPATIENT
Start: 2025-05-30 | End: 2025-05-30

## 2025-05-30 RX ORDER — 0.9 % SODIUM CHLORIDE 0.9 %
80 INTRAVENOUS SOLUTION INTRAVENOUS ONCE
Status: COMPLETED | OUTPATIENT
Start: 2025-05-30 | End: 2025-05-30

## 2025-05-30 RX ADMIN — ONDANSETRON 4 MG: 2 INJECTION, SOLUTION INTRAMUSCULAR; INTRAVENOUS at 00:07

## 2025-05-30 RX ADMIN — IOPAMIDOL 75 ML: 755 INJECTION, SOLUTION INTRAVENOUS at 00:30

## 2025-05-30 RX ADMIN — SODIUM CHLORIDE 80 ML: 0.9 INJECTION, SOLUTION INTRAVENOUS at 00:31

## 2025-05-30 RX ADMIN — SODIUM CHLORIDE, PRESERVATIVE FREE 10 ML: 5 INJECTION INTRAVENOUS at 00:30

## 2025-05-30 RX ADMIN — SODIUM CHLORIDE 1000 ML: 9 INJECTION, SOLUTION INTRAVENOUS at 00:06

## 2025-06-02 ASSESSMENT — ENCOUNTER SYMPTOMS
BLOOD IN STOOL: 0
NAUSEA: 1
ABDOMINAL PAIN: 1
DIARRHEA: 1
CONSTIPATION: 0
VOMITING: 1
SHORTNESS OF BREATH: 0

## 2025-06-02 NOTE — ED PROVIDER NOTES
medication and fluids.  Will plan for outpatient treatment and discharge    Shared decision performed with patient/family/friends regarding discharge.  They are in agreement at this time for plan for discharge.  They were advised to follow-up with appropriate specialists and PCP in 1 day regarding this visit and diagnosis.  They were given opportunity for questioning and questions were answered to their satisfaction.  Appropriate prescriptions and referrals were sent digitally and/or via discharge paperwork.  Discharged in no distress and alert and oriented per their baseline.  They were advised to return to the emergency department for any new or worsening symptoms or if needing further evaluation.      Amount and/or Complexity of Data Reviewed  Labs: ordered.  Radiology: ordered.    Risk  Prescription drug management.            RADIOLOGY:         Interpretation per the Radiologist below, if available at the time of this note:    CT ABDOMEN PELVIS W IV CONTRAST Additional Contrast? None   Final Result   1. Improved fluid collection adjacent to the spleen compared to the previous exam.   2. Resolved complex abscess within the pelvis adjacent to the uterus. No new intra-abdominal or pelvic abscess is seen.   3. Mild periportal edema which is nonspecific, possibly related to fluid resuscitation,  correlate for possible hepatitis.   4. Liquid stool throughout the colon related to an acute diarrheal illness  without obstruction or ileus.                  EMERGENCY DEPARTMENT COURSE:           Labs Reviewed   BASIC METABOLIC PANEL - Abnormal; Notable for the following components:       Result Value    Potassium 3.6 (*)     All other components within normal limits   CBC WITH AUTO DIFFERENTIAL - Abnormal; Notable for the following components:    MCHC 35.2 (*)     Platelets 135 (*)     Neutrophils % 73 (*)     Lymphocytes % 19 (*)     All other components within normal limits   HEPATIC FUNCTION PANEL - Abnormal; Notable

## (undated) DEVICE — SUTURE PDS II SZ 0 L27IN ABSRB VLT L26MM CT-2 1/2 CIR Z334H

## (undated) DEVICE — 3M™ IOBAN™ 2 ANTIMICROBIAL INCISE DRAPE 6650EZ: Brand: IOBAN™ 2

## (undated) DEVICE — SUTURE PERMAHAND SZ 2-0 L18IN NONABSORBABLE BLK L26MM SH C012D

## (undated) DEVICE — DRESSING NEG PRSS L W15XL10CM D1CM POLYVI ALC WHT FOAM VAC

## (undated) DEVICE — SUTURE PERMA-HAND SZ 2-0 L30IN NONABSORBABLE BLK L26MM SH K833H

## (undated) DEVICE — 3M™ STERI-DRAPE™ ISOLATION BAG, 10 PER CARTON / 4 CARTONS PER CASE, 1003: Brand: 3M™ STERI-DRAPE™

## (undated) DEVICE — GOWN,SIRUS,POLYRNF,BRTHSLV,XL,30/CS: Brand: MEDLINE

## (undated) DEVICE — AGENT HEMSTAT W2XL4IN FIBRIN SEAL PTCH DSG EVARREST

## (undated) DEVICE — SUTURE VIC + SH-13-0 27IN  VCP311H

## (undated) DEVICE — SUTURE ETHIBOND EXCEL SZ 0 L30IN NONABSORBABLE GRN CT1 L36MM X424H

## (undated) DEVICE — STRAP,POSITIONING,KNEE/BODY,FOAM,4X60": Brand: MEDLINE

## (undated) DEVICE — PREMIUM DRY TRAY LF: Brand: MEDLINE INDUSTRIES, INC.

## (undated) DEVICE — COVER OR TBL W40XL90IN ABSRB STD AND GRIPPY BK SAHARA

## (undated) DEVICE — ULTRASONIC SET TUBE SONIC 1 SONICVAC DISP

## (undated) DEVICE — DRESSING BORDERED ADH GZ UNIV GEN USE 8INX4IN AND 6INX2IN

## (undated) DEVICE — RELOAD STPL L100MM OPN H3.8MM CLS H1.5MM WIRE DIA0.2MM BLU

## (undated) DEVICE — CANISTER NEG PRSS 500ML WND THER W/ TBNG NO PRSS RANG W/

## (undated) DEVICE — LIGASURE IMPACT FT10 COMPATIBLE: Brand: MEDLINE

## (undated) DEVICE — TRAY SPNL 24GA L4IN PENCAN PNCL PNT NDL 0.75% BIPIVCAIN W/

## (undated) DEVICE — SUTURE VICRYL SZ 2-0 L27IN ABSRB UD L26MM SH 1/2 CIR J417H

## (undated) DEVICE — 1LYRTR 16FR10ML100%SIL UMS SNP: Brand: MEDLINE INDUSTRIES, INC.

## (undated) DEVICE — GOWN,SIRUS,NONRNF,SETINSLV,XL,20/CS: Brand: MEDLINE

## (undated) DEVICE — GLOVE SURG SZ 75 L12IN FNGR THK79MIL GRN LTX FREE

## (undated) DEVICE — GLOVE ORANGE PI 7 1/2   MSG9075

## (undated) DEVICE — APPLIER CLP L L13IN TI MULT RNG HNDL 20 CLP STR LIGACLP

## (undated) DEVICE — DRESSING NEG PRESSURE INCISION MGMT SYS 90 CM KIT PREVENA +

## (undated) DEVICE — PAD,SANITARY,11 IN,MAXI,W/WINGS,N-STRL: Brand: MEDLINE

## (undated) DEVICE — Device

## (undated) DEVICE — STRAP ARMBRD W1.5XL32IN FOAM STR YET SFT W/ HK AND LOOP

## (undated) DEVICE — KIT DRSG SM W12.5XH3.2XL18CM VAC SH DRP PD W/ CONN DISP RUL

## (undated) DEVICE — COAGULATOR SUCT 10FR L6IN HND FT SWCH VALLEYLAB

## (undated) DEVICE — TUBING, SUCTION, 9/32" X 20', STRAIGHT: Brand: MEDLINE INDUSTRIES, INC.

## (undated) DEVICE — CANISTER NEG PRSS 1000ML W/ GEL INFOVAC

## (undated) DEVICE — GLUE TISS 10ML PLAS STD SPRD TIP SYR PLUNG PREFIL SKIN CLSR 5PK

## (undated) DEVICE — PREVENA INCISION MANAGEMENT SYSTEM- PEEL & PLACE DRESSING: Brand: PREVENA™ PEEL & PLACE™

## (undated) DEVICE — SYSTEM WND THER 14 DAY 150 CC CANSTR THER UNIT PREVENA + 125

## (undated) DEVICE — SUTURE PERMAHAND SZ 2-0 L12X18IN NONABSORBABLE BLK SILK A185H

## (undated) DEVICE — SVMMC GYN MIN PK

## (undated) DEVICE — AGENT HEMSTAT 3GM OXIDIZED REGENERATED CELOS ABSRB FOR CONT (ORDER MULTIPLES OF 5EA)

## (undated) DEVICE — GLOVE SURG SZ 6 THK91MIL LTX FREE SYN POLYISOPRENE ANTI

## (undated) DEVICE — ELECTRODE ARTHSCP W10MM D10MM SHFT 11CM YEL MPLR LOOP W/

## (undated) DEVICE — GLOVE ORANGE PI 7   MSG9070

## (undated) DEVICE — KENDALL SCD EXPRESS SLEEVES, KNEE LENGTH, MEDIUM: Brand: KENDALL SCD

## (undated) DEVICE — PACK SURG ABD SVMMC

## (undated) DEVICE — RELOAD STPL L75MM OPN H3.8MM CLS 1.5MM WIRE DIA0.2MM REG

## (undated) DEVICE — SOLUTION IRRIGATION STRL H2O 1000 ML UROMATIC CONTAINER

## (undated) DEVICE — SOLUTION SOD CHL 0.9% 1000ML

## (undated) DEVICE — SUTURE VCRL SZ 3-0 L27IN ABSRB UD L26MM SH 1/2 CIR J416H

## (undated) DEVICE — UNDERPANTS MAT L XL SEAMLESS CLR CODE WAISTBAND KNIT

## (undated) DEVICE — PENCIL ES L3M BTTN SWCH HOLSTER W/ BLDE ELECTRD EDGE

## (undated) DEVICE — STAPLER INT L100MM CUT LN L98MM STPL LN L102MM BLU B FRM 8

## (undated) DEVICE — Z DISCONTINUED USE 2272124 DRAPE SURG XL N INVASIVE 2 LAYR DISP

## (undated) DEVICE — BRAVA STRIP PASTE. OSTOMY CARE.: Brand: BRAVA

## (undated) DEVICE — SUTURE PERMAHAND SZ 3-0 L30IN NONABSORBABLE BLK SILK BRAID A304H

## (undated) DEVICE — STAPLER INT L75MM CUT LN L73MM STPL LN L77MM BLU B FRM 8

## (undated) DEVICE — STERILE POLYISOPRENE POWDER-FREE SURGICAL GLOVES WITH EMOLLIENT COATING: Brand: PROTEXIS

## (undated) DEVICE — TOWEL SURG W16XL26IN WHT NONFENESTRATED ST 2 PER PK

## (undated) DEVICE — ELECTRODE ARTHSCP W20MM D12MM SHFT L11CM RND MPLR SAFE T G

## (undated) DEVICE — SUTURE PERMAHAND SZ 0 L18IN NONABSORBABLE BLK SILK BRAID A186H

## (undated) DEVICE — SUTURE MONOCRYL SZ 4-0 L27IN ABSRB UD L19MM PS-2 1/2 CIR PRIM Y426H

## (undated) DEVICE — SOLUTION SCRB 4OZ 4% CHG H2O AIDED FOR PREOPERATIVE SKIN

## (undated) DEVICE — GARMENT,MEDLINE,DVT,INT,CALF,MED, GEN2: Brand: MEDLINE

## (undated) DEVICE — SUTURE VICRYL 3-0 L36IN ABSRB VLT CT-1 L36MM 1/2 CIR J344H

## (undated) DEVICE — SUTURE VICRYL SZ 0 L36IN ABSRB UD L36MM CT-1 1/2 CIR J946H

## (undated) DEVICE — SUTURE PERMAHAND SZ 3-0 L30IN NONABSORBABLE BLK SH L26MM K832H

## (undated) DEVICE — KIT DSG ABTHERA SENSATRAC

## (undated) DEVICE — SUTURE VICRYL SZ 2-0 L27IN ABSRB UD L26MM CT-2 1/2 CIR J269H

## (undated) DEVICE — SUTURE MONOCRYL SZ 0 L36IN ABSRB VLT L48MM CTX 1/2 CIR Y398H

## (undated) DEVICE — 450 ML BOTTLE OF 0.05% CHLORHEXIDINE GLUCONATE IN 99.95% STERILE WATER FOR IRRIGATION, USP AND APPLICATOR.: Brand: IRRISEPT ANTIMICROBIAL WOUND LAVAGE

## (undated) DEVICE — SPONGE LAP W18XL18IN WHT COT 4 PLY FLD STRUNG RADPQ DISP ST 2 PER PACK